# Patient Record
Sex: MALE | Race: WHITE | NOT HISPANIC OR LATINO | URBAN - METROPOLITAN AREA
[De-identification: names, ages, dates, MRNs, and addresses within clinical notes are randomized per-mention and may not be internally consistent; named-entity substitution may affect disease eponyms.]

---

## 2017-04-18 ENCOUNTER — OFFICE (OUTPATIENT)
Dept: URBAN - METROPOLITAN AREA CLINIC 75 | Facility: CLINIC | Age: 67
End: 2017-04-18

## 2017-04-18 VITALS
SYSTOLIC BLOOD PRESSURE: 140 MMHG | WEIGHT: 196 LBS | HEART RATE: 72 BPM | DIASTOLIC BLOOD PRESSURE: 80 MMHG | HEIGHT: 68 IN

## 2017-04-18 DIAGNOSIS — R13.10 DYSPHAGIA, UNSPECIFIED: ICD-10-CM

## 2017-04-18 DIAGNOSIS — Z86.010 PERSONAL HISTORY OF COLONIC POLYPS: ICD-10-CM

## 2017-04-18 DIAGNOSIS — K21.9 GASTRO-ESOPHAGEAL REFLUX DISEASE WITHOUT ESOPHAGITIS: ICD-10-CM

## 2017-04-18 PROCEDURE — 99204 OFFICE O/P NEW MOD 45 MIN: CPT | Performed by: INTERNAL MEDICINE

## 2017-04-18 RX ORDER — ESOMEPRAZOLE MAGNESIUM 40 MG/1
80 CAPSULE, DELAYED RELEASE ORAL
Qty: 60 | Refills: 2 | Status: COMPLETED
Start: 2015-11-01 | End: 2018-02-08

## 2018-02-08 ENCOUNTER — OFFICE (OUTPATIENT)
Dept: URBAN - METROPOLITAN AREA CLINIC 75 | Facility: CLINIC | Age: 68
End: 2018-02-08
Payer: MEDICAID

## 2018-02-08 VITALS
SYSTOLIC BLOOD PRESSURE: 124 MMHG | HEIGHT: 68 IN | DIASTOLIC BLOOD PRESSURE: 80 MMHG | HEART RATE: 73 BPM | WEIGHT: 205 LBS

## 2018-02-08 DIAGNOSIS — K22.0 ACHALASIA OF CARDIA: ICD-10-CM

## 2018-02-08 DIAGNOSIS — K59.1 FUNCTIONAL DIARRHEA: ICD-10-CM

## 2018-02-08 DIAGNOSIS — K21.9 GASTRO-ESOPHAGEAL REFLUX DISEASE WITHOUT ESOPHAGITIS: ICD-10-CM

## 2018-02-08 DIAGNOSIS — R13.10 DYSPHAGIA, UNSPECIFIED: ICD-10-CM

## 2018-02-08 DIAGNOSIS — R10.13 EPIGASTRIC PAIN: ICD-10-CM

## 2018-02-08 DIAGNOSIS — R14.0 ABDOMINAL DISTENSION (GASEOUS): ICD-10-CM

## 2018-02-08 DIAGNOSIS — Z80.9 FAMILY HISTORY OF MALIGNANT NEOPLASM, UNSPECIFIED: ICD-10-CM

## 2018-02-08 DIAGNOSIS — R10.827 GENERALIZED REBOUND ABDOMINAL TENDERNESS: ICD-10-CM

## 2018-02-08 DIAGNOSIS — Z86.010 PERSONAL HISTORY OF COLONIC POLYPS: ICD-10-CM

## 2018-02-08 PROCEDURE — 99214 OFFICE O/P EST MOD 30 MIN: CPT

## 2018-02-08 RX ORDER — PANTOPRAZOLE SODIUM 40 MG/1
TABLET, DELAYED RELEASE ORAL
Qty: 180 | Refills: 1 | Status: ACTIVE

## 2018-02-08 RX ORDER — ESOMEPRAZOLE MAGNESIUM 40 MG/1
80 CAPSULE, DELAYED RELEASE ORAL
Qty: 60 | Refills: 2 | Status: COMPLETED
Start: 2015-11-01 | End: 2018-02-08

## 2021-06-09 ENCOUNTER — APPOINTMENT (OUTPATIENT)
Dept: GENERAL RADIOLOGY | Facility: HOSPITAL | Age: 71
End: 2021-06-09

## 2021-06-09 ENCOUNTER — HOSPITAL ENCOUNTER (INPATIENT)
Facility: HOSPITAL | Age: 71
LOS: 4 days | Discharge: HOME | End: 2021-06-13
Attending: EMERGENCY MEDICINE | Admitting: STUDENT IN AN ORGANIZED HEALTH CARE EDUCATION/TRAINING PROGRAM

## 2021-06-09 DIAGNOSIS — I50.9 ACUTE ON CHRONIC CONGESTIVE HEART FAILURE, UNSPECIFIED HEART FAILURE TYPE (HCC): Primary | ICD-10-CM

## 2021-06-09 DIAGNOSIS — I10 PRIMARY HYPERTENSION: ICD-10-CM

## 2021-06-09 DIAGNOSIS — I50.33 ACUTE ON CHRONIC DIASTOLIC HEART FAILURE (HCC): ICD-10-CM

## 2021-06-09 PROBLEM — E87.6 HYPOKALEMIA: Status: ACTIVE | Noted: 2021-06-09

## 2021-06-09 PROBLEM — Z95.1 HX OF CABG: Status: ACTIVE | Noted: 2021-06-09

## 2021-06-09 PROBLEM — K59.09 CHRONIC CONSTIPATION: Status: ACTIVE | Noted: 2018-07-06

## 2021-06-09 PROBLEM — Z99.81 OXYGEN DEPENDENT: Status: ACTIVE | Noted: 2018-07-06

## 2021-06-09 PROBLEM — E78.5 HYPERLIPIDEMIA: Status: ACTIVE | Noted: 2021-06-09

## 2021-06-09 LAB
ALBUMIN SERPL-MCNC: 4 G/DL (ref 3.5–5.2)
ALBUMIN/GLOB SERPL: 1.3 G/DL
ALP SERPL-CCNC: 114 U/L (ref 39–117)
ALT SERPL W P-5'-P-CCNC: 21 U/L (ref 1–41)
ANION GAP SERPL CALCULATED.3IONS-SCNC: 6.1 MMOL/L (ref 5–15)
AST SERPL-CCNC: 20 U/L (ref 1–40)
BASOPHILS # BLD AUTO: 0.05 10*3/MM3 (ref 0–0.2)
BASOPHILS NFR BLD AUTO: 0.5 % (ref 0–1.5)
BILIRUB SERPL-MCNC: 0.5 MG/DL (ref 0–1.2)
BUN SERPL-MCNC: 13 MG/DL (ref 8–23)
BUN/CREAT SERPL: 12.3 (ref 7–25)
CALCIUM SPEC-SCNC: 8.9 MG/DL (ref 8.6–10.5)
CHLORIDE SERPL-SCNC: 106 MMOL/L (ref 98–107)
CO2 SERPL-SCNC: 33.9 MMOL/L (ref 22–29)
CREAT SERPL-MCNC: 1.06 MG/DL (ref 0.76–1.27)
DEPRECATED RDW RBC AUTO: 47.2 FL (ref 37–54)
EOSINOPHIL # BLD AUTO: 0.57 10*3/MM3 (ref 0–0.4)
EOSINOPHIL NFR BLD AUTO: 6.1 % (ref 0.3–6.2)
ERYTHROCYTE [DISTWIDTH] IN BLOOD BY AUTOMATED COUNT: 14.7 % (ref 12.3–15.4)
GFR SERPL CREATININE-BSD FRML MDRD: 69 ML/MIN/1.73
GLOBULIN UR ELPH-MCNC: 3.1 GM/DL
GLUCOSE SERPL-MCNC: 94 MG/DL (ref 65–99)
HCT VFR BLD AUTO: 41 % (ref 37.5–51)
HGB BLD-MCNC: 14 G/DL (ref 13–17.7)
LYMPHOCYTES # BLD AUTO: 2.39 10*3/MM3 (ref 0.7–3.1)
LYMPHOCYTES NFR BLD AUTO: 25.4 % (ref 19.6–45.3)
MCH RBC QN AUTO: 30.6 PG (ref 26.6–33)
MCHC RBC AUTO-ENTMCNC: 34.1 G/DL (ref 31.5–35.7)
MCV RBC AUTO: 89.7 FL (ref 79–97)
MONOCYTES # BLD AUTO: 0.85 10*3/MM3 (ref 0.1–0.9)
MONOCYTES NFR BLD AUTO: 9 % (ref 5–12)
NEUTROPHILS NFR BLD AUTO: 5.55 10*3/MM3 (ref 1.7–7)
NEUTROPHILS NFR BLD AUTO: 58.9 % (ref 42.7–76)
NT-PROBNP SERPL-MCNC: 1524 PG/ML (ref 0–900)
PLATELET # BLD AUTO: 193 10*3/MM3 (ref 140–450)
PMV BLD AUTO: 12.3 FL (ref 6–12)
POTASSIUM SERPL-SCNC: 3 MMOL/L (ref 3.5–5.2)
PROT SERPL-MCNC: 7.1 G/DL (ref 6–8.5)
RBC # BLD AUTO: 4.57 10*6/MM3 (ref 4.14–5.8)
SCHISTOCYTES BLD QL SMEAR: NORMAL
SODIUM SERPL-SCNC: 146 MMOL/L (ref 136–145)
TROPONIN T SERPL-MCNC: <0.01 NG/ML (ref 0–0.03)
WBC # BLD AUTO: 9.42 10*3/MM3 (ref 3.4–10.8)

## 2021-06-09 PROCEDURE — U0005 INFEC AGEN DETEC AMPLI PROBE: HCPCS | Performed by: EMERGENCY MEDICINE

## 2021-06-09 PROCEDURE — 84484 ASSAY OF TROPONIN QUANT: CPT | Performed by: EMERGENCY MEDICINE

## 2021-06-09 PROCEDURE — 93005 ELECTROCARDIOGRAM TRACING: CPT | Performed by: EMERGENCY MEDICINE

## 2021-06-09 PROCEDURE — 85025 COMPLETE CBC W/AUTO DIFF WBC: CPT | Performed by: EMERGENCY MEDICINE

## 2021-06-09 PROCEDURE — 83880 ASSAY OF NATRIURETIC PEPTIDE: CPT | Performed by: EMERGENCY MEDICINE

## 2021-06-09 PROCEDURE — 93010 ELECTROCARDIOGRAM REPORT: CPT | Performed by: INTERNAL MEDICINE

## 2021-06-09 PROCEDURE — 80053 COMPREHEN METABOLIC PANEL: CPT | Performed by: EMERGENCY MEDICINE

## 2021-06-09 PROCEDURE — 71045 X-RAY EXAM CHEST 1 VIEW: CPT

## 2021-06-09 PROCEDURE — 99284 EMERGENCY DEPT VISIT MOD MDM: CPT

## 2021-06-09 PROCEDURE — 83735 ASSAY OF MAGNESIUM: CPT | Performed by: NURSE PRACTITIONER

## 2021-06-09 PROCEDURE — 25010000002 FUROSEMIDE PER 20 MG: Performed by: EMERGENCY MEDICINE

## 2021-06-09 PROCEDURE — U0004 COV-19 TEST NON-CDC HGH THRU: HCPCS | Performed by: EMERGENCY MEDICINE

## 2021-06-09 PROCEDURE — 85007 BL SMEAR W/DIFF WBC COUNT: CPT | Performed by: EMERGENCY MEDICINE

## 2021-06-09 RX ORDER — ACETAMINOPHEN 650 MG/1
650 SUPPOSITORY RECTAL EVERY 4 HOURS PRN
Status: DISCONTINUED | OUTPATIENT
Start: 2021-06-09 | End: 2021-06-13 | Stop reason: HOSPADM

## 2021-06-09 RX ORDER — ACETAMINOPHEN 325 MG/1
650 TABLET ORAL EVERY 4 HOURS PRN
Status: DISCONTINUED | OUTPATIENT
Start: 2021-06-09 | End: 2021-06-13 | Stop reason: HOSPADM

## 2021-06-09 RX ORDER — SODIUM CHLORIDE 0.9 % (FLUSH) 0.9 %
10 SYRINGE (ML) INJECTION AS NEEDED
Status: DISCONTINUED | OUTPATIENT
Start: 2021-06-09 | End: 2021-06-13 | Stop reason: HOSPADM

## 2021-06-09 RX ORDER — FUROSEMIDE 20 MG/1
20 TABLET ORAL DAILY
COMMUNITY
End: 2021-06-13 | Stop reason: HOSPADM

## 2021-06-09 RX ORDER — FUROSEMIDE 10 MG/ML
40 INJECTION INTRAMUSCULAR; INTRAVENOUS EVERY 12 HOURS
Status: DISCONTINUED | OUTPATIENT
Start: 2021-06-10 | End: 2021-06-11

## 2021-06-09 RX ORDER — FAMOTIDINE 20 MG/1
20 TABLET, FILM COATED ORAL 2 TIMES DAILY
COMMUNITY

## 2021-06-09 RX ORDER — DICYCLOMINE HYDROCHLORIDE 10 MG/1
10 CAPSULE ORAL 3 TIMES DAILY PRN
COMMUNITY

## 2021-06-09 RX ORDER — POTASSIUM CHLORIDE 20 MEQ/1
20 TABLET, EXTENDED RELEASE ORAL 2 TIMES DAILY
COMMUNITY
End: 2021-06-13 | Stop reason: HOSPADM

## 2021-06-09 RX ORDER — CLONIDINE HYDROCHLORIDE 0.1 MG/1
0.1 TABLET ORAL 2 TIMES DAILY
COMMUNITY

## 2021-06-09 RX ORDER — NITROGLYCERIN 0.4 MG/1
0.4 TABLET SUBLINGUAL
Status: DISCONTINUED | OUTPATIENT
Start: 2021-06-09 | End: 2021-06-13 | Stop reason: HOSPADM

## 2021-06-09 RX ORDER — LABETALOL HYDROCHLORIDE 5 MG/ML
20 INJECTION, SOLUTION INTRAVENOUS ONCE
Status: COMPLETED | OUTPATIENT
Start: 2021-06-09 | End: 2021-06-09

## 2021-06-09 RX ORDER — POTASSIUM CHLORIDE 7.45 MG/ML
10 INJECTION INTRAVENOUS
Status: DISCONTINUED | OUTPATIENT
Start: 2021-06-09 | End: 2021-06-13 | Stop reason: HOSPADM

## 2021-06-09 RX ORDER — POTASSIUM CHLORIDE 1.5 G/1.77G
40 POWDER, FOR SOLUTION ORAL AS NEEDED
Status: DISCONTINUED | OUTPATIENT
Start: 2021-06-09 | End: 2021-06-13 | Stop reason: HOSPADM

## 2021-06-09 RX ORDER — HYDROCODONE BITARTRATE AND ACETAMINOPHEN 7.5; 325 MG/1; MG/1
1 TABLET ORAL EVERY 12 HOURS PRN
COMMUNITY

## 2021-06-09 RX ORDER — LISINOPRIL 40 MG/1
40 TABLET ORAL DAILY
COMMUNITY
End: 2021-06-13 | Stop reason: HOSPADM

## 2021-06-09 RX ORDER — LABETALOL HYDROCHLORIDE 5 MG/ML
40 INJECTION, SOLUTION INTRAVENOUS ONCE
Status: COMPLETED | OUTPATIENT
Start: 2021-06-09 | End: 2021-06-09

## 2021-06-09 RX ORDER — KETOCONAZOLE 20 MG/G
CREAM TOPICAL DAILY
COMMUNITY

## 2021-06-09 RX ORDER — ATORVASTATIN CALCIUM 40 MG/1
40 TABLET, FILM COATED ORAL DAILY
COMMUNITY

## 2021-06-09 RX ORDER — CARVEDILOL 6.25 MG/1
6.25 TABLET ORAL 2 TIMES DAILY WITH MEALS
COMMUNITY
End: 2021-06-13 | Stop reason: HOSPADM

## 2021-06-09 RX ORDER — ONDANSETRON 2 MG/ML
4 INJECTION INTRAMUSCULAR; INTRAVENOUS EVERY 6 HOURS PRN
Status: DISCONTINUED | OUTPATIENT
Start: 2021-06-09 | End: 2021-06-13 | Stop reason: HOSPADM

## 2021-06-09 RX ORDER — POTASSIUM CHLORIDE 750 MG/1
40 TABLET, FILM COATED, EXTENDED RELEASE ORAL AS NEEDED
Status: DISCONTINUED | OUTPATIENT
Start: 2021-06-09 | End: 2021-06-13 | Stop reason: HOSPADM

## 2021-06-09 RX ORDER — FUROSEMIDE 10 MG/ML
60 INJECTION INTRAMUSCULAR; INTRAVENOUS ONCE
Status: COMPLETED | OUTPATIENT
Start: 2021-06-09 | End: 2021-06-09

## 2021-06-09 RX ORDER — SODIUM CHLORIDE 0.9 % (FLUSH) 0.9 %
10 SYRINGE (ML) INJECTION EVERY 12 HOURS SCHEDULED
Status: DISCONTINUED | OUTPATIENT
Start: 2021-06-09 | End: 2021-06-13 | Stop reason: HOSPADM

## 2021-06-09 RX ORDER — ACETAMINOPHEN 160 MG/5ML
650 SOLUTION ORAL EVERY 4 HOURS PRN
Status: DISCONTINUED | OUTPATIENT
Start: 2021-06-09 | End: 2021-06-13 | Stop reason: HOSPADM

## 2021-06-09 RX ADMIN — FUROSEMIDE 60 MG: 10 INJECTION, SOLUTION INTRAMUSCULAR; INTRAVENOUS at 21:14

## 2021-06-09 RX ADMIN — LABETALOL HYDROCHLORIDE 20 MG: 5 INJECTION, SOLUTION INTRAVENOUS at 22:02

## 2021-06-09 RX ADMIN — LABETALOL HYDROCHLORIDE 40 MG: 5 INJECTION, SOLUTION INTRAVENOUS at 23:24

## 2021-06-09 NOTE — ED NOTES
Pt arrived via PV with c/o SOB abnormal level. Pt states his PCP called and stated lab for CHF is elevated. Pt confirms leg swelling and weight gain.     This RN in mask, pt placed in mask.      Ivette Carr, RN  06/09/21 8961

## 2021-06-10 ENCOUNTER — APPOINTMENT (OUTPATIENT)
Dept: CARDIOLOGY | Facility: HOSPITAL | Age: 71
End: 2021-06-10

## 2021-06-10 PROBLEM — J44.9 COPD (CHRONIC OBSTRUCTIVE PULMONARY DISEASE): Status: ACTIVE | Noted: 2021-06-10

## 2021-06-10 PROBLEM — I16.0 HYPERTENSIVE URGENCY: Status: ACTIVE | Noted: 2021-06-10

## 2021-06-10 PROBLEM — I25.810 CORONARY ARTERY DISEASE INVOLVING CORONARY BYPASS GRAFT OF NATIVE HEART WITHOUT ANGINA PECTORIS: Status: ACTIVE | Noted: 2021-06-10

## 2021-06-10 LAB
ANION GAP SERPL CALCULATED.3IONS-SCNC: 10.6 MMOL/L (ref 5–15)
AORTIC DIMENSIONLESS INDEX: 0.8 (DI)
BASOPHILS # BLD AUTO: 0.07 10*3/MM3 (ref 0–0.2)
BASOPHILS NFR BLD AUTO: 0.6 % (ref 0–1.5)
BH CV ECHO MEAS - AO MAX PG (FULL): 8.9 MMHG
BH CV ECHO MEAS - AO MAX PG: 15 MMHG
BH CV ECHO MEAS - AO MEAN PG (FULL): 6 MMHG
BH CV ECHO MEAS - AO MEAN PG: 9 MMHG
BH CV ECHO MEAS - AO ROOT AREA (BSA CORRECTED): 1.7
BH CV ECHO MEAS - AO ROOT AREA: 9.6 CM^2
BH CV ECHO MEAS - AO ROOT DIAM: 3.5 CM
BH CV ECHO MEAS - AO V2 MAX: 193 CM/SEC
BH CV ECHO MEAS - AO V2 MEAN: 142 CM/SEC
BH CV ECHO MEAS - AO V2 VTI: 37.5 CM
BH CV ECHO MEAS - AVA(I,A): 2.9 CM^2
BH CV ECHO MEAS - AVA(I,D): 2.9 CM^2
BH CV ECHO MEAS - AVA(V,A): 2.6 CM^2
BH CV ECHO MEAS - AVA(V,D): 2.6 CM^2
BH CV ECHO MEAS - BSA(HAYCOCK): 2.1 M^2
BH CV ECHO MEAS - BSA: 2.1 M^2
BH CV ECHO MEAS - BZI_BMI: 30.9 KILOGRAMS/M^2
BH CV ECHO MEAS - BZI_METRIC_HEIGHT: 172.7 CM
BH CV ECHO MEAS - BZI_METRIC_WEIGHT: 92.1 KG
BH CV ECHO MEAS - EDV(CUBED): 132.7 ML
BH CV ECHO MEAS - EDV(MOD-SP2): 133 ML
BH CV ECHO MEAS - EDV(MOD-SP4): 133 ML
BH CV ECHO MEAS - EDV(TEICH): 123.8 ML
BH CV ECHO MEAS - EF(CUBED): 70.4 %
BH CV ECHO MEAS - EF(MOD-BP): 56 %
BH CV ECHO MEAS - EF(MOD-SP2): 54.9 %
BH CV ECHO MEAS - EF(MOD-SP4): 60.9 %
BH CV ECHO MEAS - EF(TEICH): 61.7 %
BH CV ECHO MEAS - ESV(CUBED): 39.3 ML
BH CV ECHO MEAS - ESV(MOD-SP2): 60 ML
BH CV ECHO MEAS - ESV(MOD-SP4): 52 ML
BH CV ECHO MEAS - ESV(TEICH): 47.4 ML
BH CV ECHO MEAS - FS: 33.3 %
BH CV ECHO MEAS - IVS/LVPW: 1.1
BH CV ECHO MEAS - IVSD: 1 CM
BH CV ECHO MEAS - LAT PEAK E' VEL: 4.8 CM/SEC
BH CV ECHO MEAS - LV DIASTOLIC VOL/BSA (35-75): 64.7 ML/M^2
BH CV ECHO MEAS - LV MASS(C)D: 175.6 GRAMS
BH CV ECHO MEAS - LV MASS(C)DI: 85.4 GRAMS/M^2
BH CV ECHO MEAS - LV MAX PG: 6 MMHG
BH CV ECHO MEAS - LV MEAN PG: 3 MMHG
BH CV ECHO MEAS - LV SYSTOLIC VOL/BSA (12-30): 25.3 ML/M^2
BH CV ECHO MEAS - LV V1 MAX: 122 CM/SEC
BH CV ECHO MEAS - LV V1 MEAN: 80.1 CM/SEC
BH CV ECHO MEAS - LV V1 VTI: 25.8 CM
BH CV ECHO MEAS - LVIDD: 5.1 CM
BH CV ECHO MEAS - LVIDS: 3.4 CM
BH CV ECHO MEAS - LVLD AP2: 8 CM
BH CV ECHO MEAS - LVLD AP4: 8.3 CM
BH CV ECHO MEAS - LVLS AP2: 6.4 CM
BH CV ECHO MEAS - LVLS AP4: 7.2 CM
BH CV ECHO MEAS - LVOT AREA (M): 4.2 CM^2
BH CV ECHO MEAS - LVOT AREA: 4.2 CM^2
BH CV ECHO MEAS - LVOT DIAM: 2.3 CM
BH CV ECHO MEAS - LVPWD: 0.9 CM
BH CV ECHO MEAS - MED PEAK E' VEL: 5.7 CM/SEC
BH CV ECHO MEAS - MV A DUR: 0.09 SEC
BH CV ECHO MEAS - MV A MAX VEL: 100 CM/SEC
BH CV ECHO MEAS - MV DEC SLOPE: 380.5 CM/SEC^2
BH CV ECHO MEAS - MV DEC TIME: 263 SEC
BH CV ECHO MEAS - MV E MAX VEL: 95.5 CM/SEC
BH CV ECHO MEAS - MV E/A: 0.96
BH CV ECHO MEAS - MV MAX PG: 4.7 MMHG
BH CV ECHO MEAS - MV MEAN PG: 3 MMHG
BH CV ECHO MEAS - MV P1/2T MAX VEL: 106 CM/SEC
BH CV ECHO MEAS - MV P1/2T: 81.6 MSEC
BH CV ECHO MEAS - MV V2 MAX: 108 CM/SEC
BH CV ECHO MEAS - MV V2 MEAN: 85.7 CM/SEC
BH CV ECHO MEAS - MV V2 VTI: 33 CM
BH CV ECHO MEAS - MVA P1/2T LCG: 2.1 CM^2
BH CV ECHO MEAS - MVA(P1/2T): 2.7 CM^2
BH CV ECHO MEAS - MVA(VTI): 3.2 CM^2
BH CV ECHO MEAS - PA ACC TIME: 0.1 SEC
BH CV ECHO MEAS - PA MAX PG (FULL): 1.2 MMHG
BH CV ECHO MEAS - PA MAX PG: 3.8 MMHG
BH CV ECHO MEAS - PA PR(ACCEL): 36.3 MMHG
BH CV ECHO MEAS - PA V2 MAX: 97 CM/SEC
BH CV ECHO MEAS - RAP SYSTOLE: 8 MMHG
BH CV ECHO MEAS - RV MAX PG: 2.6 MMHG
BH CV ECHO MEAS - RV MEAN PG: 1 MMHG
BH CV ECHO MEAS - RV V1 MAX: 80.3 CM/SEC
BH CV ECHO MEAS - RV V1 MEAN: 50.7 CM/SEC
BH CV ECHO MEAS - RV V1 VTI: 13.9 CM
BH CV ECHO MEAS - SI(AO): 175.4 ML/M^2
BH CV ECHO MEAS - SI(CUBED): 45.4 ML/M^2
BH CV ECHO MEAS - SI(LVOT): 52.1 ML/M^2
BH CV ECHO MEAS - SI(MOD-SP2): 35.5 ML/M^2
BH CV ECHO MEAS - SI(MOD-SP4): 39.4 ML/M^2
BH CV ECHO MEAS - SI(TEICH): 37.1 ML/M^2
BH CV ECHO MEAS - SV(AO): 360.8 ML
BH CV ECHO MEAS - SV(CUBED): 93.3 ML
BH CV ECHO MEAS - SV(LVOT): 107.2 ML
BH CV ECHO MEAS - SV(MOD-SP2): 73 ML
BH CV ECHO MEAS - SV(MOD-SP4): 81 ML
BH CV ECHO MEAS - SV(TEICH): 76.4 ML
BH CV ECHO MEAS - TAPSE (>1.6): 2.7 CM
BH CV ECHO MEASUREMENTS AVERAGE E/E' RATIO: 18.19
BH CV XLRA - RV BASE: 4.4 CM
BH CV XLRA - RV LENGTH: 8.1 CM
BH CV XLRA - RV MID: 2.4 CM
BH CV XLRA - TDI S': 12.3 CM/SEC
BUN SERPL-MCNC: 11 MG/DL (ref 8–23)
BUN/CREAT SERPL: 11.1 (ref 7–25)
CALCIUM SPEC-SCNC: 8.5 MG/DL (ref 8.6–10.5)
CHLORIDE SERPL-SCNC: 102 MMOL/L (ref 98–107)
CO2 SERPL-SCNC: 31.4 MMOL/L (ref 22–29)
CREAT SERPL-MCNC: 0.99 MG/DL (ref 0.76–1.27)
DEPRECATED RDW RBC AUTO: 45.8 FL (ref 37–54)
EOSINOPHIL # BLD AUTO: 0.39 10*3/MM3 (ref 0–0.4)
EOSINOPHIL NFR BLD AUTO: 3.2 % (ref 0.3–6.2)
ERYTHROCYTE [DISTWIDTH] IN BLOOD BY AUTOMATED COUNT: 14.1 % (ref 12.3–15.4)
GFR SERPL CREATININE-BSD FRML MDRD: 74 ML/MIN/1.73
GLUCOSE SERPL-MCNC: 129 MG/DL (ref 65–99)
HCT VFR BLD AUTO: 40.6 % (ref 37.5–51)
HGB BLD-MCNC: 14.1 G/DL (ref 13–17.7)
IMM GRANULOCYTES # BLD AUTO: 0.05 10*3/MM3 (ref 0–0.05)
IMM GRANULOCYTES NFR BLD AUTO: 0.4 % (ref 0–0.5)
LEFT ATRIUM VOLUME INDEX: 33.4 ML/M2
LYMPHOCYTES # BLD AUTO: 2.26 10*3/MM3 (ref 0.7–3.1)
LYMPHOCYTES NFR BLD AUTO: 18.8 % (ref 19.6–45.3)
MAGNESIUM SERPL-MCNC: 1.9 MG/DL (ref 1.6–2.4)
MAXIMAL PREDICTED HEART RATE: 148 BPM
MCH RBC QN AUTO: 30.9 PG (ref 26.6–33)
MCHC RBC AUTO-ENTMCNC: 34.7 G/DL (ref 31.5–35.7)
MCV RBC AUTO: 88.8 FL (ref 79–97)
MONOCYTES # BLD AUTO: 1.53 10*3/MM3 (ref 0.1–0.9)
MONOCYTES NFR BLD AUTO: 12.7 % (ref 5–12)
NEUTROPHILS NFR BLD AUTO: 64.3 % (ref 42.7–76)
NEUTROPHILS NFR BLD AUTO: 7.73 10*3/MM3 (ref 1.7–7)
NRBC BLD AUTO-RTO: 0 /100 WBC (ref 0–0.2)
PLATELET # BLD AUTO: 272 10*3/MM3 (ref 140–450)
PMV BLD AUTO: 11.6 FL (ref 6–12)
POTASSIUM SERPL-SCNC: 2.8 MMOL/L (ref 3.5–5.2)
QT INTERVAL: 430 MS
RBC # BLD AUTO: 4.57 10*6/MM3 (ref 4.14–5.8)
SARS-COV-2 ORF1AB RESP QL NAA+PROBE: NOT DETECTED
SODIUM SERPL-SCNC: 144 MMOL/L (ref 136–145)
STRESS TARGET HR: 126 BPM
WBC # BLD AUTO: 12.03 10*3/MM3 (ref 3.4–10.8)

## 2021-06-10 PROCEDURE — 93306 TTE W/DOPPLER COMPLETE: CPT

## 2021-06-10 PROCEDURE — 85025 COMPLETE CBC W/AUTO DIFF WBC: CPT | Performed by: NURSE PRACTITIONER

## 2021-06-10 PROCEDURE — 25010000002 PERFLUTREN (DEFINITY) 8.476 MG IN SODIUM CHLORIDE (PF) 0.9 % 10 ML INJECTION: Performed by: NURSE PRACTITIONER

## 2021-06-10 PROCEDURE — 36415 COLL VENOUS BLD VENIPUNCTURE: CPT | Performed by: NURSE PRACTITIONER

## 2021-06-10 PROCEDURE — 80048 BASIC METABOLIC PNL TOTAL CA: CPT | Performed by: NURSE PRACTITIONER

## 2021-06-10 PROCEDURE — 25010000002 FUROSEMIDE PER 20 MG: Performed by: NURSE PRACTITIONER

## 2021-06-10 PROCEDURE — 93306 TTE W/DOPPLER COMPLETE: CPT | Performed by: INTERNAL MEDICINE

## 2021-06-10 RX ORDER — CARVEDILOL 12.5 MG/1
12.5 TABLET ORAL 2 TIMES DAILY WITH MEALS
Status: DISCONTINUED | OUTPATIENT
Start: 2021-06-10 | End: 2021-06-13 | Stop reason: HOSPADM

## 2021-06-10 RX ORDER — FAMOTIDINE 20 MG/1
20 TABLET, FILM COATED ORAL
Status: DISCONTINUED | OUTPATIENT
Start: 2021-06-10 | End: 2021-06-13 | Stop reason: HOSPADM

## 2021-06-10 RX ORDER — LISINOPRIL 40 MG/1
40 TABLET ORAL DAILY
Status: DISCONTINUED | OUTPATIENT
Start: 2021-06-10 | End: 2021-06-12

## 2021-06-10 RX ORDER — ATORVASTATIN CALCIUM 20 MG/1
40 TABLET, FILM COATED ORAL DAILY
Status: DISCONTINUED | OUTPATIENT
Start: 2021-06-10 | End: 2021-06-13 | Stop reason: HOSPADM

## 2021-06-10 RX ORDER — SPIRONOLACTONE 50 MG/1
50 TABLET, FILM COATED ORAL DAILY
Status: DISCONTINUED | OUTPATIENT
Start: 2021-06-10 | End: 2021-06-13 | Stop reason: HOSPADM

## 2021-06-10 RX ORDER — CARVEDILOL 6.25 MG/1
6.25 TABLET ORAL 2 TIMES DAILY WITH MEALS
Status: DISCONTINUED | OUTPATIENT
Start: 2021-06-10 | End: 2021-06-10

## 2021-06-10 RX ORDER — DICYCLOMINE HYDROCHLORIDE 10 MG/1
10 CAPSULE ORAL 3 TIMES DAILY PRN
Status: DISCONTINUED | OUTPATIENT
Start: 2021-06-10 | End: 2021-06-13 | Stop reason: HOSPADM

## 2021-06-10 RX ORDER — CLONIDINE HYDROCHLORIDE 0.1 MG/1
0.1 TABLET ORAL EVERY 12 HOURS SCHEDULED
Status: DISCONTINUED | OUTPATIENT
Start: 2021-06-10 | End: 2021-06-13 | Stop reason: HOSPADM

## 2021-06-10 RX ORDER — HYDROCODONE BITARTRATE AND ACETAMINOPHEN 7.5; 325 MG/1; MG/1
1 TABLET ORAL EVERY 12 HOURS PRN
Status: DISCONTINUED | OUTPATIENT
Start: 2021-06-10 | End: 2021-06-13 | Stop reason: HOSPADM

## 2021-06-10 RX ORDER — ASPIRIN 81 MG/1
81 TABLET, CHEWABLE ORAL DAILY
Status: DISCONTINUED | OUTPATIENT
Start: 2021-06-10 | End: 2021-06-13 | Stop reason: HOSPADM

## 2021-06-10 RX ADMIN — ATORVASTATIN CALCIUM 40 MG: 20 TABLET, FILM COATED ORAL at 09:19

## 2021-06-10 RX ADMIN — POTASSIUM CHLORIDE 40 MEQ: 750 TABLET, EXTENDED RELEASE ORAL at 20:42

## 2021-06-10 RX ADMIN — SODIUM CHLORIDE 2.5 MG/HR: 9 INJECTION, SOLUTION INTRAVENOUS at 18:45

## 2021-06-10 RX ADMIN — SODIUM CHLORIDE 2.5 MG/HR: 9 INJECTION, SOLUTION INTRAVENOUS at 07:13

## 2021-06-10 RX ADMIN — HYDROCODONE BITARTRATE AND ACETAMINOPHEN 1 TABLET: 7.5; 325 TABLET ORAL at 20:42

## 2021-06-10 RX ADMIN — SODIUM CHLORIDE 5 MG/HR: 9 INJECTION, SOLUTION INTRAVENOUS at 00:49

## 2021-06-10 RX ADMIN — FUROSEMIDE 40 MG: 10 INJECTION, SOLUTION INTRAMUSCULAR; INTRAVENOUS at 20:42

## 2021-06-10 RX ADMIN — FAMOTIDINE 20 MG: 20 TABLET, FILM COATED ORAL at 07:21

## 2021-06-10 RX ADMIN — SODIUM CHLORIDE, PRESERVATIVE FREE 10 ML: 5 INJECTION INTRAVENOUS at 09:19

## 2021-06-10 RX ADMIN — FUROSEMIDE 40 MG: 10 INJECTION, SOLUTION INTRAMUSCULAR; INTRAVENOUS at 09:18

## 2021-06-10 RX ADMIN — CARVEDILOL 12.5 MG: 12.5 TABLET, FILM COATED ORAL at 18:46

## 2021-06-10 RX ADMIN — POTASSIUM CHLORIDE 40 MEQ: 750 TABLET, EXTENDED RELEASE ORAL at 06:26

## 2021-06-10 RX ADMIN — LISINOPRIL 40 MG: 40 TABLET ORAL at 09:18

## 2021-06-10 RX ADMIN — POTASSIUM CHLORIDE 40 MEQ: 750 TABLET, EXTENDED RELEASE ORAL at 00:59

## 2021-06-10 RX ADMIN — CLONIDINE HYDROCHLORIDE 0.1 MG: 0.1 TABLET ORAL at 20:42

## 2021-06-10 RX ADMIN — PERFLUTREN 2 ML: 6.52 INJECTION, SUSPENSION INTRAVENOUS at 18:38

## 2021-06-10 RX ADMIN — SODIUM CHLORIDE 5 MG/HR: 9 INJECTION, SOLUTION INTRAVENOUS at 06:28

## 2021-06-10 RX ADMIN — SPIRONOLACTONE 50 MG: 50 TABLET, FILM COATED ORAL at 18:46

## 2021-06-10 RX ADMIN — SODIUM CHLORIDE, PRESERVATIVE FREE 10 ML: 5 INJECTION INTRAVENOUS at 21:36

## 2021-06-10 RX ADMIN — FAMOTIDINE 20 MG: 20 TABLET, FILM COATED ORAL at 18:46

## 2021-06-10 RX ADMIN — CARVEDILOL 6.25 MG: 6.25 TABLET, FILM COATED ORAL at 09:19

## 2021-06-10 RX ADMIN — POTASSIUM CHLORIDE 40 MEQ: 750 TABLET, EXTENDED RELEASE ORAL at 12:11

## 2021-06-10 RX ADMIN — HYDROCODONE BITARTRATE AND ACETAMINOPHEN 1 TABLET: 7.5; 325 TABLET ORAL at 06:26

## 2021-06-10 RX ADMIN — POTASSIUM CHLORIDE 40 MEQ: 750 TABLET, EXTENDED RELEASE ORAL at 15:40

## 2021-06-10 RX ADMIN — CLONIDINE HYDROCHLORIDE 0.1 MG: 0.1 TABLET ORAL at 09:19

## 2021-06-10 RX ADMIN — ASPIRIN 81 MG: 81 TABLET, CHEWABLE ORAL at 18:46

## 2021-06-10 NOTE — PROGRESS NOTES
Clinical Pharmacy Services: Medication History    Negrito Navarro is a 70 y.o. male presenting to [facility] for   Chief Complaint   Patient presents with   • Shortness of Breath       He  has a past medical history of CHF (congestive heart failure) (CMS/[facility]) and Hyperlipidemia.    Allergies as of 06/09/2021 - Reviewed 06/09/2021   Allergen Reaction Noted   • Shellfish-derived products Unknown - Low Severity 05/20/2013       Medication information was obtained from:patient and pharmacy    Pharmacy and Phone Number:   [id number] - [place], [place] - [address] AT [address] [facility] - [phone]  - [phone] FX  [address] [address]  Phone: [phone] Fax: [phone]          Prior to Admission Medications     Prescriptions Last Dose Informant Patient Reported? Taking?    atorvastatin (LIPITOR) 40 MG tablet  Pharmacy Yes Yes    Take 40 mg by mouth Daily.    carvedilol (COREG) 6.25 MG tablet  Pharmacy Yes Yes    Take 6.25 mg by mouth 2 (Two) Times a Day With Meals.    cloNIDine (CATAPRES) 0.1 MG tablet  Pharmacy Yes Yes    Take 0.1 mg by mouth 2 (Two) Times a Day.    dicyclomine (BENTYL) 10 MG capsule  Pharmacy Yes Yes    Take 10 mg by mouth 3 (Three) Times a Day As Needed.    famotidine (PEPCID) 20 MG tablet  Pharmacy Yes Yes    Take 20 mg by mouth 2 (Two) Times a Day.    furosemide (LASIX) 20 MG tablet  Pharmacy Yes Yes    Take 20 mg by mouth Daily.    HYDROcodone-acetaminophen (NORCO) 7.5-325 MG per tablet  Pharmacy Yes Yes    Take 1 tablet by mouth Every 12 (Twelve) Hours As Needed for Moderate Pain .    ketoconazole (NIZORAL) 2 % cream  Pharmacy Yes Yes    Apply  topically to the appropriate area as directed Daily.    lisinopril (PRINIVIL,ZESTRIL) 40 MG tablet  Pharmacy Yes Yes    Take 40 mg by mouth Daily.    potassium chloride (K-DUR,KLOR-CON) 20 MEQ CR tablet  Pharmacy Yes Yes    Take 20 mEq by mouth 2 (Two) Times a Day.             Medication notes:     This medication list is complete to the best of my knowledge as of 6/9/2021    Please call if questions.    Emerald [facility]  Medication History Technician  [phone]    6/9/2021 20:45 EDT

## 2021-06-10 NOTE — CONSULTS
Date of Hospital Visit: 06/10/21  Encounter Provider: Antelmo Ricks MD  Place of Service: [facility] CARDIOLOGY  Patient Name: Negrito Navarro  :1950  Referral Provider: Bobby Carlisle MD    Chief complaint: shortness of breath, abnormal lab    Reason for consultation: CHF    History of Present Illness    Mr. Navarro is a 70 year old man with oxygen dependent COPD, CAD s/p CABG (no details), HTN, obesity, and SERENITY.  He follows with Dr Terrell Robles for his cardiac care.     He presented to the [facility] ED yesterday at the request of his PCP. He had seen her a few days prior and reported increasing dyspnea, leg swelling, abdominal swelling, and orthopnea. He was placed on oral furosemide 20mg daily. His BNP was noted to be elevated and she referred him to the ED.     Here, his proBNP is 1500, K 3, Na 146. He was profoundly hypertensive, /130.  He was given IV labetalol, IV furosemide, and placed on IV nicardipine.      He is lying flat on oxygen and feels better but not at baseline. He denies chest pain. He denies palpitations, neuro changes, or syncope.     CXR 2021:  The cardiac silhouette is enlarged. There are postsurgical changes from CABG. There is calcific aortic atherosclerosis. There is mild airspace opacity in the lung bases bilaterally, left greater than right, which could be due to atelectasis. There is a small left pleural effusion versus scarring. There is asymmetric elevation of the left hemidiaphragm. Median sternal wires are intact. There is multilevel degenerative disc disease.    Previous cardiac testing:     Echocardiogram 2013:  Global left ventricular wall motion and contractility are within normal   limits.   The estimated ejection fraction is 55-60%.   No significant valvular abnormalities noted.   The study quality is fair.     Stress Test 2013:  1. Normal Lexiscan nuclear cardiac stress test.   2. No significant perfusion  abnormalities.   3. Normal left ventricular systolic function with ejection fraction   calculated at 64%.     Past Medical History:   Diagnosis Date   • Benign prostatic hyperplasia 11/10/2016   • COPD (chronic obstructive pulmonary disease) (CMS/[facility]) 6/10/2021   • Coronary artery disease involving coronary bypass graft of native heart without angina pectoris 6/10/2021   • Essential hypertension 11/10/2016   • Hyperlipidemia    • SERENITY (obstructive sleep apnea) 11/10/2016       History reviewed. No pertinent surgical history.    Prior to Admission medications    Medication Sig Start Date End Date Taking? Authorizing Provider   atorvastatin (LIPITOR) 40 MG tablet Take 40 mg by mouth Daily.   Yes Janelle Spaulding MD   carvedilol (COREG) 6.25 MG tablet Take 6.25 mg by mouth 2 (Two) Times a Day With Meals.   Yes Janelle Spaulding MD   cloNIDine (CATAPRES) 0.1 MG tablet Take 0.1 mg by mouth 2 (Two) Times a Day.   Yes Janelle Spaulding MD   dicyclomine (BENTYL) 10 MG capsule Take 10 mg by mouth 3 (Three) Times a Day As Needed.   Yes Janelle Spaulding MD   famotidine (PEPCID) 20 MG tablet Take 20 mg by mouth 2 (Two) Times a Day.   Yes Janelle Spaulding MD   furosemide (LASIX) 20 MG tablet Take 20 mg by mouth Daily.   Yes Janelle Spaulding MD   HYDROcodone-acetaminophen (NORCO) 7.5-325 MG per tablet Take 1 tablet by mouth Every 12 (Twelve) Hours As Needed for Moderate Pain .   Yes Janelle Spaulding MD   ketoconazole (NIZORAL) 2 % cream Apply  topically to the appropriate area as directed Daily.   Yes Janelle Spaulding MD   lisinopril (PRINIVIL,ZESTRIL) 40 MG tablet Take 40 mg by mouth Daily.   Yes Janelle Spaulding MD   potassium chloride (K-DUR,KLOR-CON) 20 MEQ CR tablet Take 20 mEq by mouth 2 (Two) Times a Day.   Yes Janelle Spaulding MD       Social History     Socioeconomic History   • Marital status: Legally      Spouse name: Not on file   • Number of children: Not on  "file   • Years of education: Not on file   • Highest education level: Not on file   Tobacco Use   • Smoking status: Never Smoker   • Smokeless tobacco: Never Used       History reviewed. No pertinent family history.    Review of Systems   Constitutional: Positive for unexpected weight change.   Respiratory: Positive for shortness of breath.    Cardiovascular: Positive for leg swelling.   Gastrointestinal: Positive for abdominal distention.   All other systems reviewed and are negative.       Objective:     Vitals:    06/10/21 1200 06/10/21 1230 06/10/21 1300 06/10/21 1323   BP: (!) 182/107 (!) 167/108 (!) 133/101 134/98   BP Location:   Left arm Left arm   Patient Position:   Lying Sitting   Pulse: 77 79 77 78   Resp:    18   Temp:    98.5 °F (36.9 °C)   TempSrc:    Oral   SpO2:    91%   Weight:       Height:         Body mass index is 30.96 kg/m².    Last Weight and Admission Weight        06/10/21  0554   Weight: 92.4 kg (203 lb 9.6 oz)     Flowsheet Rows      First Filed Value   Admission Height  172.7 cm (68\") Documented at 06/09/2021 1925   Admission Weight  96.4 kg (212 lb 8.4 oz) Documented at 06/09/2021 1925            Intake/Output Summary (Last 24 hours) at 6/10/2021 1501  Last data filed at 6/10/2021 1323  Gross per 24 hour   Intake --   Output 3400 ml   Net -3400 ml         Physical Exam  Constitutional:       General: He is not in acute distress.     Appearance: He is overweight.   HENT:      Head: Normocephalic.      Nose: Nose normal.   Eyes:      Conjunctiva/sclera: Conjunctivae normal.   Cardiovascular:      Rate and Rhythm: Normal rate and regular rhythm.      Pulses: Normal pulses.      Heart sounds: Normal heart sounds.   Pulmonary:      Effort: Pulmonary effort is normal.      Breath sounds: Normal breath sounds.   Abdominal:      Palpations: Abdomen is soft.      Tenderness: There is no abdominal tenderness.   Musculoskeletal:         General: Swelling (trace-1+) present.      Cervical back: " Normal range of motion.   Skin:     General: Skin is warm and dry.   Neurological:      General: No focal deficit present.      Mental Status: He is alert and oriented to person, place, and time.   Psychiatric:         Mood and Affect: Mood normal.                 Lab Review:                Results from last 7 days   Lab Units 06/10/21  0711   SODIUM mmol/L 144   POTASSIUM mmol/L 2.8*   CHLORIDE mmol/L 102   CO2 mmol/L 31.4*   BUN mg/dL 11   CREATININE mg/dL 0.99   GLUCOSE mg/dL 129*   CALCIUM mg/dL 8.5*     Results from last 7 days   Lab Units 06/09/21  2045   TROPONIN T ng/mL <0.010     Results from last 7 days   Lab Units 06/10/21  0711   WBC 10*3/mm3 12.03*   HEMOGLOBIN g/dL 14.1   HEMATOCRIT % 40.6   PLATELETS 10*3/mm3 272             Results from last 7 days   Lab Units 06/09/21  2045   MAGNESIUM mg/dL 1.9          Admission EKG 06/09/2021:        I personally viewed and interpreted the patient's EKG/Telemetry data -- SR, LVH with strain, no prior for comparison    Assessment/Plan:     1. Acute on chronic congestive heart failure (CMS/HCC)  2. Hypertensive urgency  3. HypoK/hyperNa  4. CAD s/p CABG  5. Oxygen dependent COPD  6. SERENITY    He is diuresing nicely. The combination of severe HTN, low K, high Na is extremely suggestive of hyperaldosteronism.     Continue IV diuretics, start oral spironolactone, increase carvedilol, continue lisinopril, wean nicardipine, try to wean off clonidine.     Await echo to re-evaluate LVEF/wall motion/valves.    Continue statin, start aspirin.      Add: He is very upset about his restricted diet.  As he doesn't follow a restricted diet at home, and is certainly not going to start doing so, I will order him a regular diet with no added salt. If his blood sugar is high, we will need to address his regimen based on what he actually is eating.

## 2021-06-10 NOTE — H&P
Patient Name:  Negrito Navarro  YOB: 1950  MRN:  [id number]  Admit Date:  6/9/2021  Patient Care Team:  Arthur Hancock MD as PCP - General (Internal Medicine)      Subjective   History Present Illness     Chief Complaint   Patient presents with   • Shortness of Breath     History of Present Illness   Mr. Javed is a 70-year-old male with history of congestive heart failure, open heart surgery, CAD, BPH, constipation, hypertension, obstructive sleep apnea, oxygen dependent who presents to the emergency room with increased shortness of breath.  Patient states that his ex been short of breath for about a month that is gradually been worsening, he denies any chest pain.  He was actually seen by his primary care physician on Monday and started back on diuretics, which he had stopped taking, he states that his primary care physician called him today and explained that his lab work was irregular and he would need to be seen by emergency room for further evaluation.  He states he has noticed some increased swelling in his legs over the last few weeks, he states he cannot sleep laying down because he gets too short of breath.  He is chronically on 4 L of oxygen at home, but states that his oxygen concentrator has not been working, he is attempted to get the company out to work on it, but he is not sure if it has been working correctly.  In the emergency room patient troponin negative, sodium 146, potassium 3.0, creatinine 1.06, BUN 13, will check magnesium.  EKG showed sinus rhythm, probable left atrial enlargement.  Chest x-ray shows cardiac silhouette enlarged, postsurgical CABG, airspace opacity in the lung bases bilaterally left greater than right could be atelectasis, small pleural effusion there is asymmetrical elevation of the left hemidiaphragm.  She also had significantly elevated blood pressure in the emergency room, patient was given labetalol times twice with little improvement in blood  pressure.  Patient appears to be in no acute distress.    Review of Systems   Constitutional: Positive for fatigue. Negative for appetite change and fever.   HENT: Negative for nosebleeds and trouble swallowing.    Eyes: Negative for photophobia, redness and visual disturbance.   Respiratory: Positive for shortness of breath. Negative for cough, chest tightness and wheezing.         Orthopnea, chronic O2 use at home 4L     Cardiovascular: Negative for chest pain, palpitations and leg swelling.   Gastrointestinal: Negative for abdominal distention, abdominal pain, nausea and vomiting.   Endocrine: Negative.    Genitourinary: Negative.    Musculoskeletal: Negative for gait problem and joint swelling.   Skin: Negative.    Neurological: Negative for dizziness, seizures, speech difficulty, light-headedness and headaches.   Hematological: Negative.    Psychiatric/Behavioral: Negative for behavioral problems and confusion.        Personal History     Past Medical History:   Diagnosis Date   • CHF (congestive heart failure) (CMS/[facility])    • Hyperlipidemia      History reviewed. No pertinent surgical history.  History reviewed. No pertinent family history.  Social History     Tobacco Use   • Smoking status: Not on file   Substance Use Topics   • Alcohol use: Not on file   • Drug use: Not on file     No current facility-administered medications on file prior to encounter.     Current Outpatient Medications on File Prior to Encounter   Medication Sig Dispense Refill   • atorvastatin (LIPITOR) 40 MG tablet Take 40 mg by mouth Daily.     • carvedilol (COREG) 6.25 MG tablet Take 6.25 mg by mouth 2 (Two) Times a Day With Meals.     • cloNIDine (CATAPRES) 0.1 MG tablet Take 0.1 mg by mouth 2 (Two) Times a Day.     • dicyclomine (BENTYL) 10 MG capsule Take 10 mg by mouth 3 (Three) Times a Day As Needed.     • famotidine (PEPCID) 20 MG tablet Take 20 mg by mouth 2 (Two) Times a Day.     • furosemide (LASIX) 20 MG tablet Take 20 mg by  mouth Daily.     • HYDROcodone-acetaminophen (NORCO) 7.5-325 MG per tablet Take 1 tablet by mouth Every 12 (Twelve) Hours As Needed for Moderate Pain .     • ketoconazole (NIZORAL) 2 % cream Apply  topically to the appropriate area as directed Daily.     • lisinopril (PRINIVIL,ZESTRIL) 40 MG tablet Take 40 mg by mouth Daily.     • potassium chloride (K-DUR,KLOR-CON) 20 MEQ CR tablet Take 20 mEq by mouth 2 (Two) Times a Day.       Allergies   Allergen Reactions   • Shellfish-Derived Products Unknown - Low Severity     gout  gout         Objective    Objective     Vital Signs  Temp:  [99.3 °F (37.4 °C)] 99.3 °F (37.4 °C)  Heart Rate:  [69-76] 72  Resp:  [22] 22  BP: (198-246)/(119-152) 199/135  SpO2:  [91 %-94 %] 91 %  on   ;   Device (Oxygen Therapy): room air  Body mass index is 32.31 kg/m².    Physical Exam  Vitals and nursing note reviewed.   Constitutional:       General: He is not in acute distress.     Appearance: He is well-developed.   HENT:      Head: Normocephalic.   Neck:      Vascular: No JVD.   Cardiovascular:      Rate and Rhythm: Normal rate and regular rhythm.      Heart sounds: Normal heart sounds.      Comments: Patient normal sinus rhythm on the monitor with heart rate 86 during my exam, bilateral lower extremity edema.  Patient appears to be in no acute distress, denies any chest pain  Pulmonary:      Effort: Pulmonary effort is normal.      Breath sounds: Examination of the right-lower field reveals decreased breath sounds. Examination of the left-lower field reveals decreased breath sounds. Decreased breath sounds present.      Comments: Patient on 3 L of oxygen during my exam with sats 93%.  Diminished breath sounds bilaterally, otherwise clear.  Abdominal:      General: Bowel sounds are normal. There is no distension.      Palpations: Abdomen is soft.      Tenderness: There is no abdominal tenderness.   Musculoskeletal:         General: Normal range of motion.      Cervical back: Normal range  of motion.      Right lower le+ Pitting Edema present.      Left lower le+ Pitting Edema present.   Skin:     General: Skin is warm and dry.      Capillary Refill: Capillary refill takes less than 2 seconds.   Neurological:      General: No focal deficit present.      Mental Status: He is alert and oriented to person, place, and time.      Cranial Nerves: Cranial nerves are intact.   Psychiatric:         Attention and Perception: Attention normal.         Mood and Affect: Mood normal.         Speech: Speech normal.         Behavior: Behavior normal.         Cognition and Memory: Cognition normal.         Judgment: Judgment normal.         Results Review:  I reviewed the patient's new clinical results.  I reviewed the patient's new imaging results and agree with the interpretation.  I reviewed the patient's other test results and agree with the interpretation  I personally viewed and interpreted the patient's EKG/Telemetry data  Discussed with ED provider.    Lab Results (last 24 hours)     Procedure Component Value Units Date/Time    CBC & Differential [[id number]]  (Abnormal) Collected: 21    Specimen: Blood Updated: 21    Narrative:      The following orders were created for panel order CBC & Differential.  Procedure                               Abnormality         Status                     ---------                               -----------         ------                     Scan Slide[[id number]]                                       Final result               CBC Auto Differential[[id number]]        Abnormal            Final result                 Please view results for these tests on the individual orders.    BNP [[id number]]  (Abnormal) Collected: 21    Specimen: Blood Updated: 21     proBNP 1,524.0 pg/mL     Narrative:      Among patients with dyspnea, NT-proBNP is highly sensitive for the detection of acute congestive heart failure. In addition NT-proBNP of  <300 pg/ml effectively rules out acute congestive heart failure with 99% negative predictive value.    Results may be falsely decreased if patient taking Biotin.      CBC Auto Differential [[id number]]  (Abnormal) Collected: 06/09/21 1935    Specimen: Blood Updated: 06/09/21 2003     WBC 9.42 10*3/mm3      RBC 4.57 10*6/mm3      Hemoglobin 14.0 g/dL      Hematocrit 41.0 %      MCV 89.7 fL      MCH 30.6 pg      MCHC 34.1 g/dL      RDW 14.7 %      RDW-SD 47.2 fl      MPV 12.3 fL      Platelets 193 10*3/mm3      Neutrophil % 58.9 %      Lymphocyte % 25.4 %      Monocyte % 9.0 %      Eosinophil % 6.1 %      Basophil % 0.5 %      Neutrophils, Absolute 5.55 10*3/mm3      Lymphocytes, Absolute 2.39 10*3/mm3      Monocytes, Absolute 0.85 10*3/mm3      Eosinophils, Absolute 0.57 10*3/mm3      Basophils, Absolute 0.05 10*3/mm3     Scan Slide [[id number]] Collected: 06/09/21 1935    Specimen: Blood Updated: 06/09/21 2003     Schistocytes Mod/2+    Comprehensive Metabolic Panel [[id number]]  (Abnormal) Collected: 06/09/21 2045    Specimen: Blood Updated: 06/09/21 2123     Glucose 94 mg/dL      BUN 13 mg/dL      Creatinine 1.06 mg/dL      Sodium 146 mmol/L      Potassium 3.0 mmol/L      Chloride 106 mmol/L      CO2 33.9 mmol/L      Calcium 8.9 mg/dL      Total Protein 7.1 g/dL      Albumin 4.00 g/dL      ALT (SGPT) 21 U/L      AST (SGOT) 20 U/L      Alkaline Phosphatase 114 U/L      Total Bilirubin 0.5 mg/dL      eGFR Non African Amer 69 mL/min/1.73      Globulin 3.1 gm/dL      A/G Ratio 1.3 g/dL      BUN/Creatinine Ratio 12.3     Anion Gap 6.1 mmol/L     Narrative:      GFR Normal >60  Chronic Kidney Disease <60  Kidney Failure <15      Troponin [[id number]]  (Normal) Collected: 06/09/21 2045    Specimen: Blood Updated: 06/09/21 2123     Troponin T <0.010 ng/mL     Narrative:      Troponin T Reference Range:  <= 0.03 ng/mL-   Negative for AMI  >0.03 ng/mL-     Abnormal for myocardial necrosis.  Clinicians would have to utilize  clinical acumen, EKG, Troponin and serial changes to determine if it is an Acute Myocardial Infarction or myocardial injury due to an underlying chronic condition.       Results may be falsely decreased if patient taking Biotin.      COVID PRE-OP / PRE-PROCEDURE SCREENING ORDER (NO ISOLATION) - Swab, Nasopharynx [[id number]] Collected: 06/09/21 2300    Specimen: Swab from Nasopharynx Updated: 06/09/21 2308    Narrative:      The following orders were created for panel order COVID PRE-OP / PRE-PROCEDURE SCREENING ORDER (NO ISOLATION) - Swab, Nasopharynx.  Procedure                               Abnormality         Status                     ---------                               -----------         ------                     COVID-19,APTIMA PANTHER,...[[id number]]                      In process                   Please view results for these tests on the individual orders.    COVID-19,APTIMA PANTHER,[facility] IN-HOUSE, NP/OP SWAB IN UTM/VTM/SALINE TRANSPORT MEDIA,24 HR TAT - Swab, Nasopharynx [[id number]] Collected: 06/09/21 2300    Specimen: Swab from Nasopharynx Updated: 06/09/21 2308          Imaging Results (Last 24 Hours)     Procedure Component Value Units Date/Time    XR Chest 1 View [[id number]] Collected: 06/09/21 2001     Updated: 06/09/21 2006    Narrative:      XR CHEST 1 VW-     HISTORY:  Shortness of air, CHF, bilateral leg swelling, face swelling.     COMPARISON:  None     FINDINGS:    A single portable view of the chest was obtained. The cardiac silhouette  is enlarged. There are postsurgical changes from CABG. There is calcific  aortic atherosclerosis. There is mild airspace opacity in the lung bases  bilaterally, left greater than right, which could be due to atelectasis.  There is a small left pleural effusion versus scarring. There is  asymmetric elevation of the left hemidiaphragm. Median sternal wires are  intact. There is multilevel degenerative disc disease.     This report was finalized on 6/9/2021  8:03 PM by Dr. Leeann Gibbons M.D.                 ECG 12 Lead   Preliminary Result   HEART RATE= 72  bpm   RR Interval= 836  ms   MT Interval= 189  ms   P Horizontal Axis= -32  deg   P Front Axis= 54  deg   QRSD Interval= 109  ms   QT Interval= 430  ms   QRS Axis= -29  deg   T Wave Axis= 112  deg   - ABNORMAL ECG -   Sinus rhythm   Probable left atrial enlargement   LVH with secondary repolarization abnormality   Electronically Signed By:    Date and Time of Study: 2021-06-09 19:58:18           Assessment/Plan     Active Hospital Problems    Diagnosis  POA   • **Acute on chronic congestive heart failure (CMS/HCC) [I50.9]  Yes   • COPD (chronic obstructive pulmonary disease) (CMS/HCC) [J44.9]  Unknown   • Hyperlipidemia [E78.5]  Yes   • Hx of CABG [Z95.1]  Not Applicable   • Hypokalemia [E87.6]  Unknown   • Chronic constipation [K59.09]  Yes   • Oxygen dependent [Z99.81]  Not Applicable   • Benign prostatic hyperplasia [N40.0]  Yes   • Essential hypertension [I10]  Yes   • SERENITY (obstructive sleep apnea) [G47.33]  Yes     Mr. Javed is a 70-year-old male with history of congestive heart failure, open heart surgery, CAD, BPH, constipation, hypertension, obstructive sleep apnea, oxygen dependent who presents to the emergency room with increased shortness of breath.    Acute on chronic CHF  -Lasix IV given in the emergency room, will continue 40 mg of Lasix IV twice daily  -Consult cardiology, patient is followed by Dr. Zabala in [place]  -2D echo in a.m.  -Daily weight  -Telemetry unit for monitoring    Hypokalemia  -Potassium replacement per protocol  -Recheck BMP in a.m.  -Magnesium level    Hypertension  -Patient given labetalol in the emergency room with little improvement in his blood pressure, will start Cardene drip  -Cardiology consulted  -Telemetry unit for monitoring    · I discussed the patient's findings and my recommendations with patient and ED provider.    VTE Prophylaxis - SCDs.  Code Status - Full  code.       HANNAH Watson  [facility]  06/10/21  00:07 EDT

## 2021-06-10 NOTE — PLAN OF CARE
Goal Outcome Evaluation:  Plan of Care Reviewed With: patient        Progress: improving     Cardene drip currently going at 5mg/hr. Nsr. A/ox4. Bilateral edema. IV lasix. Patient currently resting comfortably.

## 2021-06-10 NOTE — ED NOTES
Nursing report ED to floor  Negrito Navarro  70 y.o.  male    HPI (triage note):   Chief Complaint   Patient presents with   • Shortness of Breath       Admitting doctor:   Bobby Carlisle MD    Admitting diagnosis:   The primary encounter diagnosis was Acute on chronic congestive heart failure, unspecified heart failure type (CMS/HCC). A diagnosis of Primary hypertension was also pertinent to this visit.    Code status:   Current Code Status     Date Active Code Status Order ID Comments User Context       6/9/2021 2317 CPR [id number]  Danitza Pérez, [facility]     Advance Care Planning Activity      Questions for Current Code Status     Question Answer Comment    Code Status CPR     Medical Interventions (Level of Support Prior to Arrest) Full           Allergies:   Shellfish-derived products    Weight:       06/09/21  1925   Weight: 96.4 kg (212 lb 8.4 oz)       Most recent vitals:   Vitals:    06/09/21 2139 06/09/21 2230 06/09/21 2300 06/09/21 2319   BP: (!) 246/130 (!) 198/135 (!) 244/152 (!) 244/145   Pulse:    72   Resp:       Temp:       TempSrc:       SpO2:  94% 91% 94%   Weight:       Height:           Active LDAs/IV Access:   Lines, Drains & Airways    Active LDAs     Name:   Placement date:   Placement time:   Site:   Days:    Peripheral IV 06/09/21 2114 Left Forearm   06/09/21 2114    Forearm   less than 1                Labs (abnormal labs have a star):   Labs Reviewed   COMPREHENSIVE METABOLIC PANEL - Abnormal; Notable for the following components:       Result Value    Sodium 146 (*)     Potassium 3.0 (*)     CO2 33.9 (*)     All other components within normal limits    Narrative:     GFR Normal >60  Chronic Kidney Disease <60  Kidney Failure <15     BNP (IN-HOUSE) - Abnormal; Notable for the following components:    proBNP 1,524.0 (*)     All other components within normal limits    Narrative:     Among patients with dyspnea, NT-proBNP is highly sensitive for the detection of acute congestive  heart failure. In addition NT-proBNP of <300 pg/ml effectively rules out acute congestive heart failure with 99% negative predictive value.    Results may be falsely decreased if patient taking Biotin.     CBC WITH AUTO DIFFERENTIAL - Abnormal; Notable for the following components:    MPV 12.3 (*)     Eosinophils, Absolute 0.57 (*)     All other components within normal limits   TROPONIN (IN-HOUSE) - Normal    Narrative:     Troponin T Reference Range:  <= 0.03 ng/mL-   Negative for AMI  >0.03 ng/mL-     Abnormal for myocardial necrosis.  Clinicians would have to utilize clinical acumen, EKG, Troponin and serial changes to determine if it is an Acute Myocardial Infarction or myocardial injury due to an underlying chronic condition.       Results may be falsely decreased if patient taking Biotin.     COVID PRE-OP / PRE-PROCEDURE SCREENING ORDER (NO ISOLATION)    Narrative:     The following orders were created for panel order COVID PRE-OP / PRE-PROCEDURE SCREENING ORDER (NO ISOLATION) - Swab, Nasopharynx.  Procedure                               Abnormality         Status                     ---------                               -----------         ------                     COVID-19,APTIMA PANTHER,...[[id number]]                      In process                   Please view results for these tests on the individual orders.   COVID-19,APTIMA PANTHERCATA IN-HOUSE,NP/OP SWAB IN UTM/VTM/SALINE TRANSPORT MEDIA,24 HR TAT   SCAN SLIDE   MAGNESIUM   CBC AND DIFFERENTIAL    Narrative:     The following orders were created for panel order CBC & Differential.  Procedure                               Abnormality         Status                     ---------                               -----------         ------                     Scan Slide[[id number]]                                       Final result               CBC Auto Differential[[id number]]        Abnormal            Final result                 Please view results  for these tests on the individual orders.       EKG:   ECG 12 Lead   Preliminary Result   HEART RATE= 72  bpm   RR Interval= 836  ms   OH Interval= 189  ms   P Horizontal Axis= -32  deg   P Front Axis= 54  deg   QRSD Interval= 109  ms   QT Interval= 430  ms   QRS Axis= -29  deg   T Wave Axis= 112  deg   - ABNORMAL ECG -   Sinus rhythm   Probable left atrial enlargement   LVH with secondary repolarization abnormality   Electronically Signed By:    Date and Time of Study: 2021-06-09 19:58:18          Meds given in ED:   Medications   sodium chloride 0.9 % flush 10 mL (has no administration in time range)   sodium chloride 0.9 % flush 10 mL (has no administration in time range)   sodium chloride 0.9 % flush 10 mL (has no administration in time range)   nitroglycerin (NITROSTAT) SL tablet 0.4 mg (has no administration in time range)   acetaminophen (TYLENOL) tablet 650 mg (has no administration in time range)     Or   acetaminophen (TYLENOL) 160 MG/5ML solution 650 mg (has no administration in time range)     Or   acetaminophen (TYLENOL) suppository 650 mg (has no administration in time range)   ondansetron (ZOFRAN) injection 4 mg (has no administration in time range)   furosemide (LASIX) injection 40 mg (has no administration in time range)   niCARdipine (CARDENE) 25 mg in 250 mL NS infusion kit (has no administration in time range)   potassium chloride (K-DUR,KLOR-CON) ER tablet 40 mEq (has no administration in time range)     Or   potassium chloride (KLOR-CON) packet 40 mEq (has no administration in time range)     Or   potassium chloride 10 mEq in 100 mL IVPB (has no administration in time range)   furosemide (LASIX) injection 60 mg (60 mg Intravenous Given 6/9/21 2114)   labetalol (NORMODYNE,TRANDATE) injection 20 mg (20 mg Intravenous Given 6/9/21 2202)   labetalol (NORMODYNE,TRANDATE) injection 40 mg (40 mg Intravenous Given 6/9/21 2324)       Imaging results:  No radiology results for the last day    Ambulatory  status:   -  Up with assistance     Social issues:   Social History     Socioeconomic History   • Marital status: Legally      Spouse name: Not on file   • Number of children: Not on file   • Years of education: Not on file   • Highest education level: Not on file          Debra Goodman RN  06/09/21 6833

## 2021-06-10 NOTE — PROGRESS NOTES
Name: Negrito Navarro ADMIT: 2021   : 1950  PCP: Arthur Hancock MD    MRN: [id number] LOS: 1 days   AGE/SEX: 70 y.o. male  ROOM: [facility]     Subjective   Subjective   No events overnight. Pt now requiring some oxygen. Says that he feels a little better than when he presented but still complaining of SOA.       Objective   Objective   Vital Signs  Temp:  [98.3 °F (36.8 °C)-99.3 °F (37.4 °C)] 98.5 °F (36.9 °C)  Heart Rate:  [69-79] 78  Resp:  [18-22] 18  BP: (133-246)/() 134/98  SpO2:  [85 %-94 %] 91 %  on  Flow (L/min):  [2] 2;   Device (Oxygen Therapy): room air  Body mass index is 30.96 kg/m².  Physical Exam  Constitutional:       General: He is not in acute distress.  HENT:      Head: Normocephalic and atraumatic.   Cardiovascular:      Rate and Rhythm: Normal rate and regular rhythm.      Heart sounds: Normal heart sounds.   Pulmonary:      Effort: Pulmonary effort is normal.      Breath sounds: Normal breath sounds.      Comments: diminished  Abdominal:      General: Bowel sounds are normal.      Palpations: Abdomen is soft.   Musculoskeletal:         General: No tenderness.      Right lower leg: No edema.      Left lower leg: No edema.   Neurological:      Mental Status: He is alert.         Results Review     I reviewed the patient's new clinical results.  Results from last 7 days   Lab Units 06/10/21  0711 21  1935   WBC 10*3/mm3 12.03* 9.42   HEMOGLOBIN g/dL 14.1 14.0   PLATELETS 10*3/mm3 272 193     Results from last 7 days   Lab Units 06/10/21  0711 21  2045   SODIUM mmol/L 144 146*   POTASSIUM mmol/L 2.8* 3.0*   CHLORIDE mmol/L 102 106   CO2 mmol/L 31.4* 33.9*   BUN mg/dL 11 13   CREATININE mg/dL 0.99 1.06   GLUCOSE mg/dL 129* 94   Estimated Creatinine Clearance: 74.4 mL/min (by C-G formula based on SCr of 0.99 mg/dL).  Results from last 7 days   Lab Units 21  2045   ALBUMIN g/dL 4.00   BILIRUBIN mg/dL 0.5   ALK PHOS U/L 114   AST (SGOT) U/L 20   ALT (SGPT) U/L 21      Results from last 7 days   Lab Units 06/10/21  0711 06/09/21  2045   CALCIUM mg/dL 8.5* 8.9   ALBUMIN g/dL  --  4.00   MAGNESIUM mg/dL  --  1.9       COVID19   Date Value Ref Range Status   06/09/2021 Not Detected Not Detected - Ref. Range Final     No results found for: HGBA1C, POCGLU    XR Chest 1 View  XR CHEST 1 VW-     HISTORY:  Shortness of air, CHF, bilateral leg swelling, face swelling.     COMPARISON:  None     FINDINGS:    A single portable view of the chest was obtained. The cardiac silhouette  is enlarged. There are postsurgical changes from CABG. There is calcific  aortic atherosclerosis. There is mild airspace opacity in the lung bases  bilaterally, left greater than right, which could be due to atelectasis.  There is a small left pleural effusion versus scarring. There is  asymmetric elevation of the left hemidiaphragm. Median sternal wires are  intact. There is multilevel degenerative disc disease.     This report was finalized on 6/9/2021 8:03 PM by Dr. Leeann Gibbons M.D.       Scheduled Medications  atorvastatin, 40 mg, Oral, Daily  carvedilol, 6.25 mg, Oral, BID With Meals  cloNIDine, 0.1 mg, Oral, Q12H  famotidine, 20 mg, Oral, BID AC  furosemide, 40 mg, Intravenous, Q12H  lisinopril, 40 mg, Oral, Daily  sodium chloride, 10 mL, Intravenous, Q12H    Infusions  niCARdipine, 5-15 mg/hr, Last Rate: 2.5 mg/hr (06/10/21 0713)    Diet  Diet Regular; Low Sodium; No Added Salt       Assessment/Plan     Active Hospital Problems    Diagnosis  POA   • **Acute on chronic congestive heart failure (CMS/HCC) [I50.9]  Yes   • COPD (chronic obstructive pulmonary disease) (CMS/HCC) [J44.9]  Unknown   • Hyperlipidemia [E78.5]  Yes   • Hx of CABG [Z95.1]  Not Applicable   • Hypokalemia [E87.6]  Unknown   • Chronic constipation [K59.09]  Yes   • Oxygen dependent [Z99.81]  Not Applicable   • Benign prostatic hyperplasia [N40.0]  Yes   • Essential hypertension [I10]  Yes   • SERENITY (obstructive sleep apnea) [G47.33]   Yes      Resolved Hospital Problems   No resolved problems to display.       70 y.o. male admitted with Acute on chronic congestive heart failure (CMS/[facility]).    Acute on chronic unspecified heart failure-Cardiology is consulted. Receiving iv lasix. 2L urinary output yesterday. Another 1400cc today.     Hypertensive urgency-on Cardene drip. Oral home medications have been restarted. bp is improving.    (history of?) Chronic hypoxic respiratory failure on 4L home O2-pt reports to me that he hasn't really had access to oxygen in about a year, so it's not really clear to me that he has chronic hypoxic respiratory failure anymore.    Hypokalemia-mag is ok. Replacing K. Cardiology starting spironolactone given concern for hyperaldosteronism    Hypernatremia-mild    · SCDs for DVT prophylaxis.  · Full code.  · Discussed with patient and care team on multidisciplinary rounds.  · Anticipate discharge TBD      Ye Addison MD  [facility]  06/10/21  14:21 EDT    I wore protective equipment throughout this patient encounter including a face mask, gloves and protective eyewear.  Hand hygiene was performed before donning protective equipment and after removal when leaving the room.

## 2021-06-10 NOTE — ED PROVIDER NOTES
EMERGENCY DEPARTMENT ENCOUNTER    Room Number:  38/38  Date of encounter:  6/9/2021  PCP: Arthur Hancock MD  Historian: Patient      HPI:  Chief Complaint: Shortness of breath  A complete HPI/ROS/PMH/PSH/SH/FH are unobtainable due to: None    Context: Negrito Navarro is a 70 y.o. male who presents to the ED c/o redness of breath.  Onset 1 year ago.  However, it is worsened over the past 3 days.  He saw his PCP on Monday and he was started on Lasix 20 mg daily.  No chest pain or fever.  Does endorse having associated cough.  His dyspnea worsens with exertion.      PAST MEDICAL HISTORY  Active Ambulatory Problems     Diagnosis Date Noted   • No Active Ambulatory Problems     Resolved Ambulatory Problems     Diagnosis Date Noted   • No Resolved Ambulatory Problems     Past Medical History:   Diagnosis Date   • CHF (congestive heart failure) (CMS/[facility])    • Hyperlipidemia          PAST SURGICAL HISTORY  History reviewed. No pertinent surgical history.      FAMILY HISTORY  History reviewed. No pertinent family history.      SOCIAL HISTORY  Social History     Socioeconomic History   • Marital status: Legally      Spouse name: Not on file   • Number of children: Not on file   • Years of education: Not on file   • Highest education level: Not on file         ALLERGIES  Shellfish-derived products        REVIEW OF SYSTEMS  Review of Systems     All systems reviewed and negative except for those discussed in HPI.       PHYSICAL EXAM    I have reviewed the triage vital signs and nursing notes.    ED Triage Vitals   Temp Heart Rate Resp BP SpO2   06/09/21 1908 06/09/21 1908 06/09/21 1908 06/09/21 2013 06/09/21 1908   99.3 °F (37.4 °C) 76 22 (!) (P) 236/119 93 %      Temp src Heart Rate Source Patient Position BP Location FiO2 (%)   06/09/21 1908 -- -- -- --   Tympanic           Physical Exam  GENERAL: uncomfortable  HENT: nares patent  EYES: no scleral icterus  CV: regular rhythm, regular rate, no murmur, 3+ lower  extremity edema bilaterally  RESPIRATORY: normal effort, bibasilar crackles  ABDOMEN: soft, nontender  MUSCULOSKELETAL: no deformity  NEURO: alert, moves all extremities, follows commands  SKIN: warm, dry        LAB RESULTS  Recent Results (from the past 24 hour(s))   BNP    Collection Time: 06/09/21  7:35 PM    Specimen: Blood   Result Value Ref Range    proBNP 1,524.0 (H) 0.0 - 900.0 pg/mL   CBC Auto Differential    Collection Time: 06/09/21  7:35 PM    Specimen: Blood   Result Value Ref Range    WBC 9.42 3.40 - 10.80 10*3/mm3    RBC 4.57 4.14 - 5.80 10*6/mm3    Hemoglobin 14.0 13.0 - 17.7 g/dL    Hematocrit 41.0 37.5 - 51.0 %    MCV 89.7 79.0 - 97.0 fL    MCH 30.6 26.6 - 33.0 pg    MCHC 34.1 31.5 - 35.7 g/dL    RDW 14.7 12.3 - 15.4 %    RDW-SD 47.2 37.0 - 54.0 fl    MPV 12.3 (H) 6.0 - 12.0 fL    Platelets 193 140 - 450 10*3/mm3    Neutrophil % 58.9 42.7 - 76.0 %    Lymphocyte % 25.4 19.6 - 45.3 %    Monocyte % 9.0 5.0 - 12.0 %    Eosinophil % 6.1 0.3 - 6.2 %    Basophil % 0.5 0.0 - 1.5 %    Neutrophils, Absolute 5.55 1.70 - 7.00 10*3/mm3    Lymphocytes, Absolute 2.39 0.70 - 3.10 10*3/mm3    Monocytes, Absolute 0.85 0.10 - 0.90 10*3/mm3    Eosinophils, Absolute 0.57 (H) 0.00 - 0.40 10*3/mm3    Basophils, Absolute 0.05 0.00 - 0.20 10*3/mm3   Scan Slide    Collection Time: 06/09/21  7:35 PM    Specimen: Blood   Result Value Ref Range    Schistocytes Mod/2+ None Seen   ECG 12 Lead    Collection Time: 06/09/21  7:58 PM   Result Value Ref Range    QT Interval 430 ms   Comprehensive Metabolic Panel    Collection Time: 06/09/21  8:45 PM    Specimen: Blood   Result Value Ref Range    Glucose 94 65 - 99 mg/dL    BUN 13 8 - 23 mg/dL    Creatinine 1.06 0.76 - 1.27 mg/dL    Sodium 146 (H) 136 - 145 mmol/L    Potassium 3.0 (L) 3.5 - 5.2 mmol/L    Chloride 106 98 - 107 mmol/L    CO2 33.9 (H) 22.0 - 29.0 mmol/L    Calcium 8.9 8.6 - 10.5 mg/dL    Total Protein 7.1 6.0 - 8.5 g/dL    Albumin 4.00 3.50 - 5.20 g/dL    ALT (SGPT) 21 1  - 41 U/L    AST (SGOT) 20 1 - 40 U/L    Alkaline Phosphatase 114 39 - 117 U/L    Total Bilirubin 0.5 0.0 - 1.2 mg/dL    eGFR Non African Amer 69 >60 mL/min/1.73    Globulin 3.1 gm/dL    A/G Ratio 1.3 g/dL    BUN/Creatinine Ratio 12.3 7.0 - 25.0    Anion Gap 6.1 5.0 - 15.0 mmol/L   Troponin    Collection Time: 06/09/21  8:45 PM    Specimen: Blood   Result Value Ref Range    Troponin T <0.010 0.000 - 0.030 ng/mL       Ordered the above labs and independently reviewed the results.        RADIOLOGY  XR Chest 1 View    Result Date: 6/9/2021  XR CHEST 1 VW-  HISTORY:  Shortness of air, CHF, bilateral leg swelling, face swelling.  COMPARISON:  None  FINDINGS:  A single portable view of the chest was obtained. The cardiac silhouette is enlarged. There are postsurgical changes from CABG. There is calcific aortic atherosclerosis. There is mild airspace opacity in the lung bases bilaterally, left greater than right, which could be due to atelectasis. There is a small left pleural effusion versus scarring. There is asymmetric elevation of the left hemidiaphragm. Median sternal wires are intact. There is multilevel degenerative disc disease.  This report was finalized on 6/9/2021 8:03 PM by Dr. Leeann Gibbons M.D.        I ordered the above noted radiological studies. Reviewed by me and discussed with radiologist.  See dictation for official radiology interpretation.      PROCEDURES    Critical Care  Performed by: Anthony Sharma II, MD  Authorized by: Antohny Sharma II, MD     Critical care provider statement:     Critical care time (minutes):  33    Critical care was necessary to treat or prevent imminent or life-threatening deterioration of the following conditions:  Circulatory failure and cardiac failure    Critical care was time spent personally by me on the following activities:  Development of treatment plan with patient or surrogate, ordering and review of laboratory studies, ordering and review of  radiographic studies, pulse oximetry, re-evaluation of patient's condition, obtaining history from patient or surrogate, discussions with primary provider and examination of patient          MEDICATIONS GIVEN IN ER    Medications   sodium chloride 0.9 % flush 10 mL (has no administration in time range)   furosemide (LASIX) injection 60 mg (60 mg Intravenous Given 6/9/21 2114)   labetalol (NORMODYNE,TRANDATE) injection 20 mg (20 mg Intravenous Given 6/9/21 2202)         PROGRESS, DATA ANALYSIS, CONSULTS, AND MEDICAL DECISION MAKING    All labs have been independently reviewed by me.  All radiology studies have been reviewed by me and discussed with radiologist dictating the report.   EKG's independently viewed and interpreted by me.  Discussion below represents my analysis of pertinent findings related to patient's condition, differential diagnosis, treatment plan and final disposition.    Differential diagnosis includes but not limited to CHF, acute coronary syndrome, pulmonary embolism, pneumothorax, pneumonia, asthma/COPD, pulmonary hypertension, deconditioning, anemia, other hematologic etiologies such as CO poisoning, anxiety, COVID-19.         ED Course as of Jun 09 2307 Wed Jun 09, 2021 2039 proBNP(!): 1,524.0 [TD]   2039 WBC: 9.42 [TD]   2042 EKG interpreted by myself.  Time 1958.  Sinus rhythm.  Heart rate 72.  Left atrial normality.  Borderline left axis deviation.  LVH.    [TD]   2130 Creatinine: 1.06 [TD]      ED Course User Index  [TD] Anthony Sharma II, MD       Given IV labetalol for his severe HTN.     I discussed the case with HANNAH Denise for A.  I we discussed the patient's history, labs, and vitals.  Blood pressure is improving.  Admit for further diuresis to Dr. Carlisle.    PPE: Both the patient and I wore a surgical mask throughout the entire patient encounter. I wore protective goggles.     AS OF 23:07 EDT VITALS:    BP - (!) 198/135  HR - 69  TEMP - 99.3 °F (37.4 °C)  (Tympanic)  O2 SATS - 94%        DIAGNOSIS  Final diagnoses:   Acute on chronic congestive heart failure, unspecified heart failure type (CMS/[facility])   Primary hypertension         DISPOSITION  Admit           Anthony Sharma II, MD  06/09/21 9700

## 2021-06-11 PROBLEM — I50.33 ACUTE ON CHRONIC DIASTOLIC HEART FAILURE: Status: ACTIVE | Noted: 2021-06-11

## 2021-06-11 LAB
ANION GAP SERPL CALCULATED.3IONS-SCNC: 8 MMOL/L (ref 5–15)
BUN SERPL-MCNC: 13 MG/DL (ref 8–23)
BUN/CREAT SERPL: 10.9 (ref 7–25)
CALCIUM SPEC-SCNC: 8.6 MG/DL (ref 8.6–10.5)
CHLORIDE SERPL-SCNC: 103 MMOL/L (ref 98–107)
CO2 SERPL-SCNC: 29 MMOL/L (ref 22–29)
CREAT SERPL-MCNC: 1.19 MG/DL (ref 0.76–1.27)
DEPRECATED RDW RBC AUTO: 52 FL (ref 37–54)
ERYTHROCYTE [DISTWIDTH] IN BLOOD BY AUTOMATED COUNT: 14.9 % (ref 12.3–15.4)
GFR SERPL CREATININE-BSD FRML MDRD: 60 ML/MIN/1.73
GLUCOSE SERPL-MCNC: 190 MG/DL (ref 65–99)
HCT VFR BLD AUTO: 44.4 % (ref 37.5–51)
HGB BLD-MCNC: 14.2 G/DL (ref 13–17.7)
MCH RBC QN AUTO: 30.3 PG (ref 26.6–33)
MCHC RBC AUTO-ENTMCNC: 32 G/DL (ref 31.5–35.7)
MCV RBC AUTO: 94.7 FL (ref 79–97)
PLATELET # BLD AUTO: 259 10*3/MM3 (ref 140–450)
PMV BLD AUTO: 11.4 FL (ref 6–12)
POTASSIUM SERPL-SCNC: 3.7 MMOL/L (ref 3.5–5.2)
POTASSIUM SERPL-SCNC: 3.7 MMOL/L (ref 3.5–5.2)
RBC # BLD AUTO: 4.69 10*6/MM3 (ref 4.14–5.8)
SODIUM SERPL-SCNC: 140 MMOL/L (ref 136–145)
WBC # BLD AUTO: 10.01 10*3/MM3 (ref 3.4–10.8)

## 2021-06-11 PROCEDURE — 25010000002 FUROSEMIDE PER 20 MG: Performed by: NURSE PRACTITIONER

## 2021-06-11 PROCEDURE — 84132 ASSAY OF SERUM POTASSIUM: CPT | Performed by: STUDENT IN AN ORGANIZED HEALTH CARE EDUCATION/TRAINING PROGRAM

## 2021-06-11 PROCEDURE — 85027 COMPLETE CBC AUTOMATED: CPT | Performed by: STUDENT IN AN ORGANIZED HEALTH CARE EDUCATION/TRAINING PROGRAM

## 2021-06-11 PROCEDURE — 99232 SBSQ HOSP IP/OBS MODERATE 35: CPT | Performed by: NURSE PRACTITIONER

## 2021-06-11 PROCEDURE — 80048 BASIC METABOLIC PNL TOTAL CA: CPT | Performed by: INTERNAL MEDICINE

## 2021-06-11 RX ORDER — FUROSEMIDE 40 MG/1
40 TABLET ORAL
Status: DISCONTINUED | OUTPATIENT
Start: 2021-06-11 | End: 2021-06-12

## 2021-06-11 RX ADMIN — SPIRONOLACTONE 50 MG: 50 TABLET, FILM COATED ORAL at 08:35

## 2021-06-11 RX ADMIN — ATORVASTATIN CALCIUM 40 MG: 20 TABLET, FILM COATED ORAL at 08:35

## 2021-06-11 RX ADMIN — ASPIRIN 81 MG: 81 TABLET, CHEWABLE ORAL at 08:35

## 2021-06-11 RX ADMIN — FUROSEMIDE 40 MG: 10 INJECTION, SOLUTION INTRAMUSCULAR; INTRAVENOUS at 08:34

## 2021-06-11 RX ADMIN — CLONIDINE HYDROCHLORIDE 0.1 MG: 0.1 TABLET ORAL at 08:35

## 2021-06-11 RX ADMIN — HYDROCODONE BITARTRATE AND ACETAMINOPHEN 1 TABLET: 7.5; 325 TABLET ORAL at 11:51

## 2021-06-11 RX ADMIN — CLONIDINE HYDROCHLORIDE 0.1 MG: 0.1 TABLET ORAL at 21:07

## 2021-06-11 RX ADMIN — SODIUM CHLORIDE, PRESERVATIVE FREE 10 ML: 5 INJECTION INTRAVENOUS at 08:35

## 2021-06-11 RX ADMIN — SODIUM CHLORIDE, PRESERVATIVE FREE 10 ML: 5 INJECTION INTRAVENOUS at 21:07

## 2021-06-11 RX ADMIN — FAMOTIDINE 20 MG: 20 TABLET, FILM COATED ORAL at 18:13

## 2021-06-11 RX ADMIN — LISINOPRIL 40 MG: 40 TABLET ORAL at 08:35

## 2021-06-11 RX ADMIN — FUROSEMIDE 40 MG: 40 TABLET ORAL at 18:13

## 2021-06-11 RX ADMIN — CARVEDILOL 12.5 MG: 12.5 TABLET, FILM COATED ORAL at 18:13

## 2021-06-11 RX ADMIN — CARVEDILOL 12.5 MG: 12.5 TABLET, FILM COATED ORAL at 08:35

## 2021-06-11 RX ADMIN — FAMOTIDINE 20 MG: 20 TABLET, FILM COATED ORAL at 07:00

## 2021-06-11 RX ADMIN — SODIUM CHLORIDE 2.5 MG/HR: 9 INJECTION, SOLUTION INTRAVENOUS at 05:34

## 2021-06-11 NOTE — PLAN OF CARE
Goal Outcome Evaluation:  Plan of Care Reviewed With: patient        Progress: improving     Cardene drip going at 2.5 mg. IV lasix. Monitoring blood pressure q30 mins. Patient had low potassium. Potassium protocol in place. Up ad anand. NSR. Patient currently resting comfortably.

## 2021-06-11 NOTE — CASE MANAGEMENT/SOCIAL WORK
Discharge Planning Assessment  [facility]     Patient Name: Negrito Navarro  MRN: [id number]  Today's Date: 6/11/2021    Admit Date: 6/9/2021    Discharge Needs Assessment     Row Name 06/11/21 1525       Living Environment    Lives With  child(mike), adult    Name(s) of Who Lives With Patient  lives with daughter Drea Navarro    Current Living Arrangements  home/apartment/condo    Potentially Unsafe Housing Conditions  unable to assess    Primary Care Provided by  self    Provides Primary Care For  no one    Family Caregiver if Needed  child(mike), adult    Family Caregiver Names  mami Navarro [phone]    Quality of Family Relationships  helpful;involved;supportive    Able to Return to Prior Arrangements  yes       Resource/Environmental Concerns    Resource/Environmental Concerns  none    Transportation Concerns  car, none       Transition Planning    Patient/Family Anticipates Transition to  home with family    Transportation Anticipated  family or friend will provide       Discharge Needs Assessment    Readmission Within the Last 30 Days  no previous admission in last 30 days    Equipment Currently Used at Home  oxygen    Concerns to be Addressed  discharge planning    Anticipated Changes Related to Illness  none    Provided Post Acute Provider List?  N/A    Provided Post Acute Provider Quality & Resource List?  N/A        Discharge Plan     Row Name 06/11/21 1528       Plan    Plan  Home with daughter who he lives with    Provided Post Acute Provider List?  Refused    Provided Post Acute Provider Quality & Resource List?  Refused    Refused Quality and Resource List Comment  If he needs HH he does not care who it is with    Patient/Family in Agreement with Plan  unable to assess    Plan Comments  Patient lives with his daughter and is usually independent and drives himself.  She can help when discharged if needed.  He does wear O2 from [employer] but states the tanks need to be replaced at home or he  might want to go with someone else.  He does not use any DME at this time.  He is open to  if he needs it and does not care what company it is through.  CCP will remain to follow for any needs throughout his stay.        Continued Care and Services - Admitted Since 6/9/2021    Coordination has not been started for this encounter.         Demographic Summary     Row Name 06/11/21 1522       General Information    Admission Type  inpatient    Arrived From  home    Required Notices Provided  Important Message from Medicare    Reason for Consult  discharge planning    Preferred Language  English     Used During This Interaction  no       Contact Information    Permission Granted to Share Info With  family/designee    Contact Information Comments  daughter Drea Navarro [phone]        Functional Status     Row Name 06/11/21 1524       Functional Status    Usual Activity Tolerance  good    Current Activity Tolerance  moderate       Functional Status, IADL    Medications  independent    Meal Preparation  independent    Housekeeping  independent    Laundry  independent    Shopping  independent       Mental Status    General Appearance WDL  WDL       Mental Status Summary    Recent Changes in Mental Status/Cognitive Functioning  no changes        Psychosocial    No documentation.       Abuse/Neglect    No documentation.       Legal    No documentation.       Substance Abuse    No documentation.       Patient Forms    No documentation.           Shannon Epley, RN

## 2021-06-11 NOTE — PROGRESS NOTES
Name: Negrito Navarro ADMIT: 2021   : 1950  PCP: Arthur Hancock MD    MRN: [id number] LOS: 2 days   AGE/SEX: 71 y.o. male  ROOM: [facility]/1     Subjective   Subjective   Says he's feeling better, but says that shortness of breath is unchanged. Currently requiring 2L. [birth year].       Objective   Objective   Vital Signs  Temp:  [97.3 °F (36.3 °C)-98.3 °F (36.8 °C)] 97.3 °F (36.3 °C)  Heart Rate:  [59-78] 67  Resp:  [16-20] 20  BP: (122-166)/() 126/80  SpO2:  [90 %-95 %] 95 %  on  Flow (L/min):  [2] 2;   Device (Oxygen Therapy): nasal cannula  Body mass index is 30.76 kg/m².  Physical Exam  Constitutional:       General: He is not in acute distress.  HENT:      Head: Normocephalic and atraumatic.   Cardiovascular:      Rate and Rhythm: Normal rate and regular rhythm.      Heart sounds: Normal heart sounds.   Pulmonary:      Effort: Pulmonary effort is normal.      Breath sounds: Normal breath sounds.      Comments: diminished  Abdominal:      General: Bowel sounds are normal.      Palpations: Abdomen is soft.   Musculoskeletal:         General: No tenderness.      Right lower leg: No edema.      Left lower leg: No edema.   Neurological:      Mental Status: He is alert.         Results Review     I reviewed the patient's new clinical results.  Results from last 7 days   Lab Units 21  0059 06/10/21  0711 21  1935   WBC 10*3/mm3 10.01 12.03* 9.42   HEMOGLOBIN g/dL 14.2 14.1 14.0   PLATELETS 10*3/mm3 259 272 193     Results from last 7 days   Lab Units 21  0059 06/10/21  0711 21  2045   SODIUM mmol/L 140 144 146*   POTASSIUM mmol/L 3.7  3.7 2.8* 3.0*   CHLORIDE mmol/L 103 102 106   CO2 mmol/L 29.0 31.4* 33.9*   BUN mg/dL 13 11 13   CREATININE mg/dL 1.19 0.99 1.06   GLUCOSE mg/dL 190* 129* 94   Estimated Creatinine Clearance: 60.8 mL/min (by C-G formula based on SCr of 1.19 mg/dL).  Results from last 7 days   Lab Units 21  2045   ALBUMIN g/dL 4.00   BILIRUBIN mg/dL  0.5   ALK PHOS U/L 114   AST (SGOT) U/L 20   ALT (SGPT) U/L 21     Results from last 7 days   Lab Units 06/11/21  0059 06/10/21  0711 06/09/21  2045   CALCIUM mg/dL 8.6 8.5* 8.9   ALBUMIN g/dL  --   --  4.00   MAGNESIUM mg/dL  --   --  1.9       COVID19   Date Value Ref Range Status   06/09/2021 Not Detected Not Detected - Ref. Range Final     No results found for: HGBA1C, POCGLU    Adult Transthoracic Echo Complete W/ Cont if Necessary Per Protocol  · Left ventricular ejection fraction appears to be 61 - 65%.  · Left ventricular wall thickness is consistent with mild concentric   hypertrophy.  · Left ventricular diastolic function is consistent with (grade II w/high   LAP) pseudonormalization  · Normal right ventricular cavity size and systolic function noted.  · The left atrial cavity is mildly dilated.  · Saline test results are negative.  · Aortic valve maximum pressure gradient is 15 mmHg. Aortic valve mean   pressure gradient is 9.0 mmHg.  · There is no evidence of pericardial effusion.       Scheduled Medications  aspirin, 81 mg, Oral, Daily  atorvastatin, 40 mg, Oral, Daily  carvedilol, 12.5 mg, Oral, BID With Meals  cloNIDine, 0.1 mg, Oral, Q12H  famotidine, 20 mg, Oral, BID AC  furosemide, 40 mg, Oral, BID  lisinopril, 40 mg, Oral, Daily  sodium chloride, 10 mL, Intravenous, Q12H  spironolactone, 50 mg, Oral, Daily    Infusions   Diet  Diet Regular; Low Sodium; No Added Salt       Assessment/Plan     Active Hospital Problems    Diagnosis  POA   • **Acute on chronic diastolic heart failure (CMS/HCC) [I50.33]  Yes   • COPD (chronic obstructive pulmonary disease) (CMS/[facility]) [J44.9]  Yes   • Hypertensive urgency [I16.0]  Yes   • Coronary artery disease involving coronary bypass graft of native heart without angina pectoris [I25.810]  Yes   • Acute on chronic congestive heart failure (CMS/[facility]) [I50.9]  Yes   • Hyperlipidemia [E78.5]  Yes   • Hypokalemia [E87.6]  Yes   • Chronic constipation [K59.09]  Yes   •  Oxygen dependent [Z99.81]  Not Applicable   • Benign prostatic hyperplasia [N40.0]  Yes   • Essential hypertension [I10]  Yes   • SERENITY (obstructive sleep apnea) [G47.33]  Yes      Resolved Hospital Problems   No resolved problems to display.       71 y.o. male admitted with Acute on chronic diastolic heart failure (CMS/HCC).    Acute on chronic diastolic heart failure-Cardiology is consulted. Good UOP. Weight is down. On 2L. Switched to oral diuretics. Echo showed grade 2 LVDD.    Hypertensive urgency-cardene drip off. bp is improved with addition of spironolactone    (history of?) chronic hypoxic respiratory failure on 4L home O2-pt reports to me that he hasn't really had access to oxygen in about a year, so it's not really clear to me that he has chronic hypoxic respiratory failure anymore. Currently requiring 2L.  Hypokalemia-resolved  Hypernatremia-resolved    · SCDs for DVT prophylaxis.  · Full code.  · Discussed with patient and care team on multidisciplinary rounds.  · Anticipate discharge home in 1-2 days when cleared by cardiology. Will need walking oximetry on discharge      Ye Addison MD  [facility]  06/11/21  17:51 EDT    I wore protective equipment throughout this patient encounter including a face mask, gloves and protective eyewear.  Hand hygiene was performed before donning protective equipment and after removal when leaving the room.

## 2021-06-11 NOTE — PROGRESS NOTES
"    Patient Name: Negrito Navarro  :1950  71 y.o.      Patient Care Team:  Arthur Hancock MD as PCP - General (Internal Medicine)    Chief Complaint: follow up shortness of breath, hypertensive urgency    Interval History: BP well controlled. Remains on 2.5 of cardene. Feels some better than when is presented. Not much different than yesterday.        Objective   Vital Signs  Temp:  [97.3 °F (36.3 °C)-98.3 °F (36.8 °C)] 97.3 °F (36.3 °C)  Heart Rate:  [59-78] 67  Resp:  [16-20] 20  BP: (122-175)/() 126/80    Intake/Output Summary (Last 24 hours) at 2021 1342  Last data filed at 2021 1305  Gross per 24 hour   Intake --   Output 1575 ml   Net -1575 ml     Flowsheet Rows      First Filed Value   Admission Height  172.7 cm (68\") Documented at 2021   Admission Weight  96.4 kg (212 lb 8.4 oz) Documented at 2021          Physical Exam:   General Appearance:    Alert, cooperative, in no acute distress   Lungs:     Diminished r>l to auscultation.  Normal respiratory effort and rate.      Heart:    Regular rhythm and normal rate, normal S1 and S2, no murmurs, gallops or rubs.     Chest Wall:    No abnormalities observed   Abdomen:     Soft, nontender, positive bowel sounds.     Extremities:   no cyanosis, clubbing or edema.  No marked joint deformities.  Adequate musculoskeletal strength.       Results Review:    Results from last 7 days   Lab Units 21  0059   SODIUM mmol/L 140   POTASSIUM mmol/L 3.7  3.7   CHLORIDE mmol/L 103   CO2 mmol/L 29.0   BUN mg/dL 13   CREATININE mg/dL 1.19   GLUCOSE mg/dL 190*   CALCIUM mg/dL 8.6     Results from last 7 days   Lab Units 21   TROPONIN T ng/mL <0.010     Results from last 7 days   Lab Units 21  005   WBC 10*3/mm3 10.01   HEMOGLOBIN g/dL 14.2   HEMATOCRIT % 44.4   PLATELETS 10*3/mm3 259         Results from last 7 days   Lab Units 21  204   MAGNESIUM mg/dL 1.9                   Medication Review:   aspirin, 81 " mg, Oral, Daily  atorvastatin, 40 mg, Oral, Daily  carvedilol, 12.5 mg, Oral, BID With Meals  cloNIDine, 0.1 mg, Oral, Q12H  famotidine, 20 mg, Oral, BID AC  furosemide, 40 mg, Intravenous, Q12H  lisinopril, 40 mg, Oral, Daily  sodium chloride, 10 mL, Intravenous, Q12H  spironolactone, 50 mg, Oral, Daily              Assessment/Plan   1. Acute on chronic diastolic congestive heart failure (CMS/HCC)  2. Hypertensive urgency - certainly a contributing factor to #1.   3. HypoK/hyperNa  4. CAD s/p CABG  5. Oxygen dependent COPD - oxygen need appears to be at baseline.  6. SERENITY    Weight down 1 lbs. UOP starting to decrease.   BP well controlled. I stopped cardene this morning. Continue to follow BP and try to wean clonidine. Continue spironolactone, lisinopril, carvedilol.   Change to oral diuretics.     HANNAH Khanna  [facility]  06/11/21  13:42 EDT

## 2021-06-12 LAB
ANION GAP SERPL CALCULATED.3IONS-SCNC: 6.7 MMOL/L (ref 5–15)
ANION GAP SERPL CALCULATED.3IONS-SCNC: 7.8 MMOL/L (ref 5–15)
BACTERIA UR QL AUTO: NORMAL /HPF
BILIRUB UR QL STRIP: NEGATIVE
BUN SERPL-MCNC: 21 MG/DL (ref 8–23)
BUN SERPL-MCNC: 23 MG/DL (ref 8–23)
BUN/CREAT SERPL: 13.6 (ref 7–25)
BUN/CREAT SERPL: 16.1 (ref 7–25)
CALCIUM SPEC-SCNC: 8.6 MG/DL (ref 8.6–10.5)
CALCIUM SPEC-SCNC: 8.8 MG/DL (ref 8.6–10.5)
CHLORIDE SERPL-SCNC: 100 MMOL/L (ref 98–107)
CHLORIDE SERPL-SCNC: 103 MMOL/L (ref 98–107)
CLARITY UR: CLEAR
CO2 SERPL-SCNC: 31.2 MMOL/L (ref 22–29)
CO2 SERPL-SCNC: 33.3 MMOL/L (ref 22–29)
COLOR UR: YELLOW
CREAT SERPL-MCNC: 1.43 MG/DL (ref 0.76–1.27)
CREAT SERPL-MCNC: 1.54 MG/DL (ref 0.76–1.27)
GFR SERPL CREATININE-BSD FRML MDRD: 45 ML/MIN/1.73
GFR SERPL CREATININE-BSD FRML MDRD: 48 ML/MIN/1.73
GLUCOSE SERPL-MCNC: 115 MG/DL (ref 65–99)
GLUCOSE SERPL-MCNC: 175 MG/DL (ref 65–99)
GLUCOSE UR STRIP-MCNC: NEGATIVE MG/DL
HGB UR QL STRIP.AUTO: ABNORMAL
HYALINE CASTS UR QL AUTO: NORMAL /LPF
KETONES UR QL STRIP: NEGATIVE
LEUKOCYTE ESTERASE UR QL STRIP.AUTO: NEGATIVE
NITRITE UR QL STRIP: NEGATIVE
PH UR STRIP.AUTO: 5.5 [PH] (ref 5–8)
POTASSIUM SERPL-SCNC: 3.7 MMOL/L (ref 3.5–5.2)
POTASSIUM SERPL-SCNC: 3.8 MMOL/L (ref 3.5–5.2)
PROT UR QL STRIP: NEGATIVE
RBC # UR: NORMAL /HPF
REF LAB TEST METHOD: NORMAL
SODIUM SERPL-SCNC: 140 MMOL/L (ref 136–145)
SODIUM SERPL-SCNC: 142 MMOL/L (ref 136–145)
SP GR UR STRIP: 1.01 (ref 1–1.03)
SQUAMOUS #/AREA URNS HPF: NORMAL /HPF
UROBILINOGEN UR QL STRIP: ABNORMAL
WBC UR QL AUTO: NORMAL /HPF

## 2021-06-12 PROCEDURE — 81001 URINALYSIS AUTO W/SCOPE: CPT | Performed by: STUDENT IN AN ORGANIZED HEALTH CARE EDUCATION/TRAINING PROGRAM

## 2021-06-12 PROCEDURE — 80048 BASIC METABOLIC PNL TOTAL CA: CPT | Performed by: STUDENT IN AN ORGANIZED HEALTH CARE EDUCATION/TRAINING PROGRAM

## 2021-06-12 PROCEDURE — 99232 SBSQ HOSP IP/OBS MODERATE 35: CPT | Performed by: NURSE PRACTITIONER

## 2021-06-12 PROCEDURE — 80048 BASIC METABOLIC PNL TOTAL CA: CPT | Performed by: NURSE PRACTITIONER

## 2021-06-12 RX ORDER — FUROSEMIDE 40 MG/1
40 TABLET ORAL DAILY
Status: DISCONTINUED | OUTPATIENT
Start: 2021-06-13 | End: 2021-06-13 | Stop reason: HOSPADM

## 2021-06-12 RX ORDER — NIFEDIPINE 30 MG/1
30 TABLET, EXTENDED RELEASE ORAL
Status: DISCONTINUED | OUTPATIENT
Start: 2021-06-12 | End: 2021-06-13 | Stop reason: HOSPADM

## 2021-06-12 RX ADMIN — ASPIRIN 81 MG: 81 TABLET, CHEWABLE ORAL at 08:08

## 2021-06-12 RX ADMIN — FAMOTIDINE 20 MG: 20 TABLET, FILM COATED ORAL at 08:09

## 2021-06-12 RX ADMIN — ATORVASTATIN CALCIUM 40 MG: 20 TABLET, FILM COATED ORAL at 08:08

## 2021-06-12 RX ADMIN — FAMOTIDINE 20 MG: 20 TABLET, FILM COATED ORAL at 17:52

## 2021-06-12 RX ADMIN — CLONIDINE HYDROCHLORIDE 0.1 MG: 0.1 TABLET ORAL at 20:10

## 2021-06-12 RX ADMIN — SODIUM CHLORIDE, PRESERVATIVE FREE 10 ML: 5 INJECTION INTRAVENOUS at 20:10

## 2021-06-12 RX ADMIN — CARVEDILOL 12.5 MG: 12.5 TABLET, FILM COATED ORAL at 08:09

## 2021-06-12 RX ADMIN — SPIRONOLACTONE 50 MG: 50 TABLET, FILM COATED ORAL at 08:09

## 2021-06-12 RX ADMIN — LISINOPRIL 40 MG: 40 TABLET ORAL at 08:09

## 2021-06-12 RX ADMIN — CLONIDINE HYDROCHLORIDE 0.1 MG: 0.1 TABLET ORAL at 08:09

## 2021-06-12 RX ADMIN — SODIUM CHLORIDE 500 ML: 9 INJECTION, SOLUTION INTRAVENOUS at 09:54

## 2021-06-12 RX ADMIN — SODIUM CHLORIDE, PRESERVATIVE FREE 10 ML: 5 INJECTION INTRAVENOUS at 08:09

## 2021-06-12 RX ADMIN — FUROSEMIDE 40 MG: 40 TABLET ORAL at 08:09

## 2021-06-12 RX ADMIN — HYDROCODONE BITARTRATE AND ACETAMINOPHEN 1 TABLET: 7.5; 325 TABLET ORAL at 17:52

## 2021-06-12 RX ADMIN — CARVEDILOL 12.5 MG: 12.5 TABLET, FILM COATED ORAL at 17:52

## 2021-06-12 RX ADMIN — NIFEDIPINE 30 MG: 30 TABLET, FILM COATED, EXTENDED RELEASE ORAL at 11:30

## 2021-06-12 NOTE — PROGRESS NOTES
Name: Negrito Navarro ADMIT: 2021   : 1950  PCP: Arthur Hancock MD    MRN: [id number] LOS: 3 days   AGE/SEX: 71 y.o. male  ROOM: [facility]/1     Subjective   Subjective     Says he's feeling pretty well. Still has some shortness of breath, but not requiring any oxygen. saO2 is 90% however he looks comfortable. Unfortunately he has new BAO. Still having negative fluid balance on oral diuretics. He has already received his meds this morning.       Objective   Objective   Vital Signs  Temp:  [97.3 °F (36.3 °C)-98.7 °F (37.1 °C)] 98.7 °F (37.1 °C)  Heart Rate:  [66-69] 66  Resp:  [17-20] 17  BP: (126-193)/() 165/82  SpO2:  [93 %-95 %] 95 %  on  Flow (L/min):  [2] 2;   Device (Oxygen Therapy): nasal cannula  Body mass index is 31.22 kg/m².  Physical Exam  Constitutional:       General: He is not in acute distress.  HENT:      Head: Normocephalic and atraumatic.   Cardiovascular:      Rate and Rhythm: Normal rate and regular rhythm.      Heart sounds: Normal heart sounds.   Pulmonary:      Effort: Pulmonary effort is normal.      Breath sounds: Normal breath sounds.      Comments: diminished  Abdominal:      General: Bowel sounds are normal.      Palpations: Abdomen is soft.   Musculoskeletal:         General: No tenderness.      Right lower leg: No edema.      Left lower leg: No edema.   Neurological:      Mental Status: He is alert.         Results Review     I reviewed the patient's new clinical results.  Results from last 7 days   Lab Units 21  0059 06/10/21  0711 21  1935   WBC 10*3/mm3 10.01 12.03* 9.42   HEMOGLOBIN g/dL 14.2 14.1 14.0   PLATELETS 10*3/mm3 259 272 193     Results from last 7 days   Lab Units 21  0516 21  0059 06/10/21  0711 21  2045   SODIUM mmol/L 140 140 144 146*   POTASSIUM mmol/L 3.8 3.7  3.7 2.8* 3.0*   CHLORIDE mmol/L 100 103 102 106   CO2 mmol/L 33.3* 29.0 31.4* 33.9*   BUN mg/dL 21 13 11 13   CREATININE mg/dL 1.54* 1.19 0.99 1.06   GLUCOSE mg/dL  115* 190* 129* 94   Estimated Creatinine Clearance: 47.3 mL/min (A) (by C-G formula based on SCr of 1.54 mg/dL (H)).  Results from last 7 days   Lab Units 06/09/21  2045   ALBUMIN g/dL 4.00   BILIRUBIN mg/dL 0.5   ALK PHOS U/L 114   AST (SGOT) U/L 20   ALT (SGPT) U/L 21     Results from last 7 days   Lab Units 06/12/21  0516 06/11/21  0059 06/10/21  0711 06/09/21  2045   CALCIUM mg/dL 8.8 8.6 8.5* 8.9   ALBUMIN g/dL  --   --   --  4.00   MAGNESIUM mg/dL  --   --   --  1.9       COVID19   Date Value Ref Range Status   06/09/2021 Not Detected Not Detected - Ref. Range Final     No results found for: HGBA1C, POCGLU    Adult Transthoracic Echo Complete W/ Cont if Necessary Per Protocol  · Left ventricular ejection fraction appears to be 61 - 65%.  · Left ventricular wall thickness is consistent with mild concentric   hypertrophy.  · Left ventricular diastolic function is consistent with (grade II w/high   LAP) pseudonormalization  · Normal right ventricular cavity size and systolic function noted.  · The left atrial cavity is mildly dilated.  · Saline test results are negative.  · Aortic valve maximum pressure gradient is 15 mmHg. Aortic valve mean   pressure gradient is 9.0 mmHg.  · There is no evidence of pericardial effusion.       Scheduled Medications  aspirin, 81 mg, Oral, Daily  atorvastatin, 40 mg, Oral, Daily  carvedilol, 12.5 mg, Oral, BID With Meals  cloNIDine, 0.1 mg, Oral, Q12H  famotidine, 20 mg, Oral, BID AC  [START ON 6/13/2021] furosemide, 40 mg, Oral, Daily  NIFEdipine XL, 30 mg, Oral, Q24H  sodium chloride, 500 mL, Intravenous, Once  sodium chloride, 10 mL, Intravenous, Q12H  spironolactone, 50 mg, Oral, Daily    Infusions   Diet  Diet Regular; Low Sodium; No Added Salt       Assessment/Plan     Active Hospital Problems    Diagnosis  POA   • **Acute on chronic diastolic heart failure (CMS/HCC) [I50.33]  Yes   • COPD (chronic obstructive pulmonary disease) (CMS/HCC) [J44.9]  Yes   • Hypertensive  urgency [I16.0]  Yes   • Coronary artery disease involving coronary bypass graft of native heart without angina pectoris [I25.810]  Yes   • Acute on chronic congestive heart failure (CMS/HCC) [I50.9]  Yes   • Hyperlipidemia [E78.5]  Yes   • Hypokalemia [E87.6]  Yes   • Chronic constipation [K59.09]  Yes   • Oxygen dependent [Z99.81]  Not Applicable   • Benign prostatic hyperplasia [N40.0]  Yes   • Essential hypertension [I10]  Yes   • SERENITY (obstructive sleep apnea) [G47.33]  Yes      Resolved Hospital Problems   No resolved problems to display.       71 y.o. male admitted with Acute on chronic diastolic heart failure (CMS/HCC).    Acute on chronic diastolic heart failure-Cardiology is consulted. Negative fluid balance on oral diuretics. Reducing lasix to once daily.     BAO-likely from over diuresis. He's already received his meds this morning. Unfortunately, he continues to have intermittent high blood pressures which make controlling his diastolic heart failure difficult. Will stop losartan, and reduce lasix to once daily as above. Will administer 500cc NS over 4 hours. Check u/a and pvr. Check bmp this afternoon after fluids. If creatinine not coming down, then d/c other diuretics.    Hypertensive urgency-stopped lisinopril and reduced lasix dose as above. Start nifedipine.    (history of?) chronic hypoxic respiratory failure on 4L home O2-pt reports to me that he hasn't really had access to oxygen in about a year, so it's not really clear to me that he has chronic hypoxic respiratory failure anymore. Currently off oxygen  Hypokalemia-resolved  Hypernatremia-resolved    · SCDs for DVT prophylaxis.  · Full code.  · Discussed with patient and care team on multidisciplinary rounds.  · Anticipate discharge home in 1-2 days when cleared by cardiology. Will need walking oximetry on discharge      Ye Addison MD  [facility]  06/12/21  09:38 EDT    I wore protective equipment throughout this  patient encounter including a face mask, gloves and protective eyewear.  Hand hygiene was performed before donning protective equipment and after removal when leaving the room.

## 2021-06-12 NOTE — PROGRESS NOTES
Cardiology Follow-Up Note      Patient Name: Negrito Navarro  Age/Sex: 71 y.o. male  : 1950  MRN: [id number]      Day of Service: 21       Chief Complaint/Follow-up: CHF, dyspnea    S: Dyspnea and edema improved. No CP      Temp:  [97.3 °F (36.3 °C)-98.7 °F (37.1 °C)] 98.7 °F (37.1 °C)  Heart Rate:  [65-69] 65  Resp:  [17-20] 18  BP: (126-193)/() 165/82     PHYSICAL EXAM:    General Appearance: No acute distress, well developed and well nourished.   Eyes: Conjunctiva and lids: No erythema, swelling, or discharge. Sclera non-icteric.   HENT: Atraumatic, normocephalic. External eyes, ears, and nose normal.   Respiratory: No signs of respiratory distress. Respiration rhythm and depth normal. Decreased breath sounds but no rales.    Cardiovascular:  Heart Rate and Rhythm: Normal, Heart Sounds: Normal S1 and S2. No S3 or S4 noted.  Murmurs: No murmurs noted. No rubs, thrills, or gallops.   Arterial Pulses: Posterior tibialis and dorsalis pedis pulses normal.   Lower Extremities: No edema noted.  Gastrointestinal:  Abdomen soft, non-distended, non-tender.  Musculoskeletal: Normal movement of extremities  Skin: Warm and dry.   Psychiatric: Patient alert and oriented to person, place, and time. Speech and behavior appropriate. Normal mood and affect.       ECG/TELE:           Results from last 7 days   Lab Units 21  0516 21  0059 06/10/21  0711   SODIUM mmol/L 140 140 144   POTASSIUM mmol/L 3.8 3.7  3.7 2.8*   CHLORIDE mmol/L 100 103 102   CO2 mmol/L 33.3* 29.0 31.4*   BUN mg/dL 21 13 11   CREATININE mg/dL 1.54* 1.19 0.99   GLUCOSE mg/dL 115* 190* 129*   CALCIUM mg/dL 8.8 8.6 8.5*     Results from last 7 days   Lab Units 21  0059 06/10/21  0711 21  1935   WBC 10*3/mm3 10.01 12.03* 9.42   HEMOGLOBIN g/dL 14.2 14.1 14.0   HEMATOCRIT % 44.4 40.6 41.0   PLATELETS 10*3/mm3 259 272 193         Results from last 7 days   Lab Units 215   TROPONIN T ng/mL <0.010      6/10/2021 Echo:  Interpretation Summary    · Left ventricular ejection fraction appears to be 61 - 65%.  · Left ventricular wall thickness is consistent with mild concentric hypertrophy.  · Left ventricular diastolic function is consistent with (grade II w/high LAP) pseudonormalization  · Normal right ventricular cavity size and systolic function noted.  · The left atrial cavity is mildly dilated.  · Saline test results are negative.  · Aortic valve maximum pressure gradient is 15 mmHg. Aortic valve mean pressure gradient is 9.0 mmHg.  · There is no evidence of pericardial effusion.              Current Medications:   Scheduled Meds:aspirin, 81 mg, Oral, Daily  atorvastatin, 40 mg, Oral, Daily  carvedilol, 12.5 mg, Oral, BID With Meals  cloNIDine, 0.1 mg, Oral, Q12H  famotidine, 20 mg, Oral, BID AC  [START ON 6/13/2021] furosemide, 40 mg, Oral, Daily  NIFEdipine XL, 30 mg, Oral, Q24H  sodium chloride, 500 mL, Intravenous, Once  sodium chloride, 10 mL, Intravenous, Q12H  spironolactone, 50 mg, Oral, Daily            Acute on chronic diastolic heart failure (CMS/HCC)    Acute on chronic congestive heart failure (CMS/HCC)    Hyperlipidemia    Benign prostatic hyperplasia    Chronic constipation    Essential hypertension    SERENITY (obstructive sleep apnea)    Oxygen dependent    Hypokalemia    COPD (chronic obstructive pulmonary disease) (CMS/[facility])    Hypertensive urgency    Coronary artery disease involving coronary bypass graft of native heart without angina pectoris       Plan:     --Acute on chronic diastolic HF, EF 61-65%. Appears euvolemic by exam.     ---Hypertensive urgency--b/p improved but still times that is elevated. Noted med changes by primary team.     --Hypokalemia/hypernatremia. Resolved.    --CAD, s/p CABG. No angina.     --COPD, oxygen dependent---had not seen pul for follow up---may be able to come off oxygen per primary team. Room air when I saw him, Sat 90%--comfortable.     --SERENITY    --BAO, creat  1.5---furosemide decreased to daily, switched from lisinopril to nifedipine per primary team. Remains on spironolactone.     Dyspnea and edema better. Creat up, agree with med changes by primary team. B/p still significantly elevated at times. Continue to follow.     Jeannine Baltazar, HANNAH  06/12/21  11:47 EDT

## 2021-06-12 NOTE — PLAN OF CARE
Goal Outcome Evaluation:  Plan of Care Reviewed With: patient          Outcome Summary: Pt is alert and oriented. VSS on room air. Denies of SOB. complians of shoulder pain. Pain med given one time during day shift. ambulated to bathroom with one people assistant.given 500 ml N.S bolus per MD order.Urine sample collected. will continue to monitor.

## 2021-06-13 VITALS
TEMPERATURE: 98.9 F | HEART RATE: 65 BPM | DIASTOLIC BLOOD PRESSURE: 78 MMHG | WEIGHT: 205.4 LBS | RESPIRATION RATE: 18 BRPM | BODY MASS INDEX: 31.13 KG/M2 | HEIGHT: 68 IN | OXYGEN SATURATION: 98 % | SYSTOLIC BLOOD PRESSURE: 139 MMHG

## 2021-06-13 PROBLEM — I16.0 HYPERTENSIVE URGENCY: Status: RESOLVED | Noted: 2021-06-10 | Resolved: 2021-06-13

## 2021-06-13 PROBLEM — E87.6 HYPOKALEMIA: Status: RESOLVED | Noted: 2021-06-09 | Resolved: 2021-06-13

## 2021-06-13 PROBLEM — I50.9 ACUTE ON CHRONIC CONGESTIVE HEART FAILURE: Status: RESOLVED | Noted: 2021-06-09 | Resolved: 2021-06-13

## 2021-06-13 PROBLEM — I50.33 ACUTE ON CHRONIC DIASTOLIC HEART FAILURE: Status: RESOLVED | Noted: 2021-06-11 | Resolved: 2021-06-13

## 2021-06-13 LAB
ANION GAP SERPL CALCULATED.3IONS-SCNC: 7.2 MMOL/L (ref 5–15)
BUN SERPL-MCNC: 21 MG/DL (ref 8–23)
BUN/CREAT SERPL: 15.3 (ref 7–25)
CALCIUM SPEC-SCNC: 8.6 MG/DL (ref 8.6–10.5)
CHLORIDE SERPL-SCNC: 101 MMOL/L (ref 98–107)
CO2 SERPL-SCNC: 30.8 MMOL/L (ref 22–29)
CREAT SERPL-MCNC: 1.37 MG/DL (ref 0.76–1.27)
DEPRECATED RDW RBC AUTO: 44.8 FL (ref 37–54)
ERYTHROCYTE [DISTWIDTH] IN BLOOD BY AUTOMATED COUNT: 13.8 % (ref 12.3–15.4)
GFR SERPL CREATININE-BSD FRML MDRD: 51 ML/MIN/1.73
GLUCOSE SERPL-MCNC: 115 MG/DL (ref 65–99)
HCT VFR BLD AUTO: 37.7 % (ref 37.5–51)
HGB BLD-MCNC: 13 G/DL (ref 13–17.7)
MCH RBC QN AUTO: 30.7 PG (ref 26.6–33)
MCHC RBC AUTO-ENTMCNC: 34.5 G/DL (ref 31.5–35.7)
MCV RBC AUTO: 89.1 FL (ref 79–97)
PLATELET # BLD AUTO: 243 10*3/MM3 (ref 140–450)
PMV BLD AUTO: 10.6 FL (ref 6–12)
POTASSIUM SERPL-SCNC: 3.6 MMOL/L (ref 3.5–5.2)
RBC # BLD AUTO: 4.23 10*6/MM3 (ref 4.14–5.8)
SODIUM SERPL-SCNC: 139 MMOL/L (ref 136–145)
WBC # BLD AUTO: 13.69 10*3/MM3 (ref 3.4–10.8)

## 2021-06-13 PROCEDURE — 99232 SBSQ HOSP IP/OBS MODERATE 35: CPT | Performed by: NURSE PRACTITIONER

## 2021-06-13 PROCEDURE — 80048 BASIC METABOLIC PNL TOTAL CA: CPT | Performed by: STUDENT IN AN ORGANIZED HEALTH CARE EDUCATION/TRAINING PROGRAM

## 2021-06-13 PROCEDURE — 85027 COMPLETE CBC AUTOMATED: CPT | Performed by: STUDENT IN AN ORGANIZED HEALTH CARE EDUCATION/TRAINING PROGRAM

## 2021-06-13 PROCEDURE — 94618 PULMONARY STRESS TESTING: CPT

## 2021-06-13 RX ORDER — FUROSEMIDE 40 MG/1
40 TABLET ORAL DAILY
Qty: 30 TABLET | Refills: 0 | Status: SHIPPED | OUTPATIENT
Start: 2021-06-14 | End: 2021-07-14

## 2021-06-13 RX ORDER — SPIRONOLACTONE 50 MG/1
50 TABLET, FILM COATED ORAL DAILY
Qty: 30 TABLET | Refills: 0 | Status: SHIPPED | OUTPATIENT
Start: 2021-06-14 | End: 2021-07-14

## 2021-06-13 RX ORDER — CARVEDILOL 12.5 MG/1
12.5 TABLET ORAL 2 TIMES DAILY WITH MEALS
Qty: 60 TABLET | Refills: 0 | Status: SHIPPED | OUTPATIENT
Start: 2021-06-13 | End: 2021-07-13

## 2021-06-13 RX ORDER — ASPIRIN 81 MG/1
81 TABLET, CHEWABLE ORAL DAILY
Qty: 30 TABLET | Refills: 0 | Status: SHIPPED | OUTPATIENT
Start: 2021-06-14 | End: 2021-07-14

## 2021-06-13 RX ORDER — NIFEDIPINE 30 MG/1
30 TABLET, FILM COATED, EXTENDED RELEASE ORAL
Qty: 30 TABLET | Refills: 0 | Status: SHIPPED | OUTPATIENT
Start: 2021-06-14 | End: 2021-07-14

## 2021-06-13 RX ADMIN — NIFEDIPINE 30 MG: 30 TABLET, FILM COATED, EXTENDED RELEASE ORAL at 08:07

## 2021-06-13 RX ADMIN — FAMOTIDINE 20 MG: 20 TABLET, FILM COATED ORAL at 06:43

## 2021-06-13 RX ADMIN — CARVEDILOL 12.5 MG: 12.5 TABLET, FILM COATED ORAL at 08:07

## 2021-06-13 RX ADMIN — SODIUM CHLORIDE, PRESERVATIVE FREE 10 ML: 5 INJECTION INTRAVENOUS at 08:08

## 2021-06-13 RX ADMIN — ASPIRIN 81 MG: 81 TABLET, CHEWABLE ORAL at 08:07

## 2021-06-13 RX ADMIN — SPIRONOLACTONE 50 MG: 50 TABLET, FILM COATED ORAL at 08:07

## 2021-06-13 RX ADMIN — CLONIDINE HYDROCHLORIDE 0.1 MG: 0.1 TABLET ORAL at 08:07

## 2021-06-13 RX ADMIN — ATORVASTATIN CALCIUM 40 MG: 20 TABLET, FILM COATED ORAL at 08:07

## 2021-06-13 RX ADMIN — FUROSEMIDE 40 MG: 40 TABLET ORAL at 08:07

## 2021-06-13 NOTE — PROGRESS NOTES
Cardiology Follow-Up Note      Patient Name: Negrito Navarro  Age/Sex: 71 y.o. male  : 1950  MRN: [id number]      Day of Service: 21       Chief Complaint/Follow-up: Congestive heart failure, dyspnea.      S: Feels okay, no CP or dyspnea, edema improved.       Temp:  [98.3 °F (36.8 °C)-99 °F (37.2 °C)] 98.4 °F (36.9 °C)  Heart Rate:  [60-73] 60  Resp:  [16-22] 18  BP: (126-171)/(64-90) 126/64     PHYSICAL EXAM:    General Appearance: No acute distress, well developed and well nourished.   Eyes: Conjunctiva and lids: No erythema, swelling, or discharge. Sclera non-icteric.   HENT: Atraumatic, normocephalic. External eyes, ears, and nose normal.   Respiratory: No signs of respiratory distress. Respiration rhythm and depth normal.   Clear to auscultation. No rales, crackles, rhonchi, or wheezing auscultated.   Cardiovascular:  Jugular Venous Pressure: Normal  Heart Rate and Rhythm: Normal, Heart Sounds: Normal S1 and S2. No S3 or S4 noted.  Murmurs: No murmurs noted. No rubs, thrills, or gallops.   Arterial Pulses: Posterior tibialis and dorsalis pedis pulses normal.   Lower Extremities: No edema noted.  Gastrointestinal:  Abdomen soft, non-distended, non-tender.  Musculoskeletal: Normal movement of extremities  Skin: Warm and dry.   Psychiatric: Patient alert and oriented to person, place, and time. Speech and behavior appropriate. Normal mood and affect.       ECG/TELE:       Results from last 7 days   Lab Units 21  0420 21  1537 21  0516   SODIUM mmol/L 139 142 140   POTASSIUM mmol/L 3.6 3.7 3.8   CHLORIDE mmol/L 101 103 100   CO2 mmol/L 30.8* 31.2* 33.3*   BUN mg/dL    CREATININE mg/dL 1.37* 1.43* 1.54*   GLUCOSE mg/dL 115* 175* 115*   CALCIUM mg/dL 8.6 8.6 8.8     Results from last 7 days   Lab Units 21  0420 21  0059 06/10/21  0711   WBC 10*3/mm3 13.69* 10.01 12.03*   HEMOGLOBIN g/dL 13.0 14.2 14.1   HEMATOCRIT % 37.7 44.4 40.6   PLATELETS 10*3/mm3 243  259 272         Results from last 7 days   Lab Units 06/09/21 2045   TROPONIN T ng/mL <0.010               Current Medications:   Scheduled Meds:aspirin, 81 mg, Oral, Daily  atorvastatin, 40 mg, Oral, Daily  carvedilol, 12.5 mg, Oral, BID With Meals  cloNIDine, 0.1 mg, Oral, Q12H  famotidine, 20 mg, Oral, BID AC  furosemide, 40 mg, Oral, Daily  NIFEdipine XL, 30 mg, Oral, Q24H  sodium chloride, 10 mL, Intravenous, Q12H  spironolactone, 50 mg, Oral, Daily            Acute on chronic diastolic heart failure (CMS/[facility])    Acute on chronic congestive heart failure (CMS/[facility])    Hyperlipidemia    Benign prostatic hyperplasia    Chronic constipation    Essential hypertension    SERENITY (obstructive sleep apnea)    Oxygen dependent    Hypokalemia    COPD (chronic obstructive pulmonary disease) (CMS/[facility])    Hypertensive urgency    Coronary artery disease involving coronary bypass graft of native heart without angina pectoris       Plan:        --Acute on chronic diastolic HF, EF 61-65%. Appears euvolemic by exam.      ---Hypertensive urgency--elevated yesterday but b/p improved today---would keep on current regimen for now and monitor.     --Hypokalemia/hypernatremia. Resolved.     --CAD, s/p CABG. No angina.      --COPD, oxygen dependent---had not seen pul for follow up---may be able to come off oxygen per primary team.     --SERENITY     --BAO, creat improving-1.37 today--furosemide decreased to daily, switched from lisinopril to nifedipine per primary team yesterday. Remains on spironolactone.  Will keep current regimen.      B/p improved today and cardiac status stable. He could probably go home today and be follow up closely as outpatient. He thinks he is going to continue to follow with Dr. Robles---told him we would be happy to follow him if he choose. Will continue to follow along if he stays.      HANNAH Monroy APRN  06/13/21  10:11 EDT

## 2021-06-13 NOTE — DISCHARGE SUMMARY
Patient Name: Negrito Navarro  : 1950  MRN: [id number]    Date of Admission: 2021  Date of Discharge:  2021  Primary Care Physician: Arthur Hancock MD      Chief Complaint:   Shortness of Breath      Discharge Diagnoses     Active Hospital Problems    Diagnosis  POA   • COPD (chronic obstructive pulmonary disease) (CMS/HCC) [J44.9]  Yes   • Coronary artery disease involving coronary bypass graft of native heart without angina pectoris [I25.810]  Yes   • Hyperlipidemia [E78.5]  Yes   • Chronic constipation [K59.09]  Yes   • Oxygen dependent [Z99.81]  Not Applicable   • Benign prostatic hyperplasia [N40.0]  Yes   • Essential hypertension [I10]  Yes   • SERENITY (obstructive sleep apnea) [G47.33]  Yes      Resolved Hospital Problems    Diagnosis Date Resolved POA   • **Acute on chronic diastolic heart failure (CMS/HCC) [I50.33] 2021 Yes   • Hypertensive urgency [I16.0] 2021 Yes   • Acute on chronic congestive heart failure (CMS/HCC) [I50.9] 2021 Yes   • Hypokalemia [E87.6] 2021 Yes        Hospital Course     Mr. Navarro is a 71 y.o. male with a history of chronic diastolic heart failure, chronic hypoxic respiratory failure on 4 L home O2 (though patient had not been using his oxygen for about a year), essential hypertension, coronary artery disease, COPD, BPH, SERENITY who presented to [facility] initially complaining of shortness of breath.  Please see the admitting history and physical for further details.  He was found to have hypertensive urgency/emergency and acute on chronic diastolic heart failure and was admitted to the hospital for further evaluation and treatment.      Patient was seen in consultation by cardiology.  He was treated with IV diuretics and a Cardene drip.  It was suspected that he might have hyperaldosteronism due to hypokalemia, hypernatremia, alkalosis, and severe hypertension, and so he was started on spironolactone.  His shortness of breath  slowly improved, and he came off of oxygen.  The day prior to discharge, his creatinine bumped a little bit and so blood pressure medications and diuretics had to be readjusted.  He underwent walking oximetry, did not qualify for oxygen.  Referral to the ambulatory heart failure clinic has been written at discharge.  He should also follow-up with cardiology in a couple of weeks for labs and medication adjustments.    Day of Discharge     Subjective:  He is doing really well today.  His creatinine has improved.  His blood pressure is better to.  He did not require oxygen with walking oximetry.    Physical Exam:  Temp:  [98.3 °F (36.8 °C)-99 °F (37.2 °C)] 98.4 °F (36.9 °C)  Heart Rate:  [60-73] 60  Resp:  [16-22] 18  BP: (126-171)/(64-90) 126/64  Body mass index is 31.23 kg/m².  Physical Exam  Constitutional:       General: He is not in acute distress.  Cardiovascular:      Rate and Rhythm: Normal rate and regular rhythm.      Heart sounds: Normal heart sounds.   Pulmonary:      Effort: Pulmonary effort is normal.      Breath sounds: Normal breath sounds.   Abdominal:      General: Bowel sounds are normal.      Palpations: Abdomen is soft.   Musculoskeletal:         General: No tenderness.      Right lower leg: No edema.      Left lower leg: No edema.   Neurological:      Mental Status: He is alert.   Psychiatric:         Mood and Affect: Mood normal.         Behavior: Behavior normal.         Consultants     Consult Orders (all) (From admission, onward)     Start     Ordered    06/09/21 2315  Inpatient Cardiology Consult  Once     Specialty:  Cardiology  Provider:  Kerry Schulte MD    06/09/21 2317 06/09/21 2313  Cardiac Rehab Evaluation and Enrollment  Once,   Status:  Canceled     Provider:  (Not yet assigned)    06/09/21 2317 06/09/21 2235  [facility] (on-call MD unless specified) Details  Once     Specialty:  Hospitalist  Provider:  (Not yet assigned)    06/09/21 2234              Procedures     Imaging Results  (All)     Procedure Component Value Units Date/Time    XR Chest 1 View [[id number]] Collected: 06/09/21 2001     Updated: 06/09/21 2006    Narrative:      XR CHEST 1 VW-     HISTORY:  Shortness of air, CHF, bilateral leg swelling, face swelling.     COMPARISON:  None     FINDINGS:    A single portable view of the chest was obtained. The cardiac silhouette  is enlarged. There are postsurgical changes from CABG. There is calcific  aortic atherosclerosis. There is mild airspace opacity in the lung bases  bilaterally, left greater than right, which could be due to atelectasis.  There is a small left pleural effusion versus scarring. There is  asymmetric elevation of the left hemidiaphragm. Median sternal wires are  intact. There is multilevel degenerative disc disease.     This report was finalized on 6/9/2021 8:03 PM by Dr. Leeann Gibbons M.D.             Pertinent Labs     Results from last 7 days   Lab Units 06/13/21  0420 06/11/21  0059 06/10/21  0711 06/09/21  1935   WBC 10*3/mm3 13.69* 10.01 12.03* 9.42   HEMOGLOBIN g/dL 13.0 14.2 14.1 14.0   PLATELETS 10*3/mm3 243 259 272 193     Results from last 7 days   Lab Units 06/13/21  0420 06/12/21  1537 06/12/21  0516 06/11/21  0059   SODIUM mmol/L 139 142 140 140   POTASSIUM mmol/L 3.6 3.7 3.8 3.7  3.7   CHLORIDE mmol/L 101 103 100 103   CO2 mmol/L 30.8* 31.2* 33.3* 29.0   BUN mg/dL 21 23 21 13   CREATININE mg/dL 1.37* 1.43* 1.54* 1.19   GLUCOSE mg/dL 115* 175* 115* 190*   Estimated Creatinine Clearance: 53.2 mL/min (A) (by C-G formula based on SCr of 1.37 mg/dL (H)).  Results from last 7 days   Lab Units 06/09/21  2045   ALBUMIN g/dL 4.00   BILIRUBIN mg/dL 0.5   ALK PHOS U/L 114   AST (SGOT) U/L 20   ALT (SGPT) U/L 21     Results from last 7 days   Lab Units 06/13/21  0420 06/12/21  1537 06/12/21  0516 06/11/21  0059 06/09/21 2045   CALCIUM mg/dL 8.6 8.6 8.8 8.6 8.9   ALBUMIN g/dL  --   --   --   --  4.00   MAGNESIUM mg/dL  --   --   --   --  1.9       Results from  last 7 days   Lab Units 06/09/21 2045 06/09/21 1935   TROPONIN T ng/mL <0.010  --    PROBNP pg/mL  --  1,524.0*           Invalid input(s): LDLCALC        Test Results Pending at Discharge       Discharge Details        Discharge Medications      New Medications      Instructions Start Date   aspirin 81 MG chewable tablet   81 mg, Oral, Daily   Start Date: June 14, 2021     NIFEdipine CC 30 MG 24 hr tablet  Commonly known as: ADALAT CC   30 mg, Oral, Every 24 Hours Scheduled   Start Date: June 14, 2021     spironolactone 50 MG tablet  Commonly known as: ALDACTONE   50 mg, Oral, Daily   Start Date: June 14, 2021        Changes to Medications      Instructions Start Date   carvedilol 12.5 MG tablet  Commonly known as: COREG  What changed:   · medication strength  · how much to take   12.5 mg, Oral, 2 Times Daily With Meals      furosemide 40 MG tablet  Commonly known as: LASIX  What changed:   · medication strength  · how much to take   40 mg, Oral, Daily   Start Date: June 14, 2021        Continue These Medications      Instructions Start Date   atorvastatin 40 MG tablet  Commonly known as: LIPITOR   40 mg, Oral, Daily      cloNIDine 0.1 MG tablet  Commonly known as: CATAPRES   0.1 mg, Oral, 2 Times Daily      dicyclomine 10 MG capsule  Commonly known as: BENTYL   10 mg, Oral, 3 Times Daily PRN      famotidine 20 MG tablet  Commonly known as: PEPCID   20 mg, Oral, 2 Times Daily      HYDROcodone-acetaminophen 7.5-325 MG per tablet  Commonly known as: NORCO   1 tablet, Oral, Every 12 Hours PRN      ketoconazole 2 % cream  Commonly known as: NIZORAL   Topical, Daily         Stop These Medications    lisinopril 40 MG tablet  Commonly known as: PRINIVIL,ZESTRIL     potassium chloride 20 MEQ CR tablet  Commonly known as: K-DUR,KLOR-CON            Allergies   Allergen Reactions   • Shellfish-Derived Products Unknown - Low Severity     gout  gout           Discharge Disposition:  Home or Self Care    Discharge Diet:  Diet  Order   Procedures   • Diet Regular; Low Sodium; No Added Salt       Discharge Activity:   Activity Instructions     Activity as Tolerated            CODE STATUS:    Code Status and Medical Interventions:   Ordered at: 06/09/21 2492     Code Status:    CPR     Medical Interventions (Level of Support Prior to Arrest):    Full       No future appointments.  Additional Instructions for the Follow-ups that You Need to Schedule     Call MD With Problems / Concerns   As directed      Instructions: call your primary care physician if you experience chest pain, shortness of breath, abdominal pain, nausea, vomiting, fevers, sweats, chills, or worsening of your symptoms    Order Comments: Instructions: call your primary care physician if you experience chest pain, shortness of breath, abdominal pain, nausea, vomiting, fevers, sweats, chills, or worsening of your symptoms          Discharge Follow-up with PCP   As directed       Currently Documented PCP:    Arthur Hancock MD    PCP Phone Number:    [phone]     Follow Up Details: 1-2 weeks         Discharge Follow-up with Specialty: cardiology 2 weeks   As directed      Specialty: cardiology 2 weeks           Follow-up Information     Arthur Hancock MD .    Specialty: Internal Medicine  Why: 1-2 weeks  Contact information:  [address]  [place] [id number] [phone]             [facility] .    Specialty: Cardiology  Contact information:  [address]  [place] [address] [phone]                 Additional Instructions for the Follow-ups that You Need to Schedule     Call MD With Problems / Concerns   As directed      Instructions: call your primary care physician if you experience chest pain, shortness of breath, abdominal pain, nausea, vomiting, fevers, sweats, chills, or worsening of your symptoms    Order Comments: Instructions: call your primary care physician if you experience chest  pain, shortness of breath, abdominal pain, nausea, vomiting, fevers, sweats, chills, or worsening of your symptoms          Discharge Follow-up with PCP   As directed       Currently Documented PCP:    Arthur Hancock MD    PCP Phone Number:    [phone]     Follow Up Details: 1-2 weeks         Discharge Follow-up with Specialty: cardiology 2 weeks   As directed      Specialty: cardiology 2 weeks           Time Spent on Discharge:  Greater than 30 minutes      Ye Addison MD  [facility]  06/13/21  11:02 EDT

## 2021-06-13 NOTE — PLAN OF CARE
Exercise Oximetry    Patient Name:Negrito Navarro   MRN: [id number]   Date: 06/13/21             ROOM AIR BASELINE   SpO2%    92   Heart Rate  71   Blood Pressure      EXERCISE ON ROOM AIR SpO2% EXERCISE ON O2 @  LPM SpO2%   1 MINUTE    1 MINUTE    2 MINUTES  2 MINUTES    3 MINUTES  92  3 MINUTES    4 MINUTES   92 4 MINUTES    5 MINUTES   92 5 MINUTES    6 MINUTES   91 6 MINUTES               Distance Walked   Distance Walked   Dyspnea (Dee Scale)  none Dyspnea (Dee Scale)   Fatigue (Dee Scale)    none Fatigue (Dee Scale)   SpO2% Post Exercise  92 SpO2% Post Exercise   HR Post Exercise          72 HR Post Exercise   Time to Recovery          none Time to Recovery     CommentGoal Outcome Evaluation: Came upon RN walking pt. on room air. Continued walk at comfortble pace on room air. Returned to   Bedside at 92% on room air. Thank you NRRT

## 2021-06-13 NOTE — PLAN OF CARE
Goal Outcome Evaluation:  Plan of Care Reviewed With: patient        Progress: improving  Outcome Summary: Patient is being diacharged.

## 2021-06-13 NOTE — PLAN OF CARE
Goal Outcome Evaluation:  Plan of Care Reviewed With: patient        Progress: improving  Outcome Summary: PT has no complaints during evening, resting comfortably throught night.  Denies pain or SOB.  VSS.

## 2021-06-14 ENCOUNTER — TELEPHONE (OUTPATIENT)
Dept: CARDIOLOGY | Facility: HOSPITAL | Age: 71
End: 2021-06-14

## 2021-06-14 ENCOUNTER — READMISSION MANAGEMENT (OUTPATIENT)
Dept: CALL CENTER | Facility: HOSPITAL | Age: 71
End: 2021-06-14

## 2021-06-14 NOTE — CASE MANAGEMENT/SOCIAL WORK
Case Management Discharge Note      Final Note: Discharge to home    Provided Post Acute Provider List?: Refused  Provided Post Acute Provider Quality & Resource List?: Refused  Refused Quality and Resource List Comment: If he needs HH he does not care who it is with    Selected Continued Care - Discharged on 6/13/2021 Admission date: 6/9/2021 - Discharge disposition: Home or Self Care    Destination    No services have been selected for the patient.              Durable Medical Equipment    No services have been selected for the patient.              Dialysis/Infusion    No services have been selected for the patient.              Home Medical Care    No services have been selected for the patient.              Therapy    No services have been selected for the patient.              Community Resources    No services have been selected for the patient.              Community & DME    No services have been selected for the patient.                  Transportation Services  Private: Car    Final Discharge Disposition Code: 01 - home or self-care

## 2021-06-14 NOTE — TELEPHONE ENCOUNTER
Called Pt to schedule appt.  No answer. Left VM message with clinic phone number and reason for call.  Will await Pt call back/try again later.      Carlita Henry RN  [facility]

## 2021-06-14 NOTE — OUTREACH NOTE
Prep Survey      Responses   [facility] patient discharged from?  [place]   Is LACE score < 7 ?  No   Emergency Room discharge w/ pulse ox?  No   Eligibility  Readm Mgmt   Discharge diagnosis  COPD    Does the patient have one of the following disease processes/diagnoses(primary or secondary)?  COPD/Pneumonia   Does the patient have Home health ordered?  No   Is there a DME ordered?  No   Prep survey completed?  Yes          Lilly Kohli RN

## 2021-06-22 ENCOUNTER — TELEPHONE (OUTPATIENT)
Dept: CARDIOLOGY | Facility: HOSPITAL | Age: 71
End: 2021-06-22

## 2021-06-22 NOTE — TELEPHONE ENCOUNTER
Called Pt to schedule appt.  I explained that we received a referral to see him when he was discharged from the hospital.  I explained whet we do here at the clinic and provided him with out phone number.  He said he was not sure he wants to come in, but will think about it and call me back.  All questions addressed at this time. Understanding and agreement verbalized. Will await Pt call back.      Carlita Henry RN  [facility]

## 2021-06-23 ENCOUNTER — READMISSION MANAGEMENT (OUTPATIENT)
Dept: CALL CENTER | Facility: HOSPITAL | Age: 71
End: 2021-06-23

## 2021-06-23 NOTE — OUTREACH NOTE
COPD/PN Week 2 Survey      Responses   [facility] [place] patient discharged from?  [place]   Does the patient have one of the following disease processes/diagnoses(primary or secondary)?  COPD/Pneumonia   Was the primary reason for admission:  COPD exacerbation   Week 2 attempt successful?  Yes   Call start time  1007   Call end time  1010   Discharge diagnosis  COPD    Meds reviewed with patient/caregiver?  Yes   Is the patient having any side effects they believe may be caused by any medication additions or changes?  No   Does the patient have all medications ordered at discharge?  Yes   Is the patient taking all medications as directed (includes completed medication regime)?  Yes   Does the patient have a primary care provider?   Yes   Does the patient have an appointment with their PCP or specialist within 7 days of discharge?  Yes   Comments regarding PCP  PCP APPOINTMENT IS 7/7/21   Has the patient kept scheduled appointments due by today?  N/A   Has home health visited the patient within 72 hours of discharge?  N/A   Has all DME been delivered?  Yes   DME interventions  Called DME agency   DME comments  PATIENT STATES HE IS NO LONGER USING O2 AND WOULD LIKE IT PICKED UP. CALL TO [place] TO REVEAL THEY STILL NEED AN ORDER FAXED FROM PATIENT'S DOCTOR TO DISCONTINUE HIS O2. CALL TO PATIENT TO RELAY SAME INFORMATION.   Pulse Ox monitoring  None   Did the patient receive a copy of their discharge instructions?  Yes   Nursing interventions  Reviewed instructions with patient   What is the patient's perception of their health status since discharge?  Improving   Nursing Interventions  Nurse provided patient education   If the patient is a current smoker, are they able to teach back resources for cessation?  Not a smoker   Is the patient/caregiver able to teach back the hierarchy of who to call/visit for symptoms/problems? PCP, Specialist, Home health nurse, Urgent Care, ED, 911  Yes   Is the patient able to teach  back COPD zones?  Yes   Nursing interventions  Education provided on various zones   Patient reports what zone on this call?  Green Zone   Green Zone  Reports doing well, Breathing without shortness of breath, Usual activity and exercise level, Usual amount of phlegm/mucus without difficulty coughing up, Sleeping well, Appetite is good   Green Zone interventions:  Avoid indoor/outdoor triggers, Take daily medications, Continue regular exercise/diet plan   Week 2 call completed?  Yes          Charley Albrecht LPN

## 2021-07-01 ENCOUNTER — READMISSION MANAGEMENT (OUTPATIENT)
Dept: CALL CENTER | Facility: HOSPITAL | Age: 71
End: 2021-07-01

## 2021-07-01 NOTE — OUTREACH NOTE
COPD/PN Week 3 Survey      Responses   [facility] patient discharged from?  [place]   Does the patient have one of the following disease processes/diagnoses(primary or secondary)?  COPD/Pneumonia   Was the primary reason for admission:  COPD exacerbation   Week 3 attempt successful?  No   Unsuccessful attempts  Attempt 1          Charley Albrecht LPN

## 2021-07-02 ENCOUNTER — READMISSION MANAGEMENT (OUTPATIENT)
Dept: CALL CENTER | Facility: HOSPITAL | Age: 71
End: 2021-07-02

## 2021-07-02 NOTE — OUTREACH NOTE
COPD/PN Week 3 Survey      Responses   [facility] patient discharged from?  [place]   Does the patient have one of the following disease processes/diagnoses(primary or secondary)?  COPD/Pneumonia   Was the primary reason for admission:  COPD exacerbation   Week 3 attempt successful?  No   Unsuccessful attempts  Attempt 2          Oumou Burrows RN

## 2021-08-13 ENCOUNTER — TELEPHONE (OUTPATIENT)
Dept: CARDIOLOGY | Facility: HOSPITAL | Age: 71
End: 2021-08-13

## 2021-08-13 NOTE — TELEPHONE ENCOUNTER
Called Pt to schedule initial visit.  Appt. Scheduled on 8/17/21 at 11:00 AM.  Provided Pt with directions to clinic and phone number.  All questions and concerns addressed at this time. Understanding and agreement verbalized.    Carlita Henry RN  [facility]

## 2021-08-17 ENCOUNTER — TELEPHONE (OUTPATIENT)
Dept: CARDIOLOGY | Facility: HOSPITAL | Age: 71
End: 2021-08-17

## 2021-08-17 NOTE — TELEPHONE ENCOUNTER
08/17/2021 @ 11:23 AM - Patient has not showed for appointment this morning. Called and reached Voicemail. Left message.

## 2023-01-04 ENCOUNTER — HOSPITAL ENCOUNTER (EMERGENCY)
Facility: HOSPITAL | Age: 73
Discharge: HOME | End: 2023-01-05
Attending: EMERGENCY MEDICINE | Admitting: EMERGENCY MEDICINE
Payer: MEDICARE

## 2023-01-04 DIAGNOSIS — S46.912A STRAIN OF LEFT SHOULDER, INITIAL ENCOUNTER: ICD-10-CM

## 2023-01-04 DIAGNOSIS — S00.03XA CONTUSION OF OCCIPITAL REGION OF SCALP, INITIAL ENCOUNTER: ICD-10-CM
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
## 2023-01-04 DIAGNOSIS — I10 HYPERTENSION NOT AT GOAL: ICD-10-CM

## 2023-01-04 DIAGNOSIS — S09.90XA TRAUMATIC INJURY OF HEAD, INITIAL ENCOUNTER: Primary | ICD-10-CM

## 2023-01-04 DIAGNOSIS — N18.9 CHRONIC KIDNEY DISEASE, UNSPECIFIED CKD STAGE: ICD-10-CM

## 2023-01-04 DIAGNOSIS — S00.01XA ABRASION OF SCALP, INITIAL ENCOUNTER: ICD-10-CM
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
## 2023-01-04 PROCEDURE — 99285 EMERGENCY DEPT VISIT HI MDM: CPT

## 2023-01-05 ENCOUNTER — APPOINTMENT (OUTPATIENT)
Dept: GENERAL RADIOLOGY | Facility: HOSPITAL | Age: 73
End: 2023-01-05
Payer: MEDICARE

## 2023-01-05 ENCOUNTER — APPOINTMENT (OUTPATIENT)
Dept: CT IMAGING | Facility: HOSPITAL | Age: 73
End: 2023-01-05
Payer: MEDICARE

## 2023-01-05 VITALS
HEIGHT: 69 IN | BODY MASS INDEX: 29.62 KG/M2 | DIASTOLIC BLOOD PRESSURE: 114 MMHG | TEMPERATURE: 98.2 F | OXYGEN SATURATION: 95 % | HEART RATE: 65 BPM | SYSTOLIC BLOOD PRESSURE: 196 MMHG | WEIGHT: 200 LBS | RESPIRATION RATE: 16 BRPM

## 2023-01-05 LAB
ANION GAP SERPL CALCULATED.3IONS-SCNC: 6.3 MMOL/L (ref 5–15)
BASOPHILS # BLD AUTO: 0.05 10*3/MM3 (ref 0–0.2)
BASOPHILS NFR BLD AUTO: 0.5 % (ref 0–1.5)
BUN SERPL-MCNC: 21 MG/DL (ref 8–23)
BUN/CREAT SERPL: 15.2 (ref 7–25)
CALCIUM SPEC-SCNC: 9.2 MG/DL (ref 8.6–10.5)
CHLORIDE SERPL-SCNC: 106 MMOL/L (ref 98–107)
CO2 SERPL-SCNC: 30.7 MMOL/L (ref 22–29)
CREAT SERPL-MCNC: 1.38 MG/DL (ref 0.76–1.27)
DEPRECATED RDW RBC AUTO: 48 FL (ref 37–54)
EGFRCR SERPLBLD CKD-EPI 2021: 53.7 ML/MIN/1.73
EOSINOPHIL # BLD AUTO: 0.46 10*3/MM3 (ref 0–0.4)
EOSINOPHIL NFR BLD AUTO: 4.3 % (ref 0.3–6.2)
ERYTHROCYTE [DISTWIDTH] IN BLOOD BY AUTOMATED COUNT: 13.7 % (ref 12.3–15.4)
GLUCOSE SERPL-MCNC: 130 MG/DL (ref 65–99)
HCT VFR BLD AUTO: 43 % (ref 37.5–51)
HGB BLD-MCNC: 14 G/DL (ref 13–17.7)
IMM GRANULOCYTES # BLD AUTO: 0.02 10*3/MM3 (ref 0–0.05)
IMM GRANULOCYTES NFR BLD AUTO: 0.2 % (ref 0–0.5)
LYMPHOCYTES # BLD AUTO: 2.17 10*3/MM3 (ref 0.7–3.1)
LYMPHOCYTES NFR BLD AUTO: 20.5 % (ref 19.6–45.3)
MCH RBC QN AUTO: 31.3 PG (ref 26.6–33)
MCHC RBC AUTO-ENTMCNC: 32.6 G/DL (ref 31.5–35.7)
MCV RBC AUTO: 96 FL (ref 79–97)
MONOCYTES # BLD AUTO: 1.65 10*3/MM3 (ref 0.1–0.9)
MONOCYTES NFR BLD AUTO: 15.6 % (ref 5–12)
NEUTROPHILS NFR BLD AUTO: 58.9 % (ref 42.7–76)
NEUTROPHILS NFR BLD AUTO: 6.25 10*3/MM3 (ref 1.7–7)
NRBC BLD AUTO-RTO: 0 /100 WBC (ref 0–0.2)
PLATELET # BLD AUTO: 290 10*3/MM3 (ref 140–450)
PMV BLD AUTO: 9.9 FL (ref 6–12)
POTASSIUM SERPL-SCNC: 4 MMOL/L (ref 3.5–5.2)
QT INTERVAL: 402 MS
RBC # BLD AUTO: 4.48 10*6/MM3 (ref 4.14–5.8)
SODIUM SERPL-SCNC: 143 MMOL/L (ref 136–145)
TROPONIN T SERPL-MCNC: <0.01 NG/ML (ref 0–0.03)
WBC NRBC COR # BLD: 10.6 10*3/MM3 (ref 3.4–10.8)

## 2023-01-05 PROCEDURE — 84484 ASSAY OF TROPONIN QUANT: CPT | Performed by: EMERGENCY MEDICINE

## 2023-01-05 PROCEDURE — 93010 ELECTROCARDIOGRAM REPORT: CPT | Performed by: INTERNAL MEDICINE

## 2023-01-05 PROCEDURE — 73030 X-RAY EXAM OF SHOULDER: CPT

## 2023-01-05 PROCEDURE — 85025 COMPLETE CBC W/AUTO DIFF WBC: CPT | Performed by: EMERGENCY MEDICINE

## 2023-01-05 PROCEDURE — 72125 CT NECK SPINE W/O DYE: CPT

## 2023-01-05 PROCEDURE — 36415 COLL VENOUS BLD VENIPUNCTURE: CPT

## 2023-01-05 PROCEDURE — 90715 TDAP VACCINE 7 YRS/> IM: CPT | Performed by: EMERGENCY MEDICINE

## 2023-01-05 PROCEDURE — 90471 IMMUNIZATION ADMIN: CPT | Performed by: EMERGENCY MEDICINE

## 2023-01-05 PROCEDURE — 80048 BASIC METABOLIC PNL TOTAL CA: CPT | Performed by: EMERGENCY MEDICINE

## 2023-01-05 PROCEDURE — 70450 CT HEAD/BRAIN W/O DYE: CPT

## 2023-01-05 PROCEDURE — 93005 ELECTROCARDIOGRAM TRACING: CPT | Performed by: EMERGENCY MEDICINE

## 2023-01-05 PROCEDURE — 25010000002 TETANUS-DIPHTH-ACELL PERTUSSIS 5-2.5-18.5 LF-MCG/0.5 SUSPENSION PREFILLED SYRINGE: Performed by: EMERGENCY MEDICINE

## 2023-01-05 RX ORDER — CARVEDILOL 6.25 MG/1
12.5 TABLET ORAL ONCE
Status: COMPLETED | OUTPATIENT
Start: 2023-01-05 | End: 2023-01-05

## 2023-01-05 RX ORDER — SODIUM CHLORIDE 0.9 % (FLUSH) 0.9 %
10 SYRINGE (ML) INJECTION AS NEEDED
Status: DISCONTINUED | OUTPATIENT
Start: 2023-01-05 | End: 2023-01-05 | Stop reason: HOSPADM

## 2023-01-05 RX ORDER — CLONIDINE HYDROCHLORIDE 0.1 MG/1
0.1 TABLET ORAL ONCE
Status: COMPLETED | OUTPATIENT
Start: 2023-01-05 | End: 2023-01-05

## 2023-01-05 RX ORDER — ACETAMINOPHEN 500 MG
1000 TABLET ORAL ONCE
Status: COMPLETED | OUTPATIENT
Start: 2023-01-05 | End: 2023-01-05

## 2023-01-05 RX ADMIN — ACETAMINOPHEN 1000 MG: 500 TABLET ORAL at 00:31

## 2023-01-05 RX ADMIN — TETANUS TOXOID, REDUCED DIPHTHERIA TOXOID AND ACELLULAR PERTUSSIS VACCINE, ADSORBED 0.5 ML: 5; 2.5; 8; 8; 2.5 SUSPENSION INTRAMUSCULAR at 00:32

## 2023-01-05 RX ADMIN — CARVEDILOL 12.5 MG: 6.25 TABLET, FILM COATED ORAL at 00:30

## 2023-01-05 RX ADMIN — CLONIDINE HYDROCHLORIDE 0.1 MG: 0.1 TABLET ORAL at 00:31

## 2023-01-05 NOTE — ED PROVIDER NOTES
EMERGENCY DEPARTMENT ENCOUNTER    Room Number:  24/24  Date of encounter:  1/5/2023  PCP: Provider, No Known  Historian: Patient    Patient was placed in face mask during triage process. Patient was wearing facemask when I entered the room and throughout our encounter. I wore full protective equipment throughout this patient encounter including a face mask, eye protection, and gloves. Hand hygiene was performed before donning protective equipment and again following doffing of PPE after leaving the room.    HPI:  Chief Complaint: Fall with head injury  A complete HPI/ROS/PMH/PSH/SH/FH are unobtainable due to: N/A   Context: Negrito Navarro is a 72 y.o. male who presents to the ED via EMS after fall with head injury.  Patient reports that he misstepped and fell backwards while walking upstairs to his son's house tonight.  He has mild headache but did not lose consciousness.  No other focal numbness or weakness.  He reports little bit of neck discomfort and left shoulder discomfort.  Otherwise the patient has felt well recently with no chest pain, shortness of breath, nausea or vomiting.  He specifically denies any dizziness, lightheadedness or near syncope.  Unsure when his last tetanus immunization was.  Of note, patient reports that he has been staying with his son this week and left his regular medications at home until his other son brought him to them this morning.  He was not surprised to hear that his blood pressure was elevated.      MEDICAL HISTORY REVIEW  EMR reviewed:    Date of Admission: 6/9/2021  Date of Discharge:  6/13/2021        Active Hospital Problems     Diagnosis   POA   • COPD (chronic obstructive pulmonary disease) (CMS/[facility]) [J44.9]   Yes   • Coronary artery disease involving coronary bypass graft of native heart without angina pectoris [I25.810]   Yes   • Hyperlipidemia [E78.5]   Yes   • Chronic constipation [K59.09]   Yes   • Oxygen dependent [Z99.81]   Not Applicable   • Benign prostatic  hyperplasia [N40.0]   Yes   • Essential hypertension [I10]   Yes   • SERENITY (obstructive sleep apnea) [G47.33]   Yes       Resolved Hospital Problems     Diagnosis Date Resolved POA   • **Acute on chronic diastolic heart failure (CMS/HCC) [I50.33] 06/13/2021 Yes   • Hypertensive urgency [I16.0] 06/13/2021 Yes   • Acute on chronic congestive heart failure (CMS/HCC) [I50.9] 06/13/2021 Yes   • Hypokalemia [E87.6] 06/13/2021 Yes          PAST MEDICAL HISTORY  Active Ambulatory Problems     Diagnosis Date Noted   • Hyperlipidemia 06/09/2021   • Benign prostatic hyperplasia 11/10/2016   • Chronic constipation 07/06/2018   • Essential hypertension 11/10/2016   • SERENITY (obstructive sleep apnea) 11/10/2016   • Oxygen dependent 07/06/2018   • COPD (chronic obstructive pulmonary disease) ([facility]) 06/10/2021   • Coronary artery disease involving coronary bypass graft of native heart without angina pectoris 06/10/2021     Resolved Ambulatory Problems     Diagnosis Date Noted   • Acute on chronic congestive heart failure (HCC) 06/09/2021   • Hypokalemia 06/09/2021   • Hypertensive urgency 06/10/2021   • Acute on chronic diastolic heart failure (HCC) 06/11/2021     No Additional Past Medical History         PAST SURGICAL HISTORY  No past surgical history on file.      FAMILY HISTORY  No family history on file.      SOCIAL HISTORY  Social History     Socioeconomic History   • Marital status: Legally    Tobacco Use   • Smoking status: Never   • Smokeless tobacco: Never         ALLERGIES  Shellfish-derived products        REVIEW OF SYSTEMS  Review of Systems     All systems reviewed and negative except for those discussed in HPI.       PHYSICAL EXAM    I have reviewed the triage vital signs and nursing notes.    ED Triage Vitals [01/04/23 2356]   Temp Heart Rate Resp BP SpO2   98.2 °F (36.8 °C) 88 16 (!) 233/127 100 %      Temp src Heart Rate Source Patient Position BP Location FiO2 (%)   Tympanic Monitor -- -- --       Physical  Exam    Physical Exam   Constitutional: No distress.   HENT:  Head: Normocephalic.  Small abrasion occiput with no large laceration appreciated.  No depressed skull fracture appreciated.  Oropharynx: Mucous membranes are moist.   Eyes: No scleral icterus. No conjunctival pallor.  Neck: Painless range of motion noted. Neck supple.  Mild diffuse musculoskeletal discomfort with posterior neck without focal midline tenderness to palpation or step-off.  Cardiovascular: Normal rate, regular rhythm and intact distal pulses.  Pulmonary/Chest: No respiratory distress. There are no wheezes, no rhonchi, and no rales.   Abdominal: Soft. There is no tenderness. There is no rebound and no guarding.   Musculoskeletal: Moves all extremities equally. There is no pedal edema or calf tenderness.  Full range of motion left shoulder though he reports some mild discomfort.  No gross bony deformity of the left shoulder noted  Neurological: Alert.  Baseline strength and sensation noted.   Skin: Skin is pink, warm, and dry. No pallor.   Psychiatric: Mood and affect normal.   Nursing note and vitals reviewed.    LAB RESULTS  Recent Results (from the past 24 hour(s))   ECG 12 Lead Other; Uncontrolled hypertension    Collection Time: 01/05/23 12:35 AM   Result Value Ref Range    QT Interval 402 ms   Basic Metabolic Panel    Collection Time: 01/05/23 12:41 AM    Specimen: Arm, Left; Blood   Result Value Ref Range    Glucose 130 (H) 65 - 99 mg/dL    BUN 21 8 - 23 mg/dL    Creatinine 1.38 (H) 0.76 - 1.27 mg/dL    Sodium 143 136 - 145 mmol/L    Potassium 4.0 3.5 - 5.2 mmol/L    Chloride 106 98 - 107 mmol/L    CO2 30.7 (H) 22.0 - 29.0 mmol/L    Calcium 9.2 8.6 - 10.5 mg/dL    BUN/Creatinine Ratio 15.2 7.0 - 25.0    Anion Gap 6.3 5.0 - 15.0 mmol/L    eGFR 53.7 (L) >60.0 mL/min/1.73   Troponin    Collection Time: 01/05/23 12:41 AM    Specimen: Arm, Left; Blood   Result Value Ref Range    Troponin T <0.010 0.000 - 0.030 ng/mL   CBC Auto Differential     Collection Time: 01/05/23 12:41 AM    Specimen: Arm, Left; Blood   Result Value Ref Range    WBC 10.60 3.40 - 10.80 10*3/mm3    RBC 4.48 4.14 - 5.80 10*6/mm3    Hemoglobin 14.0 13.0 - 17.7 g/dL    Hematocrit 43.0 37.5 - 51.0 %    MCV 96.0 79.0 - 97.0 fL    MCH 31.3 26.6 - 33.0 pg    MCHC 32.6 31.5 - 35.7 g/dL    RDW 13.7 12.3 - 15.4 %    RDW-SD 48.0 37.0 - 54.0 fl    MPV 9.9 6.0 - 12.0 fL    Platelets 290 140 - 450 10*3/mm3    Neutrophil % 58.9 42.7 - 76.0 %    Lymphocyte % 20.5 19.6 - 45.3 %    Monocyte % 15.6 (H) 5.0 - 12.0 %    Eosinophil % 4.3 0.3 - 6.2 %    Basophil % 0.5 0.0 - 1.5 %    Immature Grans % 0.2 0.0 - 0.5 %    Neutrophils, Absolute 6.25 1.70 - 7.00 10*3/mm3    Lymphocytes, Absolute 2.17 0.70 - 3.10 10*3/mm3    Monocytes, Absolute 1.65 (H) 0.10 - 0.90 10*3/mm3    Eosinophils, Absolute 0.46 (H) 0.00 - 0.40 10*3/mm3    Basophils, Absolute 0.05 0.00 - 0.20 10*3/mm3    Immature Grans, Absolute 0.02 0.00 - 0.05 10*3/mm3    nRBC 0.0 0.0 - 0.2 /100 WBC       Ordered the above labs and independently reviewed the results.        RADIOLOGY  XR Shoulder 2+ View Left    Result Date: 1/5/2023  Patient: WILBUR ARGUELLO  Time Out: 01:36 Exam(s): FILM LEFT SHOULDER EXAM:   XR Left Shoulder Complete, 2 or More Views CLINICAL HISTORY:    Reason for exam: Traumatic injury. TECHNIQUE:   Two or more views of the left shoulder. COMPARISON:   No relevant prior studies available. FINDINGS:   Bones joints:  No acute fracture.  No dislocation.  Degenerative changes of the acromioclavicular joint.   Soft tissues:  Unremarkable. IMPRESSION:       No acute post-traumatic abnormality.     Electronically signed by Amol Barksdale M.D. on 01-05-23 at 0136    CT Head Without Contrast    Result Date: 1/5/2023  Patient: WILBUR ARGUELLO  Time Out: 03:08 Exam(s): CT HEAD Without Contrast EXAM:   CT Head Without Intravenous Contrast CLINICAL HISTORY:    Reason for exam: Traumatic injury. TECHNIQUE:   Axial computed tomography images of the  head brain without intravenous contrast.  CTDI is 55.97 mGy and DLP is 990.6 mGy-cm.  This CT exam was performed according to the principle of ALARA (As Low As Reasonably Achievable) by using one or more of the following dose reduction techniques: automated exposure control, adjustment of the mA and or kV according to patient size, and or use of iterative reconstruction technique. COMPARISON:   No relevant prior studies available. FINDINGS:   Brain:  No hemorrhage, herniation, or mass effect.  Chronic microvascular ischemic changes.   Ventricles:  No hydrocephalus.  Age related cerebral volume loss.   Bones joints:  Unremarkable.   Soft tissues:  Unremarkable.   Sinuses: Sphenoid sinus air-fluid level and severe ethmoid sinus mucosal thickening.   Mastoid air cells:  Clear. IMPRESSION:       No acute hemorrhage, hydrocephalus, or mass effect.  Correlate with sinus disease.     Electronically signed by Hany De La Vega MD on 01-05-23 at 0308    CT Cervical Spine Without Contrast    Result Date: 1/5/2023  Patient: WILBUR ARGUELLO  Time Out: 03:12 Exam(s): CT C SPINE EXAM:   CT Cervical Spine Without Intravenous Contrast CLINICAL HISTORY:    Reason for exam: Traumatic injury. TECHNIQUE:   Axial computed tomography images of the cervical spine without intravenous contrast.  CTDI is 18.79 mGy and DLP is 354 mGy-cm.  This CT exam was performed according to the principle of ALARA (As Low As Reasonably Achievable) by using one or more of the following dose reduction techniques: automated exposure control, adjustment of the mA and or kV according to patient size, and or use of iterative reconstruction technique. COMPARISON:   No relevant prior studies available. FINDINGS:   Vertebrae:  No acute fracture.   Discs spinal canal neural foramina:  degenerative changes.   Soft tissues:  No prevertebral swelling. IMPRESSION:       No acute fracture or subluxation.     Electronically signed by Hany De La Vega MD on 01-05-23 at  0312      I ordered the above noted radiological studies. Reviewed by me and discussed with radiologist.  See dictation for official radiology interpretation.      PROCEDURES    Procedures        MEDICATIONS GIVEN IN ER    Medications   sodium chloride 0.9 % flush 10 mL (has no administration in time range)   acetaminophen (TYLENOL) tablet 1,000 mg (1,000 mg Oral Given 1/5/23 0031)   Tetanus-Diphth-Acell Pertussis (BOOSTRIX) injection 0.5 mL (0.5 mL Intramuscular Given 1/5/23 0032)   carvedilol (COREG) tablet 12.5 mg (12.5 mg Oral Given 1/5/23 0030)   cloNIDine (CATAPRES) tablet 0.1 mg (0.1 mg Oral Given 1/5/23 0031)         PROGRESS, DATA ANALYSIS, CONSULTS, AND MEDICAL DECISION MAKING    My differential diagnosis includes but is not limited to cerebral contusion, cervical strain, concussion with LOC, concussion without LOC, contusion, fracture of the skull, orbits or mandible, hematoma, intracranial hemorrhage including subdural, epidural, subarachnoid and intracerebral, laceration and postconcussion syndrome      All labs have been independently reviewed by me.  All radiology studies have been reviewed by me and discussed with radiologist dictating the report.   EKG's independently viewed and interpreted by me.  Discussion below represents my analysis of pertinent findings related to patient's condition, differential diagnosis, treatment plan and final disposition.      ED Course as of 01/05/23 0318   Thu Jan 05, 2023   0023 Treat headache with Tylenol.  Cleansed evaluate wound further and assess for acute traumatic injuries with imaging.  Uncontrolled hypertension-EKG and labs. [RS]   0043 EKG           EKG time: 0035  Rhythm/Rate: Sinus, 85  P waves and DC: MARCELLA within normal limits  QRS, axis: Narrow complex  ST and T waves: No STEMI; QTC within normal    Interpreted Contemporaneously by me, independently viewed  Comparison: 6/9/2021-no acute change   [RS]   0128 Creatinine(!): 1.38  Stable chronic CKD [RS]    0128 Troponin T: <0.010 [RS]   0128 WBC: 10.60 [RS]   0128 Hemoglobin: 14.0 [RS]   0128 No evidence of endorgan damage on laboratory or EKG evaluation.  Patient given a dose of his evening home medications here.  Await imaging results. [RS]   0136 Blood pressure beginning to come down at this time.  Patient feels well.  After the wound is been cleansed, it confirms there is no large laceration requiring repair.  Topical antibiotic ointment requested. [RS]   0137 I personally reviewed and evaluated the radiologic imaging of the left shoulder which reveals chronic arthritic changes especially the AC joint with no clear evidence of acute bony deformity. [RS]   0138 I personally reviewed the noncontrast CT images of the head.  I do not appreciate a large intracranial hemorrhage.  Await final radiology interpretation. [RS]   0209 BP(!): 190/107  Improving [RS]   0247 I contacted stat rad.  They have not received any images for the head or cervical spine CTs.  I then contacted our radiology department.  Images appear to have not transmitted.  They are trying again at this time.  Disposition of this patient has been delayed by radiology issues. [RS]      ED Course User Index  [RS] Aayush Talbert MD       AS OF 03:18 EST VITALS:    BP - (!) 189/140  HR - 67  TEMP - 98.2 °F (36.8 °C) (Tympanic)  O2 SATS - 95%        DIAGNOSIS  Final diagnoses:   Traumatic injury of head, initial encounter   Fall in home, initial encounter   Hypertension not at goal   Contusion of occipital region of scalp, initial encounter   Abrasion of scalp, initial encounter   Strain of left shoulder, initial encounter   Chronic kidney disease, unspecified CKD stage         DISPOSITION  DISCHARGE    Patient discharged in stable condition.    Reviewed implications of results, diagnosis, meds, responsibility to follow up, warning signs and symptoms of possible worsening, potential complications and reasons to return to ER.    Patient/Family voiced  understanding of above instructions.    Discussed plan for discharge, as there is no emergent indication for admission. Patient referred to primary care provider for regular health maintenance. Pt/family is agreeable and understands need for follow up and possible repeat testing.  Pt is aware that discharge does not mean that nothing is wrong but it indicates no emergency is present that requires admission and they must continue care with follow-up as given below or physician of their choice.     FOLLOW-UP  Your primary care provider in [place], [place]    Schedule an appointment as soon as possible for a visit in 1 week  Follow-up on elevated blood pressure and reevaluation of head injury         Medication List      No changes were made to your prescriptions during this visit.                     Aayush Talbert MD  01/05/23 031

## 2023-01-05 NOTE — DISCHARGE INSTRUCTIONS
Return to the emergency department with worsening symptoms, uncontrolled pain, inability to tolerate oral liquids, fever greater than 105°F not controlled by Tylenol and ibuprofen or as needed with emergent concerns.

## 2023-01-05 NOTE — ED NOTES
Pt to triage from home via [employer] [phone] with initial c/o fall.  Pt has small laceration/abrasion to posterior head.  Mechanical fall, due to uneven steps.  Pt denies blood thinners, denies loc. Pt transported due to elevated bp - told ems he has been out of his bp meds for a few days.  Pt wearing mask in triage. Triage personnel wore appropriate PPE    Pt denies headache or visual changes with htn.

## 2023-04-20 ENCOUNTER — APPOINTMENT (OUTPATIENT)
Dept: GENERAL RADIOLOGY | Facility: HOSPITAL | Age: 73
DRG: 229 | End: 2023-04-20
Payer: MEDICARE

## 2023-04-20 ENCOUNTER — HOSPITAL ENCOUNTER (INPATIENT)
Facility: HOSPITAL | Age: 73
LOS: 3 days | Discharge: HOME | DRG: 229 | End: 2023-04-23
Attending: EMERGENCY MEDICINE | Admitting: INTERNAL MEDICINE
Payer: MEDICARE

## 2023-04-20 DIAGNOSIS — N18.9 CHRONIC RENAL IMPAIRMENT, UNSPECIFIED CKD STAGE: ICD-10-CM

## 2023-04-20 DIAGNOSIS — I24.9 ACUTE CORONARY SYNDROME WITH HIGH TROPONIN: Primary | ICD-10-CM

## 2023-04-20 DIAGNOSIS — R07.2 PRECORDIAL CHEST PAIN: ICD-10-CM

## 2023-04-20 DIAGNOSIS — I10 ESSENTIAL HYPERTENSION: ICD-10-CM

## 2023-04-20 DIAGNOSIS — Z95.1 HX OF CABG: ICD-10-CM

## 2023-04-20 DIAGNOSIS — Z95.5 HISTORY OF CORONARY ANGIOPLASTY WITH INSERTION OF STENT: ICD-10-CM

## 2023-04-20 DIAGNOSIS — E78.5 HYPERLIPIDEMIA, UNSPECIFIED HYPERLIPIDEMIA TYPE: ICD-10-CM

## 2023-04-20 LAB
ALBUMIN SERPL-MCNC: 4.1 G/DL (ref 3.5–5.2)
ALBUMIN/GLOB SERPL: 1.1 G/DL
ALP SERPL-CCNC: 93 U/L (ref 39–117)
ALT SERPL W P-5'-P-CCNC: 26 U/L (ref 1–41)
ANION GAP SERPL CALCULATED.3IONS-SCNC: 9 MMOL/L (ref 5–15)
APTT PPP: 28 SECONDS (ref 22.7–35.4)
AST SERPL-CCNC: 32 U/L (ref 1–40)
BASOPHILS # BLD AUTO: 0.03 10*3/MM3 (ref 0–0.2)
BASOPHILS NFR BLD AUTO: 0.3 % (ref 0–1.5)
BILIRUB SERPL-MCNC: 0.8 MG/DL (ref 0–1.2)
BUN SERPL-MCNC: 21 MG/DL (ref 8–23)
BUN/CREAT SERPL: 14.3 (ref 7–25)
CALCIUM SPEC-SCNC: 9 MG/DL (ref 8.6–10.5)
CHLORIDE SERPL-SCNC: 101 MMOL/L (ref 98–107)
CO2 SERPL-SCNC: 29 MMOL/L (ref 22–29)
CREAT SERPL-MCNC: 1.47 MG/DL (ref 0.76–1.27)
DEPRECATED RDW RBC AUTO: 43.8 FL (ref 37–54)
EGFRCR SERPLBLD CKD-EPI 2021: 49.7 ML/MIN/1.73
EOSINOPHIL # BLD AUTO: 0.04 10*3/MM3 (ref 0–0.4)
EOSINOPHIL NFR BLD AUTO: 0.3 % (ref 0.3–6.2)
ERYTHROCYTE [DISTWIDTH] IN BLOOD BY AUTOMATED COUNT: 13.8 % (ref 12.3–15.4)
GLOBULIN UR ELPH-MCNC: 3.8 GM/DL
GLUCOSE SERPL-MCNC: 113 MG/DL (ref 65–99)
HCT VFR BLD AUTO: 42.6 % (ref 37.5–51)
HGB BLD-MCNC: 14.8 G/DL (ref 13–17.7)
IMM GRANULOCYTES # BLD AUTO: 0.03 10*3/MM3 (ref 0–0.05)
IMM GRANULOCYTES NFR BLD AUTO: 0.3 % (ref 0–0.5)
INR PPP: 1.11 (ref 0.9–1.1)
LYMPHOCYTES # BLD AUTO: 1.84 10*3/MM3 (ref 0.7–3.1)
LYMPHOCYTES NFR BLD AUTO: 15.8 % (ref 19.6–45.3)
MCH RBC QN AUTO: 30.6 PG (ref 26.6–33)
MCHC RBC AUTO-ENTMCNC: 34.7 G/DL (ref 31.5–35.7)
MCV RBC AUTO: 88 FL (ref 79–97)
MONOCYTES # BLD AUTO: 2.11 10*3/MM3 (ref 0.1–0.9)
MONOCYTES NFR BLD AUTO: 18.1 % (ref 5–12)
NEUTROPHILS NFR BLD AUTO: 65.2 % (ref 42.7–76)
NEUTROPHILS NFR BLD AUTO: 7.62 10*3/MM3 (ref 1.7–7)
NRBC BLD AUTO-RTO: 0 /100 WBC (ref 0–0.2)
NT-PROBNP SERPL-MCNC: 2790 PG/ML (ref 0–900)
PLATELET # BLD AUTO: 255 10*3/MM3 (ref 140–450)
PMV BLD AUTO: 9.9 FL (ref 6–12)
POTASSIUM SERPL-SCNC: 4.2 MMOL/L (ref 3.5–5.2)
PROT SERPL-MCNC: 7.9 G/DL (ref 6–8.5)
PROTHROMBIN TIME: 14.4 SECONDS (ref 11.7–14.2)
QT INTERVAL: 425 MS
RBC # BLD AUTO: 4.84 10*6/MM3 (ref 4.14–5.8)
SODIUM SERPL-SCNC: 139 MMOL/L (ref 136–145)
TROPONIN T SERPL HS-MCNC: 1096 NG/L
WBC NRBC COR # BLD: 11.67 10*3/MM3 (ref 3.4–10.8)

## 2023-04-20 PROCEDURE — 25010000002 ENOXAPARIN PER 10 MG: Performed by: EMERGENCY MEDICINE

## 2023-04-20 PROCEDURE — 80053 COMPREHEN METABOLIC PANEL: CPT | Performed by: EMERGENCY MEDICINE

## 2023-04-20 PROCEDURE — 99285 EMERGENCY DEPT VISIT HI MDM: CPT

## 2023-04-20 PROCEDURE — 25010000002 MORPHINE PER 10 MG: Performed by: EMERGENCY MEDICINE

## 2023-04-20 PROCEDURE — 71045 X-RAY EXAM CHEST 1 VIEW: CPT

## 2023-04-20 PROCEDURE — 85025 COMPLETE CBC W/AUTO DIFF WBC: CPT | Performed by: EMERGENCY MEDICINE

## 2023-04-20 PROCEDURE — 84484 ASSAY OF TROPONIN QUANT: CPT | Performed by: EMERGENCY MEDICINE

## 2023-04-20 PROCEDURE — 93005 ELECTROCARDIOGRAM TRACING: CPT | Performed by: EMERGENCY MEDICINE

## 2023-04-20 PROCEDURE — 85610 PROTHROMBIN TIME: CPT | Performed by: EMERGENCY MEDICINE

## 2023-04-20 PROCEDURE — 83880 ASSAY OF NATRIURETIC PEPTIDE: CPT | Performed by: EMERGENCY MEDICINE

## 2023-04-20 PROCEDURE — 85730 THROMBOPLASTIN TIME PARTIAL: CPT | Performed by: EMERGENCY MEDICINE

## 2023-04-20 PROCEDURE — 93010 ELECTROCARDIOGRAM REPORT: CPT | Performed by: STUDENT IN AN ORGANIZED HEALTH CARE EDUCATION/TRAINING PROGRAM

## 2023-04-20 PROCEDURE — 25010000002 ONDANSETRON PER 1 MG: Performed by: EMERGENCY MEDICINE

## 2023-04-20 RX ORDER — CLOPIDOGREL 300 MG/1
300 TABLET, FILM COATED ORAL ONCE
Status: COMPLETED | OUTPATIENT
Start: 2023-04-20 | End: 2023-04-20

## 2023-04-20 RX ORDER — SODIUM CHLORIDE 0.9 % (FLUSH) 0.9 %
10 SYRINGE (ML) INJECTION AS NEEDED
Status: DISCONTINUED | OUTPATIENT
Start: 2023-04-20 | End: 2023-04-23 | Stop reason: HOSPADM

## 2023-04-20 RX ORDER — MORPHINE SULFATE 2 MG/ML
4 INJECTION, SOLUTION INTRAMUSCULAR; INTRAVENOUS ONCE
Status: COMPLETED | OUTPATIENT
Start: 2023-04-20 | End: 2023-04-20

## 2023-04-20 RX ORDER — ONDANSETRON 2 MG/ML
4 INJECTION INTRAMUSCULAR; INTRAVENOUS ONCE
Status: COMPLETED | OUTPATIENT
Start: 2023-04-20 | End: 2023-04-20

## 2023-04-20 RX ORDER — NITROGLYCERIN 0.4 MG/1
0.4 TABLET SUBLINGUAL
Status: DISCONTINUED | OUTPATIENT
Start: 2023-04-20 | End: 2023-04-23 | Stop reason: HOSPADM

## 2023-04-20 RX ORDER — ENOXAPARIN SODIUM 100 MG/ML
1 INJECTION SUBCUTANEOUS ONCE
Status: COMPLETED | OUTPATIENT
Start: 2023-04-20 | End: 2023-04-20

## 2023-04-20 RX ADMIN — NITROGLYCERIN 0.4 MG: 0.4 TABLET SUBLINGUAL at 20:54

## 2023-04-20 RX ADMIN — NITROGLYCERIN 0.4 MG: 0.4 TABLET SUBLINGUAL at 20:24

## 2023-04-20 RX ADMIN — ENOXAPARIN SODIUM 90 MG: 100 INJECTION SUBCUTANEOUS at 20:54

## 2023-04-20 RX ADMIN — MORPHINE SULFATE 4 MG: 2 INJECTION, SOLUTION INTRAMUSCULAR; INTRAVENOUS at 22:14

## 2023-04-20 RX ADMIN — ONDANSETRON 4 MG: 2 INJECTION INTRAMUSCULAR; INTRAVENOUS at 22:14

## 2023-04-20 RX ADMIN — CLOPIDOGREL BISULFATE 300 MG: 300 TABLET, FILM COATED ORAL at 20:23

## 2023-04-20 NOTE — Clinical Note
Hemostasis started on the right femoral artery. Angio-Seal was used in achieving hemostasis. Closure device deployed in the vessel. Hemostasis achieved successfully. Closure device additional comment: 4x4 and tegaderm applied s/p deployment

## 2023-04-20 NOTE — Clinical Note
The DP pulses are +2 bilaterally. The PT pulses are +1 bilaterally. The femoral pulses are +2 bilaterally.

## 2023-04-20 NOTE — Clinical Note
First balloon inflation max pressure = 6 tony. First balloon inflation duration = 5 seconds. Second inflation of balloon - Max pressure = 10 tony. 2nd Inflation of balloon - Duration = 8 seconds. 2nd inflation was done at 16:28 EDT.

## 2023-04-21 ENCOUNTER — APPOINTMENT (OUTPATIENT)
Dept: CARDIOLOGY | Facility: HOSPITAL | Age: 73
DRG: 229 | End: 2023-04-21
Payer: MEDICARE

## 2023-04-21 PROBLEM — Z95.5 HISTORY OF CORONARY ANGIOPLASTY WITH INSERTION OF STENT: Status: ACTIVE | Noted: 2023-04-20

## 2023-04-21 PROBLEM — Z95.1 HX OF CABG: Status: ACTIVE | Noted: 2023-04-20

## 2023-04-21 PROBLEM — R07.2 PRECORDIAL CHEST PAIN: Status: ACTIVE | Noted: 2023-04-20

## 2023-04-21 LAB
AORTIC DIMENSIONLESS INDEX: 0.4 (DI)
ASCENDING AORTA: 3.4 CM
BH CV ECHO MEAS - ACS: 1.75 CM
BH CV ECHO MEAS - AO MAX PG: 6.9 MMHG
BH CV ECHO MEAS - AO MEAN PG: 3.6 MMHG
BH CV ECHO MEAS - AO ROOT DIAM: 3.9 CM
BH CV ECHO MEAS - AO V2 MAX: 131.2 CM/SEC
BH CV ECHO MEAS - AO V2 VTI: 27.1 CM
BH CV ECHO MEAS - AVA(I,D): 1.84 CM2
BH CV ECHO MEAS - EDV(CUBED): 67.9 ML
BH CV ECHO MEAS - EDV(MOD-SP2): 48 ML
BH CV ECHO MEAS - EDV(MOD-SP4): 101 ML
BH CV ECHO MEAS - EF(MOD-SP2): 58.3 %
BH CV ECHO MEAS - EF(MOD-SP4): 58.4 %
BH CV ECHO MEAS - ESV(CUBED): 33.7 ML
BH CV ECHO MEAS - ESV(MOD-SP2): 20 ML
BH CV ECHO MEAS - ESV(MOD-SP4): 42 ML
BH CV ECHO MEAS - FS: 20.8 %
BH CV ECHO MEAS - IVS/LVPW: 1.21 CM
BH CV ECHO MEAS - IVSD: 1.1 CM
BH CV ECHO MEAS - LAT PEAK E' VEL: 8.3 CM/SEC
BH CV ECHO MEAS - LV MASS(C)D: 131.1 GRAMS
BH CV ECHO MEAS - LV MAX PG: 1.28 MMHG
BH CV ECHO MEAS - LV MEAN PG: 0.73 MMHG
BH CV ECHO MEAS - LV V1 MAX: 56.6 CM/SEC
BH CV ECHO MEAS - LV V1 VTI: 12.2 CM
BH CV ECHO MEAS - LVIDD: 4.1 CM
BH CV ECHO MEAS - LVIDS: 3.2 CM
BH CV ECHO MEAS - LVOT AREA: 4.1 CM2
BH CV ECHO MEAS - LVOT DIAM: 2.28 CM
BH CV ECHO MEAS - LVPWD: 0.9 CM
BH CV ECHO MEAS - MED PEAK E' VEL: 7.7 CM/SEC
BH CV ECHO MEAS - MV A MAX VEL: 73.7 CM/SEC
BH CV ECHO MEAS - MV DEC SLOPE: 284.9 CM/SEC2
BH CV ECHO MEAS - MV DEC TIME: 143 MSEC
BH CV ECHO MEAS - MV E MAX VEL: 75.8 CM/SEC
BH CV ECHO MEAS - MV E/A: 1.03
BH CV ECHO MEAS - MV MAX PG: 4.7 MMHG
BH CV ECHO MEAS - MV MEAN PG: 1.86 MMHG
BH CV ECHO MEAS - MV P1/2T: 112.5 MSEC
BH CV ECHO MEAS - MV V2 VTI: 27.6 CM
BH CV ECHO MEAS - MVA(P1/2T): 1.96 CM2
BH CV ECHO MEAS - MVA(VTI): 1.81 CM2
BH CV ECHO MEAS - PA ACC TIME: 0.04 SEC
BH CV ECHO MEAS - PA PR(ACCEL): 62.4 MMHG
BH CV ECHO MEAS - PA V2 MAX: 73.8 CM/SEC
BH CV ECHO MEAS - PULM A REVS DUR: 0.12 SEC
BH CV ECHO MEAS - PULM A REVS VEL: 20.5 CM/SEC
BH CV ECHO MEAS - PULM DIAS VEL: 26 CM/SEC
BH CV ECHO MEAS - PULM S/D: 0.69
BH CV ECHO MEAS - PULM SYS VEL: 18.1 CM/SEC
BH CV ECHO MEAS - RV MAX PG: 0.79 MMHG
BH CV ECHO MEAS - RV V1 MAX: 44.6 CM/SEC
BH CV ECHO MEAS - RV V1 VTI: 9.3 CM
BH CV ECHO MEAS - SV(LVOT): 49.8 ML
BH CV ECHO MEAS - SV(MOD-SP2): 28 ML
BH CV ECHO MEAS - SV(MOD-SP4): 59 ML
BH CV ECHO MEASUREMENTS AVERAGE E/E' RATIO: 9.48
BH CV XLRA - RV BASE: 3.3 CM
BH CV XLRA - RV LENGTH: 5.3 CM
BH CV XLRA - RV MID: 3.1 CM
BH CV XLRA - TDI S': 7.9 CM/SEC
GEN 5 2HR TROPONIN T REFLEX: 1035 NG/L
LEFT ATRIUM VOLUME INDEX: 34.9 ML/M2
MAXIMAL PREDICTED HEART RATE: 146 BPM
QT INTERVAL: 410 MS
SINUS: 2.9 CM
STJ: 3 CM
STRESS TARGET HR: 124 BPM
TROPONIN T DELTA: -61 NG/L

## 2023-04-21 PROCEDURE — 027034Z DILATION OF CORONARY ARTERY, ONE ARTERY WITH DRUG-ELUTING INTRALUMINAL DEVICE, PERCUTANEOUS APPROACH: ICD-10-PCS | Performed by: INTERNAL MEDICINE

## 2023-04-21 PROCEDURE — 99153 MOD SED SAME PHYS/QHP EA: CPT | Performed by: INTERNAL MEDICINE

## 2023-04-21 PROCEDURE — 36415 COLL VENOUS BLD VENIPUNCTURE: CPT | Performed by: EMERGENCY MEDICINE

## 2023-04-21 PROCEDURE — 02C03ZZ EXTIRPATION OF MATTER FROM CORONARY ARTERY, ONE ARTERY, PERCUTANEOUS APPROACH: ICD-10-PCS | Performed by: INTERNAL MEDICINE

## 2023-04-21 PROCEDURE — C9600 PERC DRUG-EL COR STENT SING: HCPCS | Performed by: INTERNAL MEDICINE

## 2023-04-21 PROCEDURE — C1769 GUIDE WIRE: HCPCS | Performed by: INTERNAL MEDICINE

## 2023-04-21 PROCEDURE — B2181ZZ FLUOROSCOPY OF LEFT INTERNAL MAMMARY BYPASS GRAFT USING LOW OSMOLAR CONTRAST: ICD-10-PCS | Performed by: INTERNAL MEDICINE

## 2023-04-21 PROCEDURE — B2131ZZ FLUOROSCOPY OF MULTIPLE CORONARY ARTERY BYPASS GRAFTS USING LOW OSMOLAR CONTRAST: ICD-10-PCS | Performed by: INTERNAL MEDICINE

## 2023-04-21 PROCEDURE — 93459 L HRT ART/GRFT ANGIO: CPT | Performed by: INTERNAL MEDICINE

## 2023-04-21 PROCEDURE — 99152 MOD SED SAME PHYS/QHP 5/>YRS: CPT | Performed by: INTERNAL MEDICINE

## 2023-04-21 PROCEDURE — 92928 PRQ TCAT PLMT NTRAC ST 1 LES: CPT | Performed by: INTERNAL MEDICINE

## 2023-04-21 PROCEDURE — 25010000002 DIPHENHYDRAMINE PER 50 MG: Performed by: NURSE PRACTITIONER

## 2023-04-21 PROCEDURE — 93306 TTE W/DOPPLER COMPLETE: CPT

## 2023-04-21 PROCEDURE — B2111ZZ FLUOROSCOPY OF MULTIPLE CORONARY ARTERIES USING LOW OSMOLAR CONTRAST: ICD-10-PCS | Performed by: INTERNAL MEDICINE

## 2023-04-21 PROCEDURE — 02CK3ZZ EXTIRPATION OF MATTER FROM RIGHT VENTRICLE, PERCUTANEOUS APPROACH: ICD-10-PCS | Performed by: INTERNAL MEDICINE

## 2023-04-21 PROCEDURE — B2151ZZ FLUOROSCOPY OF LEFT HEART USING LOW OSMOLAR CONTRAST: ICD-10-PCS | Performed by: INTERNAL MEDICINE

## 2023-04-21 PROCEDURE — 25010000002 MIDAZOLAM PER 1 MG: Performed by: INTERNAL MEDICINE

## 2023-04-21 PROCEDURE — 25510000001 PERFLUTREN (DEFINITY) 8.476 MG IN SODIUM CHLORIDE (PF) 0.9 % 10 ML INJECTION: Performed by: INTERNAL MEDICINE

## 2023-04-21 PROCEDURE — 25010000002 HEPARIN (PORCINE) PER 1000 UNITS: Performed by: INTERNAL MEDICINE

## 2023-04-21 PROCEDURE — 25010000002 FENTANYL CITRATE (PF) 50 MCG/ML SOLUTION: Performed by: INTERNAL MEDICINE

## 2023-04-21 PROCEDURE — C1894 INTRO/SHEATH, NON-LASER: HCPCS | Performed by: INTERNAL MEDICINE

## 2023-04-21 PROCEDURE — 93306 TTE W/DOPPLER COMPLETE: CPT | Performed by: INTERNAL MEDICINE

## 2023-04-21 PROCEDURE — 25010000002 METHYLPREDNISOLONE PER 40 MG: Performed by: NURSE PRACTITIONER

## 2023-04-21 PROCEDURE — C1725 CATH, TRANSLUMIN NON-LASER: HCPCS | Performed by: INTERNAL MEDICINE

## 2023-04-21 PROCEDURE — 25010000002 ONDANSETRON PER 1 MG: Performed by: INTERNAL MEDICINE

## 2023-04-21 PROCEDURE — 25510000001 IOPAMIDOL PER 1 ML: Performed by: INTERNAL MEDICINE

## 2023-04-21 PROCEDURE — C1757 CATH, THROMBECTOMY/EMBOLECT: HCPCS | Performed by: INTERNAL MEDICINE

## 2023-04-21 PROCEDURE — 99222 1ST HOSP IP/OBS MODERATE 55: CPT | Performed by: INTERNAL MEDICINE

## 2023-04-21 PROCEDURE — 93005 ELECTROCARDIOGRAM TRACING: CPT | Performed by: INTERNAL MEDICINE

## 2023-04-21 PROCEDURE — C1887 CATHETER, GUIDING: HCPCS | Performed by: INTERNAL MEDICINE

## 2023-04-21 PROCEDURE — 4A023N7 MEASUREMENT OF CARDIAC SAMPLING AND PRESSURE, LEFT HEART, PERCUTANEOUS APPROACH: ICD-10-PCS | Performed by: INTERNAL MEDICINE

## 2023-04-21 PROCEDURE — C1874 STENT, COATED/COV W/DEL SYS: HCPCS | Performed by: INTERNAL MEDICINE

## 2023-04-21 PROCEDURE — C1760 CLOSURE DEV, VASC: HCPCS | Performed by: INTERNAL MEDICINE

## 2023-04-21 PROCEDURE — 85347 COAGULATION TIME ACTIVATED: CPT

## 2023-04-21 PROCEDURE — 84484 ASSAY OF TROPONIN QUANT: CPT | Performed by: EMERGENCY MEDICINE

## 2023-04-21 DEVICE — XIENCE SKYPOINT™ EVEROLIMUS ELUTING CORONARY STENT SYSTEM 3.50 MM X 33 MM / RAPID-EXCHANGE
Type: IMPLANTABLE DEVICE | Site: CORONARY | Status: FUNCTIONAL
Brand: XIENCE SKYPOINT™

## 2023-04-21 RX ORDER — NIFEDIPINE 30 MG/1
30 TABLET, EXTENDED RELEASE ORAL DAILY
Status: DISCONTINUED | OUTPATIENT
Start: 2023-04-21 | End: 2023-04-23 | Stop reason: HOSPADM

## 2023-04-21 RX ORDER — ALBUTEROL SULFATE 2.5 MG/3ML
2.5 SOLUTION RESPIRATORY (INHALATION) EVERY 6 HOURS PRN
Status: DISCONTINUED | OUTPATIENT
Start: 2023-04-21 | End: 2023-04-23 | Stop reason: HOSPADM

## 2023-04-21 RX ORDER — ATORVASTATIN CALCIUM 20 MG/1
40 TABLET, FILM COATED ORAL DAILY
Status: DISCONTINUED | OUTPATIENT
Start: 2023-04-22 | End: 2023-04-23 | Stop reason: HOSPADM

## 2023-04-21 RX ORDER — METHYLPREDNISOLONE SODIUM SUCCINATE 40 MG/ML
40 INJECTION, POWDER, LYOPHILIZED, FOR SOLUTION INTRAMUSCULAR; INTRAVENOUS EVERY 4 HOURS
Status: DISCONTINUED | OUTPATIENT
Start: 2023-04-21 | End: 2023-04-21

## 2023-04-21 RX ORDER — CLOPIDOGREL BISULFATE 75 MG/1
75 TABLET ORAL DAILY
Status: DISCONTINUED | OUTPATIENT
Start: 2023-04-21 | End: 2023-04-23 | Stop reason: HOSPADM

## 2023-04-21 RX ORDER — SPIRONOLACTONE 50 MG/1
50 TABLET, FILM COATED ORAL DAILY
COMMUNITY

## 2023-04-21 RX ORDER — FENTANYL CITRATE 50 UG/ML
INJECTION, SOLUTION INTRAMUSCULAR; INTRAVENOUS
Status: DISCONTINUED | OUTPATIENT
Start: 2023-04-21 | End: 2023-04-21 | Stop reason: HOSPADM

## 2023-04-21 RX ORDER — ASPIRIN 81 MG/1
81 TABLET ORAL DAILY
COMMUNITY

## 2023-04-21 RX ORDER — METHYLPREDNISOLONE SODIUM SUCCINATE 40 MG/ML
40 INJECTION, POWDER, LYOPHILIZED, FOR SOLUTION INTRAMUSCULAR; INTRAVENOUS ONCE
Status: COMPLETED | OUTPATIENT
Start: 2023-04-21 | End: 2023-04-21

## 2023-04-21 RX ORDER — LABETALOL HYDROCHLORIDE 5 MG/ML
20 INJECTION, SOLUTION INTRAVENOUS
Status: DISCONTINUED | OUTPATIENT
Start: 2023-04-21 | End: 2023-04-22

## 2023-04-21 RX ORDER — TAMSULOSIN HYDROCHLORIDE 0.4 MG/1
0.4 CAPSULE ORAL DAILY
Status: DISCONTINUED | OUTPATIENT
Start: 2023-04-21 | End: 2023-04-23 | Stop reason: HOSPADM

## 2023-04-21 RX ORDER — ASPIRIN 325 MG
TABLET ORAL
Status: DISCONTINUED | OUTPATIENT
Start: 2023-04-21 | End: 2023-04-21 | Stop reason: HOSPADM

## 2023-04-21 RX ORDER — ONDANSETRON 2 MG/ML
4 INJECTION INTRAMUSCULAR; INTRAVENOUS ONCE
Status: COMPLETED | OUTPATIENT
Start: 2023-04-21 | End: 2023-04-21

## 2023-04-21 RX ORDER — HYDROCODONE BITARTRATE AND ACETAMINOPHEN 7.5; 325 MG/1; MG/1
1 TABLET ORAL ONCE AS NEEDED
Status: COMPLETED | OUTPATIENT
Start: 2023-04-21 | End: 2023-04-21

## 2023-04-21 RX ORDER — ASPIRIN 81 MG/1
81 TABLET ORAL DAILY
Status: DISCONTINUED | OUTPATIENT
Start: 2023-04-21 | End: 2023-04-23 | Stop reason: HOSPADM

## 2023-04-21 RX ORDER — LIDOCAINE HYDROCHLORIDE 20 MG/ML
INJECTION, SOLUTION INFILTRATION; PERINEURAL
Status: DISCONTINUED | OUTPATIENT
Start: 2023-04-21 | End: 2023-04-21 | Stop reason: HOSPADM

## 2023-04-21 RX ORDER — MIDAZOLAM HYDROCHLORIDE 1 MG/ML
INJECTION INTRAMUSCULAR; INTRAVENOUS
Status: DISCONTINUED | OUTPATIENT
Start: 2023-04-21 | End: 2023-04-21 | Stop reason: HOSPADM

## 2023-04-21 RX ORDER — ACETAMINOPHEN 325 MG/1
650 TABLET ORAL EVERY 4 HOURS PRN
Status: DISCONTINUED | OUTPATIENT
Start: 2023-04-21 | End: 2023-04-23 | Stop reason: HOSPADM

## 2023-04-21 RX ORDER — FLUTICASONE PROPIONATE 50 MCG
2 SPRAY, SUSPENSION (ML) NASAL DAILY
COMMUNITY

## 2023-04-21 RX ORDER — DIPHENHYDRAMINE HYDROCHLORIDE 50 MG/ML
50 INJECTION INTRAMUSCULAR; INTRAVENOUS
Status: COMPLETED | OUTPATIENT
Start: 2023-04-21 | End: 2023-04-21

## 2023-04-21 RX ORDER — CARVEDILOL 6.25 MG/1
6.25 TABLET ORAL 2 TIMES DAILY WITH MEALS
COMMUNITY

## 2023-04-21 RX ORDER — FUROSEMIDE 40 MG/1
40 TABLET ORAL DAILY
COMMUNITY
End: 2023-05-17 | Stop reason: ALTCHOICE

## 2023-04-21 RX ORDER — SODIUM CHLORIDE 9 MG/ML
250 INJECTION, SOLUTION INTRAVENOUS ONCE AS NEEDED
Status: DISCONTINUED | OUTPATIENT
Start: 2023-04-21 | End: 2023-04-23 | Stop reason: HOSPADM

## 2023-04-21 RX ORDER — ASPIRIN 81 MG/1
81 TABLET, CHEWABLE ORAL DAILY
Status: ON HOLD | COMMUNITY
End: 2023-04-23

## 2023-04-21 RX ORDER — SODIUM CHLORIDE 9 MG/ML
50 INJECTION, SOLUTION INTRAVENOUS CONTINUOUS
Status: DISCONTINUED | OUTPATIENT
Start: 2023-04-21 | End: 2023-04-23 | Stop reason: HOSPADM

## 2023-04-21 RX ORDER — CARVEDILOL 6.25 MG/1
6.25 TABLET ORAL 2 TIMES DAILY WITH MEALS
Status: DISCONTINUED | OUTPATIENT
Start: 2023-04-21 | End: 2023-04-23 | Stop reason: HOSPADM

## 2023-04-21 RX ORDER — HEPARIN SODIUM 1000 [USP'U]/ML
INJECTION, SOLUTION INTRAVENOUS; SUBCUTANEOUS
Status: DISCONTINUED | OUTPATIENT
Start: 2023-04-21 | End: 2023-04-21 | Stop reason: HOSPADM

## 2023-04-21 RX ORDER — NIFEDIPINE 30 MG/1
30 TABLET, EXTENDED RELEASE ORAL DAILY
COMMUNITY
End: 2023-05-17 | Stop reason: ALTCHOICE

## 2023-04-21 RX ORDER — ALBUTEROL SULFATE 90 UG/1
2 AEROSOL, METERED RESPIRATORY (INHALATION) EVERY 4 HOURS PRN
COMMUNITY

## 2023-04-21 RX ORDER — CLONIDINE HYDROCHLORIDE 0.1 MG/1
0.1 TABLET ORAL EVERY 12 HOURS SCHEDULED
Status: DISCONTINUED | OUTPATIENT
Start: 2023-04-21 | End: 2023-04-23 | Stop reason: HOSPADM

## 2023-04-21 RX ORDER — TAMSULOSIN HYDROCHLORIDE 0.4 MG/1
1 CAPSULE ORAL DAILY
COMMUNITY

## 2023-04-21 RX ORDER — FLUTICASONE PROPIONATE 50 MCG
2 SPRAY, SUSPENSION (ML) NASAL DAILY
Status: DISCONTINUED | OUTPATIENT
Start: 2023-04-21 | End: 2023-04-23 | Stop reason: HOSPADM

## 2023-04-21 RX ADMIN — METHYLPREDNISOLONE SODIUM SUCCINATE 40 MG: 40 INJECTION, POWDER, FOR SOLUTION INTRAMUSCULAR; INTRAVENOUS at 14:03

## 2023-04-21 RX ADMIN — SODIUM CHLORIDE 50 ML/HR: 9 INJECTION, SOLUTION INTRAVENOUS at 10:46

## 2023-04-21 RX ADMIN — CARVEDILOL 6.25 MG: 6.25 TABLET, FILM COATED ORAL at 08:36

## 2023-04-21 RX ADMIN — LABETALOL HYDROCHLORIDE 20 MG: 5 INJECTION, SOLUTION INTRAVENOUS at 18:11

## 2023-04-21 RX ADMIN — NITROGLYCERIN 1 INCH: 20 OINTMENT TOPICAL at 17:48

## 2023-04-21 RX ADMIN — METHYLPREDNISOLONE SODIUM SUCCINATE 40 MG: 40 INJECTION, POWDER, FOR SOLUTION INTRAMUSCULAR; INTRAVENOUS at 10:46

## 2023-04-21 RX ADMIN — HYDROCODONE BITARTRATE AND ACETAMINOPHEN 1 TABLET: 7.5; 325 TABLET ORAL at 09:17

## 2023-04-21 RX ADMIN — NITROGLYCERIN 1 INCH: 20 OINTMENT TOPICAL at 00:53

## 2023-04-21 RX ADMIN — PERFLUTREN 2 ML: 6.52 INJECTION, SUSPENSION INTRAVENOUS at 16:11

## 2023-04-21 RX ADMIN — NITROGLYCERIN 1 INCH: 20 OINTMENT TOPICAL at 12:11

## 2023-04-21 RX ADMIN — DIPHENHYDRAMINE HYDROCHLORIDE 50 MG: 50 INJECTION, SOLUTION INTRAMUSCULAR; INTRAVENOUS at 14:03

## 2023-04-21 RX ADMIN — NIFEDIPINE 30 MG: 30 TABLET, FILM COATED, EXTENDED RELEASE ORAL at 17:48

## 2023-04-21 RX ADMIN — CLOPIDOGREL BISULFATE 75 MG: 75 TABLET, FILM COATED ORAL at 14:03

## 2023-04-21 RX ADMIN — DIPHENHYDRAMINE HYDROCHLORIDE 50 MG: 50 INJECTION, SOLUTION INTRAMUSCULAR; INTRAVENOUS at 10:46

## 2023-04-21 RX ADMIN — ACETAMINOPHEN 650 MG: 325 TABLET, FILM COATED ORAL at 22:53

## 2023-04-21 RX ADMIN — LABETALOL HYDROCHLORIDE 20 MG: 5 INJECTION, SOLUTION INTRAVENOUS at 20:23

## 2023-04-21 RX ADMIN — NITROGLYCERIN 1 INCH: 20 OINTMENT TOPICAL at 05:44

## 2023-04-21 RX ADMIN — ONDANSETRON 4 MG: 2 INJECTION INTRAMUSCULAR; INTRAVENOUS at 09:17

## 2023-04-21 RX ADMIN — CLONIDINE HYDROCHLORIDE 0.1 MG: 0.1 TABLET ORAL at 20:23

## 2023-04-21 RX ADMIN — ASPIRIN 81 MG: 81 TABLET, COATED ORAL at 08:36

## 2023-04-21 RX ADMIN — CARVEDILOL 6.25 MG: 6.25 TABLET, FILM COATED ORAL at 17:47

## 2023-04-21 NOTE — ED NOTES
Nursing report ED to floor  Negrito Navarro  72 y.o.  male    HPI :   Chief Complaint   Patient presents with    Chest Pain       Admitting doctor:   Jayne Villalobos MD    Admitting diagnosis:   The primary encounter diagnosis was Acute coronary syndrome with high troponin. Diagnoses of Precordial chest pain, Hyperlipidemia, unspecified hyperlipidemia type, Essential hypertension, History of coronary angioplasty with insertion of stent, Hx of CABG, and Chronic renal impairment, unspecified CKD stage were also pertinent to this visit.    Code status:   Current Code Status       Date Active Code Status Order ID Comments User Context       Prior            Allergies:   Shellfish-derived products    Isolation:   No active isolations    Intake and Output  No intake or output data in the 24 hours ending 04/20/23 2221    Weight:       04/20/23 2005   Weight: 93 kg (205 lb 0.4 oz)       Most recent vitals:   Vitals:    04/20/23 2038 04/20/23 2106 04/20/23 2136 04/20/23 2143   BP:  120/89 151/85    BP Location:       Patient Position:       Pulse: 66 70 87 86   Resp:       Temp:       TempSrc:       SpO2: 95% 95% 95% 94%   Weight:       Height:           Active LDAs/IV Access:   Lines, Drains & Airways       Active LDAs       Name Placement date Placement time Site Days    Peripheral IV 04/20/23 2002 Left Antecubital 04/20/23 2002  Antecubital  less than 1    Peripheral IV 04/20/23 2017 Right Antecubital 04/20/23 2017  Antecubital  less than 1                    Labs (abnormal labs have a star):   Labs Reviewed   COMPREHENSIVE METABOLIC PANEL - Abnormal; Notable for the following components:       Result Value    Glucose 113 (*)     Creatinine 1.47 (*)     eGFR 49.7 (*)     All other components within normal limits    Narrative:     GFR Normal >60  Chronic Kidney Disease <60  Kidney Failure <15    The GFR formula is only valid for adults with stable renal function between ages 18 and 70.   PROTIME-INR - Abnormal;  Notable for the following components:    Protime 14.4 (*)     INR 1.11 (*)     All other components within normal limits   BNP (IN-HOUSE) - Abnormal; Notable for the following components:    proBNP 2,790.0 (*)     All other components within normal limits    Narrative:     Among patients with dyspnea, NT-proBNP is highly sensitive for the detection of acute congestive heart failure. In addition NT-proBNP of <300 pg/ml effectively rules out acute congestive heart failure with 99% negative predictive value.    Results may be falsely decreased if patient taking Biotin.     TROPONIN - Abnormal; Notable for the following components:    HS Troponin T 1,096 (*)     All other components within normal limits    Narrative:     High Sensitive Troponin T Reference Range:  <10.0 ng/L- Negative Female for AMI  <15.0 ng/L- Negative Male for AMI  >=10 - Abnormal Female indicating possible myocardial injury.  >=15 - Abnormal Male indicating possible myocardial injury.   Clinicians would have to utilize clinical acumen, EKG, Troponin, and serial changes to determine if it is an Acute Myocardial Infarction or myocardial injury due to an underlying chronic condition.        CBC WITH AUTO DIFFERENTIAL - Abnormal; Notable for the following components:    WBC 11.67 (*)     Lymphocyte % 15.8 (*)     Monocyte % 18.1 (*)     Neutrophils, Absolute 7.62 (*)     Monocytes, Absolute 2.11 (*)     All other components within normal limits   APTT - Normal   HIGH SENSITIVITIY TROPONIN T 2HR   CBC AND DIFFERENTIAL    Narrative:     The following orders were created for panel order CBC & Differential.  Procedure                               Abnormality         Status                     ---------                               -----------         ------                     CBC Auto Differential[[id number]]        Abnormal            Final result                 Please view results for these tests on the individual orders.       EKG:   ECG 12 Lead Chest  Pain   Final Result   HEART RATE= 91  bpm   RR Interval= 659  ms   MD Interval=   ms   P Horizontal Axis=   deg   P Front Axis=   deg   QRSD Interval= 108  ms   QT Interval= 425  ms   QRS Axis= -8  deg   T Wave Axis= -48  deg   - ABNORMAL ECG -   Atrial fibrillation   Inferior infarct, recent   Consider posterior wall involvement   Prolonged QT interval   When compared with ECG of 05-Jan-2023 0:35:15,   Significant change in rhythm, previously sinus    Electronically Signed By: Mark Ervin ([facility]) 20-Apr-2023 22:16:30   Date and Time of Study: 2023-04-20 20:03:42          Meds given in ED:   Medications   sodium chloride 0.9 % flush 10 mL (has no administration in time range)   nitroglycerin (NITROSTAT) SL tablet 0.4 mg (0.4 mg Sublingual Given 4/20/23 2054)   metoprolol tartrate (LOPRESSOR) injection 5 mg (0 mg Intravenous Hold 4/20/23 2038)   clopidogrel (PLAVIX) tablet 300 mg (300 mg Oral Given 4/20/23 2023)   Enoxaparin Sodium (LOVENOX) syringe 90 mg (90 mg Subcutaneous Given 4/20/23 2054)   ondansetron (ZOFRAN) injection 4 mg (4 mg Intravenous Given 4/20/23 2214)   morphine injection 4 mg (4 mg Intravenous Given 4/20/23 2214)       Imaging results:  XR Chest 1 View    Result Date: 4/20/2023  Minimal likely atelectasis at the bases. Cardiomegaly. Tortuous aorta. Follow-up as clinically indicated.  This report was finalized on 4/20/2023 8:57 PM by Dr. Rigoberto Herrera M.D.       Ambulatory status:   - up with assist     Social issues:   Social History     Socioeconomic History    Marital status: Legally    Tobacco Use    Smoking status: Never    Smokeless tobacco: Never       NIH Stroke Scale:         Wagner Burrows RN  04/20/23 22:21 EDT

## 2023-04-21 NOTE — CASE MANAGEMENT/SOCIAL WORK
Discharge Planning Assessment  [facility]     Patient Name: Negrito Navarro  MRN: [id number]  Today's Date: 4/21/2023    Admit Date: 4/20/2023    Plan: Home with family support, family will transport   Discharge Needs Assessment     Row Name 04/21/23 1306       Living Environment    People in Home spouse    Current Living Arrangements home    Potentially Unsafe Housing Conditions none    Primary Care Provided by self    Provides Primary Care For no one    Family Caregiver if Needed child(mike), adult    Family Caregiver Names Drea Navarro, daughter, [phone]    Quality of Family Relationships unable to assess    Able to Return to Prior Arrangements yes       Resource/Environmental Concerns    Resource/Environmental Concerns none    Transportation Concerns none       Food Insecurity    Within the past 12 months, you worried that your food would run out before you got the money to buy more. Never true    Within the past 12 months, the food you bought just didn't last and you didn't have money to get more. Never true       Transition Planning    Patient/Family Anticipates Transition to home    Patient/Family Anticipated Services at Transition none    Transportation Anticipated family or friend will provide       Discharge Needs Assessment    Readmission Within the Last 30 Days no previous admission in last 30 days    Equipment Currently Used at Home none    Concerns to be Addressed no discharge needs identified;denies needs/concerns at this time    Equipment Needed After Discharge none    Provided Post Acute Provider List? N/A    Provided Post Acute Provider Quality & Resource List? N/A               Discharge Plan     Row Name 04/21/23 1307       Plan    Plan Home with family support, family will transport    Patient/Family in Agreement with Plan yes    Provided Post Acute Provider List? N/A    Provided Post Acute Provider Quality & Resource List? N/A    Plan Comments Met with pt at bedside. Explained role of case  manager. Face sheet verified.Pts PCP is Guillermina Yu. Pt denies any difficulty paying for medications and obtains his medications from [facility] on [address]. Pt lives with his spouse, who can assist with any care needs. Pt states that he is independent with ADL's and does not need any assistive devices at this time. Pt denies the need for any home health or rehab. Pt states that he will have transportation home. Explained that CCP would follow to assess for any discharge needs              Continued Care and Services - Admitted Since 4/20/2023    Coordination has not been started for this encounter.       Expected Discharge Date and Time     Expected Discharge Date Expected Discharge Time    Apr 23, 2023          Demographic Summary    No documentation.                Functional Status    No documentation.                Psychosocial    No documentation.                Abuse/Neglect    No documentation.                Legal    No documentation.                Substance Abuse    No documentation.                Patient Forms    No documentation.                   Queenie Javed RN

## 2023-04-21 NOTE — ED PROVIDER NOTES
EMERGENCY DEPARTMENT ENCOUNTER    Room Number:  15/15  Date seen:  4/20/2023  PCP: Provider, No Known  Historian: Patient, EMS      HPI:  Chief Complaint: Chest pain  A complete HPI/ROS/PMH/PSH/SH/FH are unobtainable due to: Nothing  Context: Negrito Navarro is a 72 y.o. male, with a history of CAD, prior CABG, and multiple coronary artery stents, who presents to the ED from home by EMS c/o substernal chest pain for the past 3 to 4 days.  Pain is described as tightness and heaviness.  Pain initially was radiating down the left arm.  Pain then began radiating to the right arm today.  He reports associated shortness of breath.  Symptoms are worse with exertion.  Denies nausea, vomiting, or sweating.  Pain was 8/10 at worst and is currently 3/10.  Patient has not seen a cardiologist in several years.  Denies recent illness, cough, fever, abdominal pain, palpitations, or dizziness.  Patient was given aspirin by EMS.            PAST MEDICAL HISTORY  Active Ambulatory Problems     Diagnosis Date Noted   • Hyperlipidemia 06/09/2021   • Benign prostatic hyperplasia 11/10/2016   • Chronic constipation 07/06/2018   • Essential hypertension 11/10/2016   • SERENITY (obstructive sleep apnea) 11/10/2016   • Oxygen dependent 07/06/2018   • COPD (chronic obstructive pulmonary disease) 06/10/2021   • Coronary artery disease involving coronary bypass graft of native heart without angina pectoris 06/10/2021     Resolved Ambulatory Problems     Diagnosis Date Noted   • Acute on chronic congestive heart failure 06/09/2021   • Hypokalemia 06/09/2021   • Hypertensive urgency 06/10/2021   • Acute on chronic diastolic heart failure 06/11/2021     No Additional Past Medical History         PAST SURGICAL HISTORY  No past surgical history on file.      FAMILY HISTORY  No family history on file.      SOCIAL HISTORY  Social History     Socioeconomic History   • Marital status: Legally    Tobacco Use   • Smoking status: Never   • Smokeless  Patient returned call regarding the lab results, would like to you to contact her about what is going on with the kidneys.     Phone is [phone] tobacco: Never         ALLERGIES  Shellfish-derived products        REVIEW OF SYSTEMS  Review of Systems     All systems have been reviewed and are negative except as as discussed in the HPI    PHYSICAL EXAM  ED Triage Vitals   Temp Pulse Resp BP SpO2   -- -- -- -- --      Temp src Heart Rate Source Patient Position BP Location FiO2 (%)   -- -- -- -- --       Physical Exam      GENERAL: Awake, alert, oriented x3.  Well-developed, well-nourished elderly male.  He does not appear to be in any distress.  HENT: NCAT, nares patent  EYES: no scleral icterus  CV: Irregularly irregular rhythm, normal rate, equal radial pulses bilaterally  RESPIRATORY: normal effort, clear to auscultation bilaterally  ABDOMEN: soft, nontender  MUSCULOSKELETAL: Extremities are nontender with full range of motion.  Trace edema in both ankles.  No calf tenderness  NEURO: Speech is normal.  No facial droop.  Follows commands  PSYCH:  calm, cooperative  SKIN: warm, dry    Vital signs and nursing notes reviewed.          LAB RESULTS  Recent Results (from the past 24 hour(s))   ECG 12 Lead Chest Pain    Collection Time: 04/20/23  8:03 PM   Result Value Ref Range    QT Interval 425 ms   Comprehensive Metabolic Panel    Collection Time: 04/20/23  8:17 PM    Specimen: Blood   Result Value Ref Range    Glucose 113 (H) 65 - 99 mg/dL    BUN 21 8 - 23 mg/dL    Creatinine 1.47 (H) 0.76 - 1.27 mg/dL    Sodium 139 136 - 145 mmol/L    Potassium 4.2 3.5 - 5.2 mmol/L    Chloride 101 98 - 107 mmol/L    CO2 29.0 22.0 - 29.0 mmol/L    Calcium 9.0 8.6 - 10.5 mg/dL    Total Protein 7.9 6.0 - 8.5 g/dL    Albumin 4.1 3.5 - 5.2 g/dL    ALT (SGPT) 26 1 - 41 U/L    AST (SGOT) 32 1 - 40 U/L    Alkaline Phosphatase 93 39 - 117 U/L    Total Bilirubin 0.8 0.0 - 1.2 mg/dL    Globulin 3.8 gm/dL    A/G Ratio 1.1 g/dL    BUN/Creatinine Ratio 14.3 7.0 - 25.0    Anion Gap 9.0 5.0 - 15.0 mmol/L    eGFR 49.7 (L) >60.0 mL/min/1.73   Protime-INR    Collection Time: 04/20/23  8:17 PM     Specimen: Blood   Result Value Ref Range    Protime 14.4 (H) 11.7 - 14.2 Seconds    INR 1.11 (H) 0.90 - 1.10   aPTT    Collection Time: 04/20/23  8:17 PM    Specimen: Blood   Result Value Ref Range    PTT 28.0 22.7 - 35.4 seconds   BNP    Collection Time: 04/20/23  8:17 PM    Specimen: Blood   Result Value Ref Range    proBNP 2,790.0 (H) 0.0 - 900.0 pg/mL   High Sensitivity Troponin T    Collection Time: 04/20/23  8:17 PM    Specimen: Blood   Result Value Ref Range    HS Troponin T 1,096 (C) <15 ng/L   CBC Auto Differential    Collection Time: 04/20/23  8:17 PM    Specimen: Blood   Result Value Ref Range    WBC 11.67 (H) 3.40 - 10.80 10*3/mm3    RBC 4.84 4.14 - 5.80 10*6/mm3    Hemoglobin 14.8 13.0 - 17.7 g/dL    Hematocrit 42.6 37.5 - 51.0 %    MCV 88.0 79.0 - 97.0 fL    MCH 30.6 26.6 - 33.0 pg    MCHC 34.7 31.5 - 35.7 g/dL    RDW 13.8 12.3 - 15.4 %    RDW-SD 43.8 37.0 - 54.0 fl    MPV 9.9 6.0 - 12.0 fL    Platelets 255 140 - 450 10*3/mm3    Neutrophil % 65.2 42.7 - 76.0 %    Lymphocyte % 15.8 (L) 19.6 - 45.3 %    Monocyte % 18.1 (H) 5.0 - 12.0 %    Eosinophil % 0.3 0.3 - 6.2 %    Basophil % 0.3 0.0 - 1.5 %    Immature Grans % 0.3 0.0 - 0.5 %    Neutrophils, Absolute 7.62 (H) 1.70 - 7.00 10*3/mm3    Lymphocytes, Absolute 1.84 0.70 - 3.10 10*3/mm3    Monocytes, Absolute 2.11 (H) 0.10 - 0.90 10*3/mm3    Eosinophils, Absolute 0.04 0.00 - 0.40 10*3/mm3    Basophils, Absolute 0.03 0.00 - 0.20 10*3/mm3    Immature Grans, Absolute 0.03 0.00 - 0.05 10*3/mm3    nRBC 0.0 0.0 - 0.2 /100 WBC       Ordered the above labs and reviewed the results.        RADIOLOGY  XR Chest 1 View    Result Date: 4/20/2023  XR CHEST 1 VW-  HISTORY: Male who is 72 years-old,  chest pain  TECHNIQUE: Frontal view the chest  COMPARISON: 06/09/2021  FINDINGS: The heart is enlarged. Aorta is tortuous. Sternotomy wires are present. Pulmonary vasculature is unremarkable. Minimal likely atelectasis at the bases. No pleural effusion, or pneumothorax.  The left hemidiaphragm remains elevated. No acute osseous process.      Minimal likely atelectasis at the bases. Cardiomegaly. Tortuous aorta. Follow-up as clinically indicated.  This report was finalized on 4/20/2023 8:57 PM by Dr. Rigoberto Herrera M.D.        Ordered the above noted radiological studies. Reviewed by me in PACS.            PROCEDURES  Critical Care  Performed by: Duc Plaza MD  Authorized by: Duc Plaza MD     Critical care provider statement:     Critical care time (minutes):  38    Critical care time was exclusive of:  Separately billable procedures and treating other patients    Critical care was necessary to treat or prevent imminent or life-threatening deterioration of the following conditions:  Cardiac failure and circulatory failure    Critical care was time spent personally by me on the following activities:  Development of treatment plan with patient or surrogate, discussions with consultants, evaluation of patient's response to treatment, examination of patient, obtaining history from patient or surrogate, ordering and performing treatments and interventions, ordering and review of laboratory studies, ordering and review of radiographic studies, re-evaluation of patient's condition, pulse oximetry and review of old charts    I assumed direction of critical care for this patient from another provider in my specialty: no      Care discussed with: admitting provider                    MEDICATIONS GIVEN IN ER  Medications   sodium chloride 0.9 % flush 10 mL (has no administration in time range)   nitroglycerin (NITROSTAT) SL tablet 0.4 mg (0.4 mg Sublingual Given 4/20/23 2054)   metoprolol tartrate (LOPRESSOR) injection 5 mg (0 mg Intravenous Hold 4/20/23 2038)   clopidogrel (PLAVIX) tablet 300 mg (300 mg Oral Given 4/20/23 2023)   Enoxaparin Sodium (LOVENOX) syringe 90 mg (90 mg Subcutaneous Given 4/20/23 2054)   ondansetron (ZOFRAN) injection 4 mg (4 mg Intravenous Given  4/20/23 2214)   morphine injection 4 mg (4 mg Intravenous Given 4/20/23 2214)                   MEDICAL DECISION MAKING, PROGRESS, and CONSULTS    All labs have been independently reviewed by me.  All radiology studies have been reviewed by me and I have also reviewed the radiology report.   EKG's independently viewed and interpreted by me.  Discussion below represents my analysis of pertinent findings related to patient's condition, differential diagnosis, treatment plan and final disposition.      Additional sources:  - Discussed/ obtained information from independent historians: EMS    - External (non-ED) record review: Echocardiogram was done in June 2021.  EF was 61 to 65%.  There was mild concentric LVH.    - Chronic or social conditions impacting care: N/A          Orders placed during this visit:  Orders Placed This Encounter   Procedures   • Critical Care   • XR Chest 1 View   • Comprehensive Metabolic Panel   • Protime-INR   • aPTT   • BNP   • High Sensitivity Troponin T   • CBC Auto Differential   • High Sensitivity Troponin T 2Hr   • Cardiac Monitoring   • Pulse Oximetry, Continuous   • Monitor Blood Pressure   • ECG 12 Lead Chest Pain   • Insert Peripheral IV   • Inpatient Admission   • CBC & Differential         Additional orders considered but not ordered:  N/A        Differential diagnosis:    DDx includes but is not limited to acute coronary syndrome, pulmonary embolism, thoracic aortic dissection, pneumonia, pneumothorax, musculoskeletal pain, GERD or esophageal spasm, anxiety, myocarditis/pericarditis, esophageal rupture, pancreatitis.               Independent interpretation of labs, radiology studies, and discussions with consultants:  ED Course as of 04/20/23 2251   u Apr 20, 2023 2011 EKG personally interpreted by me.  My personal interpretation is:          EKG time: 2003  Rhythm/Rate: Atrial fibrillation, rate 91  P waves and WA: Irregular, very  QRS, axis: Normal axis, inferior Q  waves  ST and T waves: There is minimal ST elevation in the inferior leads, there is ST depression in the lateral leads    Interpreted Contemporaneously by me at 2004, independently viewed  EKG is changed compared to prior EKG done on 1/5/2023.  Sinus rhythm was present at that time.  ST elevation and ST depression was not present.   [WH]   2012 Prehospital EKG was reviewed by me at 1944.  I then spoke with Dr. Villalobos and he reviewed the EKG at 1947.  He suspects the patient had a recent MI as there are Q waves present in the inferior leads. [WH]   2016 Case was again discussed with Dr. Villalobos.  I updated him on the patient's history and physical exam.  He recommends giving the patient Plavix 300 mg, Lovenox, and IV Lopressor.  He states that the patient does not need an emergent cardiac catheterization.  He will admit the patient to a monitored bed. [WH]   2046 proBNP(!): 2,790.0 [WH]   2046 WBC(!): 11.67 [WH]   2046 Hemoglobin: 14.8 [WH]   2046 Chest x-ray personally interpreted by me.  My personal interpretation is: Heart is enlarged.  Left hemidiaphragm is elevated.  No pneumothorax. [WH]   2049 HS Troponin T(!!): 1,096 [WH]   2049 Creatinine(!): 1.47  Stable [WH]   2056 HEART SCORE:    History #2  (Highly suspicious 2, Moderately suspicious 1, Slightly or non-suspicious 0)    ECG #2  (Significant ST depression 2,  Nonspecific repol disturbance 1, Normal 0)    Age #2  (> or = 65 2, 46-65 1,  < or = 45 0)    Risk factors #2  (hypercholesterolemia, HTN, DM, smoking, pos fam hx, obesity)  (> or = to 3 RF 2, 1 or 2 1, No risk factors 0)    Troponin #2  (> or = 3x normal limit 2, 1-3x normal limit 1, < or = Normal limit 0)    HEART Score is 10   [WH]   2108 Test results and plan for admission were discussed with the patient.  Chest pain is unchanged after nitroglycerin.  Patient appears to be resting comfortably.  He will be given a dose of IV morphine. [WH]   2222 Patient presented to the ED complaining  of constant chest pain for the past 3 days.  Pain was better today.  EKG showed ST elevation in the inferior leads but there are also Q waves in the inferior leads.  Patient's troponin was approximately 1100.  Creatinine was elevated but stable.  Patient's heart score was 10.  Case was discussed with Dr. Villalobos and he reviewed the patient's EKG.  Patient's symptoms are consistent with a recent MI.  Patient was given aspirin by EMS.  He was given Lovenox, Plavix, morphine, and nitroglycerin in the ED.  Patient will be admitted.  Critical care was performed on this patient. [WH]      ED Course User Index  [WH] Duc Plaza MD               DIAGNOSIS  Final diagnoses:   Acute coronary syndrome with high troponin   Precordial chest pain   Hyperlipidemia, unspecified hyperlipidemia type   Essential hypertension   History of coronary angioplasty with insertion of stent   Hx of CABG   Chronic renal impairment, unspecified CKD stage         DISPOSITION  ADMISSION    Discussed treatment plan and reason for admission with pt/family and admitting physician.  Pt/family voiced understanding of the plan for admission for further testing/treatment as needed.                 Latest Documented Vital Signs:  As of 22:51 EDT  BP- 151/85 HR- 86 Temp- 99.2 °F (37.3 °C) (Oral) O2 sat- 94%              --    Please note that portions of this were completed with a voice recognition program.       Note Disclaimer: At [facility], we believe that sharing information builds trust and better relationships. You are receiving this note because you are receiving care at [facility] or recently visited. It is possible you will see health information before a provider has talked with you about it. This kind of information can be easy to misunderstand. To help you fully understand what it means for your health, we urge you to discuss this note with your provider.           Duc Plaza MD  04/20/23 1208

## 2023-04-21 NOTE — ED TRIAGE NOTES
Pt presents to ED via [facility] from home with complaints of midsternal chest pain x3-4 days. Pt rates pain 3/10, worse with exertion. Pt endorses multiple cardiac stents in the past.     Pt given 324 ASA in route.

## 2023-04-21 NOTE — H&P
[facility]  History & Physical Note                                                                                    Patient Identification:  Negrito Navarro:   72 y.o.  male  1950  [id number]            Chief Complaint   Patient presents with    Chest Pain       Date of Admission: 4/21/23    Admitting Physician: Dr. Jayne Villalobos    Reason for Admission:Precordial chest pain [R07.2]  Essential hypertension [I10]  Hx of CABG [Z95.1]  Hyperlipidemia, unspecified hyperlipidemia type [E78.5]  Chronic renal impairment, unspecified CKD stage [N18.9]  Acute coronary syndrome with high troponin [I24.9]  History of coronary angioplasty with insertion of stent [Z95.5],   Chief Complaint   Patient presents with    Chest Pain       History of Present Illness:   This is a 72-year-old male who is new to our service.  He presented to [facility] with complaints of chest pain.  He has a history of CAD with stents and CABG x4 in 2016, hypertension and dyslipidemia.  He states he has not followed with cardiology for few years.  He presented to [facility] via EMS with substernal chest pain which had been ongoing for the last 3 to 4 days.  He states it radiated with right and left left arm pain.    Echo in 2021 revealed EF 61 to 65%, LV wall thickness is consistent with mild LVH.  LV diastolic function is consistent with grade 2 with high LAP.  Normal RV C size and systolic function.  LAC is mildly dilated.  7 test results are negative.  Stress test in 2013 was a normal Lexiscan.      Cardiac Risk Factors: hypertension, CAD, hyperlipidemia  Past Medical History:  Past Medical History:   Diagnosis Date    Benign prostatic hyperplasia 11/10/2016    COPD (chronic obstructive pulmonary disease) 6/10/2021    Coronary artery disease involving coronary bypass graft of native heart without angina pectoris 6/10/2021    Essential hypertension 11/10/2016    Hyperlipidemia     SERENITY  (obstructive sleep apnea) 11/10/2016    at least 3 stable    Past Surgical History:  No past surgical history on file.     Social History:   Social History     Tobacco Use    Smoking status: Never    Smokeless tobacco: Never   Substance Use Topics    Alcohol use: Not on file        Family History:  No family history on file.       Allergies:  Allergies   Allergen Reactions    Shellfish-Derived Products Unknown - Low Severity     gout  gout         Home Meds:  No current facility-administered medications on file prior to encounter.     Current Outpatient Medications on File Prior to Encounter   Medication Sig Dispense Refill    atorvastatin (LIPITOR) 40 MG tablet Take 40 mg by mouth Daily.      carvedilol (COREG) 12.5 MG tablet Take 1 tablet by mouth 2 (Two) Times a Day With Meals for 30 days. 60 tablet 0    cloNIDine (CATAPRES) 0.1 MG tablet Take 0.1 mg by mouth 2 (Two) Times a Day.      dicyclomine (BENTYL) 10 MG capsule Take 10 mg by mouth 3 (Three) Times a Day As Needed.      famotidine (PEPCID) 20 MG tablet Take 20 mg by mouth 2 (Two) Times a Day.      furosemide (LASIX) 40 MG tablet Take 1 tablet by mouth Daily for 30 days. 30 tablet 0    HYDROcodone-acetaminophen (NORCO) 7.5-325 MG per tablet Take 1 tablet by mouth Every 12 (Twelve) Hours As Needed for Moderate Pain .      ketoconazole (NIZORAL) 2 % cream Apply  topically to the appropriate area as directed Daily.      NIFEdipine XL (ADALAT CC) 30 MG 24 hr tablet Take 1 tablet by mouth Daily for 30 days. 30 tablet 0    spironolactone (ALDACTONE) 50 MG tablet Take 1 tablet by mouth Daily for 30 days. 30 tablet 0         Scheduled Meds:  aspirin, 81 mg, Daily  carvedilol, 6.25 mg, BID With Meals  metoprolol tartrate, 5 mg, Once  nitroglycerin, 1 inch, Q6H            INTAKE AND OUTPUT:  No intake or output data in the 24 hours ending 04/21/23 0937        Review of Systems  Constitutional: No wt loss, fever   Gastrointestinal: No nausea , abdominal  "pain  Behavioral/Psych: No insomnia or anxiety   Cardiovascular ----positive for CP. All other systems reviewed and are negative               Physical Exam  BP (!) 163/105   Pulse 72   Temp 98.5 °F (36.9 °C) (Oral)   Resp 20   Ht 174 cm (68.5\")   Wt 90.3 kg (199 lb)   SpO2 98%   BMI 29.82 kg/m²     General appearance: No acute changes   Eyes: Sclerae conjunctivae normal, pupils reactive   HENT: Atraumatic; oropharynx clear with moist mucous membranes and no mucosal ulcerations;  Neck: Trachea midline; NECK, supple, no thyromegaly or lymphadenopathy   Lungs: Normal size and shape, normal breath sounds, equal distribution of air, no rales and rhonchi   CV: S1-S2 regular, no murmurs, no rub, no gallop   Abdomen: Soft, nontender; no masses , no abnormal abdominal sounds   Extremities: No deformity , normal color , no peripheral edema   Skin: Normal temperature, turgor and texture; no rash, ulcers  Psych: Appropriate affect, alert and oriented to person, place and time                                Cardiographics    ECG:          ECHO: Interpretation Summary    Left ventricular ejection fraction appears to be 61 - 65%.  Left ventricular wall thickness is consistent with mild concentric hypertrophy.  Left ventricular diastolic function is consistent with (grade II w/high LAP) pseudonormalization  Normal right ventricular cavity size and systolic function noted.  The left atrial cavity is mildly dilated.  Saline test results are negative.  Aortic valve maximum pressure gradient is 15 mmHg. Aortic valve mean pressure gradient is 9.0 mmHg.  There is no evidence of pericardial effusion.      2013      IMPRESSION:   1. Normal Lexiscan nuclear cardiac stress test.   2. No significant perfusion abnormalities.   3. Normal left ventricular systolic function with ejection fraction   calculated at 64%.     COPY OF REPORT TO BE SENT TO Dr. Dangelo Gonzalez M.D.        Lab Review:  Results from last 7 days   Lab Units " 04/21/23  0003 04/20/23 2017   HSTROP T ng/L 1,035* 1,096*         Results from last 7 days   Lab Units 04/20/23 2017   SODIUM mmol/L 139   POTASSIUM mmol/L 4.2   BUN mg/dL 21   CREATININE mg/dL 1.47*   CALCIUM mg/dL 9.0     @LABRCNTbnp@  Results from last 7 days   Lab Units 04/20/23 2017   WBC 10*3/mm3 11.67*   HEMOGLOBIN g/dL 14.8   HEMATOCRIT % 42.6   PLATELETS 10*3/mm3 255     Results from last 7 days   Lab Units 04/20/23 2017   INR  1.11*   APTT seconds 28.0         CXR:  IMPRESSION:  Minimal likely atelectasis at the bases. Cardiomegaly.  Tortuous aorta. Follow-up as clinically indicated.     This report was finalized on 4/20/2023 8:57 PM by Dr. Rigoberto Herrera M.D.          Assessment / Plan:    Chest pain with known CAD with history of CABG in 2016  Hypertension  hyperlipidemia  CKD        Recommendations:  This is a 72-year-old male who is new to our service.  He presented to [facility] with substernal chest pain which radiated to right and left arms.  He has a history to include CAD with history of CABG in 2016, hypertension and hyperlipidemia.  Troponin's significantly elevated.  In the emergency room he was given Lovenox and aspirin.  He will undergo a cardiac cath.  Risk and benefits have been discussed.  He was also given bolus of Plavix.  We will start Plavix 75 mg until cardiac cath results.  Hold diuretics and spironolactone until a.m. labs and reevaluate.  Discussed with nurse, and Dr. Villalobos.         Procedure, risks and options of cardiac cath explained to pt INCLUDING BUT NOT LIMITED TO MI, STROKE, DEATH, INFECTION HAEMORRHAGE, . Pt understands well and agrees with no further questions.      Labs/tests ordered in am: Plavix 75 mg, fluids due to increased creatinine, labs tomorrow, n.p.o., cardiac cath procedure.    Rosa Isela Walker, HANNAH  4/21/2023  09:37 EDT    Patient personally interviewed and above subjective findings personally confirmed during a face to  "face contact with patient today  All findings of physical examination confirmed  All pertinent and performed labs, cardiac procedures ,  radiographs of the last 24 hours personally reviewed  Impression and plans discussed/elaborated and implemented jointly as described above     Jayne Villalobos MD        EMR Dragon/Transcription:   \"Dictated utilizing Dragon dictation\".     "

## 2023-04-21 NOTE — PLAN OF CARE
Goal Outcome Evaluation:  Plan of Care Reviewed With: patient            No acute distress noted this shift, NTG paste ordered and given for elevated BP, safety maintained, continue plan of care

## 2023-04-21 NOTE — PLAN OF CARE
Goal Outcome Evaluation:  Plan of Care Reviewed With: patient        Progress: no change  Outcome Evaluation: Received from cath lab with increased BP and bleeding at right groin. Pressure held multiple times and sandbag inuse. Dr. Villalobos notified when at bedside. IV Labetalol ordered. PO medications given. Patient supine and bed flat. Monitor BP and right groin site.

## 2023-04-22 LAB
ANION GAP SERPL CALCULATED.3IONS-SCNC: 11.4 MMOL/L (ref 5–15)
BASOPHILS # BLD AUTO: 0.01 10*3/MM3 (ref 0–0.2)
BASOPHILS NFR BLD AUTO: 0.1 % (ref 0–1.5)
BUN SERPL-MCNC: 27 MG/DL (ref 8–23)
BUN/CREAT SERPL: 20.8 (ref 7–25)
CALCIUM SPEC-SCNC: 9.3 MG/DL (ref 8.6–10.5)
CHLORIDE SERPL-SCNC: 102 MMOL/L (ref 98–107)
CHOLEST SERPL-MCNC: 122 MG/DL (ref 0–200)
CO2 SERPL-SCNC: 23.6 MMOL/L (ref 22–29)
CREAT SERPL-MCNC: 1.3 MG/DL (ref 0.76–1.27)
DEPRECATED RDW RBC AUTO: 44.3 FL (ref 37–54)
EGFRCR SERPLBLD CKD-EPI 2021: 57.6 ML/MIN/1.73
EOSINOPHIL # BLD AUTO: 0 10*3/MM3 (ref 0–0.4)
EOSINOPHIL NFR BLD AUTO: 0 % (ref 0.3–6.2)
ERYTHROCYTE [DISTWIDTH] IN BLOOD BY AUTOMATED COUNT: 13.5 % (ref 12.3–15.4)
GLUCOSE BLDC GLUCOMTR-MCNC: 106 MG/DL (ref 70–130)
GLUCOSE BLDC GLUCOMTR-MCNC: 131 MG/DL (ref 70–130)
GLUCOSE SERPL-MCNC: 229 MG/DL (ref 65–99)
HBA1C MFR BLD: 6.3 % (ref 4.8–5.6)
HCT VFR BLD AUTO: 37.3 % (ref 37.5–51)
HDLC SERPL-MCNC: 35 MG/DL (ref 40–60)
HGB BLD-MCNC: 12.9 G/DL (ref 13–17.7)
IMM GRANULOCYTES # BLD AUTO: 0.05 10*3/MM3 (ref 0–0.05)
IMM GRANULOCYTES NFR BLD AUTO: 0.4 % (ref 0–0.5)
LDLC SERPL CALC-MCNC: 74 MG/DL (ref 0–100)
LDLC/HDLC SERPL: 2.14 {RATIO}
LYMPHOCYTES # BLD AUTO: 0.72 10*3/MM3 (ref 0.7–3.1)
LYMPHOCYTES NFR BLD AUTO: 6 % (ref 19.6–45.3)
MCH RBC QN AUTO: 30.7 PG (ref 26.6–33)
MCHC RBC AUTO-ENTMCNC: 34.6 G/DL (ref 31.5–35.7)
MCV RBC AUTO: 88.8 FL (ref 79–97)
MONOCYTES # BLD AUTO: 1.07 10*3/MM3 (ref 0.1–0.9)
MONOCYTES NFR BLD AUTO: 9 % (ref 5–12)
NEUTROPHILS NFR BLD AUTO: 10.1 10*3/MM3 (ref 1.7–7)
NEUTROPHILS NFR BLD AUTO: 84.5 % (ref 42.7–76)
NRBC BLD AUTO-RTO: 0 /100 WBC (ref 0–0.2)
PLATELET # BLD AUTO: 263 10*3/MM3 (ref 140–450)
PMV BLD AUTO: 10.4 FL (ref 6–12)
POTASSIUM SERPL-SCNC: 4.1 MMOL/L (ref 3.5–5.2)
QT INTERVAL: 488 MS
RBC # BLD AUTO: 4.2 10*6/MM3 (ref 4.14–5.8)
SODIUM SERPL-SCNC: 137 MMOL/L (ref 136–145)
TRIGL SERPL-MCNC: 61 MG/DL (ref 0–150)
VLDLC SERPL-MCNC: 13 MG/DL (ref 5–40)
WBC NRBC COR # BLD: 11.95 10*3/MM3 (ref 3.4–10.8)

## 2023-04-22 PROCEDURE — 85025 COMPLETE CBC W/AUTO DIFF WBC: CPT | Performed by: INTERNAL MEDICINE

## 2023-04-22 PROCEDURE — 82962 GLUCOSE BLOOD TEST: CPT

## 2023-04-22 PROCEDURE — 80061 LIPID PANEL: CPT | Performed by: INTERNAL MEDICINE

## 2023-04-22 PROCEDURE — 80048 BASIC METABOLIC PNL TOTAL CA: CPT | Performed by: INTERNAL MEDICINE

## 2023-04-22 PROCEDURE — 83036 HEMOGLOBIN GLYCOSYLATED A1C: CPT | Performed by: INTERNAL MEDICINE

## 2023-04-22 PROCEDURE — 99232 SBSQ HOSP IP/OBS MODERATE 35: CPT | Performed by: INTERNAL MEDICINE

## 2023-04-22 PROCEDURE — 93005 ELECTROCARDIOGRAM TRACING: CPT | Performed by: INTERNAL MEDICINE

## 2023-04-22 RX ORDER — NICOTINE POLACRILEX 4 MG
15 LOZENGE BUCCAL
Status: DISCONTINUED | OUTPATIENT
Start: 2023-04-22 | End: 2023-04-23 | Stop reason: HOSPADM

## 2023-04-22 RX ORDER — LISINOPRIL 5 MG/1
5 TABLET ORAL
Status: DISCONTINUED | OUTPATIENT
Start: 2023-04-22 | End: 2023-04-23 | Stop reason: HOSPADM

## 2023-04-22 RX ORDER — DEXTROSE MONOHYDRATE 25 G/50ML
25 INJECTION, SOLUTION INTRAVENOUS
Status: DISCONTINUED | OUTPATIENT
Start: 2023-04-22 | End: 2023-04-23 | Stop reason: HOSPADM

## 2023-04-22 RX ORDER — INSULIN LISPRO 100 [IU]/ML
2-7 INJECTION, SOLUTION INTRAVENOUS; SUBCUTANEOUS
Status: DISCONTINUED | OUTPATIENT
Start: 2023-04-22 | End: 2023-04-23 | Stop reason: HOSPADM

## 2023-04-22 RX ORDER — IBUPROFEN 600 MG/1
1 TABLET ORAL
Status: DISCONTINUED | OUTPATIENT
Start: 2023-04-22 | End: 2023-04-23 | Stop reason: HOSPADM

## 2023-04-22 RX ORDER — HYDRALAZINE HYDROCHLORIDE 20 MG/ML
10 INJECTION INTRAMUSCULAR; INTRAVENOUS EVERY 6 HOURS PRN
Status: DISCONTINUED | OUTPATIENT
Start: 2023-04-22 | End: 2023-04-23 | Stop reason: HOSPADM

## 2023-04-22 RX ADMIN — ASPIRIN 81 MG: 81 TABLET, COATED ORAL at 08:49

## 2023-04-22 RX ADMIN — ATORVASTATIN CALCIUM 40 MG: 20 TABLET, FILM COATED ORAL at 08:50

## 2023-04-22 RX ADMIN — CLONIDINE HYDROCHLORIDE 0.1 MG: 0.1 TABLET ORAL at 08:50

## 2023-04-22 RX ADMIN — ACETAMINOPHEN 650 MG: 325 TABLET, FILM COATED ORAL at 22:38

## 2023-04-22 RX ADMIN — CARVEDILOL 6.25 MG: 6.25 TABLET, FILM COATED ORAL at 17:49

## 2023-04-22 RX ADMIN — NIFEDIPINE 30 MG: 30 TABLET, FILM COATED, EXTENDED RELEASE ORAL at 08:50

## 2023-04-22 RX ADMIN — CLOPIDOGREL BISULFATE 75 MG: 75 TABLET, FILM COATED ORAL at 08:50

## 2023-04-22 RX ADMIN — LISINOPRIL 5 MG: 5 TABLET ORAL at 11:06

## 2023-04-22 RX ADMIN — TAMSULOSIN HYDROCHLORIDE 0.4 MG: 0.4 CAPSULE ORAL at 08:50

## 2023-04-22 RX ADMIN — CLONIDINE HYDROCHLORIDE 0.1 MG: 0.1 TABLET ORAL at 20:19

## 2023-04-22 RX ADMIN — CARVEDILOL 6.25 MG: 6.25 TABLET, FILM COATED ORAL at 08:50

## 2023-04-22 NOTE — CONSULTS
CONSULT NOTE    INTERNAL MEDICINE   [facility]       Patient Identification:  Name: Negrito Navarro  Age: 72 y.o.  Sex: male  :  1950  MRN: [id number]             Date of Consultation:  23          Primary Care Physician: Karen Yu MD                               Requesting Physician: dr barboza  Reason for Consultation:medical management    Chief Complaint:  72 year old gentleman presented with chest pain; he had elevated troponins consistent with acs; he was admitted by cardiology; he is feeling better today; we are asked to see him regarding medical management; he had a cardiac cath and stent placement yesterday    History of Present Illness:   As above      Past Medical History:  Past Medical History:   Diagnosis Date   • Benign prostatic hyperplasia 11/10/2016   • COPD (chronic obstructive pulmonary disease) 6/10/2021   • Coronary artery disease involving coronary bypass graft of native heart without angina pectoris 6/10/2021   • Essential hypertension 11/10/2016   • Hyperlipidemia    • SERENITY (obstructive sleep apnea) 11/10/2016     Past Surgical History:  No past surgical history on file.   Home Meds:  Medications Prior to Admission   Medication Sig Dispense Refill Last Dose   • albuterol sulfate  (90 Base) MCG/ACT inhaler Inhale 2 puffs Every 4 (Four) Hours As Needed for Wheezing.   Past Month   • aspirin 81 MG EC tablet Take 1 tablet by mouth Daily.   Past Week   • atorvastatin (LIPITOR) 40 MG tablet Take 1 tablet by mouth Daily.   2023   • carvedilol (COREG) 6.25 MG tablet Take 1 tablet by mouth 2 (Two) Times a Day With Meals.   2023   • HYDROcodone-acetaminophen (NORCO) 7.5-325 MG per tablet Take 1 tablet by mouth Every 12 (Twelve) Hours As Needed for Moderate Pain.   2023   • tamsulosin (FLOMAX) 0.4 MG capsule 24 hr capsule Take 1 capsule by mouth Daily.   Past Week   • aspirin 81 MG chewable tablet Chew 1 tablet Daily.      • cloNIDine  (CATAPRES) 0.1 MG tablet Take 1 tablet by mouth 2 (Two) Times a Day.   More than a month   • fluticasone (FLONASE) 50 MCG/ACT nasal spray 2 sprays into the nostril(s) as directed by provider Daily.      • furosemide (LASIX) 40 MG tablet Take 1 tablet by mouth Daily.   More than a month   • ketoconazole (NIZORAL) 2 % cream Apply  topically to the appropriate area as directed Daily.   More than a month   • NIFEdipine XL (ADALAT CC) 30 MG 24 hr tablet Take 1 tablet by mouth Daily for 30 days. 30 tablet 0    • NIFEdipine XL (PROCARDIA XL) 30 MG 24 hr tablet Take 1 tablet by mouth Daily.   More than a month   • spironolactone (ALDACTONE) 50 MG tablet Take 1 tablet by mouth Daily for 30 days. 30 tablet 0    • spironolactone (ALDACTONE) 50 MG tablet Take 1 tablet by mouth Daily.   More than a month     Current Meds:     Current Facility-Administered Medications:   •  acetaminophen (TYLENOL) tablet 650 mg, 650 mg, Oral, Q4H PRN, Jayne Villalobos MD, 650 mg at 04/21/23 2253  •  albuterol (PROVENTIL) nebulizer solution 0.083% 2.5 mg/3mL, 2.5 mg, Nebulization, Q6H PRN, Jayne Villalobos MD  •  aspirin EC tablet 81 mg, 81 mg, Oral, Daily, Jayne Villalobos MD, 81 mg at 04/22/23 0849  •  atorvastatin (LIPITOR) tablet 40 mg, 40 mg, Oral, Daily, Jayne Villalobos MD, 40 mg at 04/22/23 0850  •  atropine sulfate injection 0.5 mg, 0.5 mg, Intravenous, Q5 Min PRN, Jayne Villalobos MD  •  carvedilol (COREG) tablet 6.25 mg, 6.25 mg, Oral, BID With Meals, Jayne Villalobos MD, 6.25 mg at 04/22/23 0850  •  cloNIDine (CATAPRES) tablet 0.1 mg, 0.1 mg, Oral, Q12H, Jayne Villalobos MD, 0.1 mg at 04/22/23 0850  •  clopidogrel (PLAVIX) tablet 75 mg, 75 mg, Oral, Daily, Jayne Villalobos MD, 75 mg at 04/22/23 0850  •  fluticasone (FLONASE) 50 MCG/ACT nasal spray 2 spray, 2 spray, Nasal, Daily, Jayne Villalobos MD  •  hydrALAZINE (APRESOLINE) injection 10 mg, 10 mg, Intravenous, Q6H PRN, Aditya  HANNAH Whitaker  •  iopamidol (ISOVUE-370) 76 % injection, , , Code / Trauma / Sedation Medication, Jayne Villalobos MD, 157 mg at 04/21/23 1651  •  lisinopril (PRINIVIL,ZESTRIL) tablet 5 mg, 5 mg, Oral, Q24H, Jayne Villalobos MD, 5 mg at 04/22/23 1106  •  metoprolol tartrate (LOPRESSOR) injection 5 mg, 5 mg, Intravenous, Once, Jayne Villalobos MD  •  NIFEdipine XL (PROCARDIA XL) 24 hr tablet 30 mg, 30 mg, Oral, Daily, Jayne Villalobos MD, 30 mg at 04/22/23 0850  •  nitroglycerin (NITROSTAT) ointment 1 inch, 1 inch, Topical, Q6H, Jayne Villalobos MD, 1 inch at 04/21/23 1748  •  nitroglycerin (NITROSTAT) SL tablet 0.4 mg, 0.4 mg, Sublingual, Q5 Min PRN, Jayne Villalobos MD, 0.4 mg at 04/20/23 2054  •  [COMPLETED] Insert Peripheral IV, , , Once **AND** sodium chloride 0.9 % flush 10 mL, 10 mL, Intravenous, PRN, Jayne Villalobos MD  •  sodium chloride 0.9 % infusion 250 mL, 250 mL, Intravenous, Once PRN, Jayne Villalobos MD  •  sodium chloride 0.9 % infusion, 50 mL/hr, Intravenous, Continuous, Jayne Villalobos MD, Last Rate: 50 mL/hr at 04/21/23 1046, 50 mL/hr at 04/21/23 1046  •  tamsulosin (FLOMAX) 24 hr capsule 0.4 mg, 0.4 mg, Oral, Daily, Jayne Villalobos MD, 0.4 mg at 04/22/23 0850  Allergies:  Allergies   Allergen Reactions   • Shellfish-Derived Products Unknown - Low Severity     gout  gout       Social History:   Social History     Socioeconomic History   • Marital status: Legally    Tobacco Use   • Smoking status: Never   • Smokeless tobacco: Never     Family History:  No family history on file.       Review of Systems  See history of present illness and past medical history.  Patient denies headache, dizziness, syncope, falls, trauma, change in vision, change in hearing, change in taste, changes in weight, changes in appetite, focal weakness, numbness, or paresthesia.  Patient denies  orthopnea, PND, cough, sinus pressure, rhinorrhea,  "epistaxis, hemoptysis, nausea, vomiting,hematemesis, diarrhea, constipation or hematchezia.  Denies cold or heat intolerance, polydipsia, polyuria, polyphagia. Denies hematuria, pyuria, dysuria, hesitancy, frequency or urgency. Denies consumption of raw and under cooked meats foods or change in water source.  Denies fever, chills, sweats, night sweats.  Denies missing any routine medications. Remainder of ROS is negative.      Vitals:   /80 (BP Location: Left arm, Patient Position: Lying)   Pulse 72   Temp 98.3 °F (36.8 °C) (Oral)   Resp 18   Ht 174 cm (68.5\")   Wt 90.3 kg (199 lb)   SpO2 98%   BMI 29.82 kg/m²   I/O:     Intake/Output Summary (Last 24 hours) at 4/22/2023 1703  Last data filed at 4/22/2023 1230  Gross per 24 hour   Intake 920 ml   Output 1050 ml   Net -130 ml     Exam:  General Appearance:    Alert, cooperative, no distress, appears stated age   Head:    Normocephalic, without obvious abnormality, atraumatic   Eyes:    PERRL, conjunctivae/corneas clear, EOM's intact, both eyes   Ears:    Normal external ear canals, both ears   Nose:   Nares normal, septum midline, mucosa normal, no drainage    or sinus tenderness   Throat:   Lips, tongue, gums normal; oral mucosa pink and moist   Neck:   Supple, symmetrical, trachea midline, no adenopathy;     thyroid:  no enlargement/tenderness/nodules; no carotid    bruit or JVD   Back:     Symmetric, no curvature, ROM normal, no CVA tenderness   Lungs:     Decreased breath sounds bilaterally, respirations unlabored   Chest Wall:    No tenderness or deformity    Heart:    Regular rate and rhythm, S1 and S2 normal, no murmur, rub   or gallop   Abdomen:     Soft, nontender, bowel sounds active all four quadrants,     no masses, no hepatomegaly, no splenomegaly   Extremities:   Extremities normal, atraumatic, no cyanosis or edema   Pulses:   Pulses palpable in all extremities; symmetric all extremities   Skin:   Skin color normal, Skin is warm and dry,  " no rashes or palpable lesions        Data Review:  Labs in chart were reviewed.  WBC   Date Value Ref Range Status   04/22/2023 11.95 (H) 3.40 - 10.80 10*3/mm3 Final     Hemoglobin   Date Value Ref Range Status   04/22/2023 12.9 (L) 13.0 - 17.7 g/dL Final     Hematocrit   Date Value Ref Range Status   04/22/2023 37.3 (L) 37.5 - 51.0 % Final     Platelets   Date Value Ref Range Status   04/22/2023 263 140 - 450 10*3/mm3 Final     Sodium   Date Value Ref Range Status   04/22/2023 137 136 - 145 mmol/L Final     Potassium   Date Value Ref Range Status   04/22/2023 4.1 3.5 - 5.2 mmol/L Final     Chloride   Date Value Ref Range Status   04/22/2023 102 98 - 107 mmol/L Final     CO2   Date Value Ref Range Status   04/22/2023 23.6 22.0 - 29.0 mmol/L Final     BUN   Date Value Ref Range Status   04/22/2023 27 (H) 8 - 23 mg/dL Final     Creatinine   Date Value Ref Range Status   04/22/2023 1.30 (H) 0.76 - 1.27 mg/dL Final     Glucose   Date Value Ref Range Status   04/22/2023 229 (H) 65 - 99 mg/dL Final     Calcium   Date Value Ref Range Status   04/22/2023 9.3 8.6 - 10.5 mg/dL Final     AST (SGOT)   Date Value Ref Range Status   04/20/2023 32 1 - 40 U/L Final     ALT (SGPT)   Date Value Ref Range Status   04/20/2023 26 1 - 41 U/L Final     Alkaline Phosphatase   Date Value Ref Range Status   04/20/2023 93 39 - 117 U/L Final         Results from last 7 days   Lab Units 04/22/23  0337   HEMOGLOBIN A1C % 6.30*       Imaging Results (Last 7 Days)     Procedure Component Value Units Date/Time    XR Chest 1 View [[id number]] Collected: 04/20/23 2049     Updated: 04/20/23 2100    Narrative:      XR CHEST 1 VW-     HISTORY: Male who is 72 years-old,  chest pain     TECHNIQUE: Frontal view the chest     COMPARISON: 06/09/2021     FINDINGS: The heart is enlarged. Aorta is tortuous. Sternotomy wires are  present. Pulmonary vasculature is unremarkable. Minimal likely  atelectasis at the bases. No pleural effusion, or pneumothorax. The  left  hemidiaphragm remains elevated. No acute osseous process.       Impression:      Minimal likely atelectasis at the bases. Cardiomegaly.  Tortuous aorta. Follow-up as clinically indicated.     This report was finalized on 4/20/2023 8:57 PM by Dr. Rigoberto Herrera M.D.           Past Medical History:   Diagnosis Date   • Benign prostatic hyperplasia 11/10/2016   • COPD (chronic obstructive pulmonary disease) 6/10/2021   • Coronary artery disease involving coronary bypass graft of native heart without angina pectoris 6/10/2021   • Essential hypertension 11/10/2016   • Hyperlipidemia    • SERENITY (obstructive sleep apnea) 11/10/2016       Assessment:  Active Hospital Problems    Diagnosis  POA   • **Acute coronary syndrome with high troponin [I24.9]  Yes   • Precordial chest pain [R07.2]  Unknown   • History of coronary angioplasty with insertion of stent [Z95.5]  Unknown   • Hx of CABG [Z95.1]  Unknown   • Hyperlipidemia [E78.5]  Unknown   • Essential hypertension [I10]  Unknown      Resolved Hospital Problems   No resolved problems to display.   diabetes  Anemia  ckd3    Plan:  Add accu checks, hgba1c, insulin  Ask the diabetes educator to see him  Monitor bp  Angioplasty done yesterday  Thanks for involving us in his care  Will follow with you    Alejandra Warren MD   4/22/2023  17:03 EDT

## 2023-04-22 NOTE — PROGRESS NOTES
[facility]  Cardiology Progress Note    Patient Identification:  Name: Negrito Navarro  Age: 72 y.o.  Sex: male  :  1950  MRN: [id number]                 Follow Up / Chief Complaint: Chest pain    Interval History:  Patient underwent angioplasty stent without any problems and complication     Subjective:  No chest pains or tightness in the chest    Objective:    Past Medical History:  Past Medical History:   Diagnosis Date   • Benign prostatic hyperplasia 11/10/2016   • COPD (chronic obstructive pulmonary disease) 6/10/2021   • Coronary artery disease involving coronary bypass graft of native heart without angina pectoris 6/10/2021   • Essential hypertension 11/10/2016   • Hyperlipidemia    • SERENITY (obstructive sleep apnea) 11/10/2016     Past Surgical History:  No past surgical history on file.     Social History:   Social History     Tobacco Use   • Smoking status: Never   • Smokeless tobacco: Never   Substance Use Topics   • Alcohol use: Not on file      Family History:  No family history on file.       Allergies:  Allergies   Allergen Reactions   • Shellfish-Derived Products Unknown - Low Severity     gout  gout       Scheduled Meds:  aspirin, 81 mg, Daily  atorvastatin, 40 mg, Daily  carvedilol, 6.25 mg, BID With Meals  cloNIDine, 0.1 mg, Q12H  clopidogrel, 75 mg, Daily  fluticasone, 2 spray, Daily  metoprolol tartrate, 5 mg, Once  NIFEdipine XL, 30 mg, Daily  nitroglycerin, 1 inch, Q6H  tamsulosin, 0.4 mg, Daily            INTAKE AND OUTPUT:    Intake/Output Summary (Last 24 hours) at 2023 0829  Last data filed at 2023 0500  Gross per 24 hour   Intake 560 ml   Output 825 ml   Net -265 ml       Review of Systems:   GI:  Cardiac: No chest pain  Pulmonary:    Constitutional:  Temp:  [97.5 °F (36.4 °C)-98.5 °F (36.9 °C)] 97.5 °F (36.4 °C)  Heart Rate:  [38-86] 68  Resp:  [12-22] 18  BP: (135-226)/() 153/91    Physical Exam:  General:  Appears in no acute distress  Eyes:  "PERTL,  HEENT:  No JVD. Thyroid not visibly enlarged. No mucosal pallor or cyanosis  Respiratory: Respirations regular and unlabored at rest. BBS with good air entry in all fields. No crackles, rubs or wheezes auscultated  Cardiovascular: S1S2 Regular rate and rhythm. No murmur, rub or gallop. No carotid bruits. DP/PT pulses     . No pretibial pitting edema  Gastrointestinal: Abdomen soft, flat, non tender. Bowel sounds present. No hepatosplenomegaly. No ascites  Musculoskeletal: VILLARREAL x4. No abnormal movements  Extremities: No digital clubbing or cyanosis  Skin: Color pink. Skin warm and dry to touch. No rashes    Neuro: AAO x3 CN II-XII grossly intact  Psych: Mood and affect normal, pleasant and cooperative          Cardiographics  Telemetry:     ECG:     Echocardiogram:     Lab Review   Results from last 7 days   Lab Units 04/21/23  0003 04/20/23 2017   HSTROP T ng/L 1,035* 1,096*         Results from last 7 days   Lab Units 04/22/23 0337   SODIUM mmol/L 137   POTASSIUM mmol/L 4.1   BUN mg/dL 27*   CREATININE mg/dL 1.30*   CALCIUM mg/dL 9.3     @LABRCNTIPbnp@  Results from last 7 days   Lab Units 04/22/23  0337 04/20/23 2017   WBC 10*3/mm3 11.95* 11.67*   HEMOGLOBIN g/dL 12.9* 14.8   HEMATOCRIT % 37.3* 42.6   PLATELETS 10*3/mm3 263 255     Results from last 7 days   Lab Units 04/20/23 2017   INR  1.11*   APTT seconds 28.0         Assessment:  Recent inferior wall myocardial infarction  S/p angioplasty stent  Hypertension      Plan:  Add lisinopril to the current medication  Possible discharge in the morning        )4/22/2023  Jayne Villalobos MD      EMR Dragon/Transcription:   \"Dictated utilizing Dragon dictation\".     "

## 2023-04-23 ENCOUNTER — READMISSION MANAGEMENT (OUTPATIENT)
Dept: CALL CENTER | Facility: HOSPITAL | Age: 73
End: 2023-04-23
Payer: MEDICARE

## 2023-04-23 VITALS
DIASTOLIC BLOOD PRESSURE: 78 MMHG | RESPIRATION RATE: 18 BRPM | SYSTOLIC BLOOD PRESSURE: 128 MMHG | WEIGHT: 199 LBS | TEMPERATURE: 97.9 F | BODY MASS INDEX: 29.47 KG/M2 | HEIGHT: 69 IN | OXYGEN SATURATION: 93 % | HEART RATE: 73 BPM

## 2023-04-23 LAB
GLUCOSE BLDC GLUCOMTR-MCNC: 105 MG/DL (ref 70–130)
GLUCOSE BLDC GLUCOMTR-MCNC: 112 MG/DL (ref 70–130)

## 2023-04-23 PROCEDURE — 82962 GLUCOSE BLOOD TEST: CPT

## 2023-04-23 PROCEDURE — 99238 HOSP IP/OBS DSCHRG MGMT 30/<: CPT | Performed by: INTERNAL MEDICINE

## 2023-04-23 RX ORDER — LISINOPRIL 5 MG/1
5 TABLET ORAL
Qty: 30 TABLET | Refills: 6 | Status: SHIPPED | OUTPATIENT
Start: 2023-04-24

## 2023-04-23 RX ORDER — ASPIRIN 81 MG/1
81 TABLET ORAL DAILY
Qty: 30 TABLET | Refills: 6 | Status: SHIPPED | OUTPATIENT
Start: 2023-04-24

## 2023-04-23 RX ORDER — CLOPIDOGREL BISULFATE 75 MG/1
75 TABLET ORAL DAILY
Qty: 30 TABLET | Refills: 11 | Status: SHIPPED | OUTPATIENT
Start: 2023-04-24

## 2023-04-23 RX ORDER — NITROGLYCERIN 0.4 MG/1
0.4 TABLET SUBLINGUAL
Qty: 25 TABLET | Refills: 12 | Status: SHIPPED | OUTPATIENT
Start: 2023-04-23

## 2023-04-23 RX ADMIN — CLOPIDOGREL BISULFATE 75 MG: 75 TABLET, FILM COATED ORAL at 08:51

## 2023-04-23 RX ADMIN — CLONIDINE HYDROCHLORIDE 0.1 MG: 0.1 TABLET ORAL at 08:51

## 2023-04-23 RX ADMIN — TAMSULOSIN HYDROCHLORIDE 0.4 MG: 0.4 CAPSULE ORAL at 08:51

## 2023-04-23 RX ADMIN — ATORVASTATIN CALCIUM 40 MG: 20 TABLET, FILM COATED ORAL at 08:51

## 2023-04-23 RX ADMIN — NIFEDIPINE 30 MG: 30 TABLET, FILM COATED, EXTENDED RELEASE ORAL at 08:51

## 2023-04-23 RX ADMIN — CARVEDILOL 6.25 MG: 6.25 TABLET, FILM COATED ORAL at 08:51

## 2023-04-23 RX ADMIN — LISINOPRIL 5 MG: 5 TABLET ORAL at 08:51

## 2023-04-23 RX ADMIN — ASPIRIN 81 MG: 81 TABLET, COATED ORAL at 08:51

## 2023-04-23 NOTE — DISCHARGE SUMMARY
Date of Discharge:  4/23/2023    Discharge Diagnosis:   Active Hospital Problems    Diagnosis  POA   • **Acute coronary syndrome with high troponin [I24.9]  Yes   • Precordial chest pain [R07.2]  Unknown   • History of coronary angioplasty with insertion of stent [Z95.5]  Unknown   • Hx of CABG [Z95.1]  Unknown   • Hyperlipidemia [E78.5]  Unknown   • Essential hypertension [I10]  Unknown      Resolved Hospital Problems   No resolved problems to display.       Presenting Problem/History of Present Illness  Precordial chest pain [R07.2]  Essential hypertension [I10]  Hx of CABG [Z95.1]  Hyperlipidemia, unspecified hyperlipidemia type [E78.5]  Chronic renal impairment, unspecified CKD stage [N18.9]  Acute coronary syndrome with high troponin [I24.9]  History of coronary angioplasty with insertion of stent [Z95.5]      Hospital Course  Patient is a 72 y.o. male presented with retrosternal chest pain for approximately 3 days, after the hospitalization patient had enzymes which were elevated, patient underwent diagnostic heart catheterization was found to have the 100% occluded right coronary artery with patient underwent angioplasty and stent without any problems complications rest of the bypasses were functioning normally postoperative course was uneventful patient is being discharged to be followed up as an outpatient.      Procedures Performed  Procedure(s):  Left Heart Cath  Coronary angiography  Left ventriculography  Saphenous Vein Graft  Native mammary injection  Percutaneous Coronary Intervention  Stent BETO coronary  Percutaneous Manual Thrombectomy       Consults:   Consults     Date and Time Order Name Status Description    4/21/2023 10:30 AM Inpatient Internal Medicine Consult Completed           Pertinent Test Results:     Ejection Fraction  No results found for: EF    Echo EF Estimated  No results found for: ECHOEFEST    Nuclear Stress Ejection Fraction  No components found for: NUCEF    Cath Ejection  Fraction Quantitative  No results found for: CATHEF    Condition on Discharge: Stable    Physical Exam at Discharge    Vital Signs  Temp:  [97.1 °F (36.2 °C)-98.3 °F (36.8 °C)] 97.9 °F (36.6 °C)  Heart Rate:  [68-73] 73  Resp:  [18] 18  BP: (128-140)/(78-93) 128/78    Physical Exam:     General Appearance:    Alert, cooperative, in no acute distress   Head:    Normocephalic, without obvious abnormality, atraumatic   Eyes:            Lids and lashes normal, conjunctivae and sclerae normal, no   icterus, no pallor, corneas clear, PERRLA   Ears:    Ears appear intact with no abnormalities noted   Throat:   No oral lesions, no thrush, oral mucosa moist   Neck:   No adenopathy, supple, trachea midline, no thyromegaly, no   carotid bruit, no JVD   Back:     No kyphosis present, no scoliosis present, no skin lesions,      erythema or scars, no tenderness to percussion or                   palpation,   range of motion normal   Lungs:     Clear to auscultation,respirations regular, even and                  unlabored    Heart:    Regular rhythm and normal rate, normal S1 and S2, no            murmur, no gallop, no rub, no click   Chest Wall:    No abnormalities observed   Abdomen:     Normal bowel sounds, no masses, no organomegaly, soft        non-tender, non-distended, no guarding, no rebound                tenderness   Rectal:     Deferred   Extremities:   Moves all extremities well, no edema, no cyanosis, no             redness   Pulses:   Pulses palpable and equal bilaterally   Skin:   No bleeding, bruising or rash   Lymph nodes:   No palpable adenopathy   Neurologic:   Cranial nerves 2 - 12 grossly intact, sensation intact, DTR       present and equal bilaterally       Discharge Disposition  Home or Self Care    Discharge Medications     Discharge Medications      New Medications      Instructions Start Date   clopidogrel 75 MG tablet  Commonly known as: PLAVIX   75 mg, Oral, Daily   Start Date: April 24, 2023      lisinopril 5 MG tablet  Commonly known as: PRINIVIL,ZESTRIL   5 mg, Oral, Every 24 Hours Scheduled   Start Date: April 24, 2023     nitroglycerin 0.4 MG SL tablet  Commonly known as: NITROSTAT   0.4 mg, Sublingual, Every 5 Minutes PRN, Take no more than 3 doses in 15 minutes.         Changes to Medications      Instructions Start Date   aspirin 81 MG chewable tablet  What changed: Another medication with the same name was added. Make sure you understand how and when to take each.   81 mg, Oral, Daily      aspirin 81 MG EC tablet  What changed: Another medication with the same name was added. Make sure you understand how and when to take each.   81 mg, Oral, Daily      aspirin 81 MG EC tablet  What changed: You were already taking a medication with the same name, and this prescription was added. Make sure you understand how and when to take each.   81 mg, Oral, Daily   Start Date: April 24, 2023        Continue These Medications      Instructions Start Date   albuterol sulfate  (90 Base) MCG/ACT inhaler  Commonly known as: PROVENTIL HFA;VENTOLIN HFA;PROAIR HFA   2 puffs, Inhalation, Every 4 Hours PRN      atorvastatin 40 MG tablet  Commonly known as: LIPITOR   40 mg, Oral, Daily      carvedilol 6.25 MG tablet  Commonly known as: COREG   6.25 mg, Oral, 2 Times Daily With Meals      cloNIDine 0.1 MG tablet  Commonly known as: CATAPRES   0.1 mg, Oral, 2 Times Daily      fluticasone 50 MCG/ACT nasal spray  Commonly known as: FLONASE   2 sprays, Nasal, Daily      furosemide 40 MG tablet  Commonly known as: LASIX   40 mg, Oral, Daily      HYDROcodone-acetaminophen 7.5-325 MG per tablet  Commonly known as: NORCO   1 tablet, Oral, Every 12 Hours PRN      ketoconazole 2 % cream  Commonly known as: NIZORAL   Topical, Daily      NIFEdipine XL 30 MG 24 hr tablet  Commonly known as: PROCARDIA XL   30 mg, Oral, Daily      NIFEdipine CC 30 MG 24 hr tablet  Commonly known as: ADALAT CC   30 mg, Oral, Every 24 Hours  Scheduled      spironolactone 50 MG tablet  Commonly known as: ALDACTONE   50 mg, Oral, Daily      spironolactone 50 MG tablet  Commonly known as: ALDACTONE   50 mg, Oral, Daily      tamsulosin 0.4 MG capsule 24 hr capsule  Commonly known as: FLOMAX   1 capsule, Oral, Daily             Discharge Diet:     Activity at Discharge:     Follow-up Appointments  No future appointments.      Test Results Pending at Discharge       Jayne Villalobos MD  04/23/23  12:30 EDT    Time:

## 2023-04-23 NOTE — NURSING NOTE
Patient given discharge education and instructions, patient read back and verified instructions. PIV x2 removed. Patient grandson at bedside to drive patient home. Wheelchair was offered to patient for discharge, patient refused. No distress noted, patient walked off unit with grandson.

## 2023-04-23 NOTE — OUTREACH NOTE
Prep Survey    Flowsheet Row Responses   [facility]? [place]   Is LACE score < 7 ? No   Eligibility Readm Mgmt   Discharge diagnosis Acute coronary syndrome with high troponin- Left Heart Cath   Does the patient have one of the following disease processes/diagnoses(primary or secondary)? Other   Does the patient have Home health ordered? No   Is there a DME ordered? No   Prep survey completed? Yes          Cande HENRIQUEZ - Registered Nurse

## 2023-04-25 LAB — ACT BLD: 275 SECONDS (ref 82–152)

## 2023-04-26 ENCOUNTER — READMISSION MANAGEMENT (OUTPATIENT)
Dept: CALL CENTER | Facility: HOSPITAL | Age: 73
End: 2023-04-26
Payer: MEDICARE

## 2023-04-26 NOTE — OUTREACH NOTE
Medical Week 1 Survey    Flowsheet Row Responses   [facility] patient discharged from? [place]   Does the patient have one of the following disease processes/diagnoses(primary or secondary)? Other   Week 1 attempt successful? No   Unsuccessful attempts Attempt 1          Jazmín BONILLA - Licensed Nurse

## 2023-05-03 ENCOUNTER — READMISSION MANAGEMENT (OUTPATIENT)
Dept: CALL CENTER | Facility: HOSPITAL | Age: 73
End: 2023-05-03
Payer: MEDICARE

## 2023-05-03 NOTE — OUTREACH NOTE
Medical Week 2 Survey    Flowsheet Row Responses   [facility] [place] operated by [facility] patient discharged from? [place]   Does the patient have one of the following disease processes/diagnoses(primary or secondary)? Other   Week 2 attempt successful? Yes   Call start time 1446   Discharge diagnosis Acute coronary syndrome with high troponin- Left Heart Cath   Call end time 1449   Person spoke with today (if not patient) and relationship pt   Meds reviewed with patient/caregiver? Yes   Is the patient having any side effects they believe may be caused by any medication additions or changes? No   Does the patient have all medications ordered at discharge? Yes   Is the patient taking all medications as directed (includes completed medication regime)? Yes   Does the patient have a primary care provider?  Yes   Does the patient have an appointment with their PCP within 7 days of discharge? Yes   Has the patient kept scheduled appointments due by today? N/A   Psychosocial issues? No   What is the patient's perception of their health status since discharge? Improving   Is the patient/caregiver able to teach back signs and symptoms related to disease process for when to call PCP? Yes   Is the patient/caregiver able to teach back signs and symptoms related to disease process for when to call 911? Yes   Is the patient/caregiver able to teach back the hierarchy of who to call/visit for symptoms/problems? PCP, Specialist, Home health nurse, Urgent Care, ED, 911 Yes   If the patient is a current smoker, are they able to teach back resources for cessation? Not a smoker   Week 2 Call Completed? Yes   Is the patient interested in additional calls from an ambulatory ?  NOTE:  applies to high risk patients requiring additional follow-up. No   Wrap up additional comments Pt denies chest pain/SOB. Pt verified PCP fu appt next week, and cardiologist appt on 5/17/23.          Karli GUAMAN - Registered Nurse

## 2023-05-08 LAB
QT INTERVAL: 410 MS
QT INTERVAL: 488 MS

## 2023-05-10 ENCOUNTER — READMISSION MANAGEMENT (OUTPATIENT)
Dept: CALL CENTER | Facility: HOSPITAL | Age: 73
End: 2023-05-10
Payer: MEDICARE

## 2023-05-10 NOTE — OUTREACH NOTE
Medical Week 3 Survey    Flowsheet Row Responses   [facility] patient discharged from? [place]   Does the patient have one of the following disease processes/diagnoses(primary or secondary)? Other   Week 3 attempt successful? No   Unsuccessful attempts Attempt 1          Jazmín BONILLA - Licensed Nurse

## 2023-05-17 ENCOUNTER — OFFICE VISIT (OUTPATIENT)
Dept: CARDIOLOGY | Age: 73
End: 2023-05-17
Payer: MEDICARE

## 2023-05-17 VITALS
BODY MASS INDEX: 29.33 KG/M2 | WEIGHT: 198 LBS | SYSTOLIC BLOOD PRESSURE: 95 MMHG | DIASTOLIC BLOOD PRESSURE: 61 MMHG | HEART RATE: 83 BPM | HEIGHT: 69 IN

## 2023-05-17 DIAGNOSIS — Z98.61 CAD S/P PERCUTANEOUS CORONARY ANGIOPLASTY: ICD-10-CM

## 2023-05-17 DIAGNOSIS — I25.10 CAD S/P PERCUTANEOUS CORONARY ANGIOPLASTY: ICD-10-CM

## 2023-05-17 DIAGNOSIS — Z79.02 LONG TERM (CURRENT) USE OF ANTITHROMBOTICS/ANTIPLATELETS: ICD-10-CM

## 2023-05-17 DIAGNOSIS — I10 PRIMARY HYPERTENSION: ICD-10-CM

## 2023-05-17 DIAGNOSIS — I25.118 CORONARY ARTERY DISEASE OF NATIVE ARTERY OF NATIVE HEART WITH STABLE ANGINA PECTORIS: Primary | ICD-10-CM

## 2023-05-17 NOTE — PROGRESS NOTES
CATH FOLLOW UP   Subjective:        Negrito Navarro is a 72 y.o. male who here for follow up    CC  S/P CATH , PCI  HPI  72-year-old male with coronary artery disease with a status post angioplasty on chronic antiplatelet agent here for the follow-up with no complaints of chest pains tightness heaviness or the pressure sensation     Problems Addressed this Visit        Cardiac and Vasculature    Primary hypertension    Coronary artery disease of native artery of native heart with stable angina pectoris - Primary    CAD S/P percutaneous coronary angioplasty       Coag and Thromboembolic    Long term (current) use of antithrombotics/antiplatelets   Diagnoses       Codes Comments    Coronary artery disease of native artery of native heart with stable angina pectoris    -  Primary ICD-10-CM: I25.118  ICD-9-CM: 414.01, 413.9     CAD S/P percutaneous coronary angioplasty     ICD-10-CM: I25.10, Z98.61  ICD-9-CM: 414.01, V45.82     Long term (current) use of antithrombotics/antiplatelets     ICD-10-CM: Z79.02  ICD-9-CM: V58.63     Primary hypertension     ICD-10-CM: I10  ICD-9-CM: 401.9         .Conclusion       •  Successful right and left coronary angiogram, LV gram, saphenous vein angiogram, internal mammary artery angiogram  •  Normal left  •  Left anterior descending 100% occluded with LIMA to the LAD patent with normal distal flow, saphenous vein with graft sequential to the diagonal 1 and 2 patent with normal distal flow  •  Circumflex artery 100% occluded with the saphenous vein graft to the OM is patent with normal distal  •  Right coronary artery midportion was 100% occluded reduced to 0% with 3.5/33 Xience stent dilated to 3.6  •  LV gram showed inferior wall akinetic with a EF of 45%      The following portions of the patient's history were reviewed and updated as appropriate: allergies, current medications, past family history, past medical history, past social history, past surgical history and problem  "list.    Past Medical History:   Diagnosis Date   • Benign prostatic hyperplasia 11/10/2016   • COPD (chronic obstructive pulmonary disease) 6/10/2021   • Coronary artery disease involving coronary bypass graft of native heart without angina pectoris 6/10/2021   • Essential hypertension 11/10/2016   • Hyperlipidemia    • SERENITY (obstructive sleep apnea) 11/10/2016     reports that he has never smoked. He has never used smokeless tobacco. He reports that he does not currently use alcohol. He reports that he does not use drugs. History reviewed. No pertinent family history.    Review of Systems  Constitutional: No wt loss, fever, fatigue  Gastrointestinal: No nausea, abdominal pain  Behavioral/Psych: No insomnia or anxiety   Cardiovascular no chest pains or tightness in the chest  Objective:       Physical Exam           Physical Exam  BP 95/61   Pulse 83   Ht 174 cm (68.5\")   Wt 89.8 kg (198 lb)   BMI 29.67 kg/m²     General appearance: No acute changes   Eyes: Sclerae conjunctivae normal, pupils reactive   HENT: Atraumatic; oropharynx clear with moist mucous membranes and no mucosal ulcerations;  Neck: Trachea midline; NECK, supple, no thyromegaly or lymphadenopathy   Lungs: Normal size and shape, normal breath sounds, equal distribution of air, no rales and rhonchi   CV: S1-S2 regular, no murmurs, no rub, no gallop   Abdomen: Soft, nontender; no masses , no abnormal abdominal sounds   Extremities: No deformity , normal color , no peripheral edema   Skin: Normal temperature, turgor and texture; no rash, ulcers  Psych: Appropriate affect, alert and oriented to person, place and time           Procedures      Echocardiogram:        Current Outpatient Medications:   •  albuterol sulfate  (90 Base) MCG/ACT inhaler, Inhale 2 puffs Every 4 (Four) Hours As Needed for Wheezing., Disp: , Rfl:   •  aspirin 81 MG EC tablet, Take 1 tablet by mouth Daily., Disp: , Rfl:   •  aspirin 81 MG EC tablet, Take 1 tablet by mouth " Daily., Disp: 30 tablet, Rfl: 6  •  atorvastatin (LIPITOR) 40 MG tablet, Take 1 tablet by mouth Daily., Disp: , Rfl:   •  carvedilol (COREG) 6.25 MG tablet, Take 1 tablet by mouth 2 (Two) Times a Day With Meals., Disp: , Rfl:   •  cloNIDine (CATAPRES) 0.1 MG tablet, Take 1 tablet by mouth 2 (Two) Times a Day., Disp: , Rfl:   •  clopidogrel (PLAVIX) 75 MG tablet, Take 1 tablet by mouth Daily., Disp: 30 tablet, Rfl: 11  •  fluticasone (FLONASE) 50 MCG/ACT nasal spray, 2 sprays into the nostril(s) as directed by provider Daily., Disp: , Rfl:   •  HYDROcodone-acetaminophen (NORCO) 7.5-325 MG per tablet, Take 1 tablet by mouth Every 12 (Twelve) Hours As Needed for Moderate Pain., Disp: , Rfl:   •  ketoconazole (NIZORAL) 2 % cream, Apply  topically to the appropriate area as directed Daily., Disp: , Rfl:   •  lisinopril (PRINIVIL,ZESTRIL) 5 MG tablet, Take 1 tablet by mouth Daily., Disp: 30 tablet, Rfl: 6  •  nitroglycerin (NITROSTAT) 0.4 MG SL tablet, Place 1 tablet under the tongue Every 5 (Five) Minutes As Needed for Chest Pain for up to 1 dose. Take no more than 3 doses in 15 minutes., Disp: 25 tablet, Rfl: 12  •  spironolactone (ALDACTONE) 50 MG tablet, Take 1 tablet by mouth Daily., Disp: , Rfl:   •  tamsulosin (FLOMAX) 0.4 MG capsule 24 hr capsule, Take 1 capsule by mouth Daily., Disp: , Rfl:   •  spironolactone (ALDACTONE) 50 MG tablet, Take 1 tablet by mouth Daily for 30 days., Disp: 30 tablet, Rfl: 0   Assessment:        Patient Active Problem List   Diagnosis   • Hyperlipidemia   • Benign prostatic hyperplasia   • Chronic constipation   • Primary hypertension   • SERENITY (obstructive sleep apnea)   • Oxygen dependent   • COPD (chronic obstructive pulmonary disease)   • Coronary artery disease of native artery of native heart with stable angina pectoris   • Acute coronary syndrome with high troponin   • Precordial chest pain   • History of coronary angioplasty with insertion of stent   • Hx of CABG   • CAD S/P  percutaneous coronary angioplasty   • Long term (current) use of antithrombotics/antiplatelets               Plan:            ICD-10-CM ICD-9-CM   1. Coronary artery disease of native artery of native heart with stable angina pectoris  I25.118 414.01     413.9   2. CAD S/P percutaneous coronary angioplasty  I25.10 414.01    Z98.61 V45.82   3. Long term (current) use of antithrombotics/antiplatelets  Z79.02 V58.63   4. Primary hypertension  I10 401.9     1. Coronary artery disease of native artery of native heart with stable angina pectoris  No angina pectoris    2. CAD S/P percutaneous coronary angioplasty  No angina pectoris    3. Long term (current) use of antithrombotics/antiplatelets  Continue current treatment     4.  Hypertension    DC PROCARDIAC AND LASIX    ETT, IF OK SEE IN 1 MONTHS  COUNSELING:    Negrito Purcell was given to patient for the following topics: diagnostic results, risk factor reductions, impressions, risks and benefits of treatment options and importance of treatment compliance .       SMOKING COUNSELING:        Dictated using Dragon dictation

## 2023-05-18 PROBLEM — Z98.61 CAD S/P PERCUTANEOUS CORONARY ANGIOPLASTY: Status: ACTIVE | Noted: 2023-05-18

## 2023-05-18 PROBLEM — I25.10 CAD S/P PERCUTANEOUS CORONARY ANGIOPLASTY: Status: ACTIVE | Noted: 2023-05-18

## 2023-05-18 PROBLEM — Z79.02 LONG TERM (CURRENT) USE OF ANTITHROMBOTICS/ANTIPLATELETS: Status: ACTIVE | Noted: 2023-05-18

## 2023-05-18 PROBLEM — I25.118 CORONARY ARTERY DISEASE OF NATIVE ARTERY OF NATIVE HEART WITH STABLE ANGINA PECTORIS: Status: ACTIVE | Noted: 2021-06-10

## 2023-09-19 RX ORDER — CLOPIDOGREL BISULFATE 75 MG/1
75 TABLET ORAL DAILY
Qty: 90 TABLET | Refills: 3 | Status: SHIPPED | OUTPATIENT
Start: 2023-09-19

## 2023-09-19 RX ORDER — CARVEDILOL 6.25 MG/1
6.25 TABLET ORAL 2 TIMES DAILY WITH MEALS
Qty: 180 TABLET | Refills: 3 | Status: SHIPPED | OUTPATIENT
Start: 2023-09-19

## 2023-09-19 RX ORDER — LISINOPRIL 5 MG/1
5 TABLET ORAL
Qty: 90 TABLET | Refills: 3 | Status: SHIPPED | OUTPATIENT
Start: 2023-09-19

## 2024-04-17 ENCOUNTER — HOSPITAL ENCOUNTER (INPATIENT)
Facility: HOSPITAL | Age: 74
LOS: 7 days | Discharge: HOME | End: 2024-04-24
Attending: EMERGENCY MEDICINE | Admitting: INTERNAL MEDICINE
Payer: MEDICARE

## 2024-04-17 ENCOUNTER — APPOINTMENT (OUTPATIENT)
Dept: GENERAL RADIOLOGY | Facility: HOSPITAL | Age: 74
End: 2024-04-17
Payer: MEDICARE

## 2024-04-17 ENCOUNTER — APPOINTMENT (OUTPATIENT)
Dept: CT IMAGING | Facility: HOSPITAL | Age: 74
End: 2024-04-17
Payer: MEDICARE

## 2024-04-17 DIAGNOSIS — Z95.1 HISTORY OF CORONARY ARTERY BYPASS GRAFT: ICD-10-CM

## 2024-04-17 DIAGNOSIS — R07.9 CHEST PAIN, UNSPECIFIED TYPE: ICD-10-CM

## 2024-04-17 DIAGNOSIS — Z86.79 HISTORY OF CHF (CONGESTIVE HEART FAILURE): ICD-10-CM

## 2024-04-17 DIAGNOSIS — B34.8 INFECTION DUE TO HUMAN METAPNEUMOVIRUS (HMPV): ICD-10-CM

## 2024-04-17 DIAGNOSIS — J96.01 ACUTE HYPOXIC RESPIRATORY FAILURE: Primary | ICD-10-CM

## 2024-04-17 LAB
ALBUMIN SERPL-MCNC: 3.7 G/DL (ref 3.5–5.2)
ALBUMIN/GLOB SERPL: 1.2 G/DL
ALP SERPL-CCNC: 88 U/L (ref 39–117)
ALT SERPL W P-5'-P-CCNC: 14 U/L (ref 1–41)
ANION GAP SERPL CALCULATED.3IONS-SCNC: 12.7 MMOL/L (ref 5–15)
AST SERPL-CCNC: 30 U/L (ref 1–40)
B PARAPERT DNA SPEC QL NAA+PROBE: NOT DETECTED
B PERT DNA SPEC QL NAA+PROBE: NOT DETECTED
BASOPHILS # BLD AUTO: 0.05 10*3/MM3 (ref 0–0.2)
BASOPHILS NFR BLD AUTO: 0.4 % (ref 0–1.5)
BILIRUB SERPL-MCNC: 1.4 MG/DL (ref 0–1.2)
BUN SERPL-MCNC: 23 MG/DL (ref 8–23)
BUN/CREAT SERPL: 13.6 (ref 7–25)
C PNEUM DNA NPH QL NAA+NON-PROBE: NOT DETECTED
CALCIUM SPEC-SCNC: 8.7 MG/DL (ref 8.6–10.5)
CHLORIDE SERPL-SCNC: 102 MMOL/L (ref 98–107)
CO2 SERPL-SCNC: 24.3 MMOL/L (ref 22–29)
CREAT SERPL-MCNC: 1.69 MG/DL (ref 0.76–1.27)
D DIMER PPP FEU-MCNC: 0.86 MCGFEU/ML (ref 0–0.75)
D-LACTATE SERPL-SCNC: 1.5 MMOL/L (ref 0.5–2)
DEPRECATED RDW RBC AUTO: 44.1 FL (ref 37–54)
EGFRCR SERPLBLD CKD-EPI 2021: 41.8 ML/MIN/1.73
EOSINOPHIL # BLD AUTO: 0.01 10*3/MM3 (ref 0–0.4)
EOSINOPHIL NFR BLD AUTO: 0.1 % (ref 0.3–6.2)
ERYTHROCYTE [DISTWIDTH] IN BLOOD BY AUTOMATED COUNT: 13.7 % (ref 12.3–15.4)
FLUAV SUBTYP SPEC NAA+PROBE: NOT DETECTED
FLUBV RNA ISLT QL NAA+PROBE: NOT DETECTED
GLOBULIN UR ELPH-MCNC: 3.1 GM/DL
GLUCOSE SERPL-MCNC: 113 MG/DL (ref 65–99)
HADV DNA SPEC NAA+PROBE: NOT DETECTED
HCOV 229E RNA SPEC QL NAA+PROBE: NOT DETECTED
HCOV HKU1 RNA SPEC QL NAA+PROBE: NOT DETECTED
HCOV NL63 RNA SPEC QL NAA+PROBE: NOT DETECTED
HCOV OC43 RNA SPEC QL NAA+PROBE: NOT DETECTED
HCT VFR BLD AUTO: 41 % (ref 37.5–51)
HGB BLD-MCNC: 14 G/DL (ref 13–17.7)
HMPV RNA NPH QL NAA+NON-PROBE: DETECTED
HOLD SPECIMEN: NORMAL
HPIV1 RNA ISLT QL NAA+PROBE: NOT DETECTED
HPIV2 RNA SPEC QL NAA+PROBE: NOT DETECTED
HPIV3 RNA NPH QL NAA+PROBE: NOT DETECTED
HPIV4 P GENE NPH QL NAA+PROBE: NOT DETECTED
IMM GRANULOCYTES # BLD AUTO: 0.04 10*3/MM3 (ref 0–0.05)
IMM GRANULOCYTES NFR BLD AUTO: 0.3 % (ref 0–0.5)
LYMPHOCYTES # BLD AUTO: 1.16 10*3/MM3 (ref 0.7–3.1)
LYMPHOCYTES NFR BLD AUTO: 9 % (ref 19.6–45.3)
M PNEUMO IGG SER IA-ACNC: NOT DETECTED
MCH RBC QN AUTO: 30.7 PG (ref 26.6–33)
MCHC RBC AUTO-ENTMCNC: 34.1 G/DL (ref 31.5–35.7)
MCV RBC AUTO: 89.9 FL (ref 79–97)
MONOCYTES # BLD AUTO: 2.4 10*3/MM3 (ref 0.1–0.9)
MONOCYTES NFR BLD AUTO: 18.7 % (ref 5–12)
NEUTROPHILS NFR BLD AUTO: 71.5 % (ref 42.7–76)
NEUTROPHILS NFR BLD AUTO: 9.2 10*3/MM3 (ref 1.7–7)
NRBC BLD AUTO-RTO: 0 /100 WBC (ref 0–0.2)
NT-PROBNP SERPL-MCNC: 2363 PG/ML (ref 0–1800)
PLATELET # BLD AUTO: 230 10*3/MM3 (ref 140–450)
PMV BLD AUTO: 10.8 FL (ref 6–12)
POTASSIUM SERPL-SCNC: 4.3 MMOL/L (ref 3.5–5.2)
PROCALCITONIN SERPL-MCNC: 0.35 NG/ML (ref 0–0.25)
PROT SERPL-MCNC: 6.8 G/DL (ref 6–8.5)
QT INTERVAL: 411 MS
QTC INTERVAL: 492 MS
RBC # BLD AUTO: 4.56 10*6/MM3 (ref 4.14–5.8)
RHINOVIRUS RNA SPEC NAA+PROBE: NOT DETECTED
RSV RNA NPH QL NAA+NON-PROBE: NOT DETECTED
SARS-COV-2 RNA NPH QL NAA+NON-PROBE: NOT DETECTED
SODIUM SERPL-SCNC: 139 MMOL/L (ref 136–145)
TROPONIN T SERPL HS-MCNC: 38 NG/L
WBC NRBC COR # BLD AUTO: 12.86 10*3/MM3 (ref 3.4–10.8)
WHOLE BLOOD HOLD COAG: NORMAL
WHOLE BLOOD HOLD SPECIMEN: NORMAL

## 2024-04-17 PROCEDURE — 84145 PROCALCITONIN (PCT): CPT | Performed by: EMERGENCY MEDICINE

## 2024-04-17 PROCEDURE — 85379 FIBRIN DEGRADATION QUANT: CPT | Performed by: EMERGENCY MEDICINE

## 2024-04-17 PROCEDURE — 83880 ASSAY OF NATRIURETIC PEPTIDE: CPT | Performed by: EMERGENCY MEDICINE

## 2024-04-17 PROCEDURE — 80053 COMPREHEN METABOLIC PANEL: CPT | Performed by: EMERGENCY MEDICINE

## 2024-04-17 PROCEDURE — 36415 COLL VENOUS BLD VENIPUNCTURE: CPT

## 2024-04-17 PROCEDURE — 83605 ASSAY OF LACTIC ACID: CPT | Performed by: EMERGENCY MEDICINE

## 2024-04-17 PROCEDURE — 25510000001 IOPAMIDOL PER 1 ML: Performed by: INTERNAL MEDICINE

## 2024-04-17 PROCEDURE — 85025 COMPLETE CBC W/AUTO DIFF WBC: CPT | Performed by: EMERGENCY MEDICINE

## 2024-04-17 PROCEDURE — 84484 ASSAY OF TROPONIN QUANT: CPT | Performed by: EMERGENCY MEDICINE

## 2024-04-17 PROCEDURE — 93010 ELECTROCARDIOGRAM REPORT: CPT | Performed by: INTERNAL MEDICINE

## 2024-04-17 PROCEDURE — 0202U NFCT DS 22 TRGT SARS-COV-2: CPT | Performed by: EMERGENCY MEDICINE

## 2024-04-17 PROCEDURE — 93005 ELECTROCARDIOGRAM TRACING: CPT | Performed by: EMERGENCY MEDICINE

## 2024-04-17 PROCEDURE — 36415 COLL VENOUS BLD VENIPUNCTURE: CPT | Performed by: EMERGENCY MEDICINE

## 2024-04-17 PROCEDURE — 87040 BLOOD CULTURE FOR BACTERIA: CPT | Performed by: EMERGENCY MEDICINE

## 2024-04-17 PROCEDURE — 25010000002 FUROSEMIDE PER 20 MG: Performed by: EMERGENCY MEDICINE

## 2024-04-17 PROCEDURE — 71045 X-RAY EXAM CHEST 1 VIEW: CPT

## 2024-04-17 PROCEDURE — 99285 EMERGENCY DEPT VISIT HI MDM: CPT

## 2024-04-17 PROCEDURE — 71275 CT ANGIOGRAPHY CHEST: CPT

## 2024-04-17 RX ORDER — ASPIRIN 81 MG/1
81 TABLET ORAL DAILY
Status: DISCONTINUED | OUTPATIENT
Start: 2024-04-18 | End: 2024-04-24 | Stop reason: HOSPADM

## 2024-04-17 RX ORDER — METHYLPREDNISOLONE SODIUM SUCCINATE 40 MG/ML
40 INJECTION, POWDER, LYOPHILIZED, FOR SOLUTION INTRAMUSCULAR; INTRAVENOUS EVERY 8 HOURS
Status: DISCONTINUED | OUTPATIENT
Start: 2024-04-18 | End: 2024-04-19

## 2024-04-17 RX ORDER — FUROSEMIDE 10 MG/ML
80 INJECTION INTRAMUSCULAR; INTRAVENOUS ONCE
Status: COMPLETED | OUTPATIENT
Start: 2024-04-17 | End: 2024-04-17

## 2024-04-17 RX ORDER — UREA 10 %
3 LOTION (ML) TOPICAL NIGHTLY PRN
Status: DISCONTINUED | OUTPATIENT
Start: 2024-04-17 | End: 2024-04-24 | Stop reason: HOSPADM

## 2024-04-17 RX ORDER — LISINOPRIL 5 MG/1
5 TABLET ORAL
Status: DISCONTINUED | OUTPATIENT
Start: 2024-04-18 | End: 2024-04-18

## 2024-04-17 RX ORDER — BISACODYL 10 MG
10 SUPPOSITORY, RECTAL RECTAL DAILY PRN
Status: DISCONTINUED | OUTPATIENT
Start: 2024-04-17 | End: 2024-04-24 | Stop reason: HOSPADM

## 2024-04-17 RX ORDER — IPRATROPIUM BROMIDE AND ALBUTEROL SULFATE 2.5; .5 MG/3ML; MG/3ML
3 SOLUTION RESPIRATORY (INHALATION)
Status: DISCONTINUED | OUTPATIENT
Start: 2024-04-18 | End: 2024-04-24 | Stop reason: HOSPADM

## 2024-04-17 RX ORDER — ONDANSETRON 2 MG/ML
4 INJECTION INTRAMUSCULAR; INTRAVENOUS EVERY 6 HOURS PRN
Status: DISCONTINUED | OUTPATIENT
Start: 2024-04-17 | End: 2024-04-24 | Stop reason: HOSPADM

## 2024-04-17 RX ORDER — ONDANSETRON 4 MG/1
4 TABLET, ORALLY DISINTEGRATING ORAL EVERY 6 HOURS PRN
Status: DISCONTINUED | OUTPATIENT
Start: 2024-04-17 | End: 2024-04-24 | Stop reason: HOSPADM

## 2024-04-17 RX ORDER — CLOPIDOGREL BISULFATE 75 MG/1
75 TABLET ORAL DAILY
Status: DISCONTINUED | OUTPATIENT
Start: 2024-04-18 | End: 2024-04-24 | Stop reason: HOSPADM

## 2024-04-17 RX ORDER — ALBUTEROL SULFATE 2.5 MG/3ML
2.5 SOLUTION RESPIRATORY (INHALATION) EVERY 6 HOURS PRN
Status: DISCONTINUED | OUTPATIENT
Start: 2024-04-17 | End: 2024-04-24 | Stop reason: HOSPADM

## 2024-04-17 RX ORDER — TAMSULOSIN HYDROCHLORIDE 0.4 MG/1
0.4 CAPSULE ORAL DAILY
Status: DISCONTINUED | OUTPATIENT
Start: 2024-04-18 | End: 2024-04-24 | Stop reason: HOSPADM

## 2024-04-17 RX ORDER — CLONIDINE HYDROCHLORIDE 0.1 MG/1
0.1 TABLET ORAL EVERY 12 HOURS SCHEDULED
Status: DISCONTINUED | OUTPATIENT
Start: 2024-04-18 | End: 2024-04-22

## 2024-04-17 RX ORDER — SODIUM CHLORIDE 0.9 % (FLUSH) 0.9 %
10 SYRINGE (ML) INJECTION AS NEEDED
Status: DISCONTINUED | OUTPATIENT
Start: 2024-04-17 | End: 2024-04-24 | Stop reason: HOSPADM

## 2024-04-17 RX ORDER — AMOXICILLIN 250 MG
2 CAPSULE ORAL 2 TIMES DAILY PRN
Status: DISCONTINUED | OUTPATIENT
Start: 2024-04-17 | End: 2024-04-20

## 2024-04-17 RX ORDER — SPIRONOLACTONE 50 MG/1
50 TABLET, FILM COATED ORAL DAILY
Status: DISCONTINUED | OUTPATIENT
Start: 2024-04-18 | End: 2024-04-23

## 2024-04-17 RX ORDER — POLYETHYLENE GLYCOL 3350 17 G/17G
17 POWDER, FOR SOLUTION ORAL DAILY PRN
Status: DISCONTINUED | OUTPATIENT
Start: 2024-04-17 | End: 2024-04-20

## 2024-04-17 RX ORDER — FUROSEMIDE 10 MG/ML
40 INJECTION INTRAMUSCULAR; INTRAVENOUS EVERY 12 HOURS
Status: DISCONTINUED | OUTPATIENT
Start: 2024-04-18 | End: 2024-04-17

## 2024-04-17 RX ORDER — BISACODYL 5 MG/1
5 TABLET, DELAYED RELEASE ORAL DAILY PRN
Status: DISCONTINUED | OUTPATIENT
Start: 2024-04-17 | End: 2024-04-24 | Stop reason: HOSPADM

## 2024-04-17 RX ORDER — HYDROCODONE BITARTRATE AND ACETAMINOPHEN 7.5; 325 MG/1; MG/1
1 TABLET ORAL EVERY 12 HOURS PRN
Status: DISCONTINUED | OUTPATIENT
Start: 2024-04-17 | End: 2024-04-24 | Stop reason: HOSPADM

## 2024-04-17 RX ORDER — ACETAMINOPHEN 325 MG/1
650 TABLET ORAL EVERY 4 HOURS PRN
Status: DISCONTINUED | OUTPATIENT
Start: 2024-04-17 | End: 2024-04-24 | Stop reason: HOSPADM

## 2024-04-17 RX ORDER — FLUTICASONE PROPIONATE 50 MCG
2 SPRAY, SUSPENSION (ML) NASAL DAILY
Status: DISCONTINUED | OUTPATIENT
Start: 2024-04-18 | End: 2024-04-24 | Stop reason: HOSPADM

## 2024-04-17 RX ORDER — CARVEDILOL 6.25 MG/1
6.25 TABLET ORAL 2 TIMES DAILY WITH MEALS
Status: DISCONTINUED | OUTPATIENT
Start: 2024-04-18 | End: 2024-04-19

## 2024-04-17 RX ORDER — HYDRALAZINE HYDROCHLORIDE 20 MG/ML
10 INJECTION INTRAMUSCULAR; INTRAVENOUS EVERY 6 HOURS PRN
Status: DISCONTINUED | OUTPATIENT
Start: 2024-04-17 | End: 2024-04-18

## 2024-04-17 RX ORDER — ATORVASTATIN CALCIUM 20 MG/1
40 TABLET, FILM COATED ORAL DAILY
Status: DISCONTINUED | OUTPATIENT
Start: 2024-04-18 | End: 2024-04-24 | Stop reason: HOSPADM

## 2024-04-17 RX ADMIN — IOPAMIDOL 95 ML: 755 INJECTION, SOLUTION INTRAVENOUS at 18:30

## 2024-04-17 RX ADMIN — FUROSEMIDE 80 MG: 10 INJECTION, SOLUTION INTRAMUSCULAR; INTRAVENOUS at 17:43

## 2024-04-17 NOTE — ED NOTES
Patient to ER via ems from home for chest pain and SOA and flank pain on both sides x 1 week on and off    Room air was 83%   Ems reports patient was blue upon arrival   Patient reports productive cough  Patient had 324 asprin and 1 nitro     Patient has hx open heart

## 2024-04-17 NOTE — NURSING NOTE
Nursing report ED to floor  Negrito Navarro  73 y.o.  male    HPI :  HPI (Adult)  Stated Reason for Visit: soa cough    Chief Complaint  Chief Complaint   Patient presents with    Chest Pain    Shortness of Breath    Flank Pain    Cough    Fever       Admitting doctor:   Alejandra Warren MD    Admitting diagnosis:   The primary encounter diagnosis was Acute hypoxic respiratory failure. Diagnoses of Infection due to human metapneumovirus (hMPV), Chest pain, unspecified type, History of coronary artery bypass graft, and History of CHF (congestive heart failure) were also pertinent to this visit.    Code status:   Current Code Status       Date Active Code Status Order ID Comments User Context       4/17/2024 1738 CPR (Attempt to Resuscitate) [id number]  Alejandra Warren MD ED        Question Answer    Code Status (Patient has no pulse and is not breathing) CPR (Attempt to Resuscitate)    Medical Interventions (Patient has pulse or is breathing) Full                    Allergies:   Shellfish-derived products    Isolation:   Contact    Intake and Output  No intake or output data in the 24 hours ending 04/17/24 1821    Weight:   There were no vitals filed for this visit.    Most recent vitals:   Vitals:    04/17/24 1630 04/17/24 1631 04/17/24 1704 04/17/24 1708   BP:  (!) 185/102     Pulse: 79 81 86 85   Resp:       Temp:       SpO2: 91%  (!) 84% 95%       Active LDAs/IV Access:   Lines, Drains & Airways       Active LDAs       Name Placement date Placement time Site Days    Peripheral IV 04/17/24 1503 Left Antecubital 04/17/24  1503  Antecubital  less than 1    Peripheral IV 04/17/24 1617 Right Antecubital 04/17/24  1617  Antecubital  less than 1                    Labs (abnormal labs have a star):   Labs Reviewed   RESPIRATORY PANEL PCR W/ COVID-19 (SARS-COV-2), NP SWAB IN UTM/VTP, 2 HR TAT - Abnormal; Notable for the following components:       Result Value    Human Metapneumovirus Detected (*)     All other  components within normal limits    Narrative:     In the setting of a positive respiratory panel with a viral infection PLUS a negative procalcitonin without other underlying concern for bacterial infection, consider observing off antibiotics or discontinuation of antibiotics and continue supportive care. If the respiratory panel is positive for atypical bacterial infection (Bordetella pertussis, Chlamydophila pneumoniae, or Mycoplasma pneumoniae), consider antibiotic de-escalation to target atypical bacterial infection.   COMPREHENSIVE METABOLIC PANEL - Abnormal; Notable for the following components:    Glucose 113 (*)     Creatinine 1.69 (*)     Total Bilirubin 1.4 (*)     eGFR 41.8 (*)     All other components within normal limits    Narrative:     GFR Normal >60  Chronic Kidney Disease <60  Kidney Failure <15    The GFR formula is only valid for adults with stable renal function between ages 18 and 70.   BNP (IN-HOUSE) - Abnormal; Notable for the following components:    proBNP 2,363.0 (*)     All other components within normal limits    Narrative:     This assay is used as an aid in the diagnosis of individuals suspected of having heart failure. It can be used as an aid in the diagnosis of acute decompensated heart failure (ADHF) in patients presenting with signs and symptoms of ADHF to the emergency department (ED). In addition, NT-proBNP of <300 pg/mL indicates ADHF is not likely.    Age Range Result Interpretation  NT-proBNP Concentration (pg/mL:      <50             Positive            >450                   Gray                 300-450                    Negative             <300    50-75           Positive            >900                  Gray                300-900                  Negative            <300      >75             Positive            >1800                  Gray                300-1800                  Negative            <300   SINGLE HS TROPONIN T - Abnormal; Notable for the following  "components:    HS Troponin T 38 (*)     All other components within normal limits    Narrative:     High Sensitive Troponin T Reference Range:  <14.0 ng/L- Negative Female for AMI  <22.0 ng/L- Negative Male for AMI  >=14 - Abnormal Female indicating possible myocardial injury.  >=22 - Abnormal Male indicating possible myocardial injury.   Clinicians would have to utilize clinical acumen, EKG, Troponin, and serial changes to determine if it is an Acute Myocardial Infarction or myocardial injury due to an underlying chronic condition.        CBC WITH AUTO DIFFERENTIAL - Abnormal; Notable for the following components:    WBC 12.86 (*)     Lymphocyte % 9.0 (*)     Monocyte % 18.7 (*)     Eosinophil % 0.1 (*)     Neutrophils, Absolute 9.20 (*)     Monocytes, Absolute 2.40 (*)     All other components within normal limits   D-DIMER, QUANTITATIVE - Abnormal; Notable for the following components:    D-Dimer, Quantitative 0.86 (*)     All other components within normal limits    Narrative:     According to the assay 's published package insert, a normal (<0.50 MCGFEU/mL) D-dimer result in conjunction with a non-high clinical probability assessment, excludes deep vein thrombosis (DVT) and pulmonary embolism (PE) with high sensitivity.    D-dimer values increase with age and this can make VTE exclusion of an older population difficult. To address this, the [employer], based on best available evidence and recent guidelines, recommends that clinicians use age-adjusted D-dimer thresholds in patients greater than 50 years of age with: a) a low probability of PE who do not meet all Pulmonary Embolism Rule Out Criteria, or b) in those with intermediate probability of PE.   The formula for an age-adjusted D-dimer cut-off is \"age/100\".  For example, a 60 year old patient would have an age-adjusted cut-off of 0.60 MCGFEU/mL and an 80 year old 0.80 MCGFEU/mL.   RAINBOW DRAW    Narrative:     The following " orders were created for panel order [place] Draw.  Procedure                               Abnormality         Status                     ---------                               -----------         ------                     Green Top (Gel)[[id number]]                                  Final result               Lavender Top[[id number]]                                     Final result               Gold Top - SST[[id number]]                                                              Light Blue Top[[id number]]                                   Final result                 Please view results for these tests on the individual orders.   CBC AND DIFFERENTIAL    Narrative:     The following orders were created for panel order CBC & Differential.  Procedure                               Abnormality         Status                     ---------                               -----------         ------                     CBC Auto Differential[[id number]]        Abnormal            Final result                 Please view results for these tests on the individual orders.   GREEN TOP   LAVENDER TOP   LIGHT BLUE TOP   GOLD TOP - SST       EKG:   ECG 12 Lead ED Triage Standing Order; SOA   Preliminary Result   HEART RATE= 86  bpm   RR Interval= 698  ms   WV Interval= 190  ms   P Horizontal Axis= 5  deg   P Front Axis= 51  deg   QRSD Interval= 101  ms   QT Interval= 411  ms   QTcB= 492  ms   QRS Axis= -32  deg   T Wave Axis= 83  deg   - ABNORMAL ECG -   Sinus rhythm   Probable left atrial enlargement   RSR' in V1 or V2, right VCD or RVH   Inferior infarct, old   Electronically Signed By:    Date and Time of Study: 2024-04-17 15:28:05          Meds given in ED:   Medications   sodium chloride 0.9 % flush 10 mL (has no administration in time range)   acetaminophen (TYLENOL) tablet 650 mg (has no administration in time range)   ondansetron ODT (ZOFRAN-ODT) disintegrating tablet 4 mg (has no administration in time range)     Or    ondansetron (ZOFRAN) injection 4 mg (has no administration in time range)   melatonin tablet 3 mg (has no administration in time range)   sennosides-docusate (PERICOLACE) 8.6-50 MG per tablet 2 tablet (has no administration in time range)     And   polyethylene glycol (MIRALAX) packet 17 g (has no administration in time range)     And   bisacodyl (DULCOLAX) EC tablet 5 mg (has no administration in time range)     And   bisacodyl (DULCOLAX) suppository 10 mg (has no administration in time range)   furosemide (LASIX) injection 80 mg (80 mg Intravenous Given 4/17/24 9843)       Imaging results:  XR Chest 1 View    Result Date: 4/17/2024  Mild bilateral basilar atelectasis or infiltrate. Borderline heart size.  This report was finalized on 4/17/2024 4:03 PM by Dr. Rigoberto Herrera M.D on Workstation: [email or web address]       Ambulatory status:   - br    Social issues:   Social History     Socioeconomic History    Marital status: Legally    Tobacco Use    Smoking status: Never    Smokeless tobacco: Never   Vaping Use    Vaping status: Never Used   Substance and Sexual Activity    Alcohol use: Not Currently    Drug use: Never    Sexual activity: Defer       Peripheral Neurovascular  Peripheral Neurovascular (Adult)  Peripheral Neurovascular WDL: WDL    Neuro Cognitive  Neuro Cognitive (Adult)  Cognitive/Neuro/Behavioral WDL: WDL    Learning  Learning Assessment (Adult)  Learning Readiness and Ability: no barriers identified    Respiratory  Respiratory WDL  Respiratory WDL: WDL    Abdominal Pain       Pain Assessments  Pain (Adult)  (0-10) Pain Rating: Rest: 7    NIH Stroke Scale       Denver Zarate RN  04/17/24 18:21 EDT

## 2024-04-17 NOTE — ED PROVIDER NOTES
EMERGENCY DEPARTMENT ENCOUNTER  Room Number:  19/19  PCP: Karen Yu MD  Independent Historians: Patient      HPI:  Chief Complaint: had concerns including Chest Pain, Shortness of Breath, Flank Pain, Cough, and Fever.     A complete HPI/ROS/PMH/PSH/SH/FH are unobtainable due to: Nothing      Context: Negrito Navarro is a 73 y.o. male with a medical history of CAD status post CABG in 2016 who presents to the ED c/o acute chest pain and shortness of breath.  He reports chest pain onset last night.  His chest pain was alleviated by nitroglycerin prior to arrival EMS.  He states that he does have to take nitroglycerin on occasion.  His cardiologist is Dr. Villalobos.  He had bypass surgery in 2016.  He also reports shortness of breath.  His oxygen level was reportedly low prior to arrival he was placed on nasal cannula oxygen.  He does not normally wear oxygen at home.  He reports that the pain is substernal.  He denies radiation to his back.  He denies history of blood clots.  He is on Plavix, no other blood thinner medications.      Review of prior external notes (non-ED) -and- Review of prior external test results outside of this encounter: See ED course below        PAST MEDICAL HISTORY  Active Ambulatory Problems     Diagnosis Date Noted    Hyperlipidemia 06/09/2021    Benign prostatic hyperplasia 11/10/2016    Chronic constipation 07/06/2018    Primary hypertension 11/10/2016    SERENITY (obstructive sleep apnea) 11/10/2016    Oxygen dependent 07/06/2018    COPD (chronic obstructive pulmonary disease) 06/10/2021    Coronary artery disease of native artery of native heart with stable angina pectoris 06/10/2021    Acute coronary syndrome with high troponin 04/20/2023    Precordial chest pain 04/20/2023    History of coronary angioplasty with insertion of stent 04/20/2023    Hx of CABG 04/20/2023    CAD S/P percutaneous coronary angioplasty 05/18/2023    Long term (current) use of antithrombotics/antiplatelets  05/18/2023     Resolved Ambulatory Problems     Diagnosis Date Noted    Acute on chronic congestive heart failure 06/09/2021    Hypokalemia 06/09/2021    Hypertensive urgency 06/10/2021    Acute on chronic diastolic heart failure 06/11/2021     Past Medical History:   Diagnosis Date    Coronary artery disease involving coronary bypass graft of native heart without angina pectoris 6/10/2021    Essential hypertension 11/10/2016         PAST SURGICAL HISTORY  Past Surgical History:   Procedure Laterality Date    CARDIAC CATHETERIZATION N/A 4/21/2023    Procedure: Left Heart Cath;  Surgeon: Jayne Villalobos MD;  Location: [facility] CATH INVASIVE LOCATION;  Service: Cardiovascular;  Laterality: N/A;    CARDIAC CATHETERIZATION N/A 4/21/2023    Procedure: Coronary angiography;  Surgeon: Jayne Villalobos MD;  Location:  [facility] CATH INVASIVE LOCATION;  Service: Cardiovascular;  Laterality: N/A;    CARDIAC CATHETERIZATION N/A 4/21/2023    Procedure: Left ventriculography;  Surgeon: Jayne Villalobos MD;  Location: [facility] CATH INVASIVE LOCATION;  Service: Cardiovascular;  Laterality: N/A;    CARDIAC CATHETERIZATION  4/21/2023    Procedure: Saphenous Vein Graft;  Surgeon: Jayne Villalobos MD;  Location: [facility] CATH INVASIVE LOCATION;  Service: Cardiovascular;;    CARDIAC CATHETERIZATION N/A 4/21/2023    Procedure: Native mammary injection;  Surgeon: Jayne Villalobos MD;  Location: [facility] CATH INVASIVE LOCATION;  Service: Cardiovascular;  Laterality: N/A;    CARDIAC CATHETERIZATION N/A 4/21/2023    Procedure: Percutaneous Coronary Intervention;  Surgeon: Jayne Villalobos MD;  Location: [facility] CATH INVASIVE LOCATION;  Service: Cardiovascular;  Laterality: N/A;    CARDIAC CATHETERIZATION N/A 4/21/2023    Procedure: Stent BETO coronary;  Surgeon: Jayne Villalobos MD;  Location: [facility] CATH INVASIVE LOCATION;  Service: Cardiovascular;  Laterality: N/A;    CARDIAC CATHETERIZATION  4/21/2023     Procedure: Percutaneous Manual Thrombectomy;  Surgeon: Jayne Villalobos MD;  Location: [facility] INVASIVE LOCATION;  Service: Cardiovascular;;         FAMILY HISTORY  History reviewed. No pertinent family history.      SOCIAL HISTORY  Social History     Socioeconomic History    Marital status: Legally    Tobacco Use    Smoking status: Never    Smokeless tobacco: Never   Vaping Use    Vaping status: Never Used   Substance and Sexual Activity    Alcohol use: Not Currently    Drug use: Never    Sexual activity: Defer         ALLERGIES  Shellfish-derived products      REVIEW OF SYSTEMS  Review of all 14 systems is negative other than stated in the HPI above.      PHYSICAL EXAM    I have reviewed the triage vital signs and nursing notes.    ED Triage Vitals [04/17/24 1505]   Temp Heart Rate Resp BP SpO2   100.5 °F (38.1 °C) 81 24 163/90 96 %      Temp src Heart Rate Source Patient Position BP Location FiO2 (%)   -- -- -- -- --         GENERAL: awake and alert, chronically ill-appearing, no acute distress  HENT: Normocephalic, atraumatic  EYES: no scleral icterus, EOMI  CV: regular rhythm, regular rate, minimal peripheral edema bilateral lower extremities  RESPIRATORY: normal effort, coarse rhonchi and crackles present bilaterally, no wheezing  ABDOMEN: soft, nondistended, nontender throughout  MUSCULOSKELETAL: no deformity  NEURO: alert, moves all extremities, follows commands, cranial nerves II through XII grossly intact with speech fluent and clear  PSYCH: calm, cooperative  SKIN: Warm, dry          LAB RESULTS  Recent Results (from the past 24 hour(s))   ECG 12 Lead ED Triage Standing Order; SOA    Collection Time: 04/17/24  3:28 PM   Result Value Ref Range    QT Interval 411 ms    QTC Interval 492 ms   Comprehensive Metabolic Panel    Collection Time: 04/17/24  4:16 PM    Specimen: Blood   Result Value Ref Range    Glucose 113 (H) 65 - 99 mg/dL    BUN 23 8 - 23 mg/dL    Creatinine 1.69 (H) 0.76 -  1.27 mg/dL    Sodium 139 136 - 145 mmol/L    Potassium 4.3 3.5 - 5.2 mmol/L    Chloride 102 98 - 107 mmol/L    CO2 24.3 22.0 - 29.0 mmol/L    Calcium 8.7 8.6 - 10.5 mg/dL    Total Protein 6.8 6.0 - 8.5 g/dL    Albumin 3.7 3.5 - 5.2 g/dL    ALT (SGPT) 14 1 - 41 U/L    AST (SGOT) 30 1 - 40 U/L    Alkaline Phosphatase 88 39 - 117 U/L    Total Bilirubin 1.4 (H) 0.0 - 1.2 mg/dL    Globulin 3.1 gm/dL    A/G Ratio 1.2 g/dL    BUN/Creatinine Ratio 13.6 7.0 - 25.0    Anion Gap 12.7 5.0 - 15.0 mmol/L    eGFR 41.8 (L) >60.0 mL/min/1.73   BNP    Collection Time: 04/17/24  4:16 PM    Specimen: Blood   Result Value Ref Range    proBNP 2,363.0 (H) 0.0 - 1,800.0 pg/mL   Single High Sensitivity Troponin T    Collection Time: 04/17/24  4:16 PM    Specimen: Blood   Result Value Ref Range    HS Troponin T 38 (H) <22 ng/L   Green Top (Gel)    Collection Time: 04/17/24  4:16 PM   Result Value Ref Range    Extra Tube Hold for add-ons.    Lavender Top    Collection Time: 04/17/24  4:16 PM   Result Value Ref Range    Extra Tube hold for add-on    Light Blue Top    Collection Time: 04/17/24  4:16 PM   Result Value Ref Range    Extra Tube Hold for add-ons.    CBC Auto Differential    Collection Time: 04/17/24  4:16 PM    Specimen: Blood   Result Value Ref Range    WBC 12.86 (H) 3.40 - 10.80 10*3/mm3    RBC 4.56 4.14 - 5.80 10*6/mm3    Hemoglobin 14.0 13.0 - 17.7 g/dL    Hematocrit 41.0 37.5 - 51.0 %    MCV 89.9 79.0 - 97.0 fL    MCH 30.7 26.6 - 33.0 pg    MCHC 34.1 31.5 - 35.7 g/dL    RDW 13.7 12.3 - 15.4 %    RDW-SD 44.1 37.0 - 54.0 fl    MPV 10.8 6.0 - 12.0 fL    Platelets 230 140 - 450 10*3/mm3    Neutrophil % 71.5 42.7 - 76.0 %    Lymphocyte % 9.0 (L) 19.6 - 45.3 %    Monocyte % 18.7 (H) 5.0 - 12.0 %    Eosinophil % 0.1 (L) 0.3 - 6.2 %    Basophil % 0.4 0.0 - 1.5 %    Immature Grans % 0.3 0.0 - 0.5 %    Neutrophils, Absolute 9.20 (H) 1.70 - 7.00 10*3/mm3    Lymphocytes, Absolute 1.16 0.70 - 3.10 10*3/mm3    Monocytes, Absolute 2.40 (H)  0.10 - 0.90 10*3/mm3    Eosinophils, Absolute 0.01 0.00 - 0.40 10*3/mm3    Basophils, Absolute 0.05 0.00 - 0.20 10*3/mm3    Immature Grans, Absolute 0.04 0.00 - 0.05 10*3/mm3    nRBC 0.0 0.0 - 0.2 /100 WBC   Respiratory Panel PCR w/COVID-19(SARS-CoV-2) [facility] In-House, NP Swab in UTM/VTM, 2 HR TAT - Swab, Nasopharynx    Collection Time: 04/17/24  4:16 PM    Specimen: Nasopharynx; Swab   Result Value Ref Range    ADENOVIRUS, PCR Not Detected Not Detected    Coronavirus 229E Not Detected Not Detected    Coronavirus HKU1 Not Detected Not Detected    Coronavirus NL63 Not Detected Not Detected    Coronavirus OC43 Not Detected Not Detected    COVID19 Not Detected Not Detected - Ref. Range    Human Metapneumovirus Detected (A) Not Detected    Human Rhinovirus/Enterovirus Not Detected Not Detected    Influenza A PCR Not Detected Not Detected    Influenza B PCR Not Detected Not Detected    Parainfluenza Virus 1 Not Detected Not Detected    Parainfluenza Virus 2 Not Detected Not Detected    Parainfluenza Virus 3 Not Detected Not Detected    Parainfluenza Virus 4 Not Detected Not Detected    RSV, PCR Not Detected Not Detected    Bordetella pertussis pcr Not Detected Not Detected    Bordetella parapertussis PCR Not Detected Not Detected    Chlamydophila pneumoniae PCR Not Detected Not Detected    Mycoplasma pneumo by PCR Not Detected Not Detected   D-dimer, Quantitative    Collection Time: 04/17/24  4:16 PM    Specimen: Blood   Result Value Ref Range    D-Dimer, Quantitative 0.86 (H) 0.00 - 0.75 MCGFEU/mL       The above labs were ordered by me and independently reviewed by me.     RADIOLOGY  XR Chest 1 View    Result Date: 4/17/2024  XR CHEST 1 VW-  HISTORY: Male who is 73 years-old, short of breath  TECHNIQUE: Frontal view of the chest  COMPARISON: 4/20/2023  FINDINGS: The heart size is borderline. Sternotomy wires are present. Pulmonary vasculature is unremarkable. Mild bilateral basilar atelectasis or  infiltrate. No large pleural effusion or pneumothorax. No acute osseous process.      Mild bilateral basilar atelectasis or infiltrate. Borderline heart size.  This report was finalized on 4/17/2024 4:03 PM by Dr. Rigoberto Herrera M.D on Workstation: [id number]       The above radiology studies were ordered by me.  See ED course for independent interpretations.     MEDICATIONS GIVEN IN ER  Medications   sodium chloride 0.9 % flush 10 mL (has no administration in time range)   acetaminophen (TYLENOL) tablet 650 mg (has no administration in time range)   ondansetron ODT (ZOFRAN-ODT) disintegrating tablet 4 mg (has no administration in time range)     Or   ondansetron (ZOFRAN) injection 4 mg (has no administration in time range)   melatonin tablet 3 mg (has no administration in time range)   sennosides-docusate (PERICOLACE) 8.6-50 MG per tablet 2 tablet (has no administration in time range)     And   polyethylene glycol (MIRALAX) packet 17 g (has no administration in time range)     And   bisacodyl (DULCOLAX) EC tablet 5 mg (has no administration in time range)     And   bisacodyl (DULCOLAX) suppository 10 mg (has no administration in time range)   cefTRIAXone (ROCEPHIN) 2,000 mg in sodium chloride 0.9 % 100 mL MBP (has no administration in time range)   furosemide (LASIX) injection 80 mg (80 mg Intravenous Given 4/17/24 2043)   iopamidol (ISOVUE-370) 76 % injection 95 mL (95 mL Intravenous Given by Other 4/17/24 1830)         ORDERS PLACED DURING THIS VISIT:  Orders Placed This Encounter   Procedures    Respiratory Panel PCR w/COVID-19(SARS-CoV-2) [facility] In-House, NP Swab in UTM/VTM, 2 HR TAT - Swab, Nasopharynx    Blood Culture - Blood,    Blood Culture - Blood,    XR Chest 1 View    CT Angiogram Chest Pulmonary Embolism    [facility] Draw    Comprehensive Metabolic Panel    BNP    Single High Sensitivity Troponin T    CBC Auto Differential    D-dimer, Quantitative    Basic Metabolic Panel    CBC (No  Diff)    Procalcitonin    Lactic Acid, Plasma    Diet: Cardiac; Healthy Heart (2-3 Na+); Fluid Consistency: Thin (IDDSI 0)    Undress & Gown    Continuous Pulse Oximetry    Vital Signs    Vital Signs    Up with assistance    Daily Weights    Strict Intake & Output    Oral Care    Place Sequential Compression Device    Maintain Sequential Compression Device    Code Status and Medical Interventions:    L[facility] (on-call MD unless specified) Details    Oxygen Therapy- Nasal Cannula; Titrate 1-6 LPM Per SpO2; 90 - 95%    ECG 12 Lead ED Triage Standing Order; SOA    Insert Peripheral IV    Inpatient Admission    CBC & Differential    Green Top (Gel)    Lavender Top    Gold Top - SST    Light Blue Top         OUTPATIENT MEDICATION MANAGEMENT:  Current Facility-Administered Medications Ordered in Epic   Medication Dose Route Frequency Provider Last Rate Last Admin    acetaminophen (TYLENOL) tablet 650 mg  650 mg Oral Q4H PRN Stingjadiel, Alejandra Stack MD        sennosides-docusate (PERICOLACE) 8.6-50 MG per tablet 2 tablet  2 tablet Oral BID PRN Briana, Alejandra Stack MD        And    polyethylene glycol (MIRALAX) packet 17 g  17 g Oral Daily PRN Briana, Alejandra Stack MD        And    bisacodyl (DULCOLAX) EC tablet 5 mg  5 mg Oral Daily PRN Briana, Alejandra Stack MD        And    bisacodyl (DULCOLAX) suppository 10 mg  10 mg Rectal Daily PRN Stingjadiel, Alejandra Stack MD        cefTRIAXone (ROCEPHIN) 2,000 mg in sodium chloride 0.9 % 100 mL MBP  2,000 mg Intravenous Once Panfilo Boothe MD        melatonin tablet 3 mg  3 mg Oral Nightly PRN Alejandra Warren MD        ondansetron ODT (ZOFRAN-ODT) disintegrating tablet 4 mg  4 mg Oral Q6H PRN Alejandra Warren MD        Or    ondansetron (ZOFRAN) injection 4 mg  4 mg Intravenous Q6H PRN Alejandra Warren MD        sodium chloride 0.9 % flush 10 mL  10 mL Intravenous PRN Panfilo Boothe MD         Current Outpatient Medications Ordered in Epic   Medication  Sig Dispense Refill    albuterol sulfate  (90 Base) MCG/ACT inhaler Inhale 2 puffs Every 4 (Four) Hours As Needed for Wheezing.      aspirin 81 MG EC tablet Take 1 tablet by mouth Daily.      aspirin 81 MG EC tablet Take 1 tablet by mouth Daily. 30 tablet 6    atorvastatin (LIPITOR) 40 MG tablet Take 1 tablet by mouth Daily.      carvedilol (COREG) 6.25 MG tablet Take 1 tablet by mouth 2 (Two) Times a Day With Meals. 180 tablet 3    cloNIDine (CATAPRES) 0.1 MG tablet Take 1 tablet by mouth 2 (Two) Times a Day.      clopidogrel (PLAVIX) 75 MG tablet Take 1 tablet by mouth Daily. 90 tablet 3    fluticasone (FLONASE) 50 MCG/ACT nasal spray 2 sprays into the nostril(s) as directed by provider Daily.      HYDROcodone-acetaminophen (NORCO) 7.5-325 MG per tablet Take 1 tablet by mouth Every 12 (Twelve) Hours As Needed for Moderate Pain.      ketoconazole (NIZORAL) 2 % cream Apply  topically to the appropriate area as directed Daily.      lisinopril (PRINIVIL,ZESTRIL) 5 MG tablet Take 1 tablet by mouth Daily. 90 tablet 3    nitroglycerin (NITROSTAT) 0.4 MG SL tablet Place 1 tablet under the tongue Every 5 (Five) Minutes As Needed for Chest Pain for up to 1 dose. Take no more than 3 doses in 15 minutes. 25 tablet 12    spironolactone (ALDACTONE) 50 MG tablet Take 1 tablet by mouth Daily for 30 days. 30 tablet 0    spironolactone (ALDACTONE) 50 MG tablet Take 1 tablet by mouth Daily.      tamsulosin (FLOMAX) 0.4 MG capsule 24 hr capsule Take 1 capsule by mouth Daily.           PROCEDURES  Procedures            PROGRESS, DATA ANALYSIS, CONSULTS, AND MEDICAL DECISION MAKING  All labs have been independently interpreted by me.  All radiology studies have been reviewed by me. All EKG's have been independently viewed and interpreted by me.  Discussion below represents my analysis of pertinent findings related to patient's condition, differential diagnosis, treatment plan and final disposition.    Differential diagnosis  includes but is not limited to:  Pneumonia  CHF  Pulmonary embolism      Clinical Scores:                  ED Course as of 04/17/24 1842 Wed Apr 17, 2024   1513 Temp: 100.5 °F (38.1 °C) [JR]   1556 EKG          EKG time: 1528  Rhythm/Rate: Sinus rhythm, 86  P waves and MA: Normal  QRS, axis: Borderline left axis  ST and T waves: Borderline lateral ST depression    Interpreted Contemporaneously by me, independently viewed  Similar compared to prior 4/22/2023       [JR]   1557 Chest x-ray independently interpreted in PACS.  There is an opacity overlying the left lower lung zone which appears to reflect elevation of left hemidiaphragm and is similar to prior chest x-ray dated 6/9/2021.  There is mild pulmonary vascular congestion.  Evidence of previous sternotomy. [JR]   1650 HS Troponin T(!): 38 [JR]   1707 I reviewed cardiac cath from 4/21/2023.  Patient had patent LIMA to LAD and other vein grafts were patent as well.  Echo reviewed from 4/21/2023 showing EF 41 to 45%. [JR]   1708 Patient has some mild vascular congestion on x-ray however his hypoxia seems out of proportion to his x-ray findings.  I have ordered Lasix for diuresis but his D-dimer is elevated therefore I have ordered CTA chest to evaluate for possible pulmonary embolism [JR]   1709 . [JR]   1722 Human Metapneumovirus(!): Detected [JR]   1725 Discussed with Dr. Warren, [facility] hospitalist, who agrees to admit for further management.  I explained that CTA chest is still pending at this time. [JR]   1840 CTA chest independently interpreted in PACS.  There is no central pulmonary embolism.  There appears to be some infiltrate present particular right lower lung field [JR]   1842 Patient appears to have some infiltrate present in the right lower lung field on the CTA chest.  This is likely secondary to human metapneumovirus however I will add a procalcitonin, blood cultures, lactic acid, and begin empiric Rocephin. [JR]      ED Course User Index  [JR]  Panfilo Boothe MD             AS OF 18:42 EDT VITALS:    BP - (!) 181/90  HR - 83  TEMP - 100.5 °F (38.1 °C)  O2 SATS - 95%    COMPLEXITY OF CARE  The patient requires admission.      Chronic or social conditions impacting patient care (Social Determinants of Health):     DIAGNOSIS  Final diagnoses:   Acute hypoxic respiratory failure   Infection due to human metapneumovirus (hMPV)   Chest pain, unspecified type   History of coronary artery bypass graft   History of CHF (congestive heart failure)           DISPOSITION  Admit      Prescription drug monitoring program review:           Please note that portions of this document were completed with a voice recognition program.    Note Disclaimer: At [facility], we believe that sharing information builds trust and better relationships. You are receiving this note because you recently visited [facility]. It is possible you will see health information before a provider has talked with you about it. This kind of information can be easy to misunderstand. To help you fully understand what it means for your health, we urge you to discuss this note with your provider.         Panfilo Boothe MD  04/17/24 7084

## 2024-04-18 ENCOUNTER — APPOINTMENT (OUTPATIENT)
Dept: CARDIOLOGY | Facility: HOSPITAL | Age: 74
End: 2024-04-18
Payer: MEDICARE

## 2024-04-18 LAB
ANION GAP SERPL CALCULATED.3IONS-SCNC: 11 MMOL/L (ref 5–15)
BUN SERPL-MCNC: 28 MG/DL (ref 8–23)
BUN/CREAT SERPL: 14.6 (ref 7–25)
CALCIUM SPEC-SCNC: 8.4 MG/DL (ref 8.6–10.5)
CHLORIDE SERPL-SCNC: 101 MMOL/L (ref 98–107)
CO2 SERPL-SCNC: 27 MMOL/L (ref 22–29)
CREAT SERPL-MCNC: 1.92 MG/DL (ref 0.76–1.27)
DEPRECATED RDW RBC AUTO: 46 FL (ref 37–54)
EGFRCR SERPLBLD CKD-EPI 2021: 35.9 ML/MIN/1.73
ERYTHROCYTE [DISTWIDTH] IN BLOOD BY AUTOMATED COUNT: 13.8 % (ref 12.3–15.4)
GEN 5 2HR TROPONIN T REFLEX: 40 NG/L
GLUCOSE SERPL-MCNC: 132 MG/DL (ref 65–99)
HCT VFR BLD AUTO: 44 % (ref 37.5–51)
HGB BLD-MCNC: 14.8 G/DL (ref 13–17.7)
MCH RBC QN AUTO: 30.7 PG (ref 26.6–33)
MCHC RBC AUTO-ENTMCNC: 33.6 G/DL (ref 31.5–35.7)
MCV RBC AUTO: 91.3 FL (ref 79–97)
PLATELET # BLD AUTO: 229 10*3/MM3 (ref 140–450)
PMV BLD AUTO: 10.9 FL (ref 6–12)
POTASSIUM SERPL-SCNC: 4 MMOL/L (ref 3.5–5.2)
RBC # BLD AUTO: 4.82 10*6/MM3 (ref 4.14–5.8)
SODIUM SERPL-SCNC: 139 MMOL/L (ref 136–145)
TROPONIN T DELTA: 2 NG/L
TROPONIN T SERPL HS-MCNC: 38 NG/L
WBC NRBC COR # BLD AUTO: 10.98 10*3/MM3 (ref 3.4–10.8)

## 2024-04-18 PROCEDURE — 97162 PT EVAL MOD COMPLEX 30 MIN: CPT

## 2024-04-18 PROCEDURE — 94760 N-INVAS EAR/PLS OXIMETRY 1: CPT

## 2024-04-18 PROCEDURE — 94799 UNLISTED PULMONARY SVC/PX: CPT

## 2024-04-18 PROCEDURE — 25510000001 PERFLUTREN (DEFINITY) 8.476 MG IN SODIUM CHLORIDE (PF) 0.9 % 10 ML INJECTION

## 2024-04-18 PROCEDURE — 25010000002 CEFTRIAXONE PER 250 MG: Performed by: INTERNAL MEDICINE

## 2024-04-18 PROCEDURE — 94640 AIRWAY INHALATION TREATMENT: CPT

## 2024-04-18 PROCEDURE — 93306 TTE W/DOPPLER COMPLETE: CPT | Performed by: INTERNAL MEDICINE

## 2024-04-18 PROCEDURE — 99222 1ST HOSP IP/OBS MODERATE 55: CPT | Performed by: INTERNAL MEDICINE

## 2024-04-18 PROCEDURE — 85027 COMPLETE CBC AUTOMATED: CPT | Performed by: INTERNAL MEDICINE

## 2024-04-18 PROCEDURE — 84484 ASSAY OF TROPONIN QUANT: CPT | Performed by: INTERNAL MEDICINE

## 2024-04-18 PROCEDURE — 80048 BASIC METABOLIC PNL TOTAL CA: CPT | Performed by: INTERNAL MEDICINE

## 2024-04-18 PROCEDURE — 25010000002 METHYLPREDNISOLONE PER 40 MG: Performed by: INTERNAL MEDICINE

## 2024-04-18 PROCEDURE — 93306 TTE W/DOPPLER COMPLETE: CPT

## 2024-04-18 PROCEDURE — 94761 N-INVAS EAR/PLS OXIMETRY MLT: CPT

## 2024-04-18 PROCEDURE — 97530 THERAPEUTIC ACTIVITIES: CPT

## 2024-04-18 RX ORDER — LISINOPRIL 10 MG/1
10 TABLET ORAL
Status: DISCONTINUED | OUTPATIENT
Start: 2024-04-19 | End: 2024-04-18

## 2024-04-18 RX ORDER — LISINOPRIL 5 MG/1
5 TABLET ORAL
Status: DISCONTINUED | OUTPATIENT
Start: 2024-04-19 | End: 2024-04-22

## 2024-04-18 RX ORDER — HYDRALAZINE HYDROCHLORIDE 25 MG/1
25 TABLET, FILM COATED ORAL EVERY 8 HOURS PRN
Status: DISCONTINUED | OUTPATIENT
Start: 2024-04-18 | End: 2024-04-23

## 2024-04-18 RX ADMIN — CARVEDILOL 6.25 MG: 6.25 TABLET, FILM COATED ORAL at 00:55

## 2024-04-18 RX ADMIN — IPRATROPIUM BROMIDE AND ALBUTEROL SULFATE 3 ML: .5; 3 SOLUTION RESPIRATORY (INHALATION) at 11:11

## 2024-04-18 RX ADMIN — CARVEDILOL 6.25 MG: 6.25 TABLET, FILM COATED ORAL at 10:13

## 2024-04-18 RX ADMIN — METHYLPREDNISOLONE SODIUM SUCCINATE 40 MG: 40 INJECTION, POWDER, FOR SOLUTION INTRAMUSCULAR; INTRAVENOUS at 23:58

## 2024-04-18 RX ADMIN — CLONIDINE HYDROCHLORIDE 0.1 MG: 0.1 TABLET ORAL at 00:55

## 2024-04-18 RX ADMIN — PERFLUTREN 2 ML: 6.52 INJECTION, SUSPENSION INTRAVENOUS at 15:03

## 2024-04-18 RX ADMIN — FLUTICASONE PROPIONATE 2 SPRAY: 50 SPRAY, METERED NASAL at 10:10

## 2024-04-18 RX ADMIN — METHYLPREDNISOLONE SODIUM SUCCINATE 40 MG: 40 INJECTION, POWDER, FOR SOLUTION INTRAMUSCULAR; INTRAVENOUS at 00:55

## 2024-04-18 RX ADMIN — METHYLPREDNISOLONE SODIUM SUCCINATE 40 MG: 40 INJECTION, POWDER, FOR SOLUTION INTRAMUSCULAR; INTRAVENOUS at 10:11

## 2024-04-18 RX ADMIN — TAMSULOSIN HYDROCHLORIDE 0.4 MG: 0.4 CAPSULE ORAL at 10:10

## 2024-04-18 RX ADMIN — CLONIDINE HYDROCHLORIDE 0.1 MG: 0.1 TABLET ORAL at 21:28

## 2024-04-18 RX ADMIN — SPIRONOLACTONE 50 MG: 50 TABLET, FILM COATED ORAL at 10:10

## 2024-04-18 RX ADMIN — ATORVASTATIN CALCIUM 40 MG: 20 TABLET, FILM COATED ORAL at 10:10

## 2024-04-18 RX ADMIN — CEFTRIAXONE 2000 MG: 2 INJECTION, POWDER, FOR SOLUTION INTRAMUSCULAR; INTRAVENOUS at 23:58

## 2024-04-18 RX ADMIN — Medication 3 MG: at 23:58

## 2024-04-18 RX ADMIN — IPRATROPIUM BROMIDE AND ALBUTEROL SULFATE 3 ML: .5; 3 SOLUTION RESPIRATORY (INHALATION) at 06:57

## 2024-04-18 RX ADMIN — CLONIDINE HYDROCHLORIDE 0.1 MG: 0.1 TABLET ORAL at 10:10

## 2024-04-18 RX ADMIN — HYDROCODONE BITARTRATE AND ACETAMINOPHEN 1 TABLET: 7.5; 325 TABLET ORAL at 00:55

## 2024-04-18 RX ADMIN — HYDROCODONE BITARTRATE AND ACETAMINOPHEN 1 TABLET: 7.5; 325 TABLET ORAL at 21:31

## 2024-04-18 RX ADMIN — CARVEDILOL 6.25 MG: 6.25 TABLET, FILM COATED ORAL at 17:07

## 2024-04-18 RX ADMIN — CEFTRIAXONE 2000 MG: 2 INJECTION, POWDER, FOR SOLUTION INTRAMUSCULAR; INTRAVENOUS at 00:55

## 2024-04-18 RX ADMIN — LISINOPRIL 5 MG: 5 TABLET ORAL at 10:10

## 2024-04-18 RX ADMIN — METHYLPREDNISOLONE SODIUM SUCCINATE 40 MG: 40 INJECTION, POWDER, FOR SOLUTION INTRAMUSCULAR; INTRAVENOUS at 17:07

## 2024-04-18 RX ADMIN — ASPIRIN 81 MG: 81 TABLET, COATED ORAL at 10:10

## 2024-04-18 RX ADMIN — IPRATROPIUM BROMIDE AND ALBUTEROL SULFATE 3 ML: .5; 3 SOLUTION RESPIRATORY (INHALATION) at 19:19

## 2024-04-18 RX ADMIN — CLOPIDOGREL BISULFATE 75 MG: 75 TABLET, FILM COATED ORAL at 10:10

## 2024-04-18 NOTE — PROGRESS NOTES
Name: Negrito Navarro ADMIT: 2024   : 1950  PCP: Karen Yu MD    MRN: [id number] LOS: 1 days   AGE/SEX: 73 y.o. male  ROOM: [facility]     Subjective   Subjective   No acute events overnight.  Patient states that he is feeling okay this morning.  Still having some intermittent chest tightness.  Sitting up in a chair at bedside.  Feels like he has improved since admission.    Objective   Objective     Vital Signs  Temp:  [97.7 °F (36.5 °C)-98.4 °F (36.9 °C)] 98.4 °F (36.9 °C)  Heart Rate:  [76-86] 79  Resp:  [16-18] 18  BP: (134-194)/() 184/95  SpO2:  [84 %-97 %] 96 %  on  Flow (L/min):  [3] 3;   Device (Oxygen Therapy): nasal cannula  Body mass index is 29.95 kg/m².    Physical Exam  General: Alert, no acute distress.  Chronically ill-appearing.  Sitting up in a chair at bedside.  ENT: No conjunctival injection or scleral icterus. Moist mucous membranes.  Neuro: Eyes open and moving in all directions, strength normal in all extremities, no focal deficits.   Lungs: Coarse breath sounds throughout.  Expiratory wheeze noted.  No distress.  Nasal cannula in place.  Heart: RRR, no murmurs. No edema.  Abdomen: Soft, non-tender, non-distended. Normal bowel sounds.   Ext: Warm and well-perfused. No edema.   Skin: No rashes or lesions. IV site without swelling or erythema.     Results Review     I reviewed the patient's new clinical results:  Results from last 7 days   Lab Units 24  0234 24  1616   WBC 10*3/mm3 10.98* 12.86*   HEMOGLOBIN g/dL 14.8 14.0   PLATELETS 10*3/mm3 229 230     Results from last 7 days   Lab Units 24  0234 24  1616   SODIUM mmol/L 139 139   POTASSIUM mmol/L 4.0 4.3   CHLORIDE mmol/L 101 102   CO2 mmol/L 27.0 24.3   BUN mg/dL 28* 23   CREATININE mg/dL 1.92* 1.69*   GLUCOSE mg/dL 132* 113*   EGFR mL/min/1.73 35.9* 41.8*     Results from last 7 days   Lab Units 24  1616   ALBUMIN g/dL 3.7   BILIRUBIN mg/dL 1.4*   ALK PHOS U/L 88   AST (SGOT) U/L 30  "  ALT (SGPT) U/L 14     Results from last 7 days   Lab Units 04/18/24  0234 04/17/24  1616   CALCIUM mg/dL 8.4* 8.7   ALBUMIN g/dL  --  3.7     Results from last 7 days   Lab Units 04/17/24  2041 04/17/24  1616   PROCALCITONIN ng/mL  --  0.35*   LACTATE mmol/L 1.5  --      No results found for: \"HGBA1C\", \"POCGLU\"    CT Angiogram Chest Pulmonary Embolism    Result Date: 4/17/2024   No pulmonary embolism. Bilateral reticulonodular infiltrates suggesting pneumonia, follow-up CT advised to exclude any possibility of underlying pulmonary nodules. Nonspecific mediastinal and hilar adenopathy, further evaluation/follow-up advised as indicated.  This report was finalized on 4/17/2024 7:25 PM by Dr. Rigoberto Herrera M.D on Workstation: [email or web address]      XR Chest 1 View    Result Date: 4/17/2024  Mild bilateral basilar atelectasis or infiltrate. Borderline heart size.  This report was finalized on 4/17/2024 4:03 PM by Dr. Rigoberto Herrera M.D on Workstation: [email or web address]       I have personally reviewed all medications:  Scheduled Medications  aspirin, 81 mg, Oral, Daily  atorvastatin, 40 mg, Oral, Daily  carvedilol, 6.25 mg, Oral, BID With Meals  cefTRIAXone, 2,000 mg, Intravenous, Q24H  cloNIDine, 0.1 mg, Oral, Q12H  clopidogrel, 75 mg, Oral, Daily  fluticasone, 2 spray, Nasal, Daily  ipratropium-albuterol, 3 mL, Nebulization, Q6H While Awake - RT  lisinopril, 5 mg, Oral, Q24H  methylPREDNISolone sodium succinate, 40 mg, Intravenous, Q8H  spironolactone, 50 mg, Oral, Daily  tamsulosin, 0.4 mg, Oral, Daily    Infusions   Diet  Diet: Cardiac; Healthy Heart (2-3 Na+); Fluid Consistency: Thin (IDDSI 0)    Assessment/Plan     Active Hospital Problems    Diagnosis  POA    **Acute hypoxic respiratory failure [J96.01]  Yes      Resolved Hospital Problems   No resolved problems to display.       73 y.o. male with Acute hypoxic respiratory failure.    Acute hypoxic respiratory failure  Pneumonia  Human metapneumovirus  -CT chest with no " pulmonary embolism, bilateral reticulonodular infiltrates suggestive of pneumonia; will need CT follow-up after resolution of acute illness  -Viral respiratory panel positive for human metapneumovirus  -Currently on 3 L nasal cannula, wean as able  -WBC elevated at 13 K, improved to 11 K this morning  -Blood cultures pending, no growth to date  -Continue DuoNebs and steroids  -Ceftriaxone x 5 days    Chest pain  Coronary artery disease  Hypertension  HFrEF  -Patient underwent PCI to the RCA in April 2023  -Blood pressure elevated today  -S/p Lasix 80 mg IV in ER  -Continue home carvedilol and clonidine  -Plan to increase lisinopril initially, but not sure kidneys will tolerate so continue current dose  -Add oral as needed hydralazine for elevated blood pressure  -Discussed additional changes/titration with cardiology  -Continue ASA, statin, Plavix  -Cardiology consulted  -EKG with evidence of acute ischemic changes  -Troponin elevated at 38, recheck is 42.  This is in setting of CKD.  -Echocardiogram done today, read pending  -Plan for stress test tomorrow    BAO on CKD  -Most recent creatinine in system was last year at 1.3  -Creatinine further increased to 1.92 today  -Hold off on further diuresis for now  -Hold spironolactone  -Monitor very closely with daily BMP  -If creatinine increases tomorrow, consult nephrology for further evaluation    SCDs for DVT prophylaxis.  Full code.  Discussed with patient, nursing staff, and care team on multidisciplinary rounds.  Anticipate discharge home timing yet to be determined.      Chica Brumfield MD  [facility]  04/18/24  15:37 EDT

## 2024-04-18 NOTE — CONSULTS
[facility]  Cardiology Consult Note    Patient Identification:  Name: Negrito Navarro  Age: 73 y.o.  Sex: male  :  1950  MRN: [id number]             Requesting Physician: Dr Warren    Reason for Consultation / Chief Complaint: chest pain    History of Present Illness:   This is a 73-year-old male who is known to our service with coronary artery disease s/p CABG, BETO to the RCA 2023, hypertension, hyperlipidemia, COPD.  He presented to [facility] ER with complaints of shortness of breath and chest pain.  He reports for the past month or so he has been having intermittent left chest discomfort radiating up to his shoulder and down his left arm with exertion.  He also reports that the past couple days he has been feeling short of breath and used his wife oxygen at night.  Workup in ER with high-sensitivity troponin initially 38, repeat 40.  BNP 2363, creatinine 1.69, D-dimer 0.86, WBC 12.86.  ECG sinus rhythm not significantly changed from previous.  Respiratory panel positive for human metapneumovirus..  Chest x-ray with bilateral basilar atelectasis or infiltrate.  CTA chest bilateral infiltrates suggesting pneumonia, mediastinal and hilar lymphadenopathy, negative for pulmonary embolism.  He was hypoxic in the ER requiring O2.  He was admitted for further management.    Echo 2023 EF 41 to 45%, LV wall motion abnormality, normal LV diastolic function.  Cardiac catheterization 2023 normal left main, % occluded with LIMA to the LAD patent with normal distal flow, SVG to the diagonal 1 and 2 patent with normal distal flow.  Circumflex 100% occluded with SVG to the OM patent with normal distal flow.  % occluded reduced to 0% with BETO.  LV gram showed inferior wall akinetic with EF 45%.    Comorbid cardiac risk factors: Age, coronary artery disease, hypertension, hyperlipidemia    Past Medical History:  Past Medical History:   Diagnosis Date    Benign prostatic  hyperplasia 11/10/2016    COPD (chronic obstructive pulmonary disease) 6/10/2021    Coronary artery disease involving coronary bypass graft of native heart without angina pectoris 6/10/2021    Essential hypertension 11/10/2016    Hyperlipidemia     SREENITY (obstructive sleep apnea) 11/10/2016     Past Surgical History:  Past Surgical History:   Procedure Laterality Date    CARDIAC CATHETERIZATION N/A 4/21/2023    Procedure: Left Heart Cath;  Surgeon: Jayne Villalobos MD;  Location: [facility] CATH INVASIVE LOC[facility];  Service: Cardiovascular;  Laterality: N/A;    CARDIAC CATHETERIZATION N/A 4/21/2023    Procedure: Coronary angiography;  Surgeon: Jayne Villalobos MD;  Location:  [facility] CATH INVASIVE LOCATION;  Service: Cardiovascular;  Laterality: N/A;    CARDIAC CATHETERIZATION N/A 4/21/2023    Procedure: Left ventriculography;  Surgeon: Jayne Villalobos MD;  Location:  [facility] CATH INVASIVE LOCATION;  Service: Cardiovascular;  Laterality: N/A;    CARDIAC CATHETERIZATION  4/21/2023    Procedure: Saphenous Vein Graft;  Surgeon: Jayne Villalobos MD;  Location: [facility] CATH INVASIVE LOCATION;  Service: Cardiovascular;;    CARDIAC CATHETERIZATION N/A 4/21/2023    Procedure: Native mammary injection;  Surgeon: Jayne Villalobos MD;  Location: [facility] CATH INVASIVE LOCATION;  Service: Cardiovascular;  Laterality: N/A;    CARDIAC CATHETERIZATION N/A 4/21/2023    Procedure: Percutaneous Coronary Intervention;  Surgeon: Jayne Villalobos MD;  Location: [facility] CATH INVASIVE LOCATION;  Service: Cardiovascular;  Laterality: N/A;    CARDIAC CATHETERIZATION N/A 4/21/2023    Procedure: Stent BETO coronary;  Surgeon: Jayne Villalobos MD;  Location: [facility] CATH INVASIVE LOCATION;  Service: Cardiovascular;  Laterality: N/A;    CARDIAC CATHETERIZATION  4/21/2023    Procedure: Percutaneous Manual Thrombectomy;  Surgeon: Jayne Villalobos MD;  Location: [facility] CATH INVASIVE LOCATION;  Service:  Cardiovascular;;      Allergies:  Allergies   Allergen Reactions    Shellfish-Derived Products Unknown - Low Severity     gout  gout       Home Meds:  Medications Prior to Admission   Medication Sig Dispense Refill Last Dose    aspirin 81 MG EC tablet Take 1 tablet by mouth Daily.   4/17/2024    atorvastatin (LIPITOR) 40 MG tablet Take 1 tablet by mouth Daily.   4/16/2024    carvedilol (COREG) 6.25 MG tablet Take 1 tablet by mouth 2 (Two) Times a Day With Meals. 180 tablet 3 4/16/2024    cloNIDine (CATAPRES) 0.1 MG tablet Take 1 tablet by mouth 2 (Two) Times a Day.   4/16/2024    clopidogrel (PLAVIX) 75 MG tablet Take 1 tablet by mouth Daily. 90 tablet 3 4/17/2024    fluticasone (FLONASE) 50 MCG/ACT nasal spray 2 sprays into the nostril(s) as directed by provider Daily.   4/16/2024    HYDROcodone-acetaminophen (NORCO) 7.5-325 MG per tablet Take 1 tablet by mouth Every 12 (Twelve) Hours As Needed for Moderate Pain.   4/16/2024    lisinopril (PRINIVIL,ZESTRIL) 5 MG tablet Take 1 tablet by mouth Daily. 90 tablet 3 4/16/2024    nitroglycerin (NITROSTAT) 0.4 MG SL tablet Place 1 tablet under the tongue Every 5 (Five) Minutes As Needed for Chest Pain for up to 1 dose. Take no more than 3 doses in 15 minutes. 25 tablet 12 4/17/2024    tamsulosin (FLOMAX) 0.4 MG capsule 24 hr capsule Take 1 capsule by mouth Daily.   4/17/2024    albuterol sulfate  (90 Base) MCG/ACT inhaler Inhale 2 puffs Every 4 (Four) Hours As Needed for Wheezing.   Unknown    aspirin 81 MG EC tablet Take 1 tablet by mouth Daily. 30 tablet 6     ketoconazole (NIZORAL) 2 % cream Apply  topically to the appropriate area as directed Daily.   Unknown    spironolactone (ALDACTONE) 50 MG tablet Take 1 tablet by mouth Daily for 30 days. 30 tablet 0     spironolactone (ALDACTONE) 50 MG tablet Take 1 tablet by mouth Daily.   Unknown     Current Meds:   Current Facility-Administered Medications   Medication Dose Route Frequency Provider Last Rate Last Admin     acetaminophen (TYLENOL) tablet 650 mg  650 mg Oral Q4H PRN Alejandra Warren MD        albuterol (PROVENTIL) nebulizer solution 0.083% 2.5 mg/3mL  2.5 mg Nebulization Q6H PRN Alejandra Warren MD        aspirin EC tablet 81 mg  81 mg Oral Daily Alejandra Warren MD   81 mg at 04/18/24 1010    atorvastatin (LIPITOR) tablet 40 mg  40 mg Oral Daily Alejandra Warren MD   40 mg at 04/18/24 1010    sennosides-docusate (PERICOLACE) 8.6-50 MG per tablet 2 tablet  2 tablet Oral BID PRN Alejandra Warren MD        And    polyethylene glycol (MIRALAX) packet 17 g  17 g Oral Daily PRN Alejandra Warren MD        And    bisacodyl (DULCOLAX) EC tablet 5 mg  5 mg Oral Daily PRN Alejandra Warren MD        And    bisacodyl (DULCOLAX) suppository 10 mg  10 mg Rectal Daily PRN Alejandra Warren MD        carvedilol (COREG) tablet 6.25 mg  6.25 mg Oral BID With Meals Alejandra Warren MD   6.25 mg at 04/18/24 1013    cefTRIAXone (ROCEPHIN) 2,000 mg in sodium chloride 0.9 % 100 mL MBP  2,000 mg Intravenous Q24H Alejandra Warren  mL/hr at 04/18/24 0055 2,000 mg at 04/18/24 0055    cloNIDine (CATAPRES) tablet 0.1 mg  0.1 mg Oral Q12H Alejandra Warren MD   0.1 mg at 04/18/24 1010    clopidogrel (PLAVIX) tablet 75 mg  75 mg Oral Daily Alejandra Warren MD   75 mg at 04/18/24 1010    fluticasone (FLONASE) 50 MCG/ACT nasal spray 2 spray  2 spray Nasal Daily Alejandra Warren MD   2 spray at 04/18/24 1010    hydrALAZINE (APRESOLINE) injection 10 mg  10 mg Intravenous Q6H PRN Alejandra Warren MD        HYDROcodone-acetaminophen (NORCO) 7.5-325 MG per tablet 1 tablet  1 tablet Oral Q12H PRN Stingl, Alejandra Stack MD   1 tablet at 04/18/24 0055    ipratropium-albuterol (DUO-NEB) nebulizer solution 3 mL  3 mL Nebulization Q6H While Awake - RT StingAlejandra duvall MD   3 mL at 04/18/24 1111    lisinopril (PRINIVIL,ZESTRIL) tablet 5 mg  5 mg Oral Q24H Stingl,  "Alejandra Stack MD   5 mg at 04/18/24 1010    melatonin tablet 3 mg  3 mg Oral Nightly PRN Alejandra Warren MD        methylPREDNISolone sodium succinate (SOLU-Medrol) injection 40 mg  40 mg Intravenous Q8H Alejandra Warren MD   40 mg at 04/18/24 1011    ondansetron ODT (ZOFRAN-ODT) disintegrating tablet 4 mg  4 mg Oral Q6H PRN Alejandra Warren MD        Or    ondansetron (ZOFRAN) injection 4 mg  4 mg Intravenous Q6H PRN Alejandra Warren MD        sodium chloride 0.9 % flush 10 mL  10 mL Intravenous PRN Panfilo Boothe MD        spironolactone (ALDACTONE) tablet 50 mg  50 mg Oral Daily Alejandra Warren MD   50 mg at 04/18/24 1010    tamsulosin (FLOMAX) 24 hr capsule 0.4 mg  0.4 mg Oral Daily Alejandra Warren MD   0.4 mg at 04/18/24 1010       Social History:   Social History     Tobacco Use    Smoking status: Never    Smokeless tobacco: Never   Substance Use Topics    Alcohol use: Not Currently      Family History:  History reviewed. No pertinent family history.     Review of Systems  Constitutional: No wt loss, fever   Gastrointestinal: No nausea , abdominal pain  Behavioral/Psych: No insomnia or anxiety   Cardiovascular ----positive for chest pain. All other systems reviewed and are negative               Physical Exam  BP (!) 184/95   Pulse 79   Temp 98.4 °F (36.9 °C) (Oral)   Resp 18   Ht 172.7 cm (68\")   Wt 89.4 kg (197 lb)   SpO2 96%   BMI 29.95 kg/m²     General appearance: No acute changes   Eyes: Sclerae conjunctivae normal, pupils reactive   HENT: Atraumatic; oropharynx clear with moist mucous membranes and no mucosal ulcerations;  Neck: Trachea midline; NECK, supple, no thyromegaly or lymphadenopathy   Lungs: Normal size and shape, normal breath sounds, equal distribution of air, no rales and rhonchi   CV: S1-S2 regular, no murmurs, no rub, no gallop   Abdomen: Soft, nontender; no masses , no abnormal abdominal sounds   Extremities: No deformity , normal " color , no peripheral edema   Skin: Normal temperature, turgor and texture; no rash, ulcers  Psych: Appropriate affect, alert and oriented to person, place and time             Cardiographics  ECG:     Prior ECG 4/22/2023 for comparison      Echocardiogram:   Interpretation Summary         Left ventricular ejection fraction appears to be 41 - 45%.    The following left ventricular wall segments are hypokinetic: apical lateral, basal inferolateral and mid inferolateral.    Left ventricular diastolic function was normal.    Left atrial volume is mildly increased.    Cardiac catheterization 4/2023  HEMODYNAMIC / ANGIOGRAPHIC DATA:   Left ventricular end diastolic pressure was 15 mmHg.  Left ventriculography revealed an EF around 45% inferior wall akinetic.   The left main is normal left main.  The left anterior descending artery is 100% occluded with the LIMA to the LAD patent with normal distal flow, sequential saphenous vein graft to the diagonal 1 and diagonal 2 patent with normal distal flow.  The left circumflex is 100% occluded with a saphenous vein graft to the OM patent with normal distal flow.  The right coronary artery is midportion 100% occluded reduced to 0% with 3.5/33 Xience stent dilated to 3.6.  Successful stenting of the mid right coronary, with reduction of stenosis from 100% to 0% with 3.5/33 Xience stent.    JESUS ALBERTO FLOW PRE..0..... POST...3...    TYPE OF LESION......C.......    RECOMMENDATIONS: Post-procedure care will focus on prevention of any ischemic events and congestive complications. Aggressive risk factor modification will be carried out.  Importance of taking dual antiplatelets for one year has been explained, risk of stent thrombosis leading to the acute MI, which carries high morbidity and mortality has been explained    Discontinuation or interruptions of these medications should be under the strict guidance of appropriate health professional  Imaging  Chest X-ray:     Lab Review   Results  from last 7 days   Lab Units 04/18/24  0234 04/18/24  0026 04/17/24  1616   HSTROP T ng/L 40* 38* 38*         Results from last 7 days   Lab Units 04/18/24  0234   SODIUM mmol/L 139   POTASSIUM mmol/L 4.0   BUN mg/dL 28*   CREATININE mg/dL 1.92*   CALCIUM mg/dL 8.4*     @[id number]@  Results from last 7 days   Lab Units 04/18/24  0234 04/17/24  1616   WBC 10*3/mm3 10.98* 12.86*   HEMOGLOBIN g/dL 14.8 14.0   HEMATOCRIT % 44.0 41.0   PLATELETS 10*3/mm3 229 230             Assessment:  Acute hypoxic respiratory failure  Pneumonia  Human metapneumovirus infection  Chest pain  Coronary artery disease s/p CABG x4 2016 and BETO to RCA 4/2023  Hypertension  Hyperlipidemia  COPD  Apnea  CKD    Recommendations / Plan:     This is a 73-year-old male who is known to our service admitted for acute hypoxic respiratory failure, pneumonia, human metapneumovirus infection chest pain.  Antibiotics per primary.  He underwent PCI to the RCA April 2023.  Mild cardiomyopathy per previous echo, will repeat echocardiogram at this time.  He was treated in ER with Lasix 80 mg IV x 1, creatinine initially 1.69, this morning 1.92.  Will hold off on any further IV diuresis.  Reports he is feeling better today still on 3 L nasal cannula.  Will plan for stress test tomorrow.        Janelle Adler, APRN  4/18/2024, 09:33 EDT  73-year-old male with respiratory failure with pneumonia atypical chest pain with a history of PCI in the past has a atypical chest pain we will have a stress test and echo in the morning  Jayne Villalobos MD      EMR Dragon/Transcription:   Dictated utilizing Dragon dictation

## 2024-04-18 NOTE — H&P
HISTORY AND PHYSICAL   [facility]        Date of Admission: 2024  Patient Identification:  Name: Negrito Navarro  Age: 73 y.o.  Sex: male  :  1950  MRN: [id number]                     Primary Care Physician: Karen Yu MD    Chief Complaint:  73 year old gentleman presented to the emergency room with shortness of breath and chest pain and cough; he took nitroglycerin which helped to relieve the pain; he has a history of cad and cabg; he was hypoxic and placed on oxygen; he has a history of copd but is not on home oxygen; denies sick contacts    History of Present Illness:   As above    Past Medical History:  Past Medical History:   Diagnosis Date    Benign prostatic hyperplasia 11/10/2016    COPD (chronic obstructive pulmonary disease) 6/10/2021    Coronary artery disease involving coronary bypass graft of native heart without angina pectoris 6/10/2021    Essential hypertension 11/10/2016    Hyperlipidemia     SERENITY (obstructive sleep apnea) 11/10/2016     Past Surgical History:  Past Surgical History:   Procedure Laterality Date    CARDIAC CATHETERIZATION N/A 2023    Procedure: Left Heart Cath;  Surgeon: Jayne Villalobos MD;  Location: [facility] CATH INVASIVE LOCATION;  Service: Cardiovascular;  Laterality: N/A;    CARDIAC CATHETERIZATION N/A 2023    Procedure: Coronary angiography;  Surgeon: Jayne Villalobos MD;  Location: [facility] CATH INVASIVE LOCATION;  Service: Cardiovascular;  Laterality: N/A;    CARDIAC CATHETERIZATION N/A 2023    Procedure: Left ventriculography;  Surgeon: Jayne Villalobos MD;  Location: [facility] CATH INVASIVE LOCATION;  Service: Cardiovascular;  Laterality: N/A;    CARDIAC CATHETERIZATION  2023    Procedure: Saphenous Vein Graft;  Surgeon: Jayne Villalobos MD;  Location: [facility] CATH INVASIVE LOCATION;  Service: Cardiovascular;;    CARDIAC CATHETERIZATION N/A 2023    Procedure: Native mammary injection;  Surgeon:  Jayne Villalobos MD;  Location:  [facility] CATH INVASIVE LOCATION;  Service: Cardiovascular;  Laterality: N/A;    CARDIAC CATHETERIZATION N/A 4/21/2023    Procedure: Percutaneous Coronary Intervention;  Surgeon: Jayne Villalobos MD;  Location:  [facility] CATH INVASIVE LOCATION;  Service: Cardiovascular;  Laterality: N/A;    CARDIAC CATHETERIZATION N/A 4/21/2023    Procedure: Stent BETO coronary;  Surgeon: Jayne Villalobos MD;  Location:  [facility] CATH INVASIVE LOCATION;  Service: Cardiovascular;  Laterality: N/A;    CARDIAC CATHETERIZATION  4/21/2023    Procedure: Percutaneous Manual Thrombectomy;  Surgeon: Jayne Villalobos MD;  [facility];  Service: Cardiovascular;;      Home Meds:  Medications Prior to Admission   Medication Sig Dispense Refill Last Dose    aspirin 81 MG EC tablet Take 1 tablet by mouth Daily.   4/17/2024    atorvastatin (LIPITOR) 40 MG tablet Take 1 tablet by mouth Daily.   4/16/2024    carvedilol (COREG) 6.25 MG tablet Take 1 tablet by mouth 2 (Two) Times a Day With Meals. 180 tablet 3 4/16/2024    cloNIDine (CATAPRES) 0.1 MG tablet Take 1 tablet by mouth 2 (Two) Times a Day.   4/16/2024    clopidogrel (PLAVIX) 75 MG tablet Take 1 tablet by mouth Daily. 90 tablet 3 4/17/2024    fluticasone (FLONASE) 50 MCG/ACT nasal spray 2 sprays into the nostril(s) as directed by provider Daily.   4/16/2024    HYDROcodone-acetaminophen (NORCO) 7.5-325 MG per tablet Take 1 tablet by mouth Every 12 (Twelve) Hours As Needed for Moderate Pain.   4/16/2024    lisinopril (PRINIVIL,ZESTRIL) 5 MG tablet Take 1 tablet by mouth Daily. 90 tablet 3 4/16/2024    nitroglycerin (NITROSTAT) 0.4 MG SL tablet Place 1 tablet under the tongue Every 5 (Five) Minutes As Needed for Chest Pain for up to 1 dose. Take no more than 3 doses in 15 minutes. 25 tablet 12 4/17/2024    tamsulosin (FLOMAX) 0.4 MG capsule 24 hr capsule Take 1 capsule by mouth Daily.   4/17/2024    albuterol sulfate HFA  108 (90 Base) MCG/ACT inhaler Inhale 2 puffs Every 4 (Four) Hours As Needed for Wheezing.   Unknown    aspirin 81 MG EC tablet Take 1 tablet by mouth Daily. 30 tablet 6     ketoconazole (NIZORAL) 2 % cream Apply  topically to the appropriate area as directed Daily.   Unknown    spironolactone (ALDACTONE) 50 MG tablet Take 1 tablet by mouth Daily for 30 days. 30 tablet 0     spironolactone (ALDACTONE) 50 MG tablet Take 1 tablet by mouth Daily.   Unknown       Allergies:  Allergies   Allergen Reactions    Shellfish-Derived Products Unknown - Low Severity     gout  gout       Immunizations:  Immunization History   Administered Date(s) Administered    COVID-19 (PFIZER) Purple Cap Monovalent 2021, 2021, 2021    Tdap 2023     Social History:   Social History     Social History Narrative    Not on file     Social History     Socioeconomic History    Marital status: Legally    Tobacco Use    Smoking status: Never    Smokeless tobacco: Never   Vaping Use    Vaping status: Never Used   Substance and Sexual Activity    Alcohol use: Not Currently    Drug use: Never    Sexual activity: Defer       Family History:  History reviewed. No pertinent family history.     Review of Systems  See history of present illness and past medical history.  Patient denies headache, dizziness, syncope, falls, trauma, change in vision, change in hearing, change in taste, changes in weight, changes in appetite, focal weakness, numbness, or paresthesia.  Patient denies  palpitations, orthopnea, PND,  sinus pressure, rhinorrhea, epistaxis, hemoptysis, nausea, vomiting,hematemesis, diarrhea, constipation or hematochezia.  Denies cold or heat intolerance, polydipsia, polyuria, polyphagia. Denies hematuria, pyuria, dysuria, hesitancy, frequency or urgency. Denies consumption of raw and under cooked meats foods or change in water source.      Objective:  T Max 24 hrs: Temp (24hrs), Av.2 °F (37.3 °C), Min:97.9 °F (36.6  °C), Max:100.5 °F (38.1 °C)    Vitals Ranges:   Temp:  [97.9 °F (36.6 °C)-100.5 °F (38.1 °C)] 97.9 °F (36.6 °C)  Heart Rate:  [79-86] 81  Resp:  [16-24] 16  BP: (163-194)/() 194/107      Exam:  BP (!) 194/107 (BP Location: Right arm, Patient Position: Sitting)   Pulse 81   Temp 97.9 °F (36.6 °C) (Oral)   Resp 16   SpO2 96%     General Appearance:    Alert, cooperative, no distress, appears stated age   Head:    Normocephalic, without obvious abnormality, atraumatic   Eyes:    PERRL, conjunctivae/corneas clear, EOM's intact, both eyes   Ears:    Normal external ear canals, both ears   Nose:   Nares normal, septum midline, mucosa normal, no drainage    or sinus tenderness   Throat:   Lips, mucosa, and tongue normal   Neck:   Supple, symmetrical, trachea midline, no adenopathy;     thyroid:  no enlargement/tenderness/nodules; no carotid    bruit or JVD   Back:     Symmetric, no curvature, ROM normal, no CVA tenderness   Lungs:     Decreased breath sounds bilaterally, respirations unlabored   Chest Wall:    No tenderness or deformity    Heart:    Regular rate and rhythm, S1 and S2 normal, no murmur, rub   or gallop   Abdomen:     Soft, nontender, bowel sounds active all four quadrants,     no masses, no hepatomegaly, no splenomegaly   Extremities:   Extremities normal, atraumatic, no cyanosis or edema                       .    Data Review:  Labs in chart were reviewed.  WBC   Date Value Ref Range Status   04/17/2024 12.86 (H) 3.40 - 10.80 10*3/mm3 Final     Hemoglobin   Date Value Ref Range Status   04/17/2024 14.0 13.0 - 17.7 g/dL Final     Hematocrit   Date Value Ref Range Status   04/17/2024 41.0 37.5 - 51.0 % Final     Platelets   Date Value Ref Range Status   04/17/2024 230 140 - 450 10*3/mm3 Final     Sodium   Date Value Ref Range Status   04/17/2024 139 136 - 145 mmol/L Final     Potassium   Date Value Ref Range Status   04/17/2024 4.3 3.5 - 5.2 mmol/L Final     Comment:     Slight hemolysis detected  by analyzer. Result may be falsely elevated.     Chloride   Date Value Ref Range Status   04/17/2024 102 98 - 107 mmol/L Final     CO2   Date Value Ref Range Status   04/17/2024 24.3 22.0 - 29.0 mmol/L Final     BUN   Date Value Ref Range Status   04/17/2024 23 8 - 23 mg/dL Final     Creatinine   Date Value Ref Range Status   04/17/2024 1.69 (H) 0.76 - 1.27 mg/dL Final     Glucose   Date Value Ref Range Status   04/17/2024 113 (H) 65 - 99 mg/dL Final     Calcium   Date Value Ref Range Status   04/17/2024 8.7 8.6 - 10.5 mg/dL Final                Imaging Results (All)       Procedure Component Value Units Date/Time    CT Angiogram Chest Pulmonary Embolism [[id number]] Collected: 04/17/24 1919     Updated: 04/17/24 1928    Narrative:      CT ANGIOGRAM CHEST PULMONARY EMBOLISM-     INDICATIONS: Chest pain, cough, fever     TECHNIQUE: Radiation dose reduction techniques were utilized, including  automated exposure control and exposure modulation based on body size.  CT angiography of the chest. Three-dimensional reconstructions.     COMPARISON: None available      FINDINGS:     No pulmonary embolism. No aortic dissection.     The heart size is enlarged without pericardial effusion. Coronary chill  calcifications are present. A few subcentimeter short axis mediastinal  lymph nodes are seen. A subcarinal lymph node is present, 1.5 cm, and a  1.4 cm short axis right hilar lymph node on axial image 56, may be  reactive in nature, but potentially evidence of neoplasm, clinical  correlation and CT follow-up advised (if further imaging evaluation is  indicated, PET/CT could be considered. Likewise, left hilar lymph node  measures 1.3 cm in axial image 70.     The airways appear clear.     No pleural effusion or pneumothorax.     The lungs show multifocal bilateral reticulonodular infiltrates, most  prominently in the right lower lobe, but also in right upper lobe, right  middle lobe, lingula. CT follow-up advised to exclude the  possibility of  underlying nodule. Atelectasis/consolidation is also apparent in right  middle lobe, left lower lobe.     Upper abdominal structures show no acute findings. Suspected splenule in  the left upper quadrant, with no normal appearing spleen noted. Colonic  diverticula are seen that do not appear inflamed. Mid mesenteric  haziness and nodularity may reflect mesenteric panniculitis. Nonspecific  thickening of the adrenal glands is seen. Fatty infiltration of the  pancreas is present.     Degenerative changes are seen in the spine. Old bilateral rib fractures  are apparent. Sternotomy wires are noted. No acute fracture is  identified.       Impression:         No pulmonary embolism. Bilateral reticulonodular infiltrates suggesting  pneumonia, follow-up CT advised to exclude any possibility of underlying  pulmonary nodules. Nonspecific mediastinal and hilar adenopathy, further  evaluation/follow-up advised as indicated.     This report was finalized on 4/17/2024 7:25 PM by Dr. Rigoberto Herrera M.D on Workstation: [employer]       XR Chest 1 View [[id number]] Collected: 04/17/24 1555     Updated: 04/17/24 1606    Narrative:      XR CHEST 1 VW-     HISTORY: Male who is 73 years-old, short of breath     TECHNIQUE: Frontal view of the chest     COMPARISON: 4/20/2023     FINDINGS: The heart size is borderline. Sternotomy wires are present.  Pulmonary vasculature is unremarkable. Mild bilateral basilar  atelectasis or infiltrate. No large pleural effusion or pneumothorax. No  acute osseous process.       Impression:      Mild bilateral basilar atelectasis or infiltrate. Borderline  heart size.     This report was finalized on 4/17/2024 4:03 PM by Dr. Rigoberto Herrera M.D on Workstation: [employer]                 Assessment:  Active Hospital Problems    Diagnosis  POA    **Acute hypoxic respiratory failure [J96.01]  Yes      Resolved Hospital Problems   No resolved problems to display.   Hypertension  Copd  Human  metapneumovirus  Cad  Chest pain  Obesity  Sleep apnea  Hyperglycemia  Ckd3  pneumonia    Plan:  Will repeat troponin  Ask cardiology to see him  Antibiotics  Steroids, giuseppe Montanez patient and ed provider  Monitor on telemetry    Alejandra Warren MD  4/17/2024  23:07 EDT

## 2024-04-18 NOTE — PLAN OF CARE
Goal Outcome Evaluation:  Plan of Care Reviewed With: patient           Outcome Evaluation: Pt is a 74 yo male who presents from home with chest pain, SOA, human metapneumovirus, PMH of CABG and CAD. Prior to admission, pt was living at home with spouse and independent with mobility. Pt uses cane as needed. Upon exam, pt demonstrates generalized weakness and decreased endurance limiting mobility. Pt performed bed mobility independently, stood with SBA, and ambulated 40' with CGA. Pt slightly SOA after ambulation but sats stable throughout. Pt will continue to benefit from PT to address impairments and assist with return to PLOF. No problems anticipated with DC home.      Anticipated Discharge Disposition (PT): home

## 2024-04-18 NOTE — PLAN OF CARE
Problem: Adult Inpatient Plan of Care  Goal: Plan of Care Review  Flowsheets (Taken 4/18/2024 1811)  Outcome Evaluation: VSS, Assist x1, 3L NC, No c/o pain, Bed alarm on. Stress test orderd. Echo completed  Goal: Absence of Hospital-Acquired Illness or Injury  Intervention: Identify and Manage Fall Risk  Recent Flowsheet Documentation  Taken 4/18/2024 1800 by Kelli Borges RN  Safety Promotion/Fall Prevention:   safety round/check completed   nonskid shoes/slippers when out of bed   fall prevention program maintained  Taken 4/18/2024 1600 by Kelli Borges RN  Safety Promotion/Fall Prevention:   safety round/check completed   nonskid shoes/slippers when out of bed   fall prevention program maintained  Taken 4/18/2024 1400 by Kelli Borges RN  Safety Promotion/Fall Prevention: safety round/check completed  Taken 4/18/2024 1200 by Kelli Borges RN  Safety Promotion/Fall Prevention:   nonskid shoes/slippers when out of bed   safety round/check completed   fall prevention program maintained  Taken 4/18/2024 1000 by Kelli Borges RN  Safety Promotion/Fall Prevention:   nonskid shoes/slippers when out of bed   safety round/check completed  Taken 4/18/2024 0800 by Kleli Borges RN  Safety Promotion/Fall Prevention:   safety round/check completed   nonskid shoes/slippers when out of bed   fall prevention program maintained  Intervention: Prevent Skin Injury  Recent Flowsheet Documentation  Taken 4/18/2024 1800 by Kelli Borges RN  Body Position: position changed independently  Taken 4/18/2024 1600 by Kelli Borges RN  Body Position: position changed independently  Taken 4/18/2024 1400 by Kelli Borges RN  Body Position: position changed independently  Skin Protection:   adhesive use limited   tubing/devices free from skin contact  Taken 4/18/2024 1200 by Kelli Borges RN  Body Position: position changed independently  Taken 4/18/2024 1000 by Kelli Borges RN  Body Position:  position changed independently  Taken 4/18/2024 0800 by Kelli Borges RN  Body Position: position changed independently  Skin Protection:   adhesive use limited   tubing/devices free from skin contact  Intervention: Prevent and Manage VTE (Venous Thromboembolism) Risk  Recent Flowsheet Documentation  Taken 4/18/2024 1800 by Kelli Borges RN  Activity Management: activity encouraged  Taken 4/18/2024 1600 by Kelli Borges RN  Activity Management: activity encouraged  Taken 4/18/2024 1400 by Kelli Borges RN  Activity Management: activity encouraged  Taken 4/18/2024 1200 by Kelli Borges RN  Activity Management: activity encouraged  Taken 4/18/2024 1000 by Kelli Borges RN  Activity Management: activity encouraged  Taken 4/18/2024 0800 by Kelli Borges RN  Activity Management: activity encouraged  VTE Prevention/Management: (Plavix) other (see comments)  Intervention: Prevent Infection  Recent Flowsheet Documentation  Taken 4/18/2024 1400 by Kelli Borges RN  Infection Prevention:   hand hygiene promoted   single patient room provided  Goal: Optimal Comfort and Wellbeing  Intervention: Provide Person-Centered Care  Recent Flowsheet Documentation  Taken 4/18/2024 1400 by Kelli Borges RN  Trust Relationship/Rapport:   choices provided   care explained  Taken 4/18/2024 0800 by Kelli Borges RN  Trust Relationship/Rapport:   questions answered   questions encouraged   Goal Outcome Evaluation:              Outcome Evaluation: VSS, Assist x1, 3L NC, No c/o pain, Bed alarm on. Stress test orderd. Echo completed

## 2024-04-18 NOTE — CASE MANAGEMENT/SOCIAL WORK
Discharge Planning Assessment  [facility]     Patient Name: Negrito Navarro  MRN: [id number]  Today's Date: 4/18/2024    Admit Date: 4/17/2024    Plan: Home with family   Discharge Needs Assessment       Row Name 04/18/24 1606       Living Environment    People in Home spouse    Name(s) of People in Home wife Yani    Current Living Arrangements home    Potentially Unsafe Housing Conditions none    Primary Care Provided by self    Family Caregiver if Needed child(mike), adult    Quality of Family Relationships involved;helpful    Able to Return to Prior Arrangements yes       Resource/Environmental Concerns    Resource/Environmental Concerns none       Transition Planning    Patient/Family Anticipates Transition to home with family    Patient/Family Anticipated Services at Transition none    Transportation Anticipated family or friend will provide       Discharge Needs Assessment    Readmission Within the Last 30 Days no previous admission in last 30 days    Equipment Currently Used at Home none    Concerns to be Addressed no discharge needs identified    Anticipated Changes Related to Illness none    Equipment Needed After Discharge none                   Discharge Plan       Row Name 04/18/24 1607       Plan    Plan Home with family    Patient/Family in Agreement with Plan yes    Plan Comments Spoke to pt at bedside, introduced self and explained CCP role, face sheet and pharmacy information verified. Pt lives with wife Yani in 1 level home with one [address]ERNON, he is IADL', has cane if needed, no HH or SNF history. He plans home with friend dtr to transport no anticipated needs, CCP will follow -Chica BAXTER                  Continued Care and Services - Admitted Since 4/17/2024    No active coordination exists for this encounter.       Expected Discharge Date and Time       Expected Discharge Date Expected Discharge Time    Apr 20, 2024            Demographic Summary       Row Name 04/18/24 1606       General Information     Admission Type inpatient                   Functional Status       Row Name 04/18/24 1606       Functional Status    Usual Activity Tolerance excellent    Current Activity Tolerance good       Assessment of Health Literacy    Health Literacy Good       Functional Status, IADL    Medications independent    Meal Preparation independent    Housekeeping independent    Laundry independent    Shopping independent       Mental Status    General Appearance WDL WDL       Mental Status Summary    Recent Changes in Mental Status/Cognitive Functioning no changes                   Psychosocial    No documentation.                  Abuse/Neglect    No documentation.                  Legal       Row Name 04/18/24 1606       Financial/Legal    Who Manages Finances if Patient Unable dtr                   Substance Abuse    No documentation.                  Patient Forms    No documentation.                     Chica Myers RN

## 2024-04-18 NOTE — THERAPY EVALUATION
Patient Name: Negrito Navarro  : 1950    MRN: [id number]                              Today's Date: 2024       Admit Date: 2024    Visit Dx:     ICD-10-CM ICD-9-CM   1. Acute hypoxic respiratory failure  J96.01 518.81   2. Infection due to human metapneumovirus (hMPV)  B34.8 079.89   3. Chest pain, unspecified type  R07.9 786.50   4. History of coronary artery bypass graft  Z95.1 V45.81   5. History of CHF (congestive heart failure)  Z86.79 V12.59     Patient Active Problem List   Diagnosis    Hyperlipidemia    Benign prostatic hyperplasia    Chronic constipation    Primary hypertension    SERENITY (obstructive sleep apnea)    Oxygen dependent    COPD (chronic obstructive pulmonary disease)    Coronary artery disease of native artery of native heart with stable angina pectoris    Acute coronary syndrome with high troponin    Precordial chest pain    History of coronary angioplasty with insertion of stent    Hx of CABG    CAD S/P percutaneous coronary angioplasty    Long term (current) use of antithrombotics/antiplatelets    Acute hypoxic respiratory failure     Past Medical History:   Diagnosis Date    Benign prostatic hyperplasia 11/10/2016    COPD (chronic obstructive pulmonary disease) 6/10/2021    Coronary artery disease involving coronary bypass graft of native heart without angina pectoris 6/10/2021    Essential hypertension 11/10/2016    Hyperlipidemia     SERENITY (obstructive sleep apnea) 11/10/2016     Past Surgical History:   Procedure Laterality Date    CARDIAC CATHETERIZATION N/A 2023    Procedure: Left Heart Cath;  Surgeon: Jayne Villalobos MD;  Location:  [facility];  Service: Cardiovascular;  Laterality: N/A;    CARDIAC CATHETERIZATION N/A 2023    Procedure: Coronary angiography;  Surgeon: Jayne Villalobos MD;  Location:  [facility];  Service: Cardiovascular;  Laterality: N/A;    CARDIAC CATHETERIZATION N/A 2023    Procedure: Left  ventriculography;  Surgeon: Jayne Villalobos MD;  Location:  [facility];  Service: Cardiovascular;  Laterality: N/A;    CARDIAC CATHETERIZATION  4/21/2023    Procedure: Saphenous Vein Graft;  Surgeon: Jayne Villalobos MD;  Location:  [facility];  Service: Cardiovascular;;    CARDIAC CATHETERIZATION N/A 4/21/2023    Procedure: Native mammary injection;  Surgeon: Jayne Villalobos MD;  Location:  [facility];  Service: Cardiovascular;  Laterality: N/A;    CARDIAC CATHETERIZATION N/A 4/21/2023    Procedure: Percutaneous Coronary Intervention;  Surgeon: Jayne Villalobos MD;  Location:  [facility];  Service: Cardiovascular;  Laterality: N/A;    CARDIAC CATHETERIZATION N/A 4/21/2023    Procedure: Stent BETO coronary;  Surgeon: Jayne Villalobos MD;  Location:  [facility];  Service: Cardiovascular;  Laterality: N/A;    CARDIAC CATHETERIZATION  4/21/2023    Procedure: Percutaneous Manual Thrombectomy;  Surgeon: Jayne Villalobos MD;  Location:  [facility];  Service: Cardiovascular;;      General Information       Row Name 04/18/24 1045          Physical Therapy Time and Intention    Document Type evaluation  -EE     Mode of Treatment individual therapy;physical therapy  -EE       Row Name 04/18/24 1045          General Information    Patient Profile Reviewed yes  -EE     Prior Level of Function independent:;all household mobility;community mobility  has cane  -EE     Existing Precautions/Restrictions fall;oxygen therapy device and L/min  -EE     Barriers to Rehab none identified  -EE       Row Name 04/18/24 1045          Living Environment    People in Home spouse  -EE       Row Name 04/18/24 1045          Home Main Entrance    Number of Stairs, Main Entrance none  -EE       Row Name 04/18/24 1045          Cognition    Orientation Status (Cognition) oriented x 4  -EE       Row Name  04/18/24 1045          Safety Issues, Functional Mobility    Impairments Affecting Function (Mobility) balance;endurance/activity tolerance;shortness of breath;strength  -EE               User Key  (r) = Recorded By, (t) = Taken By, (c) = Cosigned By      Initials Name Provider Type    EE Leeann Cervantes, PT Physical Therapist                   Mobility       Row Name 04/18/24 1046          Bed Mobility    Bed Mobility supine-sit  -EE     Supine-Sit [place] (Bed Mobility) modified independence  -EE     Assistive Device (Bed Mobility) head of bed elevated  -EE       Row Name 04/18/24 1046          Sit-Stand Transfer    Sit-Stand [place] (Transfers) standby assist;contact guard  -EE     Comment, (Sit-Stand Transfer) STS x3 from EOB  -EE       Row Name 04/18/24 1046          Gait/Stairs (Locomotion)    [place] Level (Gait) contact guard;verbal cues  -EE     Distance in Feet (Gait) 40  -EE     Deviations/Abnormal Patterns (Gait) obed decreased  -EE     Bilateral Gait Deviations forward flexed posture  -EE     Comment, (Gait/Stairs) no overt LOB; increased SOA noted with activity  -EE               User Key  (r) = Recorded By, (t) = Taken By, (c) = Cosigned By      Initials Name Provider Type    EE Leeann Cervantes, PT Physical Therapist                   Obj/Interventions       Row Name 04/18/24 1047          Range of Motion Comprehensive    General Range of Motion bilateral lower extremity ROM WNL  -EE       Row Name 04/18/24 1047          Strength Comprehensive (MMT)    General Manual Muscle Testing (MMT) Assessment lower extremity strength deficits identified  -EE     Comment, General Manual Muscle Testing (MMT) Assessment B LEs grossly 4-/5  -EE       Row Name 04/18/24 1047          Balance    Balance Assessment sitting static balance;standing static balance;standing dynamic balance  -EE     Static Sitting Balance independent  -EE     Position, Sitting Balance unsupported;sitting edge of bed  -EE      Static Standing Balance standby assist  -EE     Dynamic Standing Balance contact guard  -EE     Position/Device Used, Standing Balance unsupported  -EE     Comment, Balance Static standing during ADLs x3 min with CGA/SBA  -EE       Row Name 04/18/24 1047          Sensory Assessment (Somatosensory)    Sensory Assessment (Somatosensory) LE sensation intact  -EE               User Key  (r) = Recorded By, (t) = Taken By, (c) = Cosigned By      Initials Name Provider Type    EE Leeann Cervantes, PT Physical Therapist                   Goals/Plan       Row Name 04/18/24 1050          Transfer Goal 1 (PT)    Activity/Assistive Device (Transfer Goal 1, PT) sit-to-stand/stand-to-sit  -EE     [place] Level/Cues Needed (Transfer Goal 1, PT) independent  -EE     Time Frame (Transfer Goal 1, PT) 1 week;long term goal (LTG)  -EE     Progress/Outcome (Transfer Goal 1, PT) goal ongoing  -EE       Row Name 04/18/24 1050          Gait Training Goal 1 (PT)    Activity/Assistive Device (Gait Training Goal 1, PT) gait (walking locomotion)  -EE     [place] Level (Gait Training Goal 1, PT) independent  -EE     Distance (Gait Training Goal 1, PT) 150'  -EE     Time Frame (Gait Training Goal 1, PT) 1 week;long term goal (LTG)  -EE     Progress/Outcome (Gait Training Goal 1, PT) goal ongoing  -EE       Row Name 04/18/24 1050          Therapy Assessment/Plan (PT)    Planned Therapy Interventions (PT) balance training;bed mobility training;gait training;home exercise program;patient/family education;strengthening;transfer training;ROM (range of motion);postural re-education  -EE               User Key  (r) = Recorded By, (t) = Taken By, (c) = Cosigned By      Initials Name Provider Type    EE Leeann Cervantes, PT Physical Therapist                   Clinical Impression       Row Name 04/18/24 1048          Pain    Pretreatment Pain Rating 0/10 - no pain  -EE     Posttreatment Pain Rating 0/10 - no pain  -EE       Row Name 04/18/24 1048           Plan of Care Review    Plan of Care Reviewed With patient  -EE     Outcome Evaluation Pt is a 74 yo male who presents from home with chest pain, SOA, human metapneumovirus, PMH of CABG and CAD. Prior to admission, pt was living at home with spouse and independent with mobility. Pt uses cane as needed. Upon exam, pt demonstrates generalized weakness and decreased endurance limiting mobility. Pt performed bed mobility independently, stood with SBA, and ambulated 40' with CGA. Pt slightly SOA after ambulation but sats stable throughout. Pt will continue to benefit from PT to address impairments and assist with return to PLOF. No problems anticipated with DC home.  -EE       Row Name 04/18/24 1048          Therapy Assessment/Plan (PT)    Rehab Potential (PT) good, to achieve stated therapy goals  -EE     Criteria for Skilled Interventions Met (PT) yes;meets criteria;skilled treatment is necessary  -EE     Therapy Frequency (PT) 5 times/wk  -EE       Row Name 04/18/24 1048          Vital Signs    Pre SpO2 (%) 100  -EE     O2 Delivery Pre Treatment supplemental O2  -EE     O2 Delivery Intra Treatment supplemental O2  -EE     Post SpO2 (%) 97  -EE     O2 Delivery Post Treatment supplemental O2  -EE     Pre Patient Position Supine  -EE     Intra Patient Position Standing  -EE     Post Patient Position Sitting  -EE       Row Name 04/18/24 1048          Positioning and Restraints    Pre-Treatment Position in bed  -EE     Post Treatment Position chair  -EE     In Chair sitting;call light within reach;encouraged to call for assist;exit alarm on;notified nsg  -EE               User Key  (r) = Recorded By, (t) = Taken By, (c) = Cosigned By      Initials Name Provider Type    Leeann Clark, PT Physical Therapist                   Outcome Measures       Row Name 04/18/24 1052          How much help from another person do you currently need...    Turning from your back to your side while in flat bed without using bedrails? 4   -EE     Moving from lying on back to sitting on the side of a flat bed without bedrails? 4  -EE     Moving to and from a bed to a chair (including a wheelchair)? 3  -EE     Standing up from a chair using your arms (e.g., wheelchair, bedside chair)? 3  -EE     Climbing 3-5 steps with a railing? 3  -EE     To walk in hospital room? 3  -EE     AM-PAC 6 Clicks Score (PT) 20  -EE     Highest Level of Mobility Goal 6 --> Walk 10 steps or more  -EE               User Key  (r) = Recorded By, (t) = Taken By, (c) = Cosigned By      Initials Name Provider Type    EE Leeann Cervantes, SCARLET Physical Therapist                                 Physical Therapy Education       Title: PT OT SLP Therapies (In Progress)       Topic: Physical Therapy (In Progress)       Point: Mobility training (Done)       Learning Progress Summary             Patient Acceptance, E, VU,NR by EE at 4/18/2024 1052                         Point: Home exercise program (Not Started)       Learner Progress:  Not documented in this visit.              Point: Body mechanics (Not Started)       Learner Progress:  Not documented in this visit.              Point: Precautions (Not Started)       Learner Progress:  Not documented in this visit.                              User Key       Initials Effective Dates Name Provider Type Discipline    EE 06/16/21 -  Leeann Cervantes, SCARLET Physical Therapist PT                  PT Recommendation and Plan  Planned Therapy Interventions (PT): balance training, bed mobility training, gait training, home exercise program, patient/family education, strengthening, transfer training, ROM (range of motion), postural re-education  Plan of Care Reviewed With: patient  Outcome Evaluation: Pt is a 74 yo male who presents from home with chest pain, SOA, human metapneumovirus, PMH of CABG and CAD. Prior to admission, pt was living at home with spouse and independent with mobility. Pt uses cane as needed. Upon exam, pt demonstrates generalized  weakness and decreased endurance limiting mobility. Pt performed bed mobility independently, stood with SBA, and ambulated 40' with CGA. Pt slightly SOA after ambulation but sats stable throughout. Pt will continue to benefit from PT to address impairments and assist with return to PLOF. No problems anticipated with DC home.     Time Calculation:         PT Charges       Row Name 04/18/24 1053             Time Calculation    Start Time 1030  -EE      Stop Time 1044  -EE      Time Calculation (min) 14 min  -EE      PT Received On 04/18/24  -EE      PT - Next Appointment 04/19/24  -EE      PT Goal Re-Cert Due Date 04/25/24  -EE         Time Calculation- PT    Total Timed Code Minutes- PT 8 minute(s)  -EE         Timed Charges    27700 - PT Therapeutic Activity Minutes 8  -EE         Total Minutes    Timed Charges Total Minutes 8  -EE       Total Minutes 8  -EE                User Key  (r) = Recorded By, (t) = Taken By, (c) = Cosigned By      Initials Name Provider Type    EE Leeann Cervantes, PT Physical Therapist                  Therapy Charges for Today       Code Description Service Date Service Provider Modifiers Qty    99703210504 HC PT THERAPEUTIC ACT EA 15 MIN 4/18/2024 Leeann Cervantes, PT GP 1    53376416229 HC PT EVAL MOD COMPLEXITY 2 4/18/2024 Leeann Cervantes, PT GP 1            PT G-Codes  AM-PAC 6 Clicks Score (PT): 20  PT Discharge Summary  Anticipated Discharge Disposition (PT): home    Leeann Cervantes PT  4/18/2024

## 2024-04-18 NOTE — PLAN OF CARE
Goal Outcome Evaluation:              Outcome Evaluation: Pt is aox4, hypertensive, rsolved after admin of bp meds, voiding spontanetously

## 2024-04-19 ENCOUNTER — APPOINTMENT (OUTPATIENT)
Dept: NUCLEAR MEDICINE | Facility: HOSPITAL | Age: 74
End: 2024-04-19
Payer: MEDICARE

## 2024-04-19 LAB
ANION GAP SERPL CALCULATED.3IONS-SCNC: 11 MMOL/L (ref 5–15)
AORTIC ARCH: 3.1 CM
AORTIC DIMENSIONLESS INDEX: 0.7 (DI)
ASCENDING AORTA: 3.4 CM
BH CV ECHO MEAS - ACS: 1.33 CM
BH CV ECHO MEAS - AO MAX PG: 12.7 MMHG
BH CV ECHO MEAS - AO MEAN PG: 7 MMHG
BH CV ECHO MEAS - AO ROOT DIAM: 3.3 CM
BH CV ECHO MEAS - AO V2 MAX: 178 CM/SEC
BH CV ECHO MEAS - AO V2 VTI: 34.6 CM
BH CV ECHO MEAS - AVA(I,D): 2.38 CM2
BH CV ECHO MEAS - EDV(CUBED): 81.7 ML
BH CV ECHO MEAS - EDV(MOD-SP2): 93 ML
BH CV ECHO MEAS - EDV(MOD-SP4): 119 ML
BH CV ECHO MEAS - EF(MOD-BP): 45.1 %
BH CV ECHO MEAS - EF(MOD-SP2): 46.2 %
BH CV ECHO MEAS - EF(MOD-SP4): 44.5 %
BH CV ECHO MEAS - ESV(CUBED): 41 ML
BH CV ECHO MEAS - ESV(MOD-SP2): 50 ML
BH CV ECHO MEAS - ESV(MOD-SP4): 66 ML
BH CV ECHO MEAS - FS: 20.5 %
BH CV ECHO MEAS - IVS/LVPW: 0.99 CM
BH CV ECHO MEAS - IVSD: 1.12 CM
BH CV ECHO MEAS - LAT PEAK E' VEL: 8.1 CM/SEC
BH CV ECHO MEAS - LV MASS(C)D: 170 GRAMS
BH CV ECHO MEAS - LV MAX PG: 6.2 MMHG
BH CV ECHO MEAS - LV MEAN PG: 3 MMHG
BH CV ECHO MEAS - LV V1 MAX: 124 CM/SEC
BH CV ECHO MEAS - LV V1 VTI: 24.9 CM
BH CV ECHO MEAS - LVIDD: 4.3 CM
BH CV ECHO MEAS - LVIDS: 3.4 CM
BH CV ECHO MEAS - LVOT AREA: 3.3 CM2
BH CV ECHO MEAS - LVOT DIAM: 2.05 CM
BH CV ECHO MEAS - LVPWD: 1.13 CM
BH CV ECHO MEAS - MED PEAK E' VEL: 6.7 CM/SEC
BH CV ECHO MEAS - MV A DUR: 0.11 SEC
BH CV ECHO MEAS - MV A MAX VEL: 101 CM/SEC
BH CV ECHO MEAS - MV DEC SLOPE: 538.2 CM/SEC2
BH CV ECHO MEAS - MV DEC TIME: 0.16 SEC
BH CV ECHO MEAS - MV E MAX VEL: 125 CM/SEC
BH CV ECHO MEAS - MV E/A: 1.24
BH CV ECHO MEAS - MV MAX PG: 6.4 MMHG
BH CV ECHO MEAS - MV MEAN PG: 2.9 MMHG
BH CV ECHO MEAS - MV P1/2T: 68.7 MSEC
BH CV ECHO MEAS - MV V2 VTI: 32.8 CM
BH CV ECHO MEAS - MVA(P1/2T): 3.2 CM2
BH CV ECHO MEAS - MVA(VTI): 2.5 CM2
BH CV ECHO MEAS - PA ACC TIME: 0.1 SEC
BH CV ECHO MEAS - PA V2 MAX: 78.7 CM/SEC
BH CV ECHO MEAS - QP/QS: 0.69
BH CV ECHO MEAS - RAP SYSTOLE: 8 MMHG
BH CV ECHO MEAS - RV MAX PG: 1.36 MMHG
BH CV ECHO MEAS - RV V1 MAX: 58.3 CM/SEC
BH CV ECHO MEAS - RV V1 VTI: 15.8 CM
BH CV ECHO MEAS - RVOT DIAM: 2.13 CM
BH CV ECHO MEAS - RVSP: 22 MMHG
BH CV ECHO MEAS - SUP REN AO DIAM: 2.4 CM
BH CV ECHO MEAS - SV(LVOT): 82.3 ML
BH CV ECHO MEAS - SV(MOD-SP2): 43 ML
BH CV ECHO MEAS - SV(MOD-SP4): 53 ML
BH CV ECHO MEAS - SV(RVOT): 56.6 ML
BH CV ECHO MEAS - TAPSE (>1.6): 1.8 CM
BH CV ECHO MEAS - TR MAX PG: 13.5 MMHG
BH CV ECHO MEAS - TR MAX VEL: 184 CM/SEC
BH CV ECHO MEASUREMENTS AVERAGE E/E' RATIO: 16.89
BH CV REST NUCLEAR ISOTOPE DOSE: 10.9 MCI
BH CV STRESS COMMENTS STAGE 1: NORMAL
BH CV STRESS DOSE REGADENOSON STAGE 1: 0.4
BH CV STRESS DURATION MIN STAGE 1: 0
BH CV STRESS DURATION SEC STAGE 1: 10
BH CV STRESS NUCLEAR ISOTOPE DOSE: 29.7 MCI
BH CV STRESS PROTOCOL 1: NORMAL
BH CV STRESS RECOVERY BP: NORMAL MMHG
BH CV STRESS RECOVERY HR: 87 BPM
BH CV STRESS STAGE 1: 1
BH CV XLRA - RV BASE: 3.3 CM
BH CV XLRA - RV LENGTH: 8.3 CM
BH CV XLRA - RV MID: 2.6 CM
BH CV XLRA - TDI S': 11.9 CM/SEC
BUN SERPL-MCNC: 37 MG/DL (ref 8–23)
BUN/CREAT SERPL: 23.6 (ref 7–25)
CALCIUM SPEC-SCNC: 9.1 MG/DL (ref 8.6–10.5)
CHLORIDE SERPL-SCNC: 102 MMOL/L (ref 98–107)
CO2 SERPL-SCNC: 27 MMOL/L (ref 22–29)
CREAT SERPL-MCNC: 1.57 MG/DL (ref 0.76–1.27)
DEPRECATED RDW RBC AUTO: 44.8 FL (ref 37–54)
EGFRCR SERPLBLD CKD-EPI 2021: 45.7 ML/MIN/1.73
ERYTHROCYTE [DISTWIDTH] IN BLOOD BY AUTOMATED COUNT: 13.6 % (ref 12.3–15.4)
GLUCOSE SERPL-MCNC: 155 MG/DL (ref 65–99)
HCT VFR BLD AUTO: 43.1 % (ref 37.5–51)
HGB BLD-MCNC: 14.6 G/DL (ref 13–17.7)
LEFT ATRIUM VOLUME INDEX: 44.4 ML/M2
LV EF NUC BP: 60 %
MAXIMAL PREDICTED HEART RATE: 145 BPM
MCH RBC QN AUTO: 30.5 PG (ref 26.6–33)
MCHC RBC AUTO-ENTMCNC: 33.9 G/DL (ref 31.5–35.7)
MCV RBC AUTO: 90.2 FL (ref 79–97)
PERCENT MAX PREDICTED HR: 61.38 %
PLATELET # BLD AUTO: 262 10*3/MM3 (ref 140–450)
PMV BLD AUTO: 11.3 FL (ref 6–12)
POTASSIUM SERPL-SCNC: 4.1 MMOL/L (ref 3.5–5.2)
RBC # BLD AUTO: 4.78 10*6/MM3 (ref 4.14–5.8)
SINUS: 3.4 CM
SODIUM SERPL-SCNC: 140 MMOL/L (ref 136–145)
STJ: 2.8 CM
STRESS BASELINE BP: NORMAL MMHG
STRESS BASELINE HR: 84 BPM
STRESS PERCENT HR: 72 %
STRESS POST PEAK BP: NORMAL MMHG
STRESS POST PEAK HR: 89 BPM
STRESS TARGET HR: 123 BPM
WBC NRBC COR # BLD AUTO: 28.81 10*3/MM3 (ref 3.4–10.8)

## 2024-04-19 PROCEDURE — 85027 COMPLETE CBC AUTOMATED: CPT | Performed by: STUDENT IN AN ORGANIZED HEALTH CARE EDUCATION/TRAINING PROGRAM

## 2024-04-19 PROCEDURE — 94760 N-INVAS EAR/PLS OXIMETRY 1: CPT

## 2024-04-19 PROCEDURE — 94799 UNLISTED PULMONARY SVC/PX: CPT

## 2024-04-19 PROCEDURE — 97530 THERAPEUTIC ACTIVITIES: CPT

## 2024-04-19 PROCEDURE — 78452 HT MUSCLE IMAGE SPECT MULT: CPT | Performed by: INTERNAL MEDICINE

## 2024-04-19 PROCEDURE — 94761 N-INVAS EAR/PLS OXIMETRY MLT: CPT

## 2024-04-19 PROCEDURE — 78452 HT MUSCLE IMAGE SPECT MULT: CPT

## 2024-04-19 PROCEDURE — 99232 SBSQ HOSP IP/OBS MODERATE 35: CPT

## 2024-04-19 PROCEDURE — 93017 CV STRESS TEST TRACING ONLY: CPT

## 2024-04-19 PROCEDURE — A9500 TC99M SESTAMIBI: HCPCS | Performed by: STUDENT IN AN ORGANIZED HEALTH CARE EDUCATION/TRAINING PROGRAM

## 2024-04-19 PROCEDURE — 94664 DEMO&/EVAL PT USE INHALER: CPT

## 2024-04-19 PROCEDURE — 25010000002 REGADENOSON 0.4 MG/5ML SOLUTION: Performed by: STUDENT IN AN ORGANIZED HEALTH CARE EDUCATION/TRAINING PROGRAM

## 2024-04-19 PROCEDURE — 80048 BASIC METABOLIC PNL TOTAL CA: CPT | Performed by: STUDENT IN AN ORGANIZED HEALTH CARE EDUCATION/TRAINING PROGRAM

## 2024-04-19 PROCEDURE — 25010000002 METHYLPREDNISOLONE PER 40 MG: Performed by: INTERNAL MEDICINE

## 2024-04-19 PROCEDURE — 93018 CV STRESS TEST I&R ONLY: CPT | Performed by: INTERNAL MEDICINE

## 2024-04-19 PROCEDURE — 0 TECHNETIUM SESTAMIBI: Performed by: STUDENT IN AN ORGANIZED HEALTH CARE EDUCATION/TRAINING PROGRAM

## 2024-04-19 RX ORDER — ISOSORBIDE MONONITRATE 30 MG/1
30 TABLET, EXTENDED RELEASE ORAL
Status: DISCONTINUED | OUTPATIENT
Start: 2024-04-19 | End: 2024-04-24 | Stop reason: HOSPADM

## 2024-04-19 RX ORDER — REGADENOSON 0.08 MG/ML
0.4 INJECTION, SOLUTION INTRAVENOUS
Status: COMPLETED | OUTPATIENT
Start: 2024-04-19 | End: 2024-04-19

## 2024-04-19 RX ORDER — CARVEDILOL 12.5 MG/1
12.5 TABLET ORAL 2 TIMES DAILY WITH MEALS
Status: DISCONTINUED | OUTPATIENT
Start: 2024-04-19 | End: 2024-04-21

## 2024-04-19 RX ORDER — PREDNISONE 20 MG/1
40 TABLET ORAL
Status: DISCONTINUED | OUTPATIENT
Start: 2024-04-20 | End: 2024-04-22

## 2024-04-19 RX ADMIN — CARVEDILOL 12.5 MG: 12.5 TABLET, FILM COATED ORAL at 18:46

## 2024-04-19 RX ADMIN — REGADENOSON 0.4 MG: 0.08 INJECTION, SOLUTION INTRAVENOUS at 09:04

## 2024-04-19 RX ADMIN — FLUTICASONE PROPIONATE 2 SPRAY: 50 SPRAY, METERED NASAL at 10:33

## 2024-04-19 RX ADMIN — ATORVASTATIN CALCIUM 40 MG: 20 TABLET, FILM COATED ORAL at 14:45

## 2024-04-19 RX ADMIN — IPRATROPIUM BROMIDE AND ALBUTEROL SULFATE 3 ML: .5; 3 SOLUTION RESPIRATORY (INHALATION) at 20:34

## 2024-04-19 RX ADMIN — IPRATROPIUM BROMIDE AND ALBUTEROL SULFATE 3 ML: .5; 3 SOLUTION RESPIRATORY (INHALATION) at 11:46

## 2024-04-19 RX ADMIN — DOCUSATE SODIUM 50MG AND SENNOSIDES 8.6MG 2 TABLET: 8.6; 5 TABLET, FILM COATED ORAL at 14:45

## 2024-04-19 RX ADMIN — CLOPIDOGREL BISULFATE 75 MG: 75 TABLET, FILM COATED ORAL at 14:45

## 2024-04-19 RX ADMIN — ISOSORBIDE MONONITRATE 30 MG: 30 TABLET, EXTENDED RELEASE ORAL at 18:46

## 2024-04-19 RX ADMIN — TAMSULOSIN HYDROCHLORIDE 0.4 MG: 0.4 CAPSULE ORAL at 14:45

## 2024-04-19 RX ADMIN — CLONIDINE HYDROCHLORIDE 0.1 MG: 0.1 TABLET ORAL at 20:49

## 2024-04-19 RX ADMIN — ASPIRIN 81 MG: 81 TABLET, COATED ORAL at 14:45

## 2024-04-19 RX ADMIN — LISINOPRIL 5 MG: 5 TABLET ORAL at 14:45

## 2024-04-19 RX ADMIN — METHYLPREDNISOLONE SODIUM SUCCINATE 40 MG: 40 INJECTION, POWDER, FOR SOLUTION INTRAMUSCULAR; INTRAVENOUS at 10:33

## 2024-04-19 RX ADMIN — TECHNETIUM TC 99M SESTAMIBI 1 DOSE: 1 INJECTION INTRAVENOUS at 09:04

## 2024-04-19 RX ADMIN — TECHNETIUM TC 99M SESTAMIBI 1 DOSE: 1 INJECTION INTRAVENOUS at 06:15

## 2024-04-19 RX ADMIN — CLONIDINE HYDROCHLORIDE 0.1 MG: 0.1 TABLET ORAL at 14:45

## 2024-04-19 NOTE — PROGRESS NOTES
[facility]  Cardiology Progress Note    Patient Identification:  Name: Negrito Navarro  Age: 73 y.o.  Sex: male  :  1950  MRN: [id number]                 Follow Up / Chief Complaint: follow up for chest pain    Interval History: Stress test and echo today, no chest pain, remains on 2l nc, still with cough and still with shortness of breath      Objective:    Past Medical History:  Past Medical History:   Diagnosis Date    Benign prostatic hyperplasia 11/10/2016    COPD (chronic obstructive pulmonary disease) 6/10/2021    Coronary artery disease involving coronary bypass graft of native heart without angina pectoris 6/10/2021    Essential hypertension 11/10/2016    Hyperlipidemia     SERENITY (obstructive sleep apnea) 11/10/2016     Past Surgical History:  Past Surgical History:   Procedure Laterality Date    CARDIAC CATHETERIZATION N/A 2023    Procedure: Left Heart Cath;  Surgeon: Jayne Villalobos MD;  [facility];  Service: Cardiovascular;  Laterality: N/A;    CARDIAC CATHETERIZATION N/A 2023    Procedure: Coronary angiography;  Surgeon: Jayne Villalobos MD;  [facility];  Service: Cardiovascular;  Laterality: N/A;    CARDIAC CATHETERIZATION N/A 2023    Procedure: Left ventriculography;  Surgeon: Jayne Villalobos MD;  [facility];  Service: Cardiovascular;  Laterality: N/A;    CARDIAC CATHETERIZATION  2023    Procedure: Saphenous Vein Graft;  Surgeon: Jayne Villalobos MD;  [facility];  Service: Cardiovascular;;    CARDIAC CATHETERIZATION N/A 2023    Procedure: Native mammary injection;  Surgeon: Jayne Villalobos MD;  [facility];  Service: Cardiovascular;  Laterality: N/A;    CARDIAC CATHETERIZATION N/A 2023    Procedure: Percutaneous Coronary Intervention;  Surgeon: Jayne Villalobos MD;   Location:  [facility];  Service: Cardiovascular;  Laterality: N/A;    CARDIAC CATHETERIZATION N/A 4/21/2023    Procedure: Stent BETO coronary;  Surgeon: Jayne Villalobos MD;  Location:  [facility];  Service: Cardiovascular;  Laterality: N/A;    CARDIAC CATHETERIZATION  4/21/2023    Procedure: Percutaneous Manual Thrombectomy;  Surgeon: Jayne Villalobos MD;  Location:  [facility];  Service: Cardiovascular;;        Social History:   Social History     Tobacco Use    Smoking status: Never    Smokeless tobacco: Never   Substance Use Topics    Alcohol use: Not Currently      Family History:  History reviewed. No pertinent family history.       Allergies:  Allergies   Allergen Reactions    Shellfish-Derived Products Unknown - Low Severity     gout  gout       Scheduled Meds:  aspirin, 81 mg, Daily  atorvastatin, 40 mg, Daily  carvedilol, 12.5 mg, BID With Meals  cefTRIAXone, 2,000 mg, Q24H  cloNIDine, 0.1 mg, Q12H  clopidogrel, 75 mg, Daily  fluticasone, 2 spray, Daily  ipratropium-albuterol, 3 mL, Q6H While Awake - RT  lisinopril, 5 mg, Q24H  [START ON 4/20/2024] predniSONE, 40 mg, Daily With Breakfast  [Held by provider] spironolactone, 50 mg, Daily  tamsulosin, 0.4 mg, Daily            INTAKE AND OUTPUT:    Intake/Output Summary (Last 24 hours) at 4/19/2024 1425  Last data filed at 4/18/2024 2324  Gross per 24 hour   Intake --   Output 400 ml   Net -400 ml       Review of Systems:   GI: no n/v or abd pain  Cardiac: no chest pain or palpitations  Pulmonary: no shortness of breath or cough      Constitutional:  Temp:  [97.2 °F (36.2 °C)-98.4 °F (36.9 °C)] 97.2 °F (36.2 °C)  Heart Rate:  [60-87] 79  Resp:  [16-18] 18  BP: (175-187)/() 186/93    Physical Exam:  General:  Appears in no acute distress  Eyes: eom normal no conjunctival drainage  HEENT:  No JVD. Thyroid not visibly enlarged. No mucosal pallor or cyanosis  Respiratory: Respirations regular  and unlabored at rest. BBS with good air entry in all fields. No crackles, rubs or wheezes auscultated  Cardiovascular: S1S2 Regular rate and rhythm. No murmur, rub or gallop. No carotid bruits. DP/PT pulses   2+  . No pretibial pitting edema  Gastrointestinal: Abdomen soft, non tender. Bowel sounds present.   Musculoskeletal: VILLARREAL x4. No abnormal movements  Neuro: AAO x3 CN II-XII grossly intact  Psych: Mood and affect normal, pleasant and cooperative          Cardiographics    Echocardiogram:   Interpretation Summary         Left ventricular ejection fraction appears to be 46 - 50%.    Left ventricular diastolic function is consistent with (grade I) impaired relaxation.    The left atrial cavity is mild to moderately dilated.    There is calcification of the aortic valve.    Estimated right ventricular systolic pressure from tricuspid regurgitation is normal (<35 mmHg).     Lab Review   Results from last 7 days   Lab Units 04/18/24  0234 04/18/24  0026 04/17/24  1616   HSTROP T ng/L 40* 38* 38*         Results from last 7 days   Lab Units 04/19/24  1315   SODIUM mmol/L 140   POTASSIUM mmol/L 4.1   BUN mg/dL 37*   CREATININE mg/dL 1.57*   CALCIUM mg/dL 9.1     @[id number]@  Results from last 7 days   Lab Units 04/19/24  1315 04/18/24  0234 04/17/24  1616   WBC 10*3/mm3 28.81* 10.98* 12.86*   HEMOGLOBIN g/dL 14.6 14.8 14.0   HEMATOCRIT % 43.1 44.0 41.0   PLATELETS 10*3/mm3 262 229 230             Assessment:  Acute hypoxic respiratory failure  Pneumonia  Human metapneumovirus infection  Chest pain  Coronary artery disease s/p CABG times 4/20/2016 and BETO to RCA 4/2023  Hypertension  Hyperlipidemia  COPD  Sleep apnea  CKD      Plan:  BP elevated, meds just given, will monitor for response  No chest pain  Remains on 2l nc, still with cough and some shortness of breath.   Stress test with small lateral wall ischemia, treat medically. Continue aspirin, plavix, coreg. I will start imdur 30mg.   Echo with ef 46-50%,  "grade I LV diastolic dysfunction.       )4/19/2024  HANNAH Tamez      EMR Dragon/Transcription:   \"Dictated utilizing Dragon dictation\".     "

## 2024-04-19 NOTE — DISCHARGE PLACEMENT REQUEST
"Wilbur Navarro (73 y.o. Male)       Date of Birth   1950    Social Security Number       Address   [address] [place], [place] [address]    Home Phone   [phone]    MRN   [id number]       Jehov[employer] witness   Mandaeism    Marital Status   Legally                             Admission Date   4/17/24    Admission Type   Emergency    Admitting Provider   Alejandra Warren MD    Attending Provider   Chica Brumfield MD    Department, Room/Bed   [address], [address]       Discharge Date       Discharge Disposition       Discharge Destination                                 Attending Provider: Chica Brumfield MD    Allergies: Shellfish-derived Products    Isolation: Contact   Infection: Human Metapneumovirus  (04/17/24)   Code Status: CPR    Ht: 172.7 cm (68\")   Wt: 88.7 kg (195 lb 8.8 oz)    Admission Cmt: None   Principal Problem: Acute hypoxic respiratory failure [J96.01]                   Active Insurance as of 4/17/2024       Primary Coverage       Payor Plan Insurance Group Employer/Plan Group    ANTHEM MEDICARE REPLACEMENT ANTHEM MEDICARE ADVANTAGE [id number]       Payor Plan Address Payor Plan Phone Number Payor Plan Fax Number Effective Dates    [address] [phone]  1/1/2022 - None Entered    [facility] [address]         Subscriber Name Subscriber Birth Date Member ID       WILBUR NAVARRO 1950 [id number]               Secondary Coverage       Payor Plan Insurance Group Employer/Plan Group    [place]        Payor Plan Address Payor Plan Phone Number Payor Plan Fax Number Effective Dates    [address] [phone]  6/9/2021 - None Entered    [facility] [id number]         Subscriber Name Subscriber Birth Date Member ID       WILBUR NAVARRO 1950 [id number]                     Emergency Contacts        (Rel.) Home Phone Work Phone Mobile Phone    JOAQUIM SERRATO (Daughter) -- -- [phone]    NavarroDrea " (Daughter) -- -- --

## 2024-04-19 NOTE — PROGRESS NOTES
Name: Negrito Navarro ADMIT: 2024   : 1950  PCP: Karen Yu MD    MRN: [id number] LOS: 2 days   AGE/SEX: 73 y.o. male  ROOM: [facility]     Subjective   Subjective   No acute events overnight.  Patient states he is feeling about the same.  Still having some shortness of breath.  Remains on 3 L nasal cannula.  Denies any chest pain.  Eating and drinking well.    Objective   Objective     Vital Signs  Temp:  [97.3 °F (36.3 °C)-98.4 °F (36.9 °C)] 97.3 °F (36.3 °C)  Heart Rate:  [60-84] 78  Resp:  [18] 18  BP: (175-187)/() 175/102  SpO2:  [87 %-99 %] 93 %  on  Flow (L/min):  [3] 3;   Device (Oxygen Therapy): nasal cannula  Body mass index is 29.73 kg/m².    Physical Exam  General: Alert, no acute distress.  Chronically ill-appearing.  Sitting up in bed.  ENT: No conjunctival injection or scleral icterus. Moist mucous membranes.  Neuro: Eyes open and moving in all directions, strength normal in all extremities, no focal deficits.   Lungs: Coarse breath sounds throughout.  Occasional expiratory wheeze, has improved from yesterday.  No distress.  Nasal cannula in place.  Heart: RRR, no murmurs. No edema.  Abdomen: Soft, non-tender, non-distended. Normal bowel sounds.   Ext: Warm and well-perfused. No edema.   Skin: No rashes or lesions. IV site without swelling or erythema.     Results Review     I reviewed the patient's new clinical results:  Results from last 7 days   Lab Units 24  0234 24  1616   WBC 10*3/mm3 10.98* 12.86*   HEMOGLOBIN g/dL 14.8 14.0   PLATELETS 10*3/mm3 229 230     Results from last 7 days   Lab Units 24  0234 24  1616   SODIUM mmol/L 139 139   POTASSIUM mmol/L 4.0 4.3   CHLORIDE mmol/L 101 102   CO2 mmol/L 27.0 24.3   BUN mg/dL 28* 23   CREATININE mg/dL 1.92* 1.69*   GLUCOSE mg/dL 132* 113*   EGFR mL/min/1.73 35.9* 41.8*     Results from last 7 days   Lab Units 24  1616   ALBUMIN g/dL 3.7   BILIRUBIN mg/dL 1.4*   ALK PHOS U/L 88   AST (SGOT) U/L  "30   ALT (SGPT) U/L 14     Results from last 7 days   Lab Units 04/18/24  0234 04/17/24  1616   CALCIUM mg/dL 8.4* 8.7   ALBUMIN g/dL  --  3.7     Results from last 7 days   Lab Units 04/17/24  2041 04/17/24  1616   PROCALCITONIN ng/mL  --  0.35*   LACTATE mmol/L 1.5  --      No results found for: \"HGBA1C\", \"POCGLU\"    CT Angiogram Chest Pulmonary Embolism    Result Date: 4/17/2024   No pulmonary embolism. Bilateral reticulonodular infiltrates suggesting pneumonia, follow-up CT advised to exclude any possibility of underlying pulmonary nodules. Nonspecific mediastinal and hilar adenopathy, further evaluation/follow-up advised as indicated.  This report was finalized on 4/17/2024 7:25 PM by Dr. Rigoberto Herrera M.D on Workstation: [employer]      XR Chest 1 View    Result Date: 4/17/2024  Mild bilateral basilar atelectasis or infiltrate. Borderline heart size.  This report was finalized on 4/17/2024 4:03 PM by Dr. Rigoberto Herrera M.D on Workstation: [employer]       I have personally reviewed all medications:  Scheduled Medications  aspirin, 81 mg, Oral, Daily  atorvastatin, 40 mg, Oral, Daily  carvedilol, 12.5 mg, Oral, BID With Meals  cefTRIAXone, 2,000 mg, Intravenous, Q24H  cloNIDine, 0.1 mg, Oral, Q12H  clopidogrel, 75 mg, Oral, Daily  fluticasone, 2 spray, Nasal, Daily  ipratropium-albuterol, 3 mL, Nebulization, Q6H While Awake - RT  lisinopril, 5 mg, Oral, Q24H  methylPREDNISolone sodium succinate, 40 mg, Intravenous, Q8H  [Held by provider] spironolactone, 50 mg, Oral, Daily  tamsulosin, 0.4 mg, Oral, Daily    Infusions   Diet  NPO Diet NPO Type: Strict NPO    Assessment/Plan     Active Hospital Problems    Diagnosis  POA    **Acute hypoxic respiratory failure [J96.01]  Yes      Resolved Hospital Problems   No resolved problems to display.       73 y.o. male with Acute hypoxic respiratory failure.    Acute hypoxic respiratory failure  Pneumonia  Human metapneumovirus  -CT chest with no pulmonary embolism, " bilateral reticulonodular infiltrates suggestive of pneumonia; will need CT follow-up after resolution of acute illness  -Viral respiratory panel positive for human metapneumovirus  -Currently on 3 L nasal cannula, wean as able  -WBC elevated at 13 K, improved to 11 K yesterday.  Labs pending, will follow-up.  -Blood cultures pending, no growth to date  -Continue DuoNebs and steroids  -Ceftriaxone x 5 days  -Suspect patient may need a couple more days to wean off oxygen then improved.  Will also restart diuretics pending creatinine today.    Chest pain  Coronary artery disease  Hypertension  HFrEF  -Patient underwent PCI to the RCA in April 2023  -Blood pressure remains elevated above goal  -S/p Lasix 80 mg IV in ER  -Continue home lisinopril and clonidine, increase dose of carvedilol today  -If increased Coreg does not control blood pressure, can increase dose of clonidine  -Continue oral as needed hydralazine for elevated blood pressure  -Continue ASA, statin, Plavix  -Cardiology consulted  -EKG without evidence of acute ischemic changes  -Troponin elevated at 38, recheck is 42.  This is in setting of CKD.  -Echocardiogram completed, read pending  -Stress test completed, read pending    BAO on CKD  -Most recent creatinine in system was last year at 1.3  -Creatinine further increased to 1.92 yesterday, labs pending today.  Will follow-up.  -Hold off on further diuresis for now pending labs today  -Hold spironolactone  -Monitor very closely with daily BMP  -If creatinine increases on check today, consult nephrology for further evaluation    SCDs for DVT prophylaxis.  Full code.  Discussed with patient, nursing staff, and care team on multidisciplinary rounds.  Anticipate discharge home timing yet to be determined.      Chica Brumfield MD  [facility]  04/19/24  11:24 EDT    Addendum: Reviewed.  WBC increased substantially to 29 K.  The patient has received large amounts of IV steroids.  Will  transition this to oral.  Still seems to be responding to the antibiotics with clinical improvement, so we will hold off on additional infectious workup for now.  If WBC is not improved tomorrow, will likely need additional workup.  This would also apply if the patient were to worsen clinically.  Kidney function has improved with creatinine of 1.57.  Will restart the patient's home

## 2024-04-19 NOTE — CASE MANAGEMENT/SOCIAL WORK
Continued Stay Note  [facility]     Patient Name: Negrito Navarro  MRN: [id number]  Today's Date: 4/19/2024    Admit Date: 4/17/2024    Plan: Home with family and potentially new oxygen   Discharge Plan       Row Name 04/19/24 0652       Plan    Plan Home with family and potentially new oxygen    Patient/Family in Agreement with Plan yes    Plan Comments Pt remains on oxygen inpt, referral made to pt choice of [employer] for DME, pt will need qualifying sats documented, walking ox and orders if needed. Weekend CCP can be notified to arrange if d/c over weekend and needs. CCP will follow - Chica BAXTER                   Discharge Codes    No documentation.                 Expected Discharge Date and Time       Expected Discharge Date Expected Discharge Time    Apr 20, 2024               Chica Myers RN

## 2024-04-19 NOTE — PLAN OF CARE
Goal Outcome Evaluation:  Plan of Care Reviewed With: patient        Progress: improving  Outcome Evaluation: Pt demos steady progress with strength and endurance, as evidenced by tolerance of increased ambulation distance. Pt performed bed mobility independently, stood with SBA, and ambulated 150' with CGA. Pt slightly unsteady with turns but demos no overt LOB. Pt did demo increased SOA while ambulating on 3 L O2 per nc. Sats 86% immediately after ambulation. Improved to 93% after seated rest and cues for pursed lip breathing. Pt will continue to benefit from PT to address balance and endurance in order to assist with return to PLOF. No problems anticipated with DC home when medically stable.      Anticipated Discharge Disposition (PT): home

## 2024-04-19 NOTE — SIGNIFICANT NOTE
Attempted clinical swallow eval x2. Pt NPO and off the floor for procedure. Will ck tha today as able. Please contact speech therapy when pt becomes appropriate for eval.

## 2024-04-19 NOTE — THERAPY TREATMENT NOTE
Patient Name: Negrito Navarro  : 1950    MRN: [id number]                              Today's Date: 2024       Admit Date: 2024    Visit Dx:     ICD-10-CM ICD-9-CM   1. Acute hypoxic respiratory failure  J96.01 518.81   2. Infection due to human metapneumovirus (hMPV)  B34.8 079.89   3. Chest pain, unspecified type  R07.9 786.50   4. History of coronary artery bypass graft  Z95.1 V45.81   5. History of CHF (congestive heart failure)  Z86.79 V12.59     Patient Active Problem List   Diagnosis    Hyperlipidemia    Benign prostatic hyperplasia    Chronic constipation    Primary hypertension    SERENITY (obstructive sleep apnea)    Oxygen dependent    COPD (chronic obstructive pulmonary disease)    Coronary artery disease of native artery of native heart with stable angina pectoris    Acute coronary syndrome with high troponin    Precordial chest pain    History of coronary angioplasty with insertion of stent    Hx of CABG    CAD S/P percutaneous coronary angioplasty    Long term (current) use of antithrombotics/antiplatelets    Acute hypoxic respiratory failure     Past Medical History:   Diagnosis Date    Benign prostatic hyperplasia 11/10/2016    COPD (chronic obstructive pulmonary disease) 6/10/2021    Coronary artery disease involving coronary bypass graft of native heart without angina pectoris 6/10/2021    Essential hypertension 11/10/2016    Hyperlipidemia     SERENITY (obstructive sleep apnea) 11/10/2016     Past Surgical History:   Procedure Laterality Date    CARDIAC CATHETERIZATION N/A 2023    Procedure: Left Heart Cath;  Surgeon: Jayne Villalobos MD;  Location:  [facility];  Service: Cardiovascular;  Laterality: N/A;    CARDIAC CATHETERIZATION N/A 2023    Procedure: Coronary angiography;  Surgeon: Jayne Villalobos MD;  Location:  [facility];  Service: Cardiovascular;  Laterality: N/A;    CARDIAC CATHETERIZATION N/A 2023    Procedure: Left  ventriculography;  Surgeon: Jayne Villalobos MD;  Location:  [facility];  Service: Cardiovascular;  Laterality: N/A;    CARDIAC CATHETERIZATION  4/21/2023    Procedure: Saphenous Vein Graft;  Surgeon: Jayne Villalobos MD;  Location:  [facility];  Service: Cardiovascular;;    CARDIAC CATHETERIZATION N/A 4/21/2023    Procedure: Native mammary injection;  Surgeon: Jayne Villalobos MD;  Location:  [facility];  Service: Cardiovascular;  Laterality: N/A;    CARDIAC CATHETERIZATION N/A 4/21/2023    Procedure: Percutaneous Coronary Intervention;  Surgeon: Jayne Villalobos MD;  Location:  [facility];  Service: Cardiovascular;  Laterality: N/A;    CARDIAC CATHETERIZATION N/A 4/21/2023    Procedure: Stent BETO coronary;  Surgeon: Jayne Villalobos MD;  [facility];  Service: Cardiovascular;  Laterality: N/A;    CARDIAC CATHETERIZATION  4/21/2023    Procedure: Percutaneous Manual Thrombectomy;  Surgeon: Jayne Villalobos MD;  Location:  [facility]VAS[facility];  Service: Cardiovascular;;      General Information       Row Name 04/19/24 1118          Physical Therapy Time and Intention    Document Type therapy note (daily note)  -EE     Mode of Treatment individual therapy;physical therapy  -EE       Row Name 04/19/24 1118          General Information    Existing Precautions/Restrictions fall;oxygen therapy device and L/min  -EE     Barriers to Rehab none identified  -EE       Row Name 04/19/24 1118          Cognition    Orientation Status (Cognition) oriented x 4  -EE       Row Name 04/19/24 1118          Safety Issues, Functional Mobility    Impairments Affecting Function (Mobility) balance;endurance/activity tolerance;shortness of breath;strength  -EE               User Key  (r) = Recorded By, (t) = Taken By, (c) = Cosigned By      Initials Name Provider Type    EE Leeann Cervantes PT Physical  Therapist                   Mobility       Row Name 04/19/24 1118          Bed Mobility    Supine-Sit [place] (Bed Mobility) modified independence  -EE     Assistive Device (Bed Mobility) head of bed elevated  -EE       Row Name 04/19/24 1118          Sit-Stand Transfer    Sit-Stand [place] (Transfers) standby assist;contact guard  -EE       Row Name 04/19/24 1118          Gait/Stairs (Locomotion)    [place] Level (Gait) contact guard  -EE     Distance in Feet (Gait) 150  -EE     Deviations/Abnormal Patterns (Gait) obed decreased;base of support, narrow  -EE     Bilateral Gait Deviations forward flexed posture  -EE     Comment, (Gait/Stairs) Slightly unsteady at times but no overt LOB. Increased SOA noted with activity; cues for pursed lip breathing.  -EE               User Key  (r) = Recorded By, (t) = Taken By, (c) = Cosigned By      Initials Name Provider Type    Leeann Clark PT Physical Therapist                   Obj/Interventions       Row Name 04/19/24 1119          Balance    Static Standing Balance standby assist  -EE     Dynamic Standing Balance contact guard  -EE               User Key  (r) = Recorded By, (t) = Taken By, (c) = Cosigned By      Initials Name Provider Type    Leeann Clark, SCARLET Physical Therapist                   Goals/Plan    No documentation.                  Clinical Impression       Row Name 04/19/24 1119          Pain    Pretreatment Pain Rating 0/10 - no pain  -EE     Posttreatment Pain Rating 0/10 - no pain  -EE       Row Name 04/19/24 1119          Plan of Care Review    Plan of Care Reviewed With patient  -EE     Progress improving  -EE     Outcome Evaluation Pt demos steady progress with strength and endurance, as evidenced by tolerance of increased ambulation distance. Pt performed bed mobility independently, stood with SBA, and ambulated 150' with CGA. Pt slightly unsteady with turns but demos no overt LOB. Pt did demo increased SOA while ambulating on 3  L O2 per nc. Sats 86% immediately after ambulation. Improved to 93% after seated rest and cues for pursed lip breathing. Pt will continue to benefit from PT to address balance and endurance in order to assist with return to PLOF. No problems anticipated with DC home when medically stable.  -EE       Row Name 04/19/24 1119          Vital Signs    O2 Delivery Pre Treatment supplemental O2  -EE     Intra SpO2 (%) 86  -EE     O2 Delivery Intra Treatment supplemental O2  -EE     Post SpO2 (%) 93  -EE     O2 Delivery Post Treatment supplemental O2  -EE     Pre Patient Position Supine  -EE     Intra Patient Position Standing  -EE     Post Patient Position Sitting  -EE       Row Name 04/19/24 1119          Positioning and Restraints    Pre-Treatment Position in bed  -EE     Post Treatment Position chair  -EE     In Chair reclined;call light within reach;encouraged to call for assist;notified nsg;exit alarm on;legs elevated  -EE               User Key  (r) = Recorded By, (t) = Taken By, (c) = Cosigned By      Initials Name Provider Type    Leeann Clark PT Physical Therapist                   Outcome Measures       Row Name 04/19/24 1122          How much help from another person do you currently need...    Turning from your back to your side while in flat bed without using bedrails? 4  -EE     Moving from lying on back to sitting on the side of a flat bed without bedrails? 4  -EE     Moving to and from a bed to a chair (including a wheelchair)? 3  -EE     Standing up from a chair using your arms (e.g., wheelchair, bedside chair)? 3  -EE     Climbing 3-5 steps with a railing? 3  -EE     To walk in hospital room? 3  -EE     AM-PAC 6 Clicks Score (PT) 20  -EE     Highest Level of Mobility Goal 6 --> Walk 10 steps or more  -EE               User Key  (r) = Recorded By, (t) = Taken By, (c) = Cosigned By      Initials Name Provider Type    Leeann Clark PT Physical Therapist                                 Physical Therapy  Education       Title: PT OT SLP Therapies (In Progress)       Topic: Physical Therapy (In Progress)       Point: Mobility training (Done)       Learning Progress Summary             Patient Acceptance, E, VU,NR by EE at 4/19/2024 1122    Acceptance, E, VU,NR by EE at 4/18/2024 1052                         Point: Home exercise program (Not Started)       Learner Progress:  Not documented in this visit.              Point: Body mechanics (Not Started)       Learner Progress:  Not documented in this visit.              Point: Precautions (Not Started)       Learner Progress:  Not documented in this visit.                              User Key       Initials Effective Dates Name Provider Type Discipline     06/16/21 -  Leeann Cervantes, PT Physical Therapist PT                  PT Recommendation and Plan  Planned Therapy Interventions (PT): balance training, bed mobility training, gait training, home exercise program, patient/family education, strengthening, transfer training, ROM (range of motion), postural re-education  Plan of Care Reviewed With: patient  Progress: improving  Outcome Evaluation: Pt demos steady progress with strength and endurance, as evidenced by tolerance of increased ambulation distance. Pt performed bed mobility independently, stood with SBA, and ambulated 150' with CGA. Pt slightly unsteady with turns but demos no overt LOB. Pt did demo increased SOA while ambulating on 3 L O2 per nc. Sats 86% immediately after ambulation. Improved to 93% after seated rest and cues for pursed lip breathing. Pt will continue to benefit from PT to address balance and endurance in order to assist with return to PLOF. No problems anticipated with DC home when medically stable.     Time Calculation:         PT Charges       Row Name 04/19/24 1122             Time Calculation    Start Time 1103  -EE      Stop Time 1114  -EE      Time Calculation (min) 11 min  -EE      PT Received On 04/19/24  -EE      PT - Next  Appointment 04/22/24  -EE         Time Calculation- PT    Total Timed Code Minutes- PT 11 minute(s)  -EE         Timed Charges    38358 - PT Therapeutic Activity Minutes 11  -EE         Total Minutes    Timed Charges Total Minutes 11  -EE       Total Minutes 11  -EE                User Key  (r) = Recorded By, (t) = Taken By, (c) = Cosigned By      Initials Name Provider Type    EE Leeann Cervantes, PT Physical Therapist                  Therapy Charges for Today       Code Description Service Date Service Provider Modifiers Qty    55133283719 HC PT THERAPEUTIC ACT EA 15 MIN 4/18/2024 Leeann Cervantes, PT GP 1    77074827840 HC PT EVAL MOD COMPLEXITY 2 4/18/2024 Leeann Cervantes, PT GP 1    76915876380 HC PT THERAPEUTIC ACT EA 15 MIN 4/19/2024 Leeann Cervantes, PT GP 1            PT G-Codes  AM-PAC 6 Clicks Score (PT): 20  PT Discharge Summary  Anticipated Discharge Disposition (PT): home    Leeann Cervantes PT  4/19/2024

## 2024-04-20 LAB
ANION GAP SERPL CALCULATED.3IONS-SCNC: 8 MMOL/L (ref 5–15)
BUN SERPL-MCNC: 34 MG/DL (ref 8–23)
BUN/CREAT SERPL: 24.5 (ref 7–25)
CALCIUM SPEC-SCNC: 8.6 MG/DL (ref 8.6–10.5)
CHLORIDE SERPL-SCNC: 105 MMOL/L (ref 98–107)
CO2 SERPL-SCNC: 29 MMOL/L (ref 22–29)
CREAT SERPL-MCNC: 1.39 MG/DL (ref 0.76–1.27)
DEPRECATED RDW RBC AUTO: 44.2 FL (ref 37–54)
EGFRCR SERPLBLD CKD-EPI 2021: 52.9 ML/MIN/1.73
ERYTHROCYTE [DISTWIDTH] IN BLOOD BY AUTOMATED COUNT: 13.7 % (ref 12.3–15.4)
GLUCOSE SERPL-MCNC: 125 MG/DL (ref 65–99)
HBA1C MFR BLD: 6.6 % (ref 4.8–5.6)
HCT VFR BLD AUTO: 39.5 % (ref 37.5–51)
HGB BLD-MCNC: 13.4 G/DL (ref 13–17.7)
L PNEUMO1 AG UR QL IA: NEGATIVE
MCH RBC QN AUTO: 30.2 PG (ref 26.6–33)
MCHC RBC AUTO-ENTMCNC: 33.9 G/DL (ref 31.5–35.7)
MCV RBC AUTO: 89.2 FL (ref 79–97)
MRSA DNA SPEC QL NAA+PROBE: NORMAL
PLATELET # BLD AUTO: 249 10*3/MM3 (ref 140–450)
PMV BLD AUTO: 10.9 FL (ref 6–12)
POTASSIUM SERPL-SCNC: 3.9 MMOL/L (ref 3.5–5.2)
PROCALCITONIN SERPL-MCNC: 0.2 NG/ML (ref 0–0.25)
RBC # BLD AUTO: 4.43 10*6/MM3 (ref 4.14–5.8)
S PNEUM AG SPEC QL LA: NEGATIVE
SODIUM SERPL-SCNC: 142 MMOL/L (ref 136–145)
WBC NRBC COR # BLD AUTO: 26.12 10*3/MM3 (ref 3.4–10.8)

## 2024-04-20 PROCEDURE — 85027 COMPLETE CBC AUTOMATED: CPT | Performed by: STUDENT IN AN ORGANIZED HEALTH CARE EDUCATION/TRAINING PROGRAM

## 2024-04-20 PROCEDURE — 25010000002 HEPARIN (PORCINE) PER 1000 UNITS: Performed by: INTERNAL MEDICINE

## 2024-04-20 PROCEDURE — 87899 AGENT NOS ASSAY W/OPTIC: CPT | Performed by: INTERNAL MEDICINE

## 2024-04-20 PROCEDURE — 80048 BASIC METABOLIC PNL TOTAL CA: CPT | Performed by: STUDENT IN AN ORGANIZED HEALTH CARE EDUCATION/TRAINING PROGRAM

## 2024-04-20 PROCEDURE — 99232 SBSQ HOSP IP/OBS MODERATE 35: CPT | Performed by: NURSE PRACTITIONER

## 2024-04-20 PROCEDURE — 94799 UNLISTED PULMONARY SVC/PX: CPT

## 2024-04-20 PROCEDURE — 94761 N-INVAS EAR/PLS OXIMETRY MLT: CPT

## 2024-04-20 PROCEDURE — 87641 MR-STAPH DNA AMP PROBE: CPT | Performed by: INTERNAL MEDICINE

## 2024-04-20 PROCEDURE — 87449 NOS EACH ORGANISM AG IA: CPT | Performed by: INTERNAL MEDICINE

## 2024-04-20 PROCEDURE — 25010000002 CEFTRIAXONE PER 250 MG: Performed by: INTERNAL MEDICINE

## 2024-04-20 PROCEDURE — 94760 N-INVAS EAR/PLS OXIMETRY 1: CPT

## 2024-04-20 PROCEDURE — 63710000001 PREDNISONE PER 1 MG: Performed by: STUDENT IN AN ORGANIZED HEALTH CARE EDUCATION/TRAINING PROGRAM

## 2024-04-20 PROCEDURE — 92610 EVALUATE SWALLOWING FUNCTION: CPT

## 2024-04-20 PROCEDURE — 94664 DEMO&/EVAL PT USE INHALER: CPT

## 2024-04-20 PROCEDURE — 84145 PROCALCITONIN (PCT): CPT | Performed by: INTERNAL MEDICINE

## 2024-04-20 PROCEDURE — 83036 HEMOGLOBIN GLYCOSYLATED A1C: CPT | Performed by: INTERNAL MEDICINE

## 2024-04-20 RX ORDER — BUDESONIDE AND FORMOTEROL FUMARATE DIHYDRATE 160; 4.5 UG/1; UG/1
2 AEROSOL RESPIRATORY (INHALATION)
Status: DISCONTINUED | OUTPATIENT
Start: 2024-04-20 | End: 2024-04-24 | Stop reason: HOSPADM

## 2024-04-20 RX ORDER — HEPARIN SODIUM 5000 [USP'U]/ML
5000 INJECTION, SOLUTION INTRAVENOUS; SUBCUTANEOUS EVERY 8 HOURS SCHEDULED
Status: DISCONTINUED | OUTPATIENT
Start: 2024-04-20 | End: 2024-04-24 | Stop reason: HOSPADM

## 2024-04-20 RX ORDER — DOCUSATE SODIUM 100 MG/1
100 CAPSULE, LIQUID FILLED ORAL 2 TIMES DAILY
Status: DISCONTINUED | OUTPATIENT
Start: 2024-04-20 | End: 2024-04-24 | Stop reason: HOSPADM

## 2024-04-20 RX ORDER — GUAIFENESIN 600 MG/1
600 TABLET, EXTENDED RELEASE ORAL EVERY 12 HOURS SCHEDULED
Status: DISCONTINUED | OUTPATIENT
Start: 2024-04-20 | End: 2024-04-24 | Stop reason: HOSPADM

## 2024-04-20 RX ORDER — POLYETHYLENE GLYCOL 3350 17 G/17G
17 POWDER, FOR SOLUTION ORAL
Status: DISCONTINUED | OUTPATIENT
Start: 2024-04-20 | End: 2024-04-24 | Stop reason: HOSPADM

## 2024-04-20 RX ORDER — MONTELUKAST SODIUM 10 MG/1
10 TABLET ORAL NIGHTLY
Status: DISCONTINUED | OUTPATIENT
Start: 2024-04-20 | End: 2024-04-24 | Stop reason: HOSPADM

## 2024-04-20 RX ADMIN — BISACODYL 10 MG: 10 SUPPOSITORY RECTAL at 09:30

## 2024-04-20 RX ADMIN — GUAIFENESIN 600 MG: 600 TABLET, EXTENDED RELEASE ORAL at 20:50

## 2024-04-20 RX ADMIN — BUDESONIDE AND FORMOTEROL FUMARATE DIHYDRATE 2 PUFF: 160; 4.5 AEROSOL RESPIRATORY (INHALATION) at 21:58

## 2024-04-20 RX ADMIN — PREDNISONE 40 MG: 20 TABLET ORAL at 08:32

## 2024-04-20 RX ADMIN — CLOPIDOGREL BISULFATE 75 MG: 75 TABLET, FILM COATED ORAL at 08:32

## 2024-04-20 RX ADMIN — TAMSULOSIN HYDROCHLORIDE 0.4 MG: 0.4 CAPSULE ORAL at 08:32

## 2024-04-20 RX ADMIN — POLYETHYLENE GLYCOL 3350 17 G: 17 POWDER, FOR SOLUTION ORAL at 17:25

## 2024-04-20 RX ADMIN — Medication 3 MG: at 23:48

## 2024-04-20 RX ADMIN — ATORVASTATIN CALCIUM 40 MG: 20 TABLET, FILM COATED ORAL at 09:30

## 2024-04-20 RX ADMIN — MONTELUKAST SODIUM 10 MG: 10 TABLET, FILM COATED ORAL at 20:50

## 2024-04-20 RX ADMIN — CLONIDINE HYDROCHLORIDE 0.1 MG: 0.1 TABLET ORAL at 08:32

## 2024-04-20 RX ADMIN — DOCUSATE SODIUM 100 MG: 100 CAPSULE, LIQUID FILLED ORAL at 13:08

## 2024-04-20 RX ADMIN — HEPARIN SODIUM 5000 UNITS: 5000 INJECTION INTRAVENOUS; SUBCUTANEOUS at 20:50

## 2024-04-20 RX ADMIN — CEFTRIAXONE 2000 MG: 2 INJECTION, POWDER, FOR SOLUTION INTRAMUSCULAR; INTRAVENOUS at 01:24

## 2024-04-20 RX ADMIN — LISINOPRIL 5 MG: 5 TABLET ORAL at 08:32

## 2024-04-20 RX ADMIN — HYDROCODONE BITARTRATE AND ACETAMINOPHEN 1 TABLET: 7.5; 325 TABLET ORAL at 01:24

## 2024-04-20 RX ADMIN — CEFTRIAXONE 2000 MG: 2 INJECTION, POWDER, FOR SOLUTION INTRAMUSCULAR; INTRAVENOUS at 23:48

## 2024-04-20 RX ADMIN — GUAIFENESIN 600 MG: 600 TABLET, EXTENDED RELEASE ORAL at 13:08

## 2024-04-20 RX ADMIN — ISOSORBIDE MONONITRATE 30 MG: 30 TABLET, EXTENDED RELEASE ORAL at 08:32

## 2024-04-20 RX ADMIN — IPRATROPIUM BROMIDE AND ALBUTEROL SULFATE 3 ML: .5; 3 SOLUTION RESPIRATORY (INHALATION) at 10:40

## 2024-04-20 RX ADMIN — CARVEDILOL 12.5 MG: 12.5 TABLET, FILM COATED ORAL at 17:24

## 2024-04-20 RX ADMIN — Medication 3 MG: at 01:23

## 2024-04-20 RX ADMIN — ASPIRIN 81 MG: 81 TABLET, COATED ORAL at 08:32

## 2024-04-20 RX ADMIN — CARVEDILOL 12.5 MG: 12.5 TABLET, FILM COATED ORAL at 08:32

## 2024-04-20 RX ADMIN — CLONIDINE HYDROCHLORIDE 0.1 MG: 0.1 TABLET ORAL at 20:50

## 2024-04-20 RX ADMIN — HYDROCODONE BITARTRATE AND ACETAMINOPHEN 1 TABLET: 7.5; 325 TABLET ORAL at 23:48

## 2024-04-20 RX ADMIN — FLUTICASONE PROPIONATE 2 SPRAY: 50 SPRAY, METERED NASAL at 08:37

## 2024-04-20 RX ADMIN — IPRATROPIUM BROMIDE AND ALBUTEROL SULFATE 3 ML: .5; 3 SOLUTION RESPIRATORY (INHALATION) at 21:50

## 2024-04-20 RX ADMIN — HEPARIN SODIUM 5000 UNITS: 5000 INJECTION INTRAVENOUS; SUBCUTANEOUS at 13:08

## 2024-04-20 RX ADMIN — SPIRONOLACTONE 50 MG: 50 TABLET, FILM COATED ORAL at 08:32

## 2024-04-20 NOTE — PLAN OF CARE
Problem: Adult Inpatient Plan of Care  Goal: Plan of Care Review  Outcome: Ongoing, Progressing   Goal Outcome Evaluation:            Pt seen for clinical bedside swallow due to complaints by pt that he is inconsistently having food stuck in his esophagus. Pt stated that he used to receive botox and have his esophagus stretched by his primary doctor who is now retired. He also stated that he has not had the named procedures in several years. During this evaluation no difficulty was noted with thin liquids. Pt exhibited difficulty masticating mechanical soft consistency. He swallowed some pieces whole and complained that he could feel the bolus stuck on his right side. Pt was asked to perform chin tuck, move his head to the left/right to close off either side during a swallow. Pt stated that the strategies did not help. SLP explained the importance of wearing his teeth and masticating his food. Family member present stated binu he marcum not want to wear his dentures. SLP recommended a pureed diet with thin liquids. The pt refused the pureed recommendation.. Pt will remain on a regular diet with thin liquids. SLP recommends medication crushed in puree. SLP also recommends a VFSS study to determine the integrity of the esophagus and other swallow strategies.

## 2024-04-20 NOTE — PLAN OF CARE
Problem: Adult Inpatient Plan of Care  Goal: Plan of Care Review  Flowsheets (Taken 4/20/2024 0536)  Progress: improving  Plan of Care Reviewed With: patient  Outcome Evaluation: No acute changes. VSS. O2 in use @ 2L via NC. AOx4. Medicated for pain per MAR. Makes needs known. Resting comfortably @ this time. Bed locked and in lowest position. Side rails up x2. Call light within reach. Will continue to assess.  Goal: Absence of Hospital-Acquired Illness or Injury  Intervention: Identify and Manage Fall Risk  Flowsheets  Taken 4/20/2024 0407  Safety Promotion/Fall Prevention:   activity supervised   assistive device/personal items within reach   clutter free environment maintained   fall prevention program maintained   lighting adjusted   nonskid shoes/slippers when out of bed   room organization consistent   safety round/check completed  Taken 4/20/2024 0210  Safety Promotion/Fall Prevention:   activity supervised   clutter free environment maintained   assistive device/personal items within reach   fall prevention program maintained   lighting adjusted   nonskid shoes/slippers when out of bed   room organization consistent   safety round/check completed  Taken 4/20/2024 0043  Safety Promotion/Fall Prevention:   assistive device/personal items within reach   activity supervised   clutter free environment maintained   fall prevention program maintained   lighting adjusted   nonskid shoes/slippers when out of bed   safety round/check completed   room organization consistent  Taken 4/19/2024 2244  Safety Promotion/Fall Prevention:   activity supervised   assistive device/personal items within reach   clutter free environment maintained   fall prevention program maintained   lighting adjusted   nonskid shoes/slippers when out of bed   room organization consistent   safety round/check completed  Taken 4/19/2024 2050  Safety Promotion/Fall Prevention:   assistive device/personal items within reach   activity  supervised   clutter free environment maintained   fall prevention program maintained   lighting adjusted   nonskid shoes/slippers when out of bed   room organization consistent   safety round/check completed  Intervention: Prevent Skin Injury  Flowsheets  Taken 4/20/2024 0407  Body Position: position changed independently  Taken 4/20/2024 0210  Body Position: position changed independently  Taken 4/20/2024 0043  Body Position: position changed independently  Skin Protection:   adhesive use limited   incontinence pads utilized   transparent dressing maintained   tubing/devices free from skin contact  Taken 4/19/2024 2244  Body Position: position changed independently  Taken 4/19/2024 2050  Body Position: position changed independently  Skin Protection:   adhesive use limited   incontinence pads utilized   transparent dressing maintained   tubing/devices free from skin contact  Intervention: Prevent and Manage VTE (Venous Thromboembolism) Risk  Flowsheets  Taken 4/20/2024 0407 by Peter Osborne RN  Activity Management: activity encouraged  Taken 4/20/2024 0210 by Peter Osborne RN  Activity Management: activity encouraged  Taken 4/20/2024 0043 by Peter Osborne RN  Activity Management: activity encouraged  Range of Motion:   active ROM (range of motion) encouraged   ROM (range of motion) performed  Taken 4/19/2024 2244 by Peter Osborne RN  Activity Management: activity encouraged  Taken 4/19/2024 2050 by Peter Osborne RN  Activity Management: activity encouraged  Range of Motion:   active ROM (range of motion) encouraged   ROM (range of motion) performed  Taken 4/19/2024 1030 by Gabrielle Marrero RN  VTE Prevention/Management: (asa, plavix) other (see comments)  Intervention: Prevent Infection  Flowsheets (Taken 4/20/2024 0300 by Elisha Chapman, PCT)  Infection Prevention:   environmental surveillance performed   rest/sleep promoted   single patient room provided  Goal: Optimal Comfort and Wellbeing  Intervention:  Monitor Pain and Promote Comfort  Flowsheets (Taken 4/18/2024 2125 by Amy Quiros, RN)  Pain Management Interventions: see MAR  Intervention: Provide Person-Centered Care  Flowsheets  Taken 4/20/2024 0043  Trust Relationship/Rapport:   care explained   choices provided   emotional support provided   empathic listening provided   questions answered   questions encouraged   thoughts/feelings acknowledged   reassurance provided  Taken 4/19/2024 2050  Trust Relationship/Rapport:   care explained   emotional support provided   choices provided   empathic listening provided   questions encouraged   questions answered   thoughts/feelings acknowledged   reassurance provided  Goal: Readiness for Transition of Care  Intervention: Mutually Develop Transition Plan  Flowsheets (Taken 4/18/2024 1606 by Chica Myers, RN)  Equipment Needed After Discharge: none  Equipment Currently Used at Home: none  Anticipated Changes Related to Illness: none  Transportation Anticipated: family or friend will provide  Concerns to be Addressed: no discharge needs identified  Readmission Within the Last 30 Days: no previous admission in last 30 days  Patient/Family Anticipated Services at Transition: none  Patient/Family Anticipates Transition to: home with family     Problem: Skin Injury Risk Increased  Goal: Skin Health and Integrity  Intervention: Promote and Optimize Oral Intake  Flowsheets (Taken 4/20/2024 0536)  Oral Nutrition Promotion:   medicated   rest periods promoted  Intervention: Optimize Skin Protection  Flowsheets  Taken 4/20/2024 0407  Head of Bed (HOB) Positioning: HOB elevated  Taken 4/20/2024 0210  Head of Bed (HOB) Positioning: HOB elevated  Taken 4/20/2024 0043  Pressure Reduction Techniques:   frequent weight shift encouraged   weight shift assistance provided  Head of Bed (HOB) Positioning: HOB elevated  Pressure Reduction Devices:   positioning supports utilized   pressure-redistributing mattress utilized  Skin  Protection:   adhesive use limited   incontinence pads utilized   transparent dressing maintained   tubing/devices free from skin contact  Taken 4/19/2024 2244  Head of Bed (HOB) Positioning: HOB elevated  Taken 4/19/2024 2050  Pressure Reduction Techniques:   frequent weight shift encouraged   weight shift assistance provided  Head of Bed (HOB) Positioning: HOB elevated  Pressure Reduction Devices:   positioning supports utilized   pressure-redistributing mattress utilized  Skin Protection:   adhesive use limited   incontinence pads utilized   transparent dressing maintained   tubing/devices free from skin contact     Problem: Fall Injury Risk  Goal: Absence of Fall and Fall-Related Injury  Intervention: Identify and Manage Contributors  Flowsheets (Taken 4/19/2024 1800 by Gabrielle Marrero RN)  Medication Review/Management: medications reviewed  Intervention: Promote Injury-Free Environment  Flowsheets  Taken 4/20/2024 0407  Safety Promotion/Fall Prevention:   activity supervised   assistive device/personal items within reach   clutter free environment maintained   fall prevention program maintained   lighting adjusted   nonskid shoes/slippers when out of bed   room organization consistent   safety round/check completed  Taken 4/20/2024 0210  Safety Promotion/Fall Prevention:   activity supervised   clutter free environment maintained   assistive device/personal items within reach   fall prevention program maintained   lighting adjusted   nonskid shoes/slippers when out of bed   room organization consistent   safety round/check completed  Taken 4/20/2024 0043  Safety Promotion/Fall Prevention:   assistive device/personal items within reach   activity supervised   clutter free environment maintained   fall prevention program maintained   lighting adjusted   nonskid shoes/slippers when out of bed   safety round/check completed   room organization consistent  Taken 4/19/2024 2244  Safety Promotion/Fall  Prevention:   activity supervised   assistive device/personal items within reach   clutter free environment maintained   fall prevention program maintained   lighting adjusted   nonskid shoes/slippers when out of bed   room organization consistent   safety round/check completed  Taken 4/19/2024 2050  Safety Promotion/Fall Prevention:   assistive device/personal items within reach   activity supervised   clutter free environment maintained   fall prevention program maintained   lighting adjusted   nonskid shoes/slippers when out of bed   room organization consistent   safety round/check completed     Problem: Gas Exchange Impaired  Goal: Optimal Gas Exchange  Intervention: Optimize Oxygenation and Ventilation  Flowsheets  Taken 4/20/2024 0407  Head of Bed (HOB) Positioning: HOB elevated  Taken 4/20/2024 0210  Head of Bed (HOB) Positioning: HOB elevated  Taken 4/20/2024 0043  Head of Bed (HOB) Positioning: HOB elevated  Taken 4/19/2024 2244  Head of Bed (HOB) Positioning: HOB elevated  Taken 4/19/2024 2050  Head of Bed (HOB) Positioning: HOB elevated   Goal Outcome Evaluation:  Plan of Care Reviewed With: patient        Progress: improving  Outcome Evaluation: No acute changes. VSS. O2 in use @ 2L via NC. AOx4. Medicated for pain per MAR. Makes needs known. Resting comfortably @ this time. Bed locked and in lowest position. Side rails up x2. Call light within reach. Will continue to assess.

## 2024-04-20 NOTE — CONSULTS
CONSULT NOTE    Patient Identification:  Negrito Navarro  73 y.o.  male  1950  [id number]            Requesting physician: Dr Chica Brumfield    Reason for Consultation:  SOA cough    CC: flank pain cough soa cp    History of Present Illness:  Patient is a 73-year-old with a previous medical history of  CAD obstructive sleep apnea chronic hypoxemic respiratory failure who presented to the emergency room with acute chest pain associated with shortness of breath that actually responded to nitroglycerin.  He has an extensive coronary artery disease history with bypass and PCI's.  He had a systolic blood pressure 184 upon arrival.  Has needed oxygen during hospital stay.    Procalcitonin negative at 0.2  D-dimer elevated 0.86 and respiratory viral panel positive for human metapneumovirus.    Secondary to a positive D-dimer CT angiogram showed no pulmonary emboli however did show by lateral reticular nodule for trait suggestive of viral pneumonia.  Also had mild lymphadenopathy suggestive reactive secondary to viral process.    Pt denies any history of copd despite listed in chart, denies alb inhaler despite listed in chart.  Was sent home on oxygen after cabg but doesn't ever use at home anymore doesn't check his levels.  States he doesn't use any inhalers  Never smoker  No asthma history.      Review of Systems:  As above otherwise a 12 system review of systems performed and all else negative    Past Medical History:   Diagnosis Date    Benign prostatic hyperplasia 11/10/2016    COPD (chronic obstructive pulmonary disease) 6/10/2021    Coronary artery disease involving coronary bypass graft of native heart without angina pectoris 6/10/2021    Essential hypertension 11/10/2016    Hyperlipidemia     SERENITY (obstructive sleep apnea) 11/10/2016       Past Surgical History:   Procedure Laterality Date    CARDIAC CATHETERIZATION N/A 4/21/2023    Procedure: Left Heart Cath;  Surgeon: Jayne Villalobos MD;   Location:  [facility];  Service: Cardiovascular;  Laterality: N/A;    CARDIAC CATHETERIZATION N/A 4/21/2023    Procedure: Coronary angiography;  Surgeon: Jayne Villalobos MD;  Location:  [facility];  Service: Cardiovascular;  Laterality: N/A;    CARDIAC CATHETERIZATION N/A 4/21/2023    Procedure: Left ventriculography;  Surgeon: Jayne Villalobos MD;  Location:  [facility];  Service: Cardiovascular;  Laterality: N/A;    CARDIAC CATHETERIZATION  4/21/2023    Procedure: Saphenous Vein Graft;  Surgeon: Jayne Villalobos MD;  Location:  [facility];  Service: Cardiovascular;;    CARDIAC CATHETERIZATION N/A 4/21/2023    Procedure: Native mammary injection;  Surgeon: Jayne Villalobos MD;  Location:  [facility];  Service: Cardiovascular;  Laterality: N/A;    CARDIAC CATHETERIZATION N/A 4/21/2023    Procedure: Percutaneous Coronary Intervention;  Surgeon: Jayne Villalobos MD;  [facility];  Service: Cardiovascular;  Laterality: N/A;    CARDIAC CATHETERIZATION N/A 4/21/2023    Procedure: Stent BETO coronary;  Surgeon: Jayne Villalobos MD;  [facility];  Service: Cardiovascular;  Laterality: N/A;    CARDIAC CATHETERIZATION  4/21/2023    Procedure: Percutaneous Manual Thrombectomy;  Surgeon: Jayne Villalobos MD;  [facility];  Service: Cardiovascular;;        Medications Prior to Admission   Medication Sig Dispense Refill Last Dose    aspirin 81 MG EC tablet Take 1 tablet by mouth Daily.   4/17/2024    atorvastatin (LIPITOR) 40 MG tablet Take 1 tablet by mouth Daily.   4/16/2024    carvedilol (COREG) 6.25 MG tablet Take 1 tablet by mouth 2 (Two) Times a Day With Meals. 180 tablet 3 4/16/2024    cloNIDine (CATAPRES) 0.1 MG tablet Take 1 tablet by mouth 2 (Two) Times a Day.   4/16/2024    clopidogrel (PLAVIX) 75 MG  "tablet Take 1 tablet by mouth Daily. 90 tablet 3 4/17/2024    fluticasone (FLONASE) 50 MCG/ACT nasal spray 2 sprays into the nostril(s) as directed by provider Daily.   4/16/2024    HYDROcodone-acetaminophen (NORCO) 7.5-325 MG per tablet Take 1 tablet by mouth Every 12 (Twelve) Hours As Needed for Moderate Pain.   4/16/2024    lisinopril (PRINIVIL,ZESTRIL) 5 MG tablet Take 1 tablet by mouth Daily. 90 tablet 3 4/16/2024    nitroglycerin (NITROSTAT) 0.4 MG SL tablet Place 1 tablet under the tongue Every 5 (Five) Minutes As Needed for Chest Pain for up to 1 dose. Take no more than 3 doses in 15 minutes. 25 tablet 12 4/17/2024    tamsulosin (FLOMAX) 0.4 MG capsule 24 hr capsule Take 1 capsule by mouth Daily.   4/17/2024    albuterol sulfate  (90 Base) MCG/ACT inhaler Inhale 2 puffs Every 4 (Four) Hours As Needed for Wheezing.   Unknown    aspirin 81 MG EC tablet Take 1 tablet by mouth Daily. 30 tablet 6     ketoconazole (NIZORAL) 2 % cream Apply  topically to the appropriate area as directed Daily.   Unknown    spironolactone (ALDACTONE) 50 MG tablet Take 1 tablet by mouth Daily for 30 days. 30 tablet 0     spironolactone (ALDACTONE) 50 MG tablet Take 1 tablet by mouth Daily.   Unknown       Allergies   Allergen Reactions    Shellfish-Derived Products Unknown - Low Severity     gout  gout         Social History     Socioeconomic History    Marital status: Legally    Tobacco Use    Smoking status: Never    Smokeless tobacco: Never   Vaping Use    Vaping status: Never Used   Substance and Sexual Activity    Alcohol use: Not Currently    Drug use: Never    Sexual activity: Defer       History reviewed. No pertinent family history.    Physical Exam:  /81 (BP Location: Left arm, Patient Position: Sitting)   Pulse 84   Temp 97.7 °F (36.5 °C) (Oral)   Resp 18   Ht 172.7 cm (68\")   Wt 91.6 kg (201 lb 14.4 oz)   SpO2 100%   BMI 30.70 kg/m²   Body mass index is 30.7 kg/m².   General appearance: NAD, " conversant   Eyes: Anicteric sclerae, moist conjunctivae; no lid lag;    HENT: Atraumatic; oropharynx clear with moist mucous membranes and no mucosal ulcerations; normal hard and soft palate  Neck: Trachea midline; large, supple, no thyromegaly or lymphadenopathy  Lungs: tatiana with wheeze and rhonchi, with normal respiratory effort and no intercostal retractions  CV: RRR, no MRGs   Abdomen: obese bs  Extremities: No peripheral edema or extremity lymphadenopathy  Skin: Normal temperature, turgor and texture; no rash, ulcers or subcutaneous nodules  Psych: Appropriate affect, alert   Neuro speech intact moves all ext cns 2-12 grossly intact moves all ext.    LABS:  Results from last 7 days   Lab Units 04/20/24  0534 04/19/24  1315 04/18/24  0234 04/17/24  1616   WBC 10*3/mm3 26.12* 28.81* 10.98* 12.86*   HEMOGLOBIN g/dL 13.4 14.6 14.8 14.0   PLATELETS 10*3/mm3 249 262 229 230     Results from last 7 days   Lab Units 04/20/24  0534 04/19/24  1315 04/18/24  0234 04/17/24  1616   SODIUM mmol/L 142 140 139 139   POTASSIUM mmol/L 3.9 4.1 4.0 4.3   CHLORIDE mmol/L 105 102 101 102   CO2 mmol/L 29.0 27.0 27.0 24.3   BUN mg/dL 34* 37* 28* 23   CREATININE mg/dL 1.39* 1.57* 1.92* 1.69*   GLUCOSE mg/dL 125* 155* 132* 113*   CALCIUM mg/dL 8.6 9.1 8.4* 8.7   Estimated Creatinine Clearance: 50.5 mL/min (A) (by C-G formula based on SCr of 1.39 mg/dL (H)).    Imaging: I personally visualized the images of scans/x-rays performed within last 3 days.  Imaging Results (Most Recent)       Procedure Component Value Units Date/Time    CT Angiogram Chest Pulmonary Embolism [[id number]] Collected: 04/17/24 1919     Updated: 04/17/24 1928    Narrative:      CT ANGIOGRAM CHEST PULMONARY EMBOLISM-     INDICATIONS: Chest pain, cough, fever     TECHNIQUE: Radiation dose reduction techniques were utilized, including  automated exposure control and exposure modulation based on body size.  CT angiography of the chest. Three-dimensional  reconstructions.     COMPARISON: None available      FINDINGS:     No pulmonary embolism. No aortic dissection.     The heart size is enlarged without pericardial effusion. Coronary chill  calcifications are present. A few subcentimeter short axis mediastinal  lymph nodes are seen. A subcarinal lymph node is present, 1.5 cm, and a  1.4 cm short axis right hilar lymph node on axial image 56, may be  reactive in nature, but potentially evidence of neoplasm, clinical  correlation and CT follow-up advised (if further imaging evaluation is  indicated, PET/CT could be considered. Likewise, left hilar lymph node  measures 1.3 cm in axial image 70.     The airways appear clear.     No pleural effusion or pneumothorax.     The lungs show multifocal bilateral reticulonodular infiltrates, most  prominently in the right lower lobe, but also in right upper lobe, right  middle lobe, lingula. CT follow-up advised to exclude the possibility of  underlying nodule. Atelectasis/consolidation is also apparent in right  middle lobe, left lower lobe.     Upper abdominal structures show no acute findings. Suspected splenule in  the left upper quadrant, with no normal appearing spleen noted. Colonic  diverticula are seen that do not appear inflamed. Mid mesenteric  haziness and nodularity may reflect mesenteric panniculitis. Nonspecific  thickening of the adrenal glands is seen. Fatty infiltration of the  pancreas is present.     Degenerative changes are seen in the spine. Old bilateral rib fractures  are apparent. Sternotomy wires are noted. No acute fracture is  identified.       Impression:         No pulmonary embolism. Bilateral reticulonodular infiltrates suggesting  pneumonia, follow-up CT advised to exclude any possibility of underlying  pulmonary nodules. Nonspecific mediastinal and hilar adenopathy, further  evaluation/follow-up advised as indicated.     This report was finalized on 4/17/2024 7:25 PM by Dr. Rigoberto MCINTYRE  TATA Herrera on Workstation: [id number]       XR Chest 1 View [[id number]] Collected: 04/17/24 1555     Updated: 04/17/24 1606    Narrative:      XR CHEST 1 VW-     HISTORY: Male who is 73 years-old, short of breath     TECHNIQUE: Frontal view of the chest     COMPARISON: 4/20/2023     FINDINGS: The heart size is borderline. Sternotomy wires are present.  Pulmonary vasculature is unremarkable. Mild bilateral basilar  atelectasis or infiltrate. No large pleural effusion or pneumothorax. No  acute osseous process.       Impression:      Mild bilateral basilar atelectasis or infiltrate. Borderline  heart size.     This report was finalized on 4/17/2024 4:03 PM by Dr. Rigoberto Herrera M.D on Workstation: [id number]               Assessment / Recommendations:  HMPV viral bronchopneumonia  Bronchospasm from above  Patient Active Problem List   Diagnosis    Hyperlipidemia    Benign prostatic hyperplasia    Chronic constipation    Primary hypertension    SERENITY (obstructive sleep apnea)    Oxygen dependent    COPD (chronic obstructive pulmonary disease)    Coronary artery disease of native artery of native heart with stable angina pectoris    Acute coronary syndrome with high troponin    Precordial chest pain    History of coronary angioplasty with insertion of stent    Hx of CABG    CAD S/P percutaneous coronary angioplasty    Long term (current) use of antithrombotics/antiplatelets    Acute hypoxic respiratory failure     Outpt pfts  Pred  Symbicort  Albutrol   Suspect viral induced bronchospasm.  Needs outpt pfts  Dw rn  And family at pt permission.        Arturo Gomes MD  [facility]  04/20/24  15:35 EDT

## 2024-04-20 NOTE — PROGRESS NOTES
Name: Negrito Navarro ADMIT: 2024   : 1950  PCP: Karen Yu MD    MRN: [id number] LOS: 3 days   AGE/SEX: 73 y.o. male  ROOM: [facility]     Subjective   Subjective   Patient reports no improvement in the shortness of breath/cough productive of yellowish sputum/wheezing.  No chest pain.  No palpitation.  No ankle edema.  No fever or chills.  No hemoptysis    Review of Systems  GI.  Positive constipation.  Positive abdominal pain.  No nausea or vomiting.  .  No dysuria or hematuria.     Objective   Objective   Vital Signs  Temp:  [97.2 °F (36.2 °C)-97.5 °F (36.4 °C)] 97.5 °F (36.4 °C)  Heart Rate:  [69-87] 74  Resp:  [16-20] 16  BP: (109-186)/() 144/78  SpO2:  [74 %-100 %] 94 %  on  Flow (L/min):  [2-4] 2;   Device (Oxygen Therapy): nasal cannula  No intake or output data in the 24 hours ending 24 1052  Body mass index is 30.7 kg/m².      24  1502 24  0632 24  0527   Weight: 89.4 kg (197 lb) 88.7 kg (195 lb 8.8 oz) 91.6 kg (201 lb 14.4 oz)     Physical Exam  General.  Elderly gentleman.  Alert and oriented x 4.  In mild respiratory distress and audible wheeze.  Eyes.  Pupils equal round and reactive.  Intact extraocular musculature.  No pallor or jaundice  Oral cavity.  Moist mucous membrane.  Neck.  Supple.  No JVD.  No lymphadenopathy or thyromegaly.  Cardiovascular.  Regular rate and rhythm with grade 2 systolic murmur  Chest.  Poor bilateral air entry with bilateral inspiratory and expiratory wheezes  Abdomen.  Soft lax.  No tenderness.  No organomegaly.  No guarding or rebound  Extremities.  No clubbing/cyanosis/edema.  CNS.  No acute focal neurological deficits.      Results Review:      Results from last 7 days   Lab Units 24  0534 24  1315 24  0234 24  1616   SODIUM mmol/L 142 140 139 139   POTASSIUM mmol/L 3.9 4.1 4.0 4.3   CHLORIDE mmol/L 105 102 101 102   CO2 mmol/L 29.0 27.0 27.0 24.3   BUN mg/dL 34* 37* 28* 23   CREATININE mg/dL  "1.39* 1.57* 1.92* 1.69*   GLUCOSE mg/dL 125* 155* 132* 113*   CALCIUM mg/dL 8.6 9.1 8.4* 8.7   AST (SGOT) U/L  --   --   --  30   ALT (SGPT) U/L  --   --   --  14     Estimated Creatinine Clearance: 50.5 mL/min (A) (by C-G formula based on SCr of 1.39 mg/dL (H)).          Results from last 7 days   Lab Units 04/18/24  0234 04/18/24  0026 04/17/24  1616   HSTROP T ng/L 40* 38* 38*     Results from last 7 days   Lab Units 04/17/24  1616   PROBNP pg/mL 2,363.0*                   Invalid input(s): \"LDLCALC\"  Results from last 7 days   Lab Units 04/20/24  0534 04/19/24  1315 04/18/24  0234 04/17/24  1616   WBC 10*3/mm3 26.12* 28.81* 10.98* 12.86*   HEMOGLOBIN g/dL 13.4 14.6 14.8 14.0   HEMATOCRIT % 39.5 43.1 44.0 41.0   PLATELETS 10*3/mm3 249 262 229 230   MCV fL 89.2 90.2 91.3 89.9   MCH pg 30.2 30.5 30.7 30.7   MCHC g/dL 33.9 33.9 33.6 34.1   RDW % 13.7 13.6 13.8 13.7   RDW-SD fl 44.2 44.8 46.0 44.1   MPV fL 10.9 11.3 10.9 10.8   NEUTROPHIL % %  --   --   --  71.5   LYMPHOCYTE % %  --   --   --  9.0*   MONOCYTES % %  --   --   --  18.7*   EOSINOPHIL % %  --   --   --  0.1*   BASOPHIL % %  --   --   --  0.4   IMM GRAN % %  --   --   --  0.3   NEUTROS ABS 10*3/mm3  --   --   --  9.20*   LYMPHS ABS 10*3/mm3  --   --   --  1.16   MONOS ABS 10*3/mm3  --   --   --  2.40*   EOS ABS 10*3/mm3  --   --   --  0.01   BASOS ABS 10*3/mm3  --   --   --  0.05   IMMATURE GRANS (ABS) 10*3/mm3  --   --   --  0.04   NRBC /100 WBC  --   --   --  0.0             Results from last 7 days   Lab Units 04/17/24 2041 04/17/24  1616   PROCALCITONIN ng/mL  --  0.35*   LACTATE mmol/L 1.5  --              Results from last 7 days   Lab Units 04/17/24 2048 04/17/24 2041   BLOODCX  No growth at 2 days No growth at 2 days     Results from last 7 days   Lab Units 04/17/24  1616   ADENOVIRUS DETECTION BY PCR  Not Detected   CORONAVIRUS 229E  Not Detected   CORONAVIRUS HKU1  Not Detected   CORONAVIRUS NL63  Not Detected   CORONAVIRUS OC43  Not Detected "   HUMAN METAPNEUMOVIRUS  Detected*   HUMAN RHINOVIRUS/ENTEROVIRUS  Not Detected   INFLUENZA B PCR  Not Detected   PARAINFLUENZA 1  Not Detected   PARAINFLUENZA VIRUS 2  Not Detected   PARAINFLUENZA VIRUS 3  Not Detected   PARAINFLUENZA VIRUS 4  Not Detected   BORDETELLA PERTUSSIS PCR  Not Detected   CHLAMYDOPHILA PNEUMONIAE PCR  Not Detected   MYCOPLAMA PNEUMO PCR  Not Detected   INFLUENZA A PCR  Not Detected   RSV, PCR  Not Detected               Imaging:  Imaging Results (Last 24 Hours)       ** No results found for the last 24 hours. **               I reviewed the patient's new clinical results / labs / tests / procedures      Assessment/Plan     Active Hospital Problems    Diagnosis  POA    **Acute hypoxic respiratory failure [J96.01]  Yes      Resolved Hospital Problems   No resolved problems to display.           Acute hypoxemic respiratory failure secondary to bilateral human metapneumovirus pneumonia and reactive airway disease.  Positive D-dimer but CTA of the chest is negative for PE and positive for bilateral pneumonia.  Respiratory PCR panel is positive for human metapneumovirus.  Blood cultures are negative till now.  Still requiring oxygen.  Will check urine Legionella and Streptococcus antigen/MRSA screen and sputum cultures..  Currently on day #4 of her Rocephin.  Switched to p.o. steroids yesterday.  On DuoNebs.  Will add Mucinex/Singulair/incentive spirometry.  Will consult pulmonary secondary to poor response to treatment.  Coronary artery disease/hypertension/chronic systolic congestive heart failure.  There is no clinical evidence of angina or congestive heart failure.  Blood pressure improved after medication adjustment.  Currently on lisinopril/clonidine/Coreg/lisinopril/aspirin.  2D echo with normal low ejection fraction between 46 and 50% and grade 1 diastolic dysfunction.  Nuclear stress test with small area of lateral ischemia.  Cardiology on board and decided medical treatment.  Acute on  top of stage IIIa chronic renal failure.  Baseline creatinine at 1.3.  The patient is euvolemic.  Improved/resolved acute kidney failure which was mostly secondary to pneumonia and poor p.o. intake.  Patient renal function is at baseline at this time.  Leukocytosis.  Mostly secondary to steroids.  Check procalcitonin.  Continue Rocephin.  Blood cultures negative till now.  Hyperglycemia.  Check A1c.  VTE prophylaxis.  Will initiate subcu heparin.      Discussed my findings and plan of treatment with the patient.  Disposition.  To be determined based on clinical course.        Nithya Leo MD  [facility]  04/20/24  10:52 EDT

## 2024-04-20 NOTE — PROGRESS NOTES
HOSPITAL PROGRESS NOTE    Date of Service: 24  LOS:  LOS: 3 days   Patient Name: Negrito Navarro  Age/Sex: 73 y.o. male  : 1950  MRN: [id number]  Primary Cardiologist: Dr. Jayne Villalobos    Subjective:     Chief Complaint/Follow up:   Chest Pain, SOB, Cough    Interval History:   Standing up in room.  Shortness of breath and cough have improved.  Denies chest pain.  Couple episodes of dizziness today.    Objective:     Objective:  Temp:  [97.5 °F (36.4 °C)-97.7 °F (36.5 °C)] 97.7 °F (36.5 °C)  Heart Rate:  [74-84] 84  Resp:  [16-20] 18  BP: (109-168)/() 125/81  Body mass index is 30.7 kg/m².    Intake/Output Summary (Last 24 hours) at 2024 1641  Last data filed at 2024 0900  Gross per 24 hour   Intake 320 ml   Output --   Net 320 ml         24  1502 24  0632 24  0527   Weight: 89.4 kg (197 lb) 88.7 kg (195 lb 8.8 oz) 91.6 kg (201 lb 14.4 oz)       Physical Exam:   General Appearance: Alert, cooperative, in no acute distress. AAOx4.   Neck: No JVD.  Lungs: Rhonchi noted.    Heart: Regular rate and rhythm, normal S1 and S2, no murmurs, gallops or rubs.  Extremities: No edema.     Lab Review:   Results from last 7 days   Lab Units 24  0534 24  1315 24  0234 24  1616   SODIUM mmol/L 142 140   < > 139   POTASSIUM mmol/L 3.9 4.1   < > 4.3   CHLORIDE mmol/L 105 102   < > 102   CO2 mmol/L 29.0 27.0   < > 24.3   BUN mg/dL 34* 37*   < > 23   CREATININE mg/dL 1.39* 1.57*   < > 1.69*   GLUCOSE mg/dL 125* 155*   < > 113*   CALCIUM mg/dL 8.6 9.1   < > 8.7   AST (SGOT) U/L  --   --   --  30   ALT (SGPT) U/L  --   --   --  14    < > = values in this interval not displayed.     Results from last 7 days   Lab Units 24  0234 24  0026 24  1616   HSTROP T ng/L 40* 38* 38*     Results from last 7 days   Lab Units 24  0534 24  1315   WBC 10*3/mm3 26.12* 28.81*   HEMOGLOBIN g/dL 13.4 14.6   HEMATOCRIT % 39.5 43.1   PLATELETS  10*3/mm3 249 262                 Results from last 7 days   Lab Units 04/17/24  1616   PROBNP pg/mL 2,363.0*           Results for orders placed during the hospital encounter of 04/17/24    Adult Transthoracic Echo Complete W/ Cont if Necessary Per Protocol    Interpretation Summary    Left ventricular ejection fraction appears to be 46 - 50%.    Left ventricular diastolic function is consistent with (grade I) impaired relaxation.    The left atrial cavity is mild to moderately dilated.    There is calcification of the aortic valve.    Estimated right ventricular systolic pressure from tricuspid regurgitation is normal (<35 mmHg).    I reviewed the patient's new clinical results.  I personally viewed and interpreted the patient's EKG/Telemetry data/Labs/Test Results.     Current Medications:   Scheduled Meds:  aspirin, 81 mg, Oral, Daily  atorvastatin, 40 mg, Oral, Daily  budesonide-formoterol, 2 puff, Inhalation, BID - RT  carvedilol, 12.5 mg, Oral, BID With Meals  cefTRIAXone, 2,000 mg, Intravenous, Q24H  cloNIDine, 0.1 mg, Oral, Q12H  clopidogrel, 75 mg, Oral, Daily  docusate sodium, 100 mg, Oral, BID  fluticasone, 2 spray, Nasal, Daily  guaiFENesin, 600 mg, Oral, Q12H  heparin (porcine), 5,000 Units, Subcutaneous, Q8H  ipratropium-albuterol, 3 mL, Nebulization, Q6H While Awake - RT  isosorbide mononitrate, 30 mg, Oral, Q24H  lisinopril, 5 mg, Oral, Q24H  montelukast, 10 mg, Oral, Nightly  polyethylene glycol, 17 g, Oral, BID AC  predniSONE, 40 mg, Oral, Daily With Breakfast  spironolactone, 50 mg, Oral, Daily  tamsulosin, 0.4 mg, Oral, Daily        Allergies:  Allergies   Allergen Reactions    Shellfish-Derived Products Unknown - Low Severity     gout  gout         Assessment:       Acute hypoxic respiratory failure        Plan:   Assessment & Plan       1.  Acute hypoxic respiratory failure and reactive airway disease.  On Rocephin.  2.  Pneumonia  3.  Human metapneumovirus infection positive per respiratory  panel.  Questions  4.  Left ventricular dysfunction EF 46-50% and grade 1 diastolic dysfunction.  5.  Coronary artery disease: Status post CABG (2016) and BETO to RCA (4/2023).  Stress test showed small lateral wall ischemia with medical treatment recommended.  Isosorbide 30 mg started 4/19.  6.  Hypertension-BP normal  7.  Hyperlipidemia  8.  Chronic kidney disease  9.  Obstructive sleep apnea    Plan:  No changes made to medications today    HANNAH Sibley  [facility]   04/20/24  16:41 EDT

## 2024-04-20 NOTE — THERAPY EVALUATION
Acute Care - Speech Language Pathology   Swallow Initial Evaluation [facility]     Patient Name: Negrito Navarro  : 1950  MRN: [id number]  Today's Date: 2024               Admit Date: 2024    Visit Dx:     ICD-10-CM ICD-9-CM   1. Acute hypoxic respiratory failure  J96.01 518.81   2. Infection due to human metapneumovirus (hMPV)  B34.8 079.89   3. Chest pain, unspecified type  R07.9 786.50   4. History of coronary artery bypass graft  Z95.1 V45.81   5. History of CHF (congestive heart failure)  Z86.79 V12.59     Patient Active Problem List   Diagnosis    Hyperlipidemia    Benign prostatic hyperplasia    Chronic constipation    Primary hypertension    SERENITY (obstructive sleep apnea)    Oxygen dependent    COPD (chronic obstructive pulmonary disease)    Coronary artery disease of native artery of native heart with stable angina pectoris    Acute coronary syndrome with high troponin    Precordial chest pain    History of coronary angioplasty with insertion of stent    Hx of CABG    CAD S/P percutaneous coronary angioplasty    Long term (current) use of antithrombotics/antiplatelets    Acute hypoxic respiratory failure     Past Medical History:   Diagnosis Date    Benign prostatic hyperplasia 11/10/2016    COPD (chronic obstructive pulmonary disease) 6/10/2021    Coronary artery disease involving coronary bypass graft of native heart without angina pectoris 6/10/2021    Essential hypertension 11/10/2016    Hyperlipidemia     SERENITY (obstructive sleep apnea) 11/10/2016     Past Surgical History:   Procedure Laterality Date    CARDIAC CATHETERIZATION N/A 2023    Procedure: Left Heart Cath;  Surgeon: Jayne Villalobos MD;  Location:  [facility];  Service: Cardiovascular;  Laterality: N/A;    CARDIAC CATHETERIZATION N/A 2023    Procedure: Coronary angiography;  Surgeon: Jayne Villalobos MD;  Location:  [facility];  Service: Cardiovascular;  Laterality:  N/A;    CARDIAC CATHETERIZATION N/A 4/21/2023    Procedure: Left ventriculography;  Surgeon: Jayne Villalobos MD;  Location:  [facility];  Service: Cardiovascular;  Laterality: N/A;    CARDIAC CATHETERIZATION  4/21/2023    Procedure: Saphenous Vein Graft;  Surgeon: Jayne Villalobos MD;  Location:  [facility];  Service: Cardiovascular;;    CARDIAC CATHETERIZATION N/A 4/21/2023    Procedure: Native mammary injection;  Surgeon: Jayne Villalobos MD;  Location:  [facility];  Service: Cardiovascular;  Laterality: N/A;    CARDIAC CATHETERIZATION N/A 4/21/2023    Procedure: Percutaneous Coronary Intervention;  Surgeon: Jayne Villalobos MD;  Location:  [facility];  Service: Cardiovascular;  Laterality: N/A;    CARDIAC CATHETERIZATION N/A 4/21/2023    Procedure: Stent BETO coronary;  Surgeon: Jayne Villalobos MD;  Location:  [facility];  Service: Cardiovascular;  Laterality: N/A;    CARDIAC CATHETERIZATION  4/21/2023    Procedure: Percutaneous Manual Thrombectomy;  Surgeon: Jayne Villalobos MD;  Location:  [facility];  Service: Cardiovascular;;       SLP Recommendation and Plan     SLP Diet Recommendation: regular textures, thin liquids (04/20/24 1400)  Recommended Precautions and Strategies: upright posture during/after eating, small bites of food and sips of liquid, alternate between small bites of food and sips of liquid (04/20/24 1400)  SLP Rec. for Method of Medication Administration: meds crushed, with puree (04/20/24 1400)     Monitor for Signs of Aspiration: yes, notify SLP if any concerns (04/20/24 1400)  Recommended Diagnostics: VFSS (MBS) (04/20/24 1400)        Rehab Potential/Prognosis, Swallowing: good, to achieve stated therapy goals (04/20/24 1400)  Therapy Frequency (Swallow): PRN (04/20/24 1400)  Predicted Duration Therapy Intervention (Days): until discharge (04/20/24  1400)  Oral Care Recommendations: Oral Care BID/PRN (04/20/24 1400)                                               SWALLOW EVALUATION (Last 72 Hours)       SLP Adult Swallow Evaluation       Row Name 04/20/24 1400                   Rehab Evaluation    Document Type evaluation  -LS        Patient Effort good  -LS           General Information    Patient Profile Reviewed yes  -LS        Pertinent History Of Current Problem 73 year old gentleman presented to the emergency room with shortness of breath and chest pain and cough; he took nitroglycerin which helped to relieve the pain; he has a history of cad and cabg; he was hypoxic and placed on oxygen; he has a history of copd but is not on home oxygen; denies sick contacts Pt complained of food being stuck in his esophagus.  Pt told SLP that is  who is now retired, would stretch is esophagus and give him botox.  He stated he has not had his esophagus stretched or been given botox in  a number of years.  -LS        Current Method of Nutrition regular textures;thin liquids  -LS        Precautions/Limitations, Vision WFL;for purposes of eval  -LS        Precautions/Limitations, Hearing WFL;for purposes of eval  -LS        Prior Level of Function-Communication WFL  -LS        Prior Level of Function-Swallowing no diet consistency restrictions  -LS        Plans/Goals Discussed with patient and family  -LS        Barriers to Rehab medically complex  -LS           Oral Motor Structure and Function    Dentition Assessment edentulous  -LS        Secretion Management WNL/WFL  -LS        Mucosal Quality moist, healthy  -LS           Oral Musculature and Cranial Nerve Assessment    Oral Motor General Assessment generalized oral motor weakness  -LS           Clinical Swallow Eval    Oral Prep Phase impaired  -LS        Oral Transit WFL  -LS        Oral Residue WFL  -LS        Pharyngeal Phase suspected pharyngeal impairment  -LS        Clinical Swallow Evaluation Summary Pt seen  "for clinical bedside swallow due to complaints by pt that he is inconsistently having food stuck in his esophagus. Pt stated that he used to receive botox and have his esophagus stretched by his primary doctor who is now retired.  He also stated that he has not had the named procedures in several years.  During this evaluation no difficulty was noted with thin liquids.  Pt exhibited difficulty masticating mechanical soft consistency.  He swallowed some pieces whole and complained that he could feel the bolus stuck on his right side.  Pt was asked to perform chin tuck, move his head to the left/right to close off either side during a swallow.  Pt stated that the strategies did not help. SLP explained the importance of wearing his teeth and masticating his food.  Family member present stated binu he marcum not want to wear his dentures.  SLP recommended a pureed diet with thin liquids.  The pt refused the pureed recommendation.. Pt will remain on a regular diet with thin liquids.  SLP recommends medication crushed in puree. SLP also recommends a VFSS study to determine the integrity of the esophagus and other swallow strategies.  -LS           Pharyngeal Phase Concerns    Pharyngeal Phase Concerns other (see comments)  Pt complains of food being \"stuck\"  -LS           SLP Evaluation Clinical Impression    Rehab Potential/Prognosis, Swallowing good, to achieve stated therapy goals  -LS           Recommendations    Therapy Frequency (Swallow) PRN  -LS        Predicted Duration Therapy Intervention (Days) until discharge  -LS        SLP Diet Recommendation regular textures;thin liquids  -LS        Recommended Diagnostics VFSS (MBS)  -LS        Recommended Precautions and Strategies upright posture during/after eating;small bites of food and sips of liquid;alternate between small bites of food and sips of liquid  -LS        Oral Care Recommendations Oral Care BID/PRN  -LS        SLP Rec. for Method of Medication " Administration meds crushed;with puree  -LEONILA        Monitor for Signs of Aspiration yes;notify SLP if any concerns  -LS                  User Key  (r) = Recorded By, (t) = Taken By, (c) = Cosigned By      Initials Name Effective Dates    Kehinde Abdalla SLP 01/05/24 -                     EDUCATION  The patient has been educated in the following areas:   Dysphagia (Swallowing Impairment).              Time Calculation:       Therapy Charges for Today       Code Description Service Date Service Provider Modifiers Qty    [id number]  ST EVAL ORAL PHARYNG SWALLOW 5 4/20/2024 Kehinde Curry SLP GN 1                 ALTAF Stevens  4/20/2024

## 2024-04-21 LAB
ANION GAP SERPL CALCULATED.3IONS-SCNC: 10 MMOL/L (ref 5–15)
BUN SERPL-MCNC: 32 MG/DL (ref 8–23)
BUN/CREAT SERPL: 22.9 (ref 7–25)
CALCIUM SPEC-SCNC: 8.7 MG/DL (ref 8.6–10.5)
CHLORIDE SERPL-SCNC: 104 MMOL/L (ref 98–107)
CO2 SERPL-SCNC: 27 MMOL/L (ref 22–29)
CREAT SERPL-MCNC: 1.4 MG/DL (ref 0.76–1.27)
DEPRECATED RDW RBC AUTO: 44.1 FL (ref 37–54)
EGFRCR SERPLBLD CKD-EPI 2021: 52.4 ML/MIN/1.73
ERYTHROCYTE [DISTWIDTH] IN BLOOD BY AUTOMATED COUNT: 13.5 % (ref 12.3–15.4)
GLUCOSE SERPL-MCNC: 125 MG/DL (ref 65–99)
HCT VFR BLD AUTO: 42.2 % (ref 37.5–51)
HGB BLD-MCNC: 15.5 G/DL (ref 13–17.7)
MCH RBC QN AUTO: 33 PG (ref 26.6–33)
MCHC RBC AUTO-ENTMCNC: 36.7 G/DL (ref 31.5–35.7)
MCV RBC AUTO: 89.8 FL (ref 79–97)
PLATELET # BLD AUTO: 271 10*3/MM3 (ref 140–450)
PMV BLD AUTO: 10.7 FL (ref 6–12)
POTASSIUM SERPL-SCNC: 4 MMOL/L (ref 3.5–5.2)
RBC # BLD AUTO: 4.7 10*6/MM3 (ref 4.14–5.8)
SODIUM SERPL-SCNC: 141 MMOL/L (ref 136–145)
WBC NRBC COR # BLD AUTO: 12.43 10*3/MM3 (ref 3.4–10.8)

## 2024-04-21 PROCEDURE — 63710000001 PREDNISONE PER 1 MG: Performed by: STUDENT IN AN ORGANIZED HEALTH CARE EDUCATION/TRAINING PROGRAM

## 2024-04-21 PROCEDURE — 94799 UNLISTED PULMONARY SVC/PX: CPT

## 2024-04-21 PROCEDURE — 94761 N-INVAS EAR/PLS OXIMETRY MLT: CPT

## 2024-04-21 PROCEDURE — 85027 COMPLETE CBC AUTOMATED: CPT | Performed by: STUDENT IN AN ORGANIZED HEALTH CARE EDUCATION/TRAINING PROGRAM

## 2024-04-21 PROCEDURE — 25010000002 HEPARIN (PORCINE) PER 1000 UNITS: Performed by: INTERNAL MEDICINE

## 2024-04-21 PROCEDURE — 80048 BASIC METABOLIC PNL TOTAL CA: CPT | Performed by: STUDENT IN AN ORGANIZED HEALTH CARE EDUCATION/TRAINING PROGRAM

## 2024-04-21 PROCEDURE — 25010000002 CEFTRIAXONE PER 250 MG: Performed by: INTERNAL MEDICINE

## 2024-04-21 PROCEDURE — 94664 DEMO&/EVAL PT USE INHALER: CPT

## 2024-04-21 PROCEDURE — 94760 N-INVAS EAR/PLS OXIMETRY 1: CPT

## 2024-04-21 PROCEDURE — 99232 SBSQ HOSP IP/OBS MODERATE 35: CPT | Performed by: NURSE PRACTITIONER

## 2024-04-21 RX ORDER — CARVEDILOL 25 MG/1
25 TABLET ORAL 2 TIMES DAILY WITH MEALS
Status: DISCONTINUED | OUTPATIENT
Start: 2024-04-21 | End: 2024-04-24 | Stop reason: HOSPADM

## 2024-04-21 RX ADMIN — ISOSORBIDE MONONITRATE 30 MG: 30 TABLET, EXTENDED RELEASE ORAL at 08:00

## 2024-04-21 RX ADMIN — HYDRALAZINE HYDROCHLORIDE 25 MG: 25 TABLET ORAL at 08:08

## 2024-04-21 RX ADMIN — BUDESONIDE AND FORMOTEROL FUMARATE DIHYDRATE 2 PUFF: 160; 4.5 AEROSOL RESPIRATORY (INHALATION) at 20:21

## 2024-04-21 RX ADMIN — MONTELUKAST SODIUM 10 MG: 10 TABLET, FILM COATED ORAL at 21:20

## 2024-04-21 RX ADMIN — HEPARIN SODIUM 5000 UNITS: 5000 INJECTION INTRAVENOUS; SUBCUTANEOUS at 21:20

## 2024-04-21 RX ADMIN — CLOPIDOGREL BISULFATE 75 MG: 75 TABLET, FILM COATED ORAL at 08:00

## 2024-04-21 RX ADMIN — POLYETHYLENE GLYCOL 3350 17 G: 17 POWDER, FOR SOLUTION ORAL at 07:49

## 2024-04-21 RX ADMIN — ASPIRIN 81 MG: 81 TABLET, COATED ORAL at 08:00

## 2024-04-21 RX ADMIN — BUDESONIDE AND FORMOTEROL FUMARATE DIHYDRATE 2 PUFF: 160; 4.5 AEROSOL RESPIRATORY (INHALATION) at 08:04

## 2024-04-21 RX ADMIN — HYDRALAZINE HYDROCHLORIDE 25 MG: 25 TABLET ORAL at 00:56

## 2024-04-21 RX ADMIN — GUAIFENESIN 600 MG: 600 TABLET, EXTENDED RELEASE ORAL at 08:00

## 2024-04-21 RX ADMIN — CEFTRIAXONE 2000 MG: 2 INJECTION, POWDER, FOR SOLUTION INTRAMUSCULAR; INTRAVENOUS at 23:33

## 2024-04-21 RX ADMIN — IPRATROPIUM BROMIDE AND ALBUTEROL SULFATE 3 ML: .5; 3 SOLUTION RESPIRATORY (INHALATION) at 11:22

## 2024-04-21 RX ADMIN — HEPARIN SODIUM 5000 UNITS: 5000 INJECTION INTRAVENOUS; SUBCUTANEOUS at 13:12

## 2024-04-21 RX ADMIN — PREDNISONE 40 MG: 20 TABLET ORAL at 08:00

## 2024-04-21 RX ADMIN — HEPARIN SODIUM 5000 UNITS: 5000 INJECTION INTRAVENOUS; SUBCUTANEOUS at 06:43

## 2024-04-21 RX ADMIN — CARVEDILOL 25 MG: 25 TABLET, FILM COATED ORAL at 17:26

## 2024-04-21 RX ADMIN — ATORVASTATIN CALCIUM 40 MG: 20 TABLET, FILM COATED ORAL at 08:00

## 2024-04-21 RX ADMIN — CARVEDILOL 12.5 MG: 12.5 TABLET, FILM COATED ORAL at 08:00

## 2024-04-21 RX ADMIN — TAMSULOSIN HYDROCHLORIDE 0.4 MG: 0.4 CAPSULE ORAL at 08:00

## 2024-04-21 RX ADMIN — CLONIDINE HYDROCHLORIDE 0.1 MG: 0.1 TABLET ORAL at 21:19

## 2024-04-21 RX ADMIN — POLYETHYLENE GLYCOL 3350 17 G: 17 POWDER, FOR SOLUTION ORAL at 17:27

## 2024-04-21 RX ADMIN — LISINOPRIL 5 MG: 5 TABLET ORAL at 08:00

## 2024-04-21 RX ADMIN — SPIRONOLACTONE 50 MG: 50 TABLET, FILM COATED ORAL at 08:00

## 2024-04-21 RX ADMIN — GUAIFENESIN 600 MG: 600 TABLET, EXTENDED RELEASE ORAL at 21:20

## 2024-04-21 RX ADMIN — IPRATROPIUM BROMIDE AND ALBUTEROL SULFATE 3 ML: .5; 3 SOLUTION RESPIRATORY (INHALATION) at 20:21

## 2024-04-21 RX ADMIN — HYDROCODONE BITARTRATE AND ACETAMINOPHEN 1 TABLET: 7.5; 325 TABLET ORAL at 23:33

## 2024-04-21 RX ADMIN — CLONIDINE HYDROCHLORIDE 0.1 MG: 0.1 TABLET ORAL at 08:00

## 2024-04-21 RX ADMIN — DOCUSATE SODIUM 100 MG: 100 CAPSULE, LIQUID FILLED ORAL at 08:00

## 2024-04-21 RX ADMIN — IPRATROPIUM BROMIDE AND ALBUTEROL SULFATE 3 ML: .5; 3 SOLUTION RESPIRATORY (INHALATION) at 08:01

## 2024-04-21 RX ADMIN — FLUTICASONE PROPIONATE 2 SPRAY: 50 SPRAY, METERED NASAL at 08:00

## 2024-04-21 RX ADMIN — HYDRALAZINE HYDROCHLORIDE 25 MG: 25 TABLET ORAL at 17:35

## 2024-04-21 RX ADMIN — Medication 3 MG: at 23:33

## 2024-04-21 NOTE — PROGRESS NOTES
HOSPITAL PROGRESS NOTE    Date of Service: 24  LOS:  LOS: 4 days   Patient Name: Negrito Navarro  Age/Sex: 73 y.o. male  : 1950  MRN: [id number]  Primary Cardiologist: Dr. Jayne Villalobso    Subjective:     Chief Complaint/Follow up:   Chest Pain, SOB, Cough    Interval History:   Sitting up in chair.  Reports mild shortness of breath and cough; both improved.  Denies chest pain today, palpitations, or edema.  Blood pressure elevated.      Objective:     Objective:  Temp:  [97.5 °F (36.4 °C)-98.6 °F (37 °C)] 98.6 °F (37 °C)  Heart Rate:  [73-83] 78  Resp:  [16-18] 16  BP: (146-217)/() 160/97  Body mass index is 31.31 kg/m².    Intake/Output Summary (Last 24 hours) at 2024 1431  Last data filed at 2024 1700  Gross per 24 hour   Intake 320 ml   Output 350 ml   Net -30 ml         24  0632 24  0527 24  0537   Weight: 88.7 kg (195 lb 8.8 oz) 91.6 kg (201 lb 14.4 oz) 93.4 kg (205 lb 14.6 oz)       Physical Exam:   General Appearance: Alert, cooperative, in no acute distress. AAOx4.   Neck: No JVD.  Lungs: Rhonchi noted.    Heart: Regular rate and rhythm, normal S1 and S2, no murmurs, gallops or rubs.  Extremities: No edema.     Lab Review:   Results from last 7 days   Lab Units 24  0631 24  0534 24  0234 24  1616   SODIUM mmol/L 141 142   < > 139   POTASSIUM mmol/L 4.0 3.9   < > 4.3   CHLORIDE mmol/L 104 105   < > 102   CO2 mmol/L 27.0 29.0   < > 24.3   BUN mg/dL 32* 34*   < > 23   CREATININE mg/dL 1.40* 1.39*   < > 1.69*   GLUCOSE mg/dL 125* 125*   < > 113*   CALCIUM mg/dL 8.7 8.6   < > 8.7   AST (SGOT) U/L  --   --   --  30   ALT (SGPT) U/L  --   --   --  14    < > = values in this interval not displayed.     Results from last 7 days   Lab Units 24  0234 24  0026 24  1616   HSTROP T ng/L 40* 38* 38*     Results from last 7 days   Lab Units 24  0631 24  0534   WBC 10*3/mm3 12.43* 26.12*   HEMOGLOBIN g/dL  15.5 13.4   HEMATOCRIT % 42.2 39.5   PLATELETS 10*3/mm3 271 249                 Results from last 7 days   Lab Units 04/17/24  1616   PROBNP pg/mL 2,363.0*           Results for orders placed during the hospital encounter of 04/17/24    Adult Transthoracic Echo Complete W/ Cont if Necessary Per Protocol    Interpretation Summary    Left ventricular ejection fraction appears to be 46 - 50%.    Left ventricular diastolic function is consistent with (grade I) impaired relaxation.    The left atrial cavity is mild to moderately dilated.    There is calcification of the aortic valve.    Estimated right ventricular systolic pressure from tricuspid regurgitation is normal (<35 mmHg).    I reviewed the patient's new clinical results.  I personally viewed and interpreted the patient's EKG/Telemetry data/Labs/Test Results.     Current Medications:   Scheduled Meds:  aspirin, 81 mg, Oral, Daily  atorvastatin, 40 mg, Oral, Daily  budesonide-formoterol, 2 puff, Inhalation, BID - RT  carvedilol, 25 mg, Oral, BID With Meals  cefTRIAXone, 2,000 mg, Intravenous, Q24H  cloNIDine, 0.1 mg, Oral, Q12H  clopidogrel, 75 mg, Oral, Daily  docusate sodium, 100 mg, Oral, BID  fluticasone, 2 spray, Nasal, Daily  guaiFENesin, 600 mg, Oral, Q12H  heparin (porcine), 5,000 Units, Subcutaneous, Q8H  ipratropium-albuterol, 3 mL, Nebulization, Q6H While Awake - RT  isosorbide mononitrate, 30 mg, Oral, Q24H  lisinopril, 5 mg, Oral, Q24H  montelukast, 10 mg, Oral, Nightly  polyethylene glycol, 17 g, Oral, BID AC  predniSONE, 40 mg, Oral, Daily With Breakfast  spironolactone, 50 mg, Oral, Daily  tamsulosin, 0.4 mg, Oral, Daily        Allergies:  Allergies   Allergen Reactions    Shellfish-Derived Products Unknown - Low Severity     gout  gout         Assessment:       Acute hypoxic respiratory failure        Plan:   Assessment & Plan       1.  Acute hypoxic respiratory failure and reactive airway disease.  On Rocephin.  2.  Pneumonia  3.  Human  metapneumovirus infection positive per respiratory panel.  Questions  4.  Left ventricular dysfunction EF 46-50% and grade 1 diastolic dysfunction.  5.  Coronary artery disease: Status post CABG (2016) and BETO to RCA (4/2023).  Stress test showed small lateral wall ischemia with medical treatment recommended.  Isosorbide 30 mg started 4/19.  6.  Hypertension-BP normal  7.  Hyperlipidemia  8.  Chronic kidney disease  9.  Obstructive sleep apnea    Plan:  Medicine increase carvedilol for elevated blood pressure  Dr. Mata /team to see tomorrow to decide any further recommendations on stress testing.      HANNAH Sibley  [facility]   04/21/24  14:31 EDT

## 2024-04-21 NOTE — PLAN OF CARE
Problem: Adult Inpatient Plan of Care  Goal: Plan of Care Review  Flowsheets (Taken 4/21/2024 0513)  Progress: no change  Plan of Care Reviewed With: patient  Outcome Evaluation: No acute changes. VSS. O2 in use @ 2L via NC. AOx4. Medicated for pain per MAR. Makes needs known. Resting comfortably @ this time. Bed locked and in lowest position. Side rails up x2. Call light within reach. Will continue to assess.  Goal: Absence of Hospital-Acquired Illness or Injury  Intervention: Identify and Manage Fall Risk  Flowsheets  Taken 4/21/2024 0447  Safety Promotion/Fall Prevention:   activity supervised   assistive device/personal items within reach   clutter free environment maintained   fall prevention program maintained   lighting adjusted   nonskid shoes/slippers when out of bed   room organization consistent   safety round/check completed  Taken 4/21/2024 0210  Safety Promotion/Fall Prevention:   activity supervised   assistive device/personal items within reach   clutter free environment maintained   fall prevention program maintained   lighting adjusted   nonskid shoes/slippers when out of bed   room organization consistent   safety round/check completed  Taken 4/21/2024 0030  Safety Promotion/Fall Prevention:   activity supervised   assistive device/personal items within reach   clutter free environment maintained   fall prevention program maintained   lighting adjusted   nonskid shoes/slippers when out of bed   room organization consistent   safety round/check completed  Taken 4/20/2024 2234  Safety Promotion/Fall Prevention:   activity supervised   assistive device/personal items within reach   clutter free environment maintained   fall prevention program maintained   lighting adjusted   nonskid shoes/slippers when out of bed   room organization consistent   safety round/check completed  Taken 4/20/2024 2030  Safety Promotion/Fall Prevention:   activity supervised   assistive device/personal items within  reach   clutter free environment maintained   fall prevention program maintained   lighting adjusted   nonskid shoes/slippers when out of bed   room organization consistent   safety round/check completed  Intervention: Prevent Skin Injury  Flowsheets  Taken 4/21/2024 0447  Body Position: position changed independently  Taken 4/21/2024 0210  Body Position: position changed independently  Taken 4/21/2024 0030  Body Position: position changed independently  Taken 4/20/2024 2234  Body Position: position changed independently  Taken 4/20/2024 2030  Body Position: position changed independently  Skin Protection:   adhesive use limited   incontinence pads utilized   tubing/devices free from skin contact   transparent dressing maintained  Intervention: Prevent and Manage VTE (Venous Thromboembolism) Risk  Flowsheets  Taken 4/21/2024 0447 by Peter Osborne RN  Activity Management: activity encouraged  Taken 4/21/2024 0210 by Peter Osborne RN  Activity Management: activity encouraged  Taken 4/21/2024 0030 by Peter Osborne RN  Activity Management: activity encouraged  Taken 4/20/2024 2234 by Peter Osborne RN  Activity Management: activity encouraged  Taken 4/20/2024 2030 by Peter Osborne RN  Activity Management: activity encouraged  Range of Motion:   active ROM (range of motion) encouraged   ROM (range of motion) performed  Taken 4/20/2024 1405 by Danitza Hinton RN  VTE Prevention/Management: (Heparin SQ) --  Intervention: Prevent Infection  Flowsheets (Taken 4/21/2024 0259 by Waqas Valdes, PCT)  Infection Prevention:   single patient room provided   personal protective equipment utilized  Goal: Optimal Comfort and Wellbeing  Intervention: Monitor Pain and Promote Comfort  Flowsheets (Taken 4/18/2024 2125 by Amy Quiros, RN)  Pain Management Interventions: see MAR  Intervention: Provide Person-Centered Care  Flowsheets (Taken 4/20/2024 2030)  Trust Relationship/Rapport:   care explained   emotional support provided    choices provided   empathic listening provided   questions answered   questions encouraged   reassurance provided   thoughts/feelings acknowledged  Goal: Readiness for Transition of Care  Intervention: Mutually Develop Transition Plan  Flowsheets (Taken 4/18/2024 1606 by Chica Myers RN)  Equipment Needed After Discharge: none  Equipment Currently Used at Home: none  Anticipated Changes Related to Illness: none  Transportation Anticipated: family or friend will provide  Concerns to be Addressed: no discharge needs identified  Readmission Within the Last 30 Days: no previous admission in last 30 days  Patient/Family Anticipated Services at Transition: none  Patient/Family Anticipates Transition to: home with family     Problem: Skin Injury Risk Increased  Goal: Skin Health and Integrity  Intervention: Promote and Optimize Oral Intake  Flowsheets (Taken 4/20/2024 2030)  Oral Nutrition Promotion:   medicated   rest periods promoted  Intervention: Optimize Skin Protection  Flowsheets  Taken 4/21/2024 0447  Head of Bed (HOB) Positioning: HOB elevated  Taken 4/21/2024 0210  Head of Bed (HOB) Positioning: HOB elevated  Taken 4/21/2024 0030  Head of Bed (HOB) Positioning: HOB elevated  Taken 4/20/2024 2234  Head of Bed (HOB) Positioning: HOB elevated  Taken 4/20/2024 2030  Pressure Reduction Techniques:   frequent weight shift encouraged   weight shift assistance provided  Head of Bed (HOB) Positioning: HOB elevated  Pressure Reduction Devices:   alternating pressure pump (ADD)   positioning supports utilized   pressure-redistributing mattress utilized  Skin Protection:   adhesive use limited   incontinence pads utilized   tubing/devices free from skin contact   transparent dressing maintained     Problem: Fall Injury Risk  Goal: Absence of Fall and Fall-Related Injury  Intervention: Identify and Manage Contributors  Flowsheets  Taken 4/21/2024 0030  Medication Review/Management: medications reviewed  Taken 4/20/2024  2030  Medication Review/Management: medications reviewed  Intervention: Promote Injury-Free Environment  Flowsheets  Taken 4/21/2024 0447  Safety Promotion/Fall Prevention:   activity supervised   assistive device/personal items within reach   clutter free environment maintained   fall prevention program maintained   lighting adjusted   nonskid shoes/slippers when out of bed   room organization consistent   safety round/check completed  Taken 4/21/2024 0210  Safety Promotion/Fall Prevention:   activity supervised   assistive device/personal items within reach   clutter free environment maintained   fall prevention program maintained   lighting adjusted   nonskid shoes/slippers when out of bed   room organization consistent   safety round/check completed  Taken 4/21/2024 0030  Safety Promotion/Fall Prevention:   activity supervised   assistive device/personal items within reach   clutter free environment maintained   fall prevention program maintained   lighting adjusted   nonskid shoes/slippers when out of bed   room organization consistent   safety round/check completed  Taken 4/20/2024 2234  Safety Promotion/Fall Prevention:   activity supervised   assistive device/personal items within reach   clutter free environment maintained   fall prevention program maintained   lighting adjusted   nonskid shoes/slippers when out of bed   room organization consistent   safety round/check completed  Taken 4/20/2024 2030  Safety Promotion/Fall Prevention:   activity supervised   assistive device/personal items within reach   clutter free environment maintained   fall prevention program maintained   lighting adjusted   nonskid shoes/slippers when out of bed   room organization consistent   safety round/check completed     Problem: Gas Exchange Impaired  Goal: Optimal Gas Exchange  Intervention: Optimize Oxygenation and Ventilation  Flowsheets  Taken 4/21/2024 0447  Head of Bed (HOB) Positioning: HOB elevated  Taken 4/21/2024  0210  Head of Bed (HOB) Positioning: HOB elevated  Taken 4/21/2024 0030  Head of Bed (HOB) Positioning: HOB elevated  Taken 4/20/2024 2234  Head of Bed (HOB) Positioning: HOB elevated  Taken 4/20/2024 2030  Head of Bed (HOB) Positioning: HOB elevated   Goal Outcome Evaluation:  Plan of Care Reviewed With: patient        Progress: no change  Outcome Evaluation: No acute changes. VSS. O2 in use @ 2L via NC. AOx4. Medicated for pain per MAR. Makes needs known. Resting comfortably @ this time. Bed locked and in lowest position. Side rails up x2. Call light within reach. Will continue to assess.

## 2024-04-21 NOTE — PROGRESS NOTES
Name: Negrito Navarro ADMIT: 2024   : 1950  PCP: Karen Yu MD    MRN: [id number] LOS: 4 days   AGE/SEX: 73 y.o. male  ROOM: [facility]     Subjective   Subjective   No change in the shortness of breath/cough productive of yellowish sputum/wheezing.  No chest pain.  No palpitation.  No ankle edema.  No fever or chills.  No hemoptysis    Review of Systems  GI.    Positive abdominal pain.  No nausea or vomiting.  Normal bowel movement yesterday without fresh bright blood per rectum or melena.  .  No dysuria or hematuria.     Objective   Objective   Vital Signs  Temp:  [97.5 °F (36.4 °C)-97.7 °F (36.5 °C)] 97.5 °F (36.4 °C)  Heart Rate:  [73-84] 77  Resp:  [16-18] 16  BP: (125-217)/() 217/110  SpO2:  [92 %-100 %] 99 %  on  Flow (L/min):  [2] 2;   Device (Oxygen Therapy): nasal cannula    Intake/Output Summary (Last 24 hours) at 2024 1238  Last data filed at 2024 1700  Gross per 24 hour   Intake 600 ml   Output 350 ml   Net 250 ml     Body mass index is 31.31 kg/m².      24  0632 24  0527 24  0537   Weight: 88.7 kg (195 lb 8.8 oz) 91.6 kg (201 lb 14.4 oz) 93.4 kg (205 lb 14.6 oz)     Physical Exam  General.  Elderly gentleman.  Alert and oriented x 4.  No respiratory distress   Eyes.  Pupils equal round and reactive.  Intact extraocular musculature.  No pallor or jaundice  Oral cavity.  Moist mucous membrane.  Neck.  Supple.  No JVD.  No lymphadenopathy or thyromegaly.  Cardiovascular.  Regular rate and rhythm with grade 2 systolic murmur  Chest.  Improved bilateral air entry with decreased bilateral expiratory wheeze.    Abdomen.  Soft lax.  No tenderness.  No organomegaly.  No guarding or rebound  Extremities.  No clubbing/cyanosis/edema.  CNS.  No acute focal neurological deficits.      Results Review:      Results from last 7 days   Lab Units 24  0631 24  0534 24  1315 24  0234 24  1616   SODIUM mmol/L 141 142 140 139 139  "  POTASSIUM mmol/L 4.0 3.9 4.1 4.0 4.3   CHLORIDE mmol/L 104 105 102 101 102   CO2 mmol/L 27.0 29.0 27.0 27.0 24.3   BUN mg/dL 32* 34* 37* 28* 23   CREATININE mg/dL 1.40* 1.39* 1.57* 1.92* 1.69*   GLUCOSE mg/dL 125* 125* 155* 132* 113*   CALCIUM mg/dL 8.7 8.6 9.1 8.4* 8.7   AST (SGOT) U/L  --   --   --   --  30   ALT (SGPT) U/L  --   --   --   --  14     Estimated Creatinine Clearance: 50.6 mL/min (A) (by C-G formula based on SCr of 1.4 mg/dL (H)).  Results from last 7 days   Lab Units 04/20/24  1206   HEMOGLOBIN A1C % 6.60*         Results from last 7 days   Lab Units 04/18/24  0234 04/18/24  0026 04/17/24  1616   HSTROP T ng/L 40* 38* 38*     Results from last 7 days   Lab Units 04/17/24  1616   PROBNP pg/mL 2,363.0*                   Invalid input(s): \"LDLCALC\"  Results from last 7 days   Lab Units 04/21/24  0631 04/20/24  0534 04/19/24  1315 04/18/24  0234 04/17/24  1616   WBC 10*3/mm3 12.43* 26.12* 28.81* 10.98* 12.86*   HEMOGLOBIN g/dL 15.5 13.4 14.6 14.8 14.0   HEMATOCRIT % 42.2 39.5 43.1 44.0 41.0   PLATELETS 10*3/mm3 271 249 262 229 230   MCV fL 89.8 89.2 90.2 91.3 89.9   MCH pg 33.0 30.2 30.5 30.7 30.7   MCHC g/dL 36.7* 33.9 33.9 33.6 34.1   RDW % 13.5 13.7 13.6 13.8 13.7   RDW-SD fl 44.1 44.2 44.8 46.0 44.1   MPV fL 10.7 10.9 11.3 10.9 10.8   NEUTROPHIL % %  --   --   --   --  71.5   LYMPHOCYTE % %  --   --   --   --  9.0*   MONOCYTES % %  --   --   --   --  18.7*   EOSINOPHIL % %  --   --   --   --  0.1*   BASOPHIL % %  --   --   --   --  0.4   IMM GRAN % %  --   --   --   --  0.3   NEUTROS ABS 10*3/mm3  --   --   --   --  9.20*   LYMPHS ABS 10*3/mm3  --   --   --   --  1.16   MONOS ABS 10*3/mm3  --   --   --   --  2.40*   EOS ABS 10*3/mm3  --   --   --   --  0.01   BASOS ABS 10*3/mm3  --   --   --   --  0.05   IMMATURE GRANS (ABS) 10*3/mm3  --   --   --   --  0.04   NRBC /100 WBC  --   --   --   --  0.0             Results from last 7 days   Lab Units 04/20/24  1206 04/17/24  2041 04/17/24  1616 "   PROCALCITONIN ng/mL 0.20  --  0.35*   LACTATE mmol/L  --  1.5  --              Results from last 7 days   Lab Units 04/17/24  2048 04/17/24  2041   BLOODCX  No growth at 3 days No growth at 3 days     Results from last 7 days   Lab Units 04/17/24  1616   ADENOVIRUS DETECTION BY PCR  Not Detected   CORONAVIRUS 229E  Not Detected   CORONAVIRUS HKU1  Not Detected   CORONAVIRUS NL63  Not Detected   CORONAVIRUS OC43  Not Detected   HUMAN METAPNEUMOVIRUS  Detected*   HUMAN RHINOVIRUS/ENTEROVIRUS  Not Detected   INFLUENZA B PCR  Not Detected   PARAINFLUENZA 1  Not Detected   PARAINFLUENZA VIRUS 2  Not Detected   PARAINFLUENZA VIRUS 3  Not Detected   PARAINFLUENZA VIRUS 4  Not Detected   BORDETELLA PERTUSSIS PCR  Not Detected   CHLAMYDOPHILA PNEUMONIAE PCR  Not Detected   MYCOPLAMA PNEUMO PCR  Not Detected   INFLUENZA A PCR  Not Detected   RSV, PCR  Not Detected               Imaging:  Imaging Results (Last 24 Hours)       ** No results found for the last 24 hours. **               I reviewed the patient's new clinical results / labs / tests / procedures      Assessment/Plan     Active Hospital Problems    Diagnosis  POA    **Acute hypoxic respiratory failure [J96.01]  Yes      Resolved Hospital Problems   No resolved problems to display.           Acute hypoxemic respiratory failure secondary to bilateral human metapneumovirus pneumonia and reactive airway disease.  Positive D-dimer but CTA of the chest is negative for PE and positive for bilateral pneumonia.  Respiratory PCR panel is positive for human metapneumovirus.  Blood cultures are negative till now.  Still requiring oxygen and still symptomatic with mild objective improvement..  Negative urine Legionella and Streptococcus antigen.  Negative MRSA screen.  No sputum collected.  Currently on day #5 of Rocephin.  Switched to p.o. steroids yesterday.  On DuoNebs./Mucinex/Singulair/incentive spirometry/OPEP.  Speech planning video swallow study.  Noted and appreciate  pulmonary consult.    Coronary artery disease/hypertension/chronic systolic congestive heart failure.  There is no clinical evidence of angina or congestive heart failure.  Blood pressure suboptimally controlled and I will increase Coreg..  Currently on lisinopril/clonidine/Coreg/lisinopril/aspirin.  2D echo with normal low ejection fraction between 46 and 50% and grade 1 diastolic dysfunction.  Nuclear stress test with small area of lateral ischemia.  Cardiology on board and decided medical treatment.  Acute on top of stage IIIa chronic renal failure.  Baseline creatinine at 1.3.  The patient is euvolemic.  Improved/resolved acute kidney failure which was mostly secondary to pneumonia and poor p.o. intake.  Patient renal function is at baseline at this time.  Leukocytosis.  Mostly secondary to steroids.  Normal procalcitonin.  Continue Rocephin.  Blood cultures negative till now.  Type 2 diabetes.  A1c 6.6.  Diet at discharge.  VTE prophylaxis.  Subcu heparin.      Discussed my findings and plan of treatment with the patient/pulmonary.  Disposition.  To be determined based on clinical course.  Anticipate discharge in 2 days.        Nithya Leo MD  [facility]  04/21/24  12:38 EDT

## 2024-04-21 NOTE — PLAN OF CARE
Goal Outcome Evaluation:              Outcome Evaluation: O2 at 1.5L humidified NC.  Up with standby assist.  Occasional nonproductive cough.  Medicated prn for high blood pressure.  SOA on exertion.

## 2024-04-21 NOTE — PROGRESS NOTES
"  Daily Progress Note.   [address]  4/21/2024    Patient:  Name:  Negrito Navarro  MRN:  [id number]  1950  73 y.o.  male         Requesting physician: Dr Chica Brumfield     Reason for Consultation:  SOA cough     CC: flank pain cough soa cp          Interval History:    Afebrile  Still with cough congestion  On 2lnc saturation of 99%  Sbp 217 this am.  Awake alert  No lethargy no worsening confusion  Tolerating a diet no emesis      Physical Exam:  BP (!) 217/110 (BP Location: Right arm, Patient Position: Lying)   Pulse 77   Temp 97.5 °F (36.4 °C) (Oral)   Resp 16   Ht 172.7 cm (68\")   Wt 93.4 kg (205 lb 14.6 oz)   SpO2 99%   BMI 31.31 kg/m²   Body mass index is 31.31 kg/m².    Intake/Output Summary (Last 24 hours) at 4/21/2024 0938  Last data filed at 4/20/2024 1700  Gross per 24 hour   Intake 600 ml   Output 350 ml   Net 250 ml     General appearance: Nontoxic, conversant   Eyes: Anicteric sclerae, moist conjunctivae; no lid lag;    HENT: Atraumatic; oropharynx clear with moist mucous membranes and no mucosal ulcerations; normal hard and soft palate  Neck: Trachea midline; large, supple, no thyromegaly or lymphadenopathy  Lungs: tatiana positive wheeze positive rhonchi, with normal respiratory effort and no intercostal retractions  CV: RRR, no MRGs   Abdomen: obese bs  Skin: Warm dry well-perfused  Psych: Appropriate affect, alert   Neuro speech intact moves all ext cns 2-12 grossly intact moves all ext.    Data Review:  Notable Labs:  Results from last 7 days   Lab Units 04/21/24  0631 04/20/24  0534 04/19/24  1315 04/18/24  0234 04/17/24  1616   WBC 10*3/mm3 12.43* 26.12* 28.81* 10.98* 12.86*   HEMOGLOBIN g/dL 15.5 13.4 14.6 14.8 14.0   PLATELETS 10*3/mm3 271 249 262 229 230     Results from last 7 days   Lab Units 04/21/24  0631 04/20/24  0534 04/19/24  1315 04/18/24  0234 04/17/24  1616   SODIUM mmol/L 141 142 140 139 139   POTASSIUM mmol/L 4.0 3.9 4.1 4.0 4.3   CHLORIDE mmol/L 104 " 105 102 101 102   CO2 mmol/L 27.0 29.0 27.0 27.0 24.3   BUN mg/dL 32* 34* 37* 28* 23   CREATININE mg/dL 1.40* 1.39* 1.57* 1.92* 1.69*   GLUCOSE mg/dL 125* 125* 155* 132* 113*   CALCIUM mg/dL 8.7 8.6 9.1 8.4* 8.7   Estimated Creatinine Clearance: 50.6 mL/min (A) (by C-G formula based on SCr of 1.4 mg/dL (H)).    Results from last 7 days   Lab Units 04/21/24  0631 04/20/24  1206 04/20/24  0534 04/19/24  1315 04/18/24  0234 04/17/24  2041 04/17/24  1616   AST (SGOT) U/L  --   --   --   --   --   --  30   ALT (SGPT) U/L  --   --   --   --   --   --  14   PROCALCITONIN ng/mL  --  0.20  --   --   --   --  0.35*   LACTATE mmol/L  --   --   --   --   --  1.5  --    D DIMER QUANT MCGFEU/mL  --   --   --   --   --   --  0.86*   PLATELETS 10*3/mm3 271  --  249 262   < >  --  230    < > = values in this interval not displayed.             Imaging:  Reviewed chest images personally from past 3 days    ASSESSMENT  /  PLAN:  HMPV viral bronchopneumonia  Bronchospasm from above  Problem List       Patient Active Problem List   Diagnosis    Hyperlipidemia    Benign prostatic hyperplasia    Chronic constipation    Primary hypertension    SERENITY (obstructive sleep apnea)    Oxygen dependent    COPD (chronic obstructive pulmonary disease)    Coronary artery disease of native artery of native heart with stable angina pectoris    Acute coronary syndrome with high troponin    Precordial chest pain    History of coronary angioplasty with insertion of stent    Hx of CABG    CAD S/P percutaneous coronary angioplasty    Long term (current) use of antithrombotics/antiplatelets    Acute hypoxic respiratory failure        Pred burst  Symbicort  Albutrol   Suspect viral induced bronchospasm.  Needs outpt pfts    Coordinated care with Dr. Leo      Electronically signed by Arturo Gomes MD, 04/21/24, 9:38 AM EDT.  [facility]

## 2024-04-22 ENCOUNTER — APPOINTMENT (OUTPATIENT)
Dept: GENERAL RADIOLOGY | Facility: HOSPITAL | Age: 74
End: 2024-04-22
Payer: MEDICARE

## 2024-04-22 LAB
ANION GAP SERPL CALCULATED.3IONS-SCNC: 6.8 MMOL/L (ref 5–15)
BACTERIA SPEC AEROBE CULT: NORMAL
BACTERIA SPEC AEROBE CULT: NORMAL
BUN SERPL-MCNC: 31 MG/DL (ref 8–23)
BUN/CREAT SERPL: 25 (ref 7–25)
CALCIUM SPEC-SCNC: 9.3 MG/DL (ref 8.6–10.5)
CHLORIDE SERPL-SCNC: 104 MMOL/L (ref 98–107)
CO2 SERPL-SCNC: 31.2 MMOL/L (ref 22–29)
CREAT SERPL-MCNC: 1.24 MG/DL (ref 0.76–1.27)
DEPRECATED RDW RBC AUTO: 44.8 FL (ref 37–54)
EGFRCR SERPLBLD CKD-EPI 2021: 60.6 ML/MIN/1.73
ERYTHROCYTE [DISTWIDTH] IN BLOOD BY AUTOMATED COUNT: 13.5 % (ref 12.3–15.4)
GLUCOSE SERPL-MCNC: 117 MG/DL (ref 65–99)
HCT VFR BLD AUTO: 42.7 % (ref 37.5–51)
HGB BLD-MCNC: 14.2 G/DL (ref 13–17.7)
MCH RBC QN AUTO: 30 PG (ref 26.6–33)
MCHC RBC AUTO-ENTMCNC: 33.3 G/DL (ref 31.5–35.7)
MCV RBC AUTO: 90.1 FL (ref 79–97)
PLATELET # BLD AUTO: 266 10*3/MM3 (ref 140–450)
PMV BLD AUTO: 11 FL (ref 6–12)
POTASSIUM SERPL-SCNC: 4.3 MMOL/L (ref 3.5–5.2)
RBC # BLD AUTO: 4.74 10*6/MM3 (ref 4.14–5.8)
SODIUM SERPL-SCNC: 142 MMOL/L (ref 136–145)
WBC NRBC COR # BLD AUTO: 11.39 10*3/MM3 (ref 3.4–10.8)

## 2024-04-22 PROCEDURE — 97530 THERAPEUTIC ACTIVITIES: CPT

## 2024-04-22 PROCEDURE — 63710000001 PREDNISONE PER 1 MG: Performed by: STUDENT IN AN ORGANIZED HEALTH CARE EDUCATION/TRAINING PROGRAM

## 2024-04-22 PROCEDURE — 25010000002 HEPARIN (PORCINE) PER 1000 UNITS: Performed by: INTERNAL MEDICINE

## 2024-04-22 PROCEDURE — 94799 UNLISTED PULMONARY SVC/PX: CPT

## 2024-04-22 PROCEDURE — 94760 N-INVAS EAR/PLS OXIMETRY 1: CPT

## 2024-04-22 PROCEDURE — 85027 COMPLETE CBC AUTOMATED: CPT | Performed by: STUDENT IN AN ORGANIZED HEALTH CARE EDUCATION/TRAINING PROGRAM

## 2024-04-22 PROCEDURE — 74230 X-RAY XM SWLNG FUNCJ C+: CPT

## 2024-04-22 PROCEDURE — 94664 DEMO&/EVAL PT USE INHALER: CPT

## 2024-04-22 PROCEDURE — 99232 SBSQ HOSP IP/OBS MODERATE 35: CPT

## 2024-04-22 PROCEDURE — 80048 BASIC METABOLIC PNL TOTAL CA: CPT | Performed by: STUDENT IN AN ORGANIZED HEALTH CARE EDUCATION/TRAINING PROGRAM

## 2024-04-22 PROCEDURE — 94761 N-INVAS EAR/PLS OXIMETRY MLT: CPT

## 2024-04-22 PROCEDURE — 92611 MOTION FLUOROSCOPY/SWALLOW: CPT

## 2024-04-22 RX ORDER — PREDNISONE 10 MG/1
10 TABLET ORAL
Status: DISCONTINUED | OUTPATIENT
Start: 2024-04-23 | End: 2024-04-23

## 2024-04-22 RX ORDER — LISINOPRIL 10 MG/1
10 TABLET ORAL
Status: DISCONTINUED | OUTPATIENT
Start: 2024-04-23 | End: 2024-04-22

## 2024-04-22 RX ORDER — CLONIDINE HYDROCHLORIDE 0.1 MG/1
0.2 TABLET ORAL EVERY 12 HOURS SCHEDULED
Status: DISCONTINUED | OUTPATIENT
Start: 2024-04-22 | End: 2024-04-23

## 2024-04-22 RX ORDER — LISINOPRIL 40 MG/1
40 TABLET ORAL
Status: DISCONTINUED | OUTPATIENT
Start: 2024-04-23 | End: 2024-04-24 | Stop reason: HOSPADM

## 2024-04-22 RX ADMIN — HYDRALAZINE HYDROCHLORIDE 25 MG: 25 TABLET ORAL at 00:46

## 2024-04-22 RX ADMIN — ISOSORBIDE MONONITRATE 30 MG: 30 TABLET, EXTENDED RELEASE ORAL at 09:46

## 2024-04-22 RX ADMIN — HEPARIN SODIUM 5000 UNITS: 5000 INJECTION INTRAVENOUS; SUBCUTANEOUS at 20:57

## 2024-04-22 RX ADMIN — CLONIDINE HYDROCHLORIDE 0.2 MG: 0.1 TABLET ORAL at 20:49

## 2024-04-22 RX ADMIN — IPRATROPIUM BROMIDE AND ALBUTEROL SULFATE 3 ML: .5; 3 SOLUTION RESPIRATORY (INHALATION) at 11:02

## 2024-04-22 RX ADMIN — HYDRALAZINE HYDROCHLORIDE 25 MG: 25 TABLET ORAL at 13:07

## 2024-04-22 RX ADMIN — GUAIFENESIN 600 MG: 600 TABLET, EXTENDED RELEASE ORAL at 09:47

## 2024-04-22 RX ADMIN — HEPARIN SODIUM 5000 UNITS: 5000 INJECTION INTRAVENOUS; SUBCUTANEOUS at 13:06

## 2024-04-22 RX ADMIN — CLOPIDOGREL BISULFATE 75 MG: 75 TABLET, FILM COATED ORAL at 09:46

## 2024-04-22 RX ADMIN — BUDESONIDE AND FORMOTEROL FUMARATE DIHYDRATE 2 PUFF: 160; 4.5 AEROSOL RESPIRATORY (INHALATION) at 20:14

## 2024-04-22 RX ADMIN — LISINOPRIL 5 MG: 5 TABLET ORAL at 09:46

## 2024-04-22 RX ADMIN — IPRATROPIUM BROMIDE AND ALBUTEROL SULFATE 3 ML: .5; 3 SOLUTION RESPIRATORY (INHALATION) at 20:06

## 2024-04-22 RX ADMIN — IPRATROPIUM BROMIDE AND ALBUTEROL SULFATE 3 ML: .5; 3 SOLUTION RESPIRATORY (INHALATION) at 06:54

## 2024-04-22 RX ADMIN — LISINOPRIL 30 MG: 20 TABLET ORAL at 14:52

## 2024-04-22 RX ADMIN — MONTELUKAST SODIUM 10 MG: 10 TABLET, FILM COATED ORAL at 20:49

## 2024-04-22 RX ADMIN — ATORVASTATIN CALCIUM 40 MG: 20 TABLET, FILM COATED ORAL at 09:46

## 2024-04-22 RX ADMIN — BUDESONIDE AND FORMOTEROL FUMARATE DIHYDRATE 2 PUFF: 160; 4.5 AEROSOL RESPIRATORY (INHALATION) at 06:58

## 2024-04-22 RX ADMIN — CARVEDILOL 25 MG: 25 TABLET, FILM COATED ORAL at 09:46

## 2024-04-22 RX ADMIN — SPIRONOLACTONE 50 MG: 50 TABLET, FILM COATED ORAL at 09:46

## 2024-04-22 RX ADMIN — BARIUM SULFATE 50 ML: 400 SUSPENSION ORAL at 11:31

## 2024-04-22 RX ADMIN — BARIUM SULFATE 55 ML: 0.81 POWDER, FOR SUSPENSION ORAL at 11:30

## 2024-04-22 RX ADMIN — BARIUM SULFATE 1 TEASPOON(S): 0.6 CREAM ORAL at 11:30

## 2024-04-22 RX ADMIN — ASPIRIN 81 MG: 81 TABLET, COATED ORAL at 09:47

## 2024-04-22 RX ADMIN — CLONIDINE HYDROCHLORIDE 0.1 MG: 0.1 TABLET ORAL at 09:46

## 2024-04-22 RX ADMIN — FLUTICASONE PROPIONATE 2 SPRAY: 50 SPRAY, METERED NASAL at 09:48

## 2024-04-22 RX ADMIN — CARVEDILOL 25 MG: 25 TABLET, FILM COATED ORAL at 18:50

## 2024-04-22 RX ADMIN — BARIUM SULFATE 4 ML: 980 POWDER, FOR SUSPENSION ORAL at 11:31

## 2024-04-22 RX ADMIN — PREDNISONE 40 MG: 20 TABLET ORAL at 09:46

## 2024-04-22 RX ADMIN — DOCUSATE SODIUM 100 MG: 100 CAPSULE, LIQUID FILLED ORAL at 20:50

## 2024-04-22 RX ADMIN — GUAIFENESIN 600 MG: 600 TABLET, EXTENDED RELEASE ORAL at 20:49

## 2024-04-22 RX ADMIN — TAMSULOSIN HYDROCHLORIDE 0.4 MG: 0.4 CAPSULE ORAL at 09:46

## 2024-04-22 NOTE — PROGRESS NOTES
[facility]  Cardiology Progress Note    Patient Identification:  Name: Negrito Navarro  Age: 73 y.o.  Sex: male  :  1950  MRN: [id number]                 Follow Up / Chief Complaint: follow up for chest pain    Interval History: resting in bed, no chest pain, on 1l nc, still with cough. Shortness of breath improving      Objective:    Past Medical History:  Past Medical History:   Diagnosis Date    Benign prostatic hyperplasia 11/10/2016    COPD (chronic obstructive pulmonary disease) 6/10/2021    Coronary artery disease involving coronary bypass graft of native heart without angina pectoris 6/10/2021    Essential hypertension 11/10/2016    Hyperlipidemia     SERENITY (obstructive sleep apnea) 11/10/2016     Past Surgical History:  Past Surgical History:   Procedure Laterality Date    CARDIAC CATHETERIZATION N/A 2023    Procedure: Left Heart Cath;  Surgeon: Jayne Villalobos MD;  Location: [facility] CATH INVASIVE LOCATION;  Service: Cardiovascular;  Laterality: N/A;    CARDIAC CATHETERIZATION N/A 2023    Procedure: Coronary angiography;  Surgeon: Jayne Villalobos MD;  Location: [facility] CATH INVASIVE LOCATION;  Service: Cardiovascular;  Laterality: N/A;    CARDIAC CATHETERIZATION N/A 2023    Procedure: Left ventriculography;  Surgeon: Jayne Villalobos MD;  Location: [facility] CATH INVASIVE LOCATION;  Service: Cardiovascular;  Laterality: N/A;    CARDIAC CATHETERIZATION  2023    Procedure: Saphenous Vein Graft;  Surgeon: Jayne Villalobos MD;  Location: [facility] CATH INVASIVE LOCATION;  Service: Cardiovascular;;    CARDIAC CATHETERIZATION N/A 2023    Procedure: Native mammary injection;  Surgeon: Jayne Villalobos MD;  Location: [facility] CATH INVASIVE LOCATION;  Service: Cardiovascular;  Laterality: N/A;    CARDIAC CATHETERIZATION N/A 2023    Procedure: Percutaneous Coronary Intervention;  Surgeon: Jayne Villalobos MD;  Location: [facility] CATH INVASIVE  LOCATION;  Service: Cardiovascular;  Laterality: N/A;    CARDIAC CATHETERIZATION N/A 4/21/2023    Procedure: Stent BETO coronary;  Surgeon: Jayne Villalobos MD;  Location:  [facility] CATH INVASIVE LOCATION;  Service: Cardiovascular;  Laterality: N/A;    CARDIAC CATHETERIZATION  4/21/2023    Procedure: Percutaneous Manual Thrombectomy;  Surgeon: Jayne Villalobos MD;  Location:  [facility] CATH INVASIVE LOCATION;  Service: Cardiovascular;;        Social History:   Social History     Tobacco Use    Smoking status: Never    Smokeless tobacco: Never   Substance Use Topics    Alcohol use: Not Currently      Family History:  History reviewed. No pertinent family history.       Allergies:  Allergies   Allergen Reactions    Shellfish-Derived Products Unknown - Low Severity     gout  gout       Scheduled Meds:  aspirin, 81 mg, Daily  atorvastatin, 40 mg, Daily  budesonide-formoterol, 2 puff, BID - RT  carvedilol, 25 mg, BID With Meals  cloNIDine, 0.1 mg, Q12H  clopidogrel, 75 mg, Daily  docusate sodium, 100 mg, BID  fluticasone, 2 spray, Daily  guaiFENesin, 600 mg, Q12H  heparin (porcine), 5,000 Units, Q8H  ipratropium-albuterol, 3 mL, Q6H While Awake - RT  isosorbide mononitrate, 30 mg, Q24H  [START ON 4/23/2024] lisinopril, 10 mg, Q24H  montelukast, 10 mg, Nightly  polyethylene glycol, 17 g, BID AC  predniSONE, 40 mg, Daily With Breakfast  spironolactone, 50 mg, Daily  tamsulosin, 0.4 mg, Daily            INTAKE AND OUTPUT:  No intake or output data in the 24 hours ending 04/22/24 1133    Review of Systems:   GI: no n/v or abd pain  Cardiac: no chest pain or palpitations  Pulmonary: no shortness of breath + cough        Constitutional:  Temp:  [97.3 °F (36.3 °C)-98.6 °F (37 °C)] 97.3 °F (36.3 °C)  Heart Rate:  [64-83] 74  Resp:  [16-18] 18  BP: (160-230)/() 216/112    Physical Exam:  General:  Appears in no acute distress  Eyes: eom normal no conjunctival drainage  HEENT:  No JVD. Thyroid not visibly enlarged. No  mucosal pallor or cyanosis  Respiratory: Respirations regular and unlabored at rest. BBS with good air entry in all fields. No crackles, rubs or wheezes auscultated  Cardiovascular: S1S2 Regular rate and rhythm. No murmur, rub or gallop. No carotid bruits. DP/PT pulses   2+  . No pretibial pitting edema  Gastrointestinal: Abdomen soft, non tender. Bowel sounds present.   Musculoskeletal: VILLARREAL x4. No abnormal movements  Neuro: AAO x3 CN II-XII grossly intact  Psych: Mood and affect normal, pleasant and cooperative          Cardiographics    Echocardiogram:   Interpretation Summary         Left ventricular ejection fraction appears to be 46 - 50%.    Left ventricular diastolic function is consistent with (grade I) impaired relaxation.    The left atrial cavity is mild to moderately dilated.    There is calcification of the aortic valve.    Estimated right ventricular systolic pressure from tricuspid regurgitation is normal (<35 mmHg).     Lab Review   Results from last 7 days   Lab Units 04/18/24  0234 04/18/24  0026 04/17/24  1616   HSTROP T ng/L 40* 38* 38*         Results from last 7 days   Lab Units 04/22/24  0550   SODIUM mmol/L 142   POTASSIUM mmol/L 4.3   BUN mg/dL 31*   CREATININE mg/dL 1.24   CALCIUM mg/dL 9.3     @[id number]@  Results from last 7 days   Lab Units 04/22/24  0550 04/21/24  0631 04/20/24  0534   WBC 10*3/mm3 11.39* 12.43* 26.12*   HEMOGLOBIN g/dL 14.2 15.5 13.4   HEMATOCRIT % 42.7 42.2 39.5   PLATELETS 10*3/mm3 266 271 249             Assessment:  Acute hypoxic respiratory failure  Pneumonia  Human metapneumovirus infection: treatment per IM  Chest pain: stress test positive, treat medically. Continue dapt, coreg, imdur started  Coronary artery disease s/p CABG times 4/20/2016 and BETO to RCA 4/2023: dapt, bb, imdur  Hypertension  Hyperlipidemia  COPD  Sleep apnea  CKD      Plan:  BP elevated, increase lisinopril, prn hydralazine, bp has not been checked since am meds given, discussed with RN,  "will recheck bp after pt returns to floor  No chest pain, continue coreg, imdur, lisinopril, clonidine      )4/22/2024  HANNAH Tamez/Transcription:   \"Dictated utilizing Dragon dictation\".     "

## 2024-04-22 NOTE — PROGRESS NOTES
"  Daily Progress Note.   [address]  4/22/2024    Patient:  Name:  Negrito Navarro  MRN:  [id number]  1950  73 y.o.  male         Requesting physician: Dr Chica Brumfield     Reason for Consultation:  SOA cough     CC: flank pain cough soa cp        Interval History:  Robust BP  Afebrile  Still with cough congestion no sputum no hemoptysis  No confusion no lethargy  No abd pain no emesis    Physical Exam:  BP (!) 216/112   Pulse 74   Temp 97.3 °F (36.3 °C) (Oral)   Resp 18   Ht 172.7 cm (68\")   Wt 93.4 kg (205 lb 14.6 oz)   SpO2 100%   BMI 31.31 kg/m²   Body mass index is 31.31 kg/m².    Intake/Output Summary (Last 24 hours) at 4/22/2024 1254  Last data filed at 4/22/2024 0900  Gross per 24 hour   Intake 120 ml   Output --   Net 120 ml     General appearance: Nontoxic, conversant   Eyes: Anicteric sclerae, moist conjunctivae; no lid lag;    HENT: Atraumatic; oropharynx clear with moist mucous membranes and no mucosal ulcerations; normal hard and soft palate  Neck: Trachea midline; large, supple, no thyromegaly or lymphadenopathy  Lungs: tatiana mild sig improved wheeze no rhonchi, with normal respiratory effort and no intercostal retractions  CV: RRR, no MRGs   Abdomen: obese bs  Skin: Warm dry well-perfused  Psych: Appropriate affect, alert   Neuro speech intact moves all ext cns 2-12 grossly intact moves all ext.    Data Review:  Notable Labs:  Results from last 7 days   Lab Units 04/22/24  0550 04/21/24  0631 04/20/24  0534 04/19/24  1315 04/18/24  0234 04/17/24  1616   WBC 10*3/mm3 11.39* 12.43* 26.12* 28.81* 10.98* 12.86*   HEMOGLOBIN g/dL 14.2 15.5 13.4 14.6 14.8 14.0   PLATELETS 10*3/mm3 266 271 249 262 229 230     Results from last 7 days   Lab Units 04/22/24  0550 04/21/24  0631 04/20/24  0534 04/19/24  1315 04/18/24  0234 04/17/24  1616   SODIUM mmol/L 142 141 142 140 139 139   POTASSIUM mmol/L 4.3 4.0 3.9 4.1 4.0 4.3   CHLORIDE mmol/L 104 104 105 102 101 102   CO2 mmol/L 31.2* " 27.0 29.0 27.0 27.0 24.3   BUN mg/dL 31* 32* 34* 37* 28* 23   CREATININE mg/dL 1.24 1.40* 1.39* 1.57* 1.92* 1.69*   GLUCOSE mg/dL 117* 125* 125* 155* 132* 113*   CALCIUM mg/dL 9.3 8.7 8.6 9.1 8.4* 8.7   Estimated Creatinine Clearance: 57.1 mL/min (by C-G formula based on SCr of 1.24 mg/dL).    Results from last 7 days   Lab Units 04/22/24  0550 04/21/24  0631 04/20/24  1206 04/20/24  0534 04/18/24  0234 04/17/24  2041 04/17/24  1616   AST (SGOT) U/L  --   --   --   --   --   --  30   ALT (SGPT) U/L  --   --   --   --   --   --  14   PROCALCITONIN ng/mL  --   --  0.20  --   --   --  0.35*   LACTATE mmol/L  --   --   --   --   --  1.5  --    D DIMER QUANT MCGFEU/mL  --   --   --   --   --   --  0.86*   PLATELETS 10*3/mm3 266 271  --  249   < >  --  230    < > = values in this interval not displayed.             Imaging:  Reviewed chest images personally from past 3 days    ASSESSMENT  /  PLAN:  HMPV viral bronchopneumonia  Bronchospasm from above  Hypertensive urgency  Problem List       Patient Active Problem List   Diagnosis    Hyperlipidemia    Benign prostatic hyperplasia    Chronic constipation    Primary hypertension    SERENITY (obstructive sleep apnea)    Oxygen dependent    COPD (chronic obstructive pulmonary disease)    Coronary artery disease of native artery of native heart with stable angina pectoris    Acute coronary syndrome with high troponin    Precordial chest pain    History of coronary angioplasty with insertion of stent    Hx of CABG    CAD S/P percutaneous coronary angioplasty    Long term (current) use of antithrombotics/antiplatelets    Acute hypoxic respiratory failure        Decrease prednisone - contributing to htn?  Hypertensive urgency per cardiology.  If they do not feel can be managed on floor, can transfer to icu if they feel needed.    Symbicort  Albutrol   Suspect viral induced bronchospasm improving  Needs outpt pfts        Electronically signed by Arturo Gomes MD, 04/22/24, 1:05  PM EDT.

## 2024-04-22 NOTE — PROGRESS NOTES
Name: Negrito Navarro ADMIT: 2024   : 1950  PCP: Karen Yu MD    MRN: [id number] LOS: 5 days   AGE/SEX: 73 y.o. male  ROOM: [facility]     Subjective   Subjective   Improved dyspnea.  Continues with cough productive of yellowish sputum/wheezing.  No chest pain.  No palpitation.  No ankle edema.  No fever or chills.  No hemoptysis    Review of Systems  GI.  No abdominal pain.  No nausea or vomiting.    .  No dysuria or hematuria.     Objective   Objective   Vital Signs  Temp:  [97.3 °F (36.3 °C)-98.6 °F (37 °C)] 97.3 °F (36.3 °C)  Heart Rate:  [64-83] 74  Resp:  [16-18] 18  BP: (160-230)/() 216/112  SpO2:  [92 %-100 %] 100 %  on  Flow (L/min):  [2] 2;   Device (Oxygen Therapy): humidified;nasal cannula    Intake/Output Summary (Last 24 hours) at 2024 1158  Last data filed at 2024 0900  Gross per 24 hour   Intake 120 ml   Output --   Net 120 ml     Body mass index is 31.31 kg/m².      24  0632 24  0527 24  0537   Weight: 88.7 kg (195 lb 8.8 oz) 91.6 kg (201 lb 14.4 oz) 93.4 kg (205 lb 14.6 oz)     Physical Exam  General.  Elderly gentleman.  Alert and oriented x 4.  No respiratory distress   Eyes.  Pupils equal round and reactive.  Intact extraocular musculature.  No pallor or jaundice  Oral cavity.  Moist mucous membrane.  Neck.  Supple.  No JVD.  No lymphadenopathy or thyromegaly.  Cardiovascular.  Regular rate and rhythm with grade 2 systolic murmur  Chest.  Improved bilateral air entry with decreased bilateral expiratory wheeze (improved from the last 2 days)..    Abdomen.  Soft lax.  No tenderness.  No organomegaly.  No guarding or rebound  Extremities.  No clubbing/cyanosis/edema.  CNS.  No acute focal neurological deficits.      Results Review:      Results from last 7 days   Lab Units 24  0550 24  0631 24  0534 24  1315 24  0234 24  1616   SODIUM mmol/L 142 141 142 140 139 139   POTASSIUM mmol/L 4.3 4.0 3.9 4.1 4.0 4.3  "  CHLORIDE mmol/L 104 104 105 102 101 102   CO2 mmol/L 31.2* 27.0 29.0 27.0 27.0 24.3   BUN mg/dL 31* 32* 34* 37* 28* 23   CREATININE mg/dL 1.24 1.40* 1.39* 1.57* 1.92* 1.69*   GLUCOSE mg/dL 117* 125* 125* 155* 132* 113*   CALCIUM mg/dL 9.3 8.7 8.6 9.1 8.4* 8.7   AST (SGOT) U/L  --   --   --   --   --  30   ALT (SGPT) U/L  --   --   --   --   --  14     Estimated Creatinine Clearance: 57.1 mL/min (by C-G formula based on SCr of 1.24 mg/dL).  Results from last 7 days   Lab Units 04/20/24  1206   HEMOGLOBIN A1C % 6.60*         Results from last 7 days   Lab Units 04/18/24  0234 04/18/24  0026 04/17/24  1616   HSTROP T ng/L 40* 38* 38*     Results from last 7 days   Lab Units 04/17/24  1616   PROBNP pg/mL 2,363.0*                   Invalid input(s): \"LDLCALC\"  Results from last 7 days   Lab Units 04/22/24  0550 04/21/24  0631 04/20/24  0534 04/19/24  1315 04/18/24  0234 04/17/24  1616   WBC 10*3/mm3 11.39* 12.43* 26.12* 28.81* 10.98* 12.86*   HEMOGLOBIN g/dL 14.2 15.5 13.4 14.6 14.8 14.0   HEMATOCRIT % 42.7 42.2 39.5 43.1 44.0 41.0   PLATELETS 10*3/mm3 266 271 249 262 229 230   MCV fL 90.1 89.8 89.2 90.2 91.3 89.9   MCH pg 30.0 33.0 30.2 30.5 30.7 30.7   MCHC g/dL 33.3 36.7* 33.9 33.9 33.6 34.1   RDW % 13.5 13.5 13.7 13.6 13.8 13.7   RDW-SD fl 44.8 44.1 44.2 44.8 46.0 44.1   MPV fL 11.0 10.7 10.9 11.3 10.9 10.8   NEUTROPHIL % %  --   --   --   --   --  71.5   LYMPHOCYTE % %  --   --   --   --   --  9.0*   MONOCYTES % %  --   --   --   --   --  18.7*   EOSINOPHIL % %  --   --   --   --   --  0.1*   BASOPHIL % %  --   --   --   --   --  0.4   IMM GRAN % %  --   --   --   --   --  0.3   NEUTROS ABS 10*3/mm3  --   --   --   --   --  9.20*   LYMPHS ABS 10*3/mm3  --   --   --   --   --  1.16   MONOS ABS 10*3/mm3  --   --   --   --   --  2.40*   EOS ABS 10*3/mm3  --   --   --   --   --  0.01   BASOS ABS 10*3/mm3  --   --   --   --   --  0.05   IMMATURE GRANS (ABS) 10*3/mm3  --   --   --   --   --  0.04   NRBC /100 WBC  --   " --   --   --   --  0.0             Results from last 7 days   Lab Units 04/20/24  1206 04/17/24  2041 04/17/24  1616   PROCALCITONIN ng/mL 0.20  --  0.35*   LACTATE mmol/L  --  1.5  --              Results from last 7 days   Lab Units 04/17/24  2048 04/17/24  2041   BLOODCX  No growth at 4 days No growth at 4 days     Results from last 7 days   Lab Units 04/17/24  1616   ADENOVIRUS DETECTION BY PCR  Not Detected   CORONAVIRUS 229E  Not Detected   CORONAVIRUS HKU1  Not Detected   CORONAVIRUS NL63  Not Detected   CORONAVIRUS OC43  Not Detected   HUMAN METAPNEUMOVIRUS  Detected*   HUMAN RHINOVIRUS/ENTEROVIRUS  Not Detected   INFLUENZA B PCR  Not Detected   PARAINFLUENZA 1  Not Detected   PARAINFLUENZA VIRUS 2  Not Detected   PARAINFLUENZA VIRUS 3  Not Detected   PARAINFLUENZA VIRUS 4  Not Detected   BORDETELLA PERTUSSIS PCR  Not Detected   CHLAMYDOPHILA PNEUMONIAE PCR  Not Detected   MYCOPLAMA PNEUMO PCR  Not Detected   INFLUENZA A PCR  Not Detected   RSV, PCR  Not Detected               Imaging:  Imaging Results (Last 24 Hours)       Procedure Component Value Units Date/Time    FL Video Swallow Single Contrast [[id number]] Resulted: 04/22/24 1122     Updated: 04/22/24 1131               I reviewed the patient's new clinical results / labs / tests / procedures      Assessment/Plan     Active Hospital Problems    Diagnosis  POA    **Acute hypoxic respiratory failure [J96.01]  Yes      Resolved Hospital Problems   No resolved problems to display.           Acute hypoxemic respiratory failure secondary to bilateral human metapneumovirus pneumonia and reactive airway disease.  Positive D-dimer but CTA of the chest is negative for PE and positive for bilateral pneumonia.  Respiratory PCR panel is positive for human metapneumovirus.  Blood cultures are negative till now.  Still requiring oxygen.  Slow clinical improvement...  Negative urine Legionella and Streptococcus antigen.  Negative MRSA screen.  No sputum collected.   Status post 5 days of IV Rocephin.  Continue p.o. steroids/DuoNebs./Mucinex/Singulair/incentive spirometry/OPEP.  Awaiting video swallow study results.  Noted and appreciate pulmonary consult.    Coronary artery disease/hypertension/chronic systolic congestive heart failure.  There is no clinical evidence of angina or congestive heart failure.  Blood pressure suboptimally controlled and I will increase lisinopril and clonidine.  Coreg increased yesterday.  Continue lisinopril/clonidine/Coreg/lisinopril/aspirin/Aldactone.  2D echo with normal low ejection fraction between 46 and 50% and grade 1 diastolic dysfunction.  Nuclear stress test with small area of lateral ischemia.  Cardiology on board and decided medical treatment.  Acute on top of stage IIIa chronic renal failure.  Baseline creatinine at 1.3.  The patient is euvolemic.  Improved/resolved acute kidney failure which was mostly secondary to pneumonia and poor p.o. intake.  Patient renal function is at baseline at this time.  Leukocytosis.  Mostly secondary to steroids.  Normal procalcitonin.  Status post IV Rocephin x 5 days.  Blood cultures negative till now.  Type 2 diabetes.  A1c 6.6.  Diet at discharge.  VTE prophylaxis.  Subcu heparin.      Discussed my findings and plan of treatment with the patient/pulmonary.  Disposition.  To be determined based on clinical course.  Anticipate discharge home tomorrow if continues to improve and if okay with pulmonary and cardiology      Nithya Leo MD  [facility]  04/22/24  11:58 EDT

## 2024-04-22 NOTE — PLAN OF CARE
Goal Outcome Evaluation:  Plan of Care Reviewed With: patient        Progress: improving  Outcome Evaluation: Pt continues to demo steady progress with strength and endurance, as evidenced by tolerance of increased activity. Pt up in chair upon PT arrival; on 2 L O2 per nc prior to session. Pt ambulated 200' with SBA on room air; no LOB but did demo increased SOA with activity. Lowest observed SpO2 was 87% while ambulating on room air. Sats improved to 93% after being replaced on 2 L and pt given seated rest break. Pt will continue to benefit from PT to address endurance and strength. No problems anticipated with DC home when medically stable.      Anticipated Discharge Disposition (PT): home with assist

## 2024-04-22 NOTE — THERAPY TREATMENT NOTE
Patient Name: Negrito Navarro  : 1950    MRN: [id number]                              Today's Date: 2024       Admit Date: 2024    Visit Dx:     ICD-10-CM ICD-9-CM   1. Acute hypoxic respiratory failure  J96.01 518.81   2. Infection due to human metapneumovirus (hMPV)  B34.8 079.89   3. Chest pain, unspecified type  R07.9 786.50   4. History of coronary artery bypass graft  Z95.1 V45.81   5. History of CHF (congestive heart failure)  Z86.79 V12.59     Patient Active Problem List   Diagnosis    Hyperlipidemia    Benign prostatic hyperplasia    Chronic constipation    Primary hypertension    SERENITY (obstructive sleep apnea)    Oxygen dependent    COPD (chronic obstructive pulmonary disease)    Coronary artery disease of native artery of native heart with stable angina pectoris    Acute coronary syndrome with high troponin    Precordial chest pain    History of coronary angioplasty with insertion of stent    Hx of CABG    CAD S/P percutaneous coronary angioplasty    Long term (current) use of antithrombotics/antiplatelets    Acute hypoxic respiratory failure     Past Medical History:   Diagnosis Date    Benign prostatic hyperplasia 11/10/2016    COPD (chronic obstructive pulmonary disease) 6/10/2021    Coronary artery disease involving coronary bypass graft of native heart without angina pectoris 6/10/2021    Essential hypertension 11/10/2016    Hyperlipidemia     SERENITY (obstructive sleep apnea) 11/10/2016     Past Surgical History:   Procedure Laterality Date    CARDIAC CATHETERIZATION N/A 2023    Procedure: Left Heart Cath;  Surgeon: Jayne Villalobos MD;  Location:  [facility] CATH INVASIVE LOC[facility];  Service: Cardiovascular;  Laterality: N/A;    CARDIAC CATHETERIZATION N/A 2023    Procedure: Coronary angiography;  Surgeon: Jayne Villalobos MD;  Location:  [facility]ATH [facility]AS[facility];  Service: Cardiovascular;  Laterality: N/A;    CARDIAC CATHETERIZATION N/A 2023    Procedure: Left  ventriculography;  Surgeon: Jayne Villalobos MD;  Location:  [facility]AS[facility];  Service: Cardiovascular;  Laterality: N/A;    CARDIAC CATHETERIZATION  4/21/2023    Procedure: Saphenous Vein Graft;  Surgeon: Jayne Villalobos MD;  Location:  [facility]VASIVE LOC[facility];  Service: Cardiovascular;;    CARDIAC CATHETERIZATION N/A 4/21/2023    Procedure: Native mammary injection;  Surgeon: Jayne Villalobos MD;  Location:  [facility]VAS[facility];  Service: Cardiovascular;  Laterality: N/A;    CARDIAC CATHETERIZATION N/A 4/21/2023    Procedure: Percutaneous Coronary Intervention;  Surgeon: Jayne Villalobos MD;  Location:  [facility]VAS[facility];  Service: Cardiovascular;  Laterality: N/A;    CARDIAC CATHETERIZATION N/A 4/21/2023    Procedure: Stent BETO coronary;  Surgeon: Jayne Villalobos MD;  [facility];  Service: Cardiovascular;  Laterality: N/A;    CARDIAC CATHETERIZATION  4/21/2023    Procedure: Percutaneous Manual Thrombectomy;  Surgeon: Jayne Villalobos MD;  [facility];  Service: Cardiovascular;;      General Information       Row Name 04/22/24 1029          Physical Therapy Time and Intention    Document Type therapy note (daily note)  -EE     Mode of Treatment individual therapy;physical therapy  -EE       Row Name 04/22/24 1029          General Information    Existing Precautions/Restrictions fall;oxygen therapy device and L/min  -EE       Row Name 04/22/24 1029          Cognition    Orientation Status (Cognition) oriented x 4  -EE       Row Name 04/22/24 1029          Safety Issues, Functional Mobility    Impairments Affecting Function (Mobility) endurance/activity tolerance;shortness of breath;strength  -EE               User Key  (r) = Recorded By, (t) = Taken By, (c) = Cosigned By      Initials Name Provider Type    EE Leeann Cervantes PT Physical Therapist                   Mobility       Row  Name 04/22/24 1029          Bed Mobility    Comment, (Bed Mobility) not tested; pt up in chair  -EE       Row Name 04/22/24 1029          Sit-Stand Transfer    Sit-Stand [place] (Transfers) standby assist  -EE       Row Name 04/22/24 1029          Gait/Stairs (Locomotion)    [place] Level (Gait) standby assist  -EE     Distance in Feet (Gait) 200  -EE     Deviations/Abnormal Patterns (Gait) obed decreased;base of support, narrow;stride length decreased  -EE     Bilateral Gait Deviations forward flexed posture  -EE     Comment, (Gait/Stairs) Slight SOA with ambulation but no overt LOB or difficulty negotiating turns in hallway. Cues for pursed lip breathing.  -EE               User Key  (r) = Recorded By, (t) = Taken By, (c) = Cosigned By      Initials Name Provider Type    Leeann Clark, SCARLET Physical Therapist                   Obj/Interventions       Row Name 04/22/24 1030          Balance    Static Standing Balance standby assist  -EE     Dynamic Standing Balance standby assist  -EE     Position/Device Used, Standing Balance unsupported  -EE               User Key  (r) = Recorded By, (t) = Taken By, (c) = Cosigned By      Initials Name Provider Type    Leeann Clark, PT Physical Therapist                   Goals/Plan    No documentation.                  Clinical Impression       Row Name 04/22/24 1030          Pain    Pretreatment Pain Rating 0/10 - no pain  -EE     Posttreatment Pain Rating 0/10 - no pain  -EE       Row Name 04/22/24 1030          Plan of Care Review    Plan of Care Reviewed With patient  -EE     Progress improving  -EE     Outcome Evaluation Pt continues to demo steady progress with strength and endurance, as evidenced by tolerance of increased activity. Pt up in chair upon PT arrival; on 2 L O2 per nc prior to session. Pt ambulated 200' with SBA on room air; no LOB but did demo increased SOA with activity. Lowest observed SpO2 was 87% while ambulating on room air. Sats improved  to 93% after being replaced on 2 L and pt given seated rest break. Pt will continue to benefit from PT to address endurance and strength. No problems anticipated with DC home when medically stable.  -EE       Row Name 04/22/24 1030          Therapy Assessment/Plan (PT)    Therapy Frequency (PT) 3 times/wk  -EE       Row Name 04/22/24 1030          Vital Signs    Pre SpO2 (%) 94  -EE     O2 Delivery Pre Treatment supplemental O2  -EE     Intra SpO2 (%) 87  -EE     O2 Delivery Intra Treatment room air  -EE     Post SpO2 (%) 93  -EE     O2 Delivery Post Treatment supplemental O2  -EE     Pre Patient Position Sitting  -EE     Intra Patient Position Standing  -EE     Post Patient Position Sitting  -EE       Row Name 04/22/24 1030          Positioning and Restraints    Pre-Treatment Position sitting in chair/recliner  -EE     Post Treatment Position chair  -EE     In Chair sitting;call light within reach;encouraged to call for assist;notified nsg  -EE               User Key  (r) = Recorded By, (t) = Taken By, (c) = Cosigned By      Initials Name Provider Type    Leeann Clark PT Physical Therapist                   Outcome Measures       Row Name 04/22/24 1033          How much help from another person do you currently need...    Turning from your back to your side while in flat bed without using bedrails? 4  -EE     Moving from lying on back to sitting on the side of a flat bed without bedrails? 4  -EE     Moving to and from a bed to a chair (including a wheelchair)? 3  -EE     Standing up from a chair using your arms (e.g., wheelchair, bedside chair)? 3  -EE     Climbing 3-5 steps with a railing? 3  -EE     To walk in hospital room? 3  -EE     AM-PAC 6 Clicks Score (PT) 20  -EE     Highest Level of Mobility Goal 6 --> Walk 10 steps or more  -EE               User Key  (r) = Recorded By, (t) = Taken By, (c) = Cosigned By      Initials Name Provider Type    Leeann Clark PT Physical Therapist                                  Physical Therapy Education       Title: PT OT SLP Therapies (Done)       Topic: Physical Therapy (Done)       Point: Mobility training (Done)       Learning Progress Summary             Patient Acceptance, E,D, VU,NR by EE at 4/22/2024 1033    Acceptance, E,TB, VU by CB at 4/21/2024 0036    Acceptance, E,TB, VU by CB at 4/20/2024 0045    Acceptance, E, VU,NR by EE at 4/19/2024 1122    Acceptance, E, VU,NR by EE at 4/18/2024 1052                         Point: Home exercise program (Done)       Learning Progress Summary             Patient Acceptance, E,TB, VU by CB at 4/21/2024 0036    Acceptance, E,TB, VU by CB at 4/20/2024 0045                         Point: Body mechanics (Done)       Learning Progress Summary             Patient Acceptance, E,TB, VU by CB at 4/21/2024 0036    Acceptance, E,TB, VU by CB at 4/20/2024 0045                         Point: Precautions (Done)       Learning Progress Summary             Patient Acceptance, E,D, VU,NR by EE at 4/22/2024 1033    Acceptance, E,TB, VU by CB at 4/21/2024 0036    Acceptance, E,TB, VU by CB at 4/20/2024 0045                                         User Key       Initials Effective Dates Name Provider Type Discipline    EE 06/16/21 -  Leeann Cervantes, SCARLET Physical Therapist PT    CB 02/21/24 -  Peter Osborne, RN Registered Nurse Nurse                  PT Recommendation and Plan  Planned Therapy Interventions (PT): balance training, bed mobility training, gait training, home exercise program, patient/family education, strengthening, transfer training, ROM (range of motion), postural re-education  Plan of Care Reviewed With: patient  Progress: improving  Outcome Evaluation: Pt continues to demo steady progress with strength and endurance, as evidenced by tolerance of increased activity. Pt up in chair upon PT arrival; on 2 L O2 per nc prior to session. Pt ambulated 200' with SBA on room air; no LOB but did demo increased SOA with activity. Lowest observed SpO2  was 87% while ambulating on room air. Sats improved to 93% after being replaced on 2 L and pt given seated rest break. Pt will continue to benefit from PT to address endurance and strength. No problems anticipated with DC home when medically stable.     Time Calculation:         PT Charges       Row Name 04/22/24 1033             Time Calculation    Start Time 1018  -EE      Stop Time 1028  -EE      Time Calculation (min) 10 min  -EE      PT Received On 04/22/24  -EE      PT - Next Appointment 04/24/24  -EE         Time Calculation- PT    Total Timed Code Minutes- PT 10 minute(s)  -EE         Timed Charges    34548 - PT Therapeutic Activity Minutes 10  -EE         Total Minutes    Timed Charges Total Minutes 10  -EE       Total Minutes 10  -EE                User Key  (r) = Recorded By, (t) = Taken By, (c) = Cosigned By      Initials Name Provider Type    EE Leeann Cervantes, PT Physical Therapist                  Therapy Charges for Today       Code Description Service Date Service Provider Modifiers Qty    [id number]  PT THERAPEUTIC ACT EA 15 MIN 4/22/2024 Leeann Cervantes, PT GP 1            PT G-Codes  AM-PAC 6 Clicks Score (PT): 20  PT Discharge Summary  Anticipated Discharge Disposition (PT): home with assist    Leeann Cervantes PT  4/22/2024

## 2024-04-22 NOTE — PLAN OF CARE
Goal Outcome Evaluation:  Plan of Care Reviewed With: patient           Outcome Evaluation: Radiology APRN, Bouchra Cruz, present. Functional oropharyngeal swallow. No aspiration observed. Poor mastication at times due to edentulous state. Patient is refusing modified solids. Trace upper esophageal backflow observed at times. Consider dedicated esophageal assessment d/t hx of esophageal dysphagia and patient complaints.

## 2024-04-22 NOTE — MBS/VFSS/FEES
Acute Care - Speech Language Pathology   Swallow VFSS & Discharge [facility]     Patient Name: Negrito Navarro  : 1950  MRN: [id number]  Today's Date: 2024               Admit Date: 2024    Visit Dx:     ICD-10-CM ICD-9-CM   1. Acute hypoxic respiratory failure  J96.01 518.81   2. Infection due to human metapneumovirus (hMPV)  B34.8 079.89   3. Chest pain, unspecified type  R07.9 786.50   4. History of coronary artery bypass graft  Z95.1 V45.81   5. History of CHF (congestive heart failure)  Z86.79 V12.59     Patient Active Problem List   Diagnosis    Hyperlipidemia    Benign prostatic hyperplasia    Chronic constipation    Primary hypertension    SERENITY (obstructive sleep apnea)    Oxygen dependent    COPD (chronic obstructive pulmonary disease)    Coronary artery disease of native artery of native heart with stable angina pectoris    Acute coronary syndrome with high troponin    Precordial chest pain    History of coronary angioplasty with insertion of stent    Hx of CABG    CAD S/P percutaneous coronary angioplasty    Long term (current) use of antithrombotics/antiplatelets    Acute hypoxic respiratory failure     Past Medical History:   Diagnosis Date    Benign prostatic hyperplasia 11/10/2016    COPD (chronic obstructive pulmonary disease) 6/10/2021    Coronary artery disease involving coronary bypass graft of native heart without angina pectoris 6/10/2021    Essential hypertension 11/10/2016    Hyperlipidemia     SERENITY (obstructive sleep apnea) 11/10/2016     Past Surgical History:   Procedure Laterality Date    CARDIAC CATHETERIZATION N/A 2023    Procedure: Left Heart Cath;  Surgeon: Jayne Villalobos MD;  Location:  [facility]ATH INVAS[facility];  Service: Cardiovascular;  Laterality: N/A;    CARDIAC CATHETERIZATION N/A 2023    Procedure: Coronary angiography;  Surgeon: Jayne Villalobos MD;  Location:  [facility] CATH INVASIVE LOCATION;  Service: Cardiovascular;  Laterality: N/A;     CARDIAC CATHETERIZATION N/A 4/21/2023    Procedure: Left ventriculography;  Surgeon: Jayne Villalobos MD;  Location:  [facility] INVAS[facility];  Service: Cardiovascular;  Laterality: N/A;    CARDIAC CATHETERIZATION  4/21/2023    Procedure: Saphenous Vein Graft;  Surgeon: Jayne Villalobos MD;  Location:  [facility]VAS[facility];  Service: Cardiovascular;;    CARDIAC CATHETERIZATION N/A 4/21/2023    Procedure: Native mammary injection;  Surgeon: Jayne Villalobos MD;  Location:  [facility] INVAS[facility] LOC[facility];  Service: Cardiovascular;  Laterality: N/A;    CARDIAC CATHETERIZATION N/A 4/21/2023    Procedure: Percutaneous Coronary Intervention;  Surgeon: Jayne Villalobos MD;  Location:  [facility];  Service: Cardiovascular;  Laterality: N/A;    CARDIAC CATHETERIZATION N/A 4/21/2023    Procedure: Stent BETO coronary;  Surgeon: Jayne Villalobos MD;  Location:  [facility];  Service: Cardiovascular;  Laterality: N/A;    CARDIAC CATHETERIZATION  4/21/2023    Procedure: Percutaneous Manual Thrombectomy;  Surgeon: Jayne Villalobos MD;  Location:  [facility];  Service: Cardiovascular;;       SLP Recommendation and Plan  SLP Swallowing Diagnosis: functional oral phase, functional pharyngeal phase (04/22/24 1300)  SLP Diet Recommendation: regular textures, thin liquids (04/22/24 1300)  Recommended Precautions and Strategies: upright posture during/after eating, small bites of food and sips of liquid, alternate between small bites of food and sips of liquid (04/22/24 1300)  SLP Rec. for Method of Medication Administration: meds whole, meds crushed, with thin liquids, with puree, as tolerated (04/22/24 1300)     Monitor for Signs of Aspiration: yes, notify SLP if any concerns (04/22/24 1300)              Therapy Frequency (Swallow): evaluation only (04/22/24 1300)     Oral Care Recommendations: Oral Care BID/PRN (04/22/24 1300)                                         Plan of Care Reviewed With: patient  Outcome Evaluation: Radiology APRNBouchra, present. Functional oropharyngeal swallow. No aspiration observed. Poor mastication at times due to edentulous state. Patient is refusing modified solids. Trace upper esophageal backflow observed at times. Consider dedicated esophageal assessment d/t hx of esophageal dysphagia and patient complaints.      SWALLOW EVALUATION (Last 72 Hours)       SLP Adult Swallow Evaluation       Row Name 04/22/24 1300       Rehab Evaluation    Document Type evaluation  -    Patient Effort excellent  -       General Information    Patient Profile Reviewed yes  -SH    Pertinent History Of Current Problem PNA, hx esophageal dysphagia  -    Current Method of Nutrition regular textures;thin liquids  -    Precautions/Limitations, Vision WFL;for purposes of eval  -    Precautions/Limitations, Hearing WFL;for purposes of eval  -    Prior Level of Function-Communication WFL  -    Prior Level of Function-Swallowing esophageal concerns  -    Plans/Goals Discussed with patient  -    Barriers to Rehab --       Pain    Additional Documentation Pain Scale: FACES Pre/Post-Treatment (Group)  -       Pain Scale: FACES Pre/Post-Treatment    Pain: FACES Scale, Pretreatment 0-->no hurt  -       Oral Motor Structure and Function    Dentition Assessment edentulous  -    Secretion Management --    Mucosal Quality --       MBS/VFSS Interpretation    VFSS Summary Patient presents with a functional oropharyngeal swallow. No aspiration observed. Poor mastication at times due to edentulous state. Transient penetration during the swallow with thins via cup and straw. Mild lingual and valleculae residue post swallow, which patient clears with a spontaneous second swallow. Spill to the valleculae before the swallow with puree. Mild lingual and valleculae residue post swallow. Trace, transient posterior penetration before the  swallow with thin portion of mech soft. Mild valleculae residue after double swallow. Rapid, at times inefficient mastication with solids. Normal swallow initiation with regular solids. No penetration observed with nectar via straw. Trace upper esophageal backflow observed at times.  -       SLP Communication to Radiology    Summary Statement Radiology APRN, Bouchra rCuz, present. Functional oropharyngeal swallow. No aspiration observed. Poor mastication at times due to edentulous state. Patient is refusing modified solids. Trace upper esophageal backflow observed at times.  -       SLP Evaluation Clinical Impression    SLP Swallowing Diagnosis functional oral phase;functional pharyngeal phase  -    Rehab Potential/Prognosis, Swallowing --       Recommendations    Therapy Frequency (Swallow) evaluation only  -    Predicted Duration Therapy Intervention (Days) --    SLP Diet Recommendation regular textures;thin liquids  -    Recommended Diagnostics --    Recommended Precautions and Strategies upright posture during/after eating;small bites of food and sips of liquid;alternate between small bites of food and sips of liquid  -    Oral Care Recommendations Oral Care BID/PRN  -    SLP Rec. for Method of Medication Administration meds whole;meds crushed;with thin liquids;with puree;as tolerated  -    Monitor for Signs of Aspiration yes;notify SLP if any concerns  -              User Key  (r) = Recorded By, (t) = Taken By, (c) = Cosigned By      Initials Name Effective Dates     Kehinde Curry SLP 01/05/24 -      Chica Rice SLP 01/05/24 -                     EDUCATION  The patient has been educated in the following areas:   Dysphagia (Swallowing Impairment).              Time Calculation:    Time Calculation- SLP       Row Name 04/22/24 1331             Time Calculation- [facility]    SLP Start Time 1100  -      SLP Received On 04/22/24  -                User Key  (r) = Recorded By, (t) = Taken By,  (c) = Cosigned By      Initials Name Provider Type     Chica Rice, SLP Speech and Language Pathologist                    Therapy Charges for Today       Code Description Service Date Service Provider Modifiers Qty    14728165001 HC ST MOTION FLUORO EVAL SWALLOW 5 4/22/2024 Chica Rice SLP GN 1                 ALTAF Basilio  4/22/2024

## 2024-04-23 LAB
ANION GAP SERPL CALCULATED.3IONS-SCNC: 9.5 MMOL/L (ref 5–15)
BUN SERPL-MCNC: 30 MG/DL (ref 8–23)
BUN/CREAT SERPL: 26.3 (ref 7–25)
CALCIUM SPEC-SCNC: 8.6 MG/DL (ref 8.6–10.5)
CHLORIDE SERPL-SCNC: 104 MMOL/L (ref 98–107)
CO2 SERPL-SCNC: 27.5 MMOL/L (ref 22–29)
CREAT SERPL-MCNC: 1.14 MG/DL (ref 0.76–1.27)
DEPRECATED RDW RBC AUTO: 45.3 FL (ref 37–54)
EGFRCR SERPLBLD CKD-EPI 2021: 67.1 ML/MIN/1.73
ERYTHROCYTE [DISTWIDTH] IN BLOOD BY AUTOMATED COUNT: 13.9 % (ref 12.3–15.4)
GLUCOSE SERPL-MCNC: 120 MG/DL (ref 65–99)
HCT VFR BLD AUTO: 39 % (ref 37.5–51)
HGB BLD-MCNC: 13.2 G/DL (ref 13–17.7)
MCH RBC QN AUTO: 30.6 PG (ref 26.6–33)
MCHC RBC AUTO-ENTMCNC: 33.8 G/DL (ref 31.5–35.7)
MCV RBC AUTO: 90.3 FL (ref 79–97)
PLATELET # BLD AUTO: 253 10*3/MM3 (ref 140–450)
PMV BLD AUTO: 10.8 FL (ref 6–12)
POTASSIUM SERPL-SCNC: 4.3 MMOL/L (ref 3.5–5.2)
RBC # BLD AUTO: 4.32 10*6/MM3 (ref 4.14–5.8)
SODIUM SERPL-SCNC: 141 MMOL/L (ref 136–145)
WBC NRBC COR # BLD AUTO: 14.41 10*3/MM3 (ref 3.4–10.8)

## 2024-04-23 PROCEDURE — 99232 SBSQ HOSP IP/OBS MODERATE 35: CPT | Performed by: INTERNAL MEDICINE

## 2024-04-23 PROCEDURE — 94664 DEMO&/EVAL PT USE INHALER: CPT

## 2024-04-23 PROCEDURE — 25010000002 HEPARIN (PORCINE) PER 1000 UNITS: Performed by: INTERNAL MEDICINE

## 2024-04-23 PROCEDURE — 85027 COMPLETE CBC AUTOMATED: CPT | Performed by: STUDENT IN AN ORGANIZED HEALTH CARE EDUCATION/TRAINING PROGRAM

## 2024-04-23 PROCEDURE — 94799 UNLISTED PULMONARY SVC/PX: CPT

## 2024-04-23 PROCEDURE — 94618 PULMONARY STRESS TESTING: CPT

## 2024-04-23 PROCEDURE — 94761 N-INVAS EAR/PLS OXIMETRY MLT: CPT

## 2024-04-23 PROCEDURE — 80048 BASIC METABOLIC PNL TOTAL CA: CPT | Performed by: STUDENT IN AN ORGANIZED HEALTH CARE EDUCATION/TRAINING PROGRAM

## 2024-04-23 RX ORDER — HYDRALAZINE HYDROCHLORIDE 50 MG/1
50 TABLET, FILM COATED ORAL EVERY 8 HOURS SCHEDULED
Status: DISCONTINUED | OUTPATIENT
Start: 2024-04-23 | End: 2024-04-24 | Stop reason: HOSPADM

## 2024-04-23 RX ORDER — CLONIDINE HYDROCHLORIDE 0.1 MG/1
0.2 TABLET ORAL EVERY 8 HOURS SCHEDULED
Status: DISCONTINUED | OUTPATIENT
Start: 2024-04-23 | End: 2024-04-23

## 2024-04-23 RX ORDER — PANTOPRAZOLE SODIUM 40 MG/1
40 TABLET, DELAYED RELEASE ORAL
Status: DISCONTINUED | OUTPATIENT
Start: 2024-04-23 | End: 2024-04-24 | Stop reason: HOSPADM

## 2024-04-23 RX ORDER — CLONIDINE HYDROCHLORIDE 0.1 MG/1
0.2 TABLET ORAL EVERY 8 HOURS
Status: DISCONTINUED | OUTPATIENT
Start: 2024-04-23 | End: 2024-04-24 | Stop reason: HOSPADM

## 2024-04-23 RX ORDER — SPIRONOLACTONE 50 MG/1
50 TABLET, FILM COATED ORAL DAILY
Status: DISCONTINUED | OUTPATIENT
Start: 2024-04-23 | End: 2024-04-24 | Stop reason: HOSPADM

## 2024-04-23 RX ADMIN — GUAIFENESIN 600 MG: 600 TABLET, EXTENDED RELEASE ORAL at 10:27

## 2024-04-23 RX ADMIN — HEPARIN SODIUM 5000 UNITS: 5000 INJECTION INTRAVENOUS; SUBCUTANEOUS at 05:29

## 2024-04-23 RX ADMIN — POLYETHYLENE GLYCOL 3350 17 G: 17 POWDER, FOR SOLUTION ORAL at 18:57

## 2024-04-23 RX ADMIN — MONTELUKAST SODIUM 10 MG: 10 TABLET, FILM COATED ORAL at 20:09

## 2024-04-23 RX ADMIN — HYDRALAZINE HYDROCHLORIDE 50 MG: 50 TABLET ORAL at 20:09

## 2024-04-23 RX ADMIN — IPRATROPIUM BROMIDE AND ALBUTEROL SULFATE 3 ML: .5; 3 SOLUTION RESPIRATORY (INHALATION) at 18:52

## 2024-04-23 RX ADMIN — BUDESONIDE AND FORMOTEROL FUMARATE DIHYDRATE 2 PUFF: 160; 4.5 AEROSOL RESPIRATORY (INHALATION) at 18:53

## 2024-04-23 RX ADMIN — DOCUSATE SODIUM 100 MG: 100 CAPSULE, LIQUID FILLED ORAL at 10:26

## 2024-04-23 RX ADMIN — CLONIDINE HYDROCHLORIDE 0.2 MG: 0.1 TABLET ORAL at 20:09

## 2024-04-23 RX ADMIN — GUAIFENESIN 600 MG: 600 TABLET, EXTENDED RELEASE ORAL at 20:09

## 2024-04-23 RX ADMIN — HEPARIN SODIUM 5000 UNITS: 5000 INJECTION INTRAVENOUS; SUBCUTANEOUS at 20:08

## 2024-04-23 RX ADMIN — POLYETHYLENE GLYCOL 3350 17 G: 17 POWDER, FOR SOLUTION ORAL at 10:11

## 2024-04-23 RX ADMIN — ASPIRIN 81 MG: 81 TABLET, COATED ORAL at 10:23

## 2024-04-23 RX ADMIN — HEPARIN SODIUM 5000 UNITS: 5000 INJECTION INTRAVENOUS; SUBCUTANEOUS at 13:50

## 2024-04-23 RX ADMIN — HYDRALAZINE HYDROCHLORIDE 50 MG: 50 TABLET ORAL at 13:50

## 2024-04-23 RX ADMIN — FLUTICASONE PROPIONATE 2 SPRAY: 50 SPRAY, METERED NASAL at 10:27

## 2024-04-23 RX ADMIN — PANTOPRAZOLE SODIUM 40 MG: 40 TABLET, DELAYED RELEASE ORAL at 13:50

## 2024-04-23 RX ADMIN — CLONIDINE HYDROCHLORIDE 0.2 MG: 0.1 TABLET ORAL at 10:52

## 2024-04-23 RX ADMIN — SPIRONOLACTONE 50 MG: 50 TABLET, FILM COATED ORAL at 10:51

## 2024-04-23 RX ADMIN — ATORVASTATIN CALCIUM 40 MG: 20 TABLET, FILM COATED ORAL at 10:24

## 2024-04-23 RX ADMIN — CARVEDILOL 25 MG: 25 TABLET, FILM COATED ORAL at 18:57

## 2024-04-23 RX ADMIN — ISOSORBIDE MONONITRATE 30 MG: 30 TABLET, EXTENDED RELEASE ORAL at 10:27

## 2024-04-23 RX ADMIN — DOCUSATE SODIUM 100 MG: 100 CAPSULE, LIQUID FILLED ORAL at 20:09

## 2024-04-23 RX ADMIN — HYDROCODONE BITARTRATE AND ACETAMINOPHEN 1 TABLET: 7.5; 325 TABLET ORAL at 01:22

## 2024-04-23 RX ADMIN — BUDESONIDE AND FORMOTEROL FUMARATE DIHYDRATE 2 PUFF: 160; 4.5 AEROSOL RESPIRATORY (INHALATION) at 08:21

## 2024-04-23 RX ADMIN — LISINOPRIL 40 MG: 40 TABLET ORAL at 10:28

## 2024-04-23 RX ADMIN — Medication 3 MG: at 01:22

## 2024-04-23 RX ADMIN — IPRATROPIUM BROMIDE AND ALBUTEROL SULFATE 3 ML: .5; 3 SOLUTION RESPIRATORY (INHALATION) at 11:11

## 2024-04-23 RX ADMIN — IPRATROPIUM BROMIDE AND ALBUTEROL SULFATE 3 ML: .5; 3 SOLUTION RESPIRATORY (INHALATION) at 08:20

## 2024-04-23 RX ADMIN — TAMSULOSIN HYDROCHLORIDE 0.4 MG: 0.4 CAPSULE ORAL at 10:30

## 2024-04-23 RX ADMIN — CLOPIDOGREL BISULFATE 75 MG: 75 TABLET, FILM COATED ORAL at 10:26

## 2024-04-23 RX ADMIN — CARVEDILOL 25 MG: 25 TABLET, FILM COATED ORAL at 10:12

## 2024-04-23 RX ADMIN — HYDRALAZINE HYDROCHLORIDE 25 MG: 25 TABLET ORAL at 05:29

## 2024-04-23 NOTE — PROGRESS NOTES
Exercise Oximetry    Patient Name:Negrito Navarro   MRN: [id number]   Date: 04/23/24             ROOM AIR BASELINE   SpO2% 95   Heart Rate 75   Blood Pressure      EXERCISE ON ROOM AIR SpO2% EXERCISE ON O2 @ 1 LPM SpO2%   1 MINUTE 91 1 MINUTE 90   2 MINUTES 88 2 MINUTES 90   3 MINUTES  3 MINUTES 91   4 MINUTES  4 MINUTES 91   5 MINUTES  5 MINUTES    6 MINUTES  6 MINUTES               Distance Walked   Distance Walked   Dyspnea (Dee Scale)   Dyspnea (Dee Scale)   Fatigue (Dee Scale)   Fatigue (Dee Scale)   SpO2% Post Exercise  92% SpO2% Post Exercise   HR Post Exercise  78 HR Post Exercise   Time to Recovery  N/A Time to Recovery     Comments: This RT walked  with this patient without walking device, finger probe, and no reports of weakness or dizziness.  The patient was able to walk for the full 6 minutes, however after approximately 2 minutes, he desaturated to 88% and was placed on 1LNC and saturations regained and maintained above 90%.  The patient was returned to bed and was given scheduled breathing treatment.  As of this assessment, pt qualifies for home O2.

## 2024-04-23 NOTE — PROGRESS NOTES
Name: Negrito Navarro ADMIT: 2024   : 1950  PCP: Karen Yu MD    MRN: [id number] LOS: 6 days   AGE/SEX: 73 y.o. male  ROOM: [facility]     Subjective   Subjective   Shortness of breath/cough/wheeze continues to improve.  No chest pain.  No palpitation.  No ankle edema.  No fever or chills.  No hemoptysis.  Positive occasional nosebleed.    Review of Systems  GI.  No abdominal pain.  No nausea or vomiting.  No dysphagia or odynophagia  .  No dysuria or hematuria.  CNS.  No headache.  No dizziness.  No focal neurological symptoms     Objective   Objective   Vital Signs  Temp:  [97.3 °F (36.3 °C)-98.1 °F (36.7 °C)] 97.5 °F (36.4 °C)  Heart Rate:  [63-83] 75  Resp:  [18-24] 24  BP: (143-207)/() (P) 208/108  SpO2:  [92 %-100 %] 94 %  on  Flow (L/min):  [1-2] 1;   Device (Oxygen Therapy): nasal cannula    Intake/Output Summary (Last 24 hours) at 2024 1019  Last data filed at 2024 0821  Gross per 24 hour   Intake 240 ml   Output 200 ml   Net 40 ml     Body mass index is 31.15 kg/m².      24  0527 24  0537 24  0639   Weight: 91.6 kg (201 lb 14.4 oz) 93.4 kg (205 lb 14.6 oz) 92.9 kg (204 lb 14.4 oz)     Physical Exam  General.  Elderly gentleman.  Alert and oriented x 4.  No respiratory distress   Eyes.  Pupils equal round and reactive.  Intact extraocular musculature.  No pallor or jaundice  Oral cavity.  Moist mucous membrane.  Neck.  Supple.  No JVD.  No lymphadenopathy or thyromegaly.  Cardiovascular.  Regular rate and rhythm with grade 2 systolic murmur  Chest.  Improved bilateral air entry with minimal bilateral rhonchi and no wheezes or crackles.  Abdomen.  Soft lax.  No tenderness.  No organomegaly.  No guarding or rebound  Extremities.  No clubbing/cyanosis.  Trace bilateral lower extremity edema..  CNS.  No acute focal neurological deficits.      Results Review:      Results from last 7 days   Lab Units 24  0629 24  0550 24  0631  "04/20/24  0534 04/19/24  1315 04/18/24  0234 04/17/24  1616   SODIUM mmol/L 141 142 141 142 140 139 139   POTASSIUM mmol/L 4.3 4.3 4.0 3.9 4.1 4.0 4.3   CHLORIDE mmol/L 104 104 104 105 102 101 102   CO2 mmol/L 27.5 31.2* 27.0 29.0 27.0 27.0 24.3   BUN mg/dL 30* 31* 32* 34* 37* 28* 23   CREATININE mg/dL 1.14 1.24 1.40* 1.39* 1.57* 1.92* 1.69*   GLUCOSE mg/dL 120* 117* 125* 125* 155* 132* 113*   CALCIUM mg/dL 8.6 9.3 8.7 8.6 9.1 8.4* 8.7   AST (SGOT) U/L  --   --   --   --   --   --  30   ALT (SGPT) U/L  --   --   --   --   --   --  14     Estimated Creatinine Clearance: 61.9 mL/min (by C-G formula based on SCr of 1.14 mg/dL).  Results from last 7 days   Lab Units 04/20/24  1206   HEMOGLOBIN A1C % 6.60*         Results from last 7 days   Lab Units 04/18/24  0234 04/18/24  0026 04/17/24  1616   HSTROP T ng/L 40* 38* 38*     Results from last 7 days   Lab Units 04/17/24  1616   PROBNP pg/mL 2,363.0*                   Invalid input(s): \"LDLCALC\"  Results from last 7 days   Lab Units 04/23/24  0629 04/22/24  0550 04/21/24  0631 04/20/24  0534 04/19/24  1315 04/18/24  0234 04/17/24  1616   WBC 10*3/mm3 14.41* 11.39* 12.43* 26.12* 28.81* 10.98* 12.86*   HEMOGLOBIN g/dL 13.2 14.2 15.5 13.4 14.6 14.8 14.0   HEMATOCRIT % 39.0 42.7 42.2 39.5 43.1 44.0 41.0   PLATELETS 10*3/mm3 253 266 271 249 262 229 230   MCV fL 90.3 90.1 89.8 89.2 90.2 91.3 89.9   MCH pg 30.6 30.0 33.0 30.2 30.5 30.7 30.7   MCHC g/dL 33.8 33.3 36.7* 33.9 33.9 33.6 34.1   RDW % 13.9 13.5 13.5 13.7 13.6 13.8 13.7   RDW-SD fl 45.3 44.8 44.1 44.2 44.8 46.0 44.1   MPV fL 10.8 11.0 10.7 10.9 11.3 10.9 10.8   NEUTROPHIL % %  --   --   --   --   --   --  71.5   LYMPHOCYTE % %  --   --   --   --   --   --  9.0*   MONOCYTES % %  --   --   --   --   --   --  18.7*   EOSINOPHIL % %  --   --   --   --   --   --  0.1*   BASOPHIL % %  --   --   --   --   --   --  0.4   IMM GRAN % %  --   --   --   --   --   --  0.3   NEUTROS ABS 10*3/mm3  --   --   --   --   --   --  9.20* "   LYMPHS ABS 10*3/mm3  --   --   --   --   --   --  1.16   MONOS ABS 10*3/mm3  --   --   --   --   --   --  2.40*   EOS ABS 10*3/mm3  --   --   --   --   --   --  0.01   BASOS ABS 10*3/mm3  --   --   --   --   --   --  0.05   IMMATURE GRANS (ABS) 10*3/mm3  --   --   --   --   --   --  0.04   NRBC /100 WBC  --   --   --   --   --   --  0.0             Results from last 7 days   Lab Units 04/20/24  1206 04/17/24  2041 04/17/24  1616   PROCALCITONIN ng/mL 0.20  --  0.35*   LACTATE mmol/L  --  1.5  --              Results from last 7 days   Lab Units 04/17/24  2048 04/17/24  2041   BLOODCX  No growth at 5 days No growth at 5 days     Results from last 7 days   Lab Units 04/17/24  1616   ADENOVIRUS DETECTION BY PCR  Not Detected   CORONAVIRUS 229E  Not Detected   CORONAVIRUS HKU1  Not Detected   CORONAVIRUS NL63  Not Detected   CORONAVIRUS OC43  Not Detected   HUMAN METAPNEUMOVIRUS  Detected*   HUMAN RHINOVIRUS/ENTEROVIRUS  Not Detected   INFLUENZA B PCR  Not Detected   PARAINFLUENZA 1  Not Detected   PARAINFLUENZA VIRUS 2  Not Detected   PARAINFLUENZA VIRUS 3  Not Detected   PARAINFLUENZA VIRUS 4  Not Detected   BORDETELLA PERTUSSIS PCR  Not Detected   CHLAMYDOPHILA PNEUMONIAE PCR  Not Detected   MYCOPLAMA PNEUMO PCR  Not Detected   INFLUENZA A PCR  Not Detected   RSV, PCR  Not Detected               Imaging:  Imaging Results (Last 24 Hours)       Procedure Component Value Units Date/Time    FL Video Swallow Single Contrast [[id number]] Collected: 04/22/24 1348     Updated: 04/22/24 1720    Narrative:      VIDEO SWALLOWING EXAMINATION BY SPEECH PATHOLOGY     Clinical: Dysphasia     Reference air kerma: 5.65 mGy     Video swallowing examination performed under the direction of speech  pathology. Imaging reviewed by radiologist who concurs with the  findings.     Speech pathology summary:      Radiology APRN, Bouchra Cruz, present. Functional oropharyngeal swallow.  No aspiration observed. Poor mastication at times due to  edentulous  state. Patient is refusing modified solids. Trace upper esophageal  backflow observed at times.            This report was finalized on 4/22/2024 5:17 PM by Dr. Naldo Bland M.D on Workstation: [id number]                  I reviewed the patient's new clinical results / labs / tests / procedures      Assessment/Plan     Active Hospital Problems    Diagnosis  POA    **Acute hypoxic respiratory failure [J96.01]  Yes      Resolved Hospital Problems   No resolved problems to display.           Acute hypoxemic respiratory failure secondary to bilateral human metapneumovirus pneumonia and reactive airway disease.  Positive D-dimer but CTA of the chest is negative for PE and positive for bilateral pneumonia.  Respiratory PCR panel is positive for human metapneumovirus.  Blood cultures are negative till now.  Still requiring oxygen.  ...  Negative urine Legionella and Streptococcus antigen.  Negative MRSA screen.  No sputum collected.  Status post 5 days of IV Rocephin.  Status post IV steroids.  P.o. steroids taper and I will stop today.  Continue DuoNebs./Mucinex/Singulair/incentive spirometry/OPEP.  Swallow study with no aspiration.  Appreciate pulmonary consultation.    Coronary artery disease/hypertension/chronic systolic congestive heart failure.  There is no clinical evidence of angina or congestive heart failure.  Blood pressure suboptimally controlled even after increase of lisinopril/clonidine/Coreg.  I will further increase clonidine and hydralazine to Coreg/lisinopril/Aldactone/aspirin.  2D echo with normal low ejection fraction between 46 and 50% and grade 1 diastolic dysfunction.  Nuclear stress test with small area of lateral ischemia.  Cardiology on board and decided medical treatment.  Acute on top of stage IIIa chronic renal failure.  Baseline creatinine at 1.3.  The patient is euvolemic.  Improved/resolved acute kidney failure which was mostly secondary to pneumonia and poor p.o. intake.   Patient renal function is at baseline at this time.  Leukocytosis.  Mostly secondary to steroids.  Normal procalcitonin.  Status post IV Rocephin x 5 days.  Blood cultures negative till now.  Type 2 diabetes.  A1c 6.6.  Diet at discharge.  VTE prophylaxis.  Subcu heparin.      Discussed my findings and plan of treatment with the patient/pulmonary.  Disposition.  To be determined based on clinical course.  Anticipate discharge home tomorrow if continues to improve and if blood pressure is controlled.    Nithya Leo MD  [facility]  04/23/24  10:19 EDT

## 2024-04-23 NOTE — PLAN OF CARE
Problem: Adult Inpatient Plan of Care  Goal: Plan of Care Review  Outcome: Ongoing, Progressing  Flowsheets (Taken 4/23/2024 9852)  Progress: improving  Plan of Care Reviewed With: patient  Goal: Patient-Specific Goal (Individualized)  Outcome: Ongoing, Progressing  Goal: Absence of Hospital-Acquired Illness or Injury  Outcome: Ongoing, Progressing  Goal: Optimal Comfort and Wellbeing  Outcome: Ongoing, Progressing  Goal: Readiness for Transition of Care  Outcome: Ongoing, Progressing     Problem: Skin Injury Risk Increased  Goal: Skin Health and Integrity  Outcome: Ongoing, Progressing     Problem: Fall Injury Risk  Goal: Absence of Fall and Fall-Related Injury  Outcome: Ongoing, Progressing     Problem: Gas Exchange Impaired  Goal: Optimal Gas Exchange  Outcome: Ongoing, Progressing   Goal Outcome Evaluation:  Plan of Care Reviewed With: patient        Progress: improving

## 2024-04-23 NOTE — PROGRESS NOTES
"  Daily Progress Note.   [address]  4/23/2024    Patient:  Name:  Negrito Navarro  MRN:  [id number]  1950  73 y.o.  male         Requesting physician: Dr Chica Brumfield     Reason for Consultation:  SOA cough     CC: flank pain cough soa cp        Interval History:  Robust BP  Awake alert feels like his breathing has improved no lethargy no confusion no chest pain mild cough improved no hemoptysis tolerating a diet no emesis      Physical Exam:  /76 (BP Location: Right arm, Patient Position: Sitting)   Pulse 69   Temp 97.5 °F (36.4 °C) (Oral)   Resp 16   Ht 172.7 cm (68\")   Wt 92.9 kg (204 lb 14.4 oz)   SpO2 93%   BMI 31.15 kg/m²   Body mass index is 31.15 kg/m².    Intake/Output Summary (Last 24 hours) at 4/23/2024 1322  Last data filed at 4/23/2024 0821  Gross per 24 hour   Intake 240 ml   Output --   Net 240 ml     General appearance: Nontoxic, conversant   Eyes: Anicteric sclerae, moist conjunctivae; no lid lag;    HENT: Atraumatic; oropharynx clear with moist mucous membranes and no mucosal ulcerations; normal hard and soft palate  Neck: Trachea midline; large, supple, no thyromegaly or lymphadenopathy  Lungs: tatiana no wheeze no rhonchi, with unlabored respiratory effort and no intercostal retractions  CV: RRR, no MRGs   Abdomen: obese bs  Skin: Warm dry well-perfused  Psych: Appropriate affect, alert   Neuro speech intact moves all ext cns 2-12 grossly intact moves all ext.    Data Review:  Notable Labs:  Results from last 7 days   Lab Units 04/23/24  0629 04/22/24  0550 04/21/24  0631 04/20/24  0534 04/19/24  1315 04/18/24  0234 04/17/24  1616   WBC 10*3/mm3 14.41* 11.39* 12.43* 26.12* 28.81* 10.98* 12.86*   HEMOGLOBIN g/dL 13.2 14.2 15.5 13.4 14.6 14.8 14.0   PLATELETS 10*3/mm3 253 266 271 249 262 229 230     Results from last 7 days   Lab Units 04/23/24  0629 04/22/24  0550 04/21/24  0631 04/20/24  0534 04/19/24  1315 04/18/24  0234 04/17/24  1616   SODIUM mmol/L 141 142 " 141 142 140 139 139   POTASSIUM mmol/L 4.3 4.3 4.0 3.9 4.1 4.0 4.3   CHLORIDE mmol/L 104 104 104 105 102 101 102   CO2 mmol/L 27.5 31.2* 27.0 29.0 27.0 27.0 24.3   BUN mg/dL 30* 31* 32* 34* 37* 28* 23   CREATININE mg/dL 1.14 1.24 1.40* 1.39* 1.57* 1.92* 1.69*   GLUCOSE mg/dL 120* 117* 125* 125* 155* 132* 113*   CALCIUM mg/dL 8.6 9.3 8.7 8.6 9.1 8.4* 8.7   Estimated Creatinine Clearance: 61.9 mL/min (by C-G formula based on SCr of 1.14 mg/dL).    Results from last 7 days   Lab Units 04/23/24  0629 04/22/24  0550 04/21/24  0631 04/20/24  1206 04/18/24  0234 04/17/24  2041 04/17/24  1616   AST (SGOT) U/L  --   --   --   --   --   --  30   ALT (SGPT) U/L  --   --   --   --   --   --  14   PROCALCITONIN ng/mL  --   --   --  0.20  --   --  0.35*   LACTATE mmol/L  --   --   --   --   --  1.5  --    D DIMER QUANT MCGFEU/mL  --   --   --   --   --   --  0.86*   PLATELETS 10*3/mm3 253 266 271  --    < >  --  230    < > = values in this interval not displayed.             Imaging:  Reviewed chest images personally from past 3 days    ASSESSMENT  /  PLAN:  HMPV viral bronchopneumonia  Bronchospasm from above  Hypertensive urgency  Problem List       Patient Active Problem List   Diagnosis    Hyperlipidemia    Benign prostatic hyperplasia    Chronic constipation    Primary hypertension    SERENITY (obstructive sleep apnea)    Oxygen dependent    COPD (chronic obstructive pulmonary disease)    Coronary artery disease of native artery of native heart with stable angina pectoris    Acute coronary syndrome with high troponin    Precordial chest pain    History of coronary angioplasty with insertion of stent    Hx of CABG    CAD S/P percutaneous coronary angioplasty    Long term (current) use of antithrombotics/antiplatelets    Acute hypoxic respiratory failure        Decrease prednisone - contributing to htn?  Can stop tomorrow  Hypertensive urgency per cardiology.  If they do not feel can be managed on floor, can transfer to icu if they  feel needed.    Symbicort for bronchospasm  Albutrol   Suspect viral induced bronchospasm improving  Needs outpt pfts    Coordinated care with Dr.Hussein Tarik mandujano.        Electronically signed by Arturo Gomes MD, 04/23/24, 2:01 PM EDT.

## 2024-04-23 NOTE — PROGRESS NOTES
[facility]  Cardiology Progress Note    Patient Identification:  Name: Negrito Navarro  Age: 73 y.o.  Sex: male  :  1950  MRN: [id number]                 Follow Up / Chief Complaint: follow up for chest pain    Interval History: resting in bed, no chest pain, on 1l nc, still with cough. Shortness of breath improving      Objective:    Past Medical History:  Past Medical History:   Diagnosis Date    Benign prostatic hyperplasia 11/10/2016    COPD (chronic obstructive pulmonary disease) 6/10/2021    Coronary artery disease involving coronary bypass graft of native heart without angina pectoris 6/10/2021    Essential hypertension 11/10/2016    Hyperlipidemia     SERENITY (obstructive sleep apnea) 11/10/2016     Past Surgical History:  Past Surgical History:   Procedure Laterality Date    CARDIAC CATHETERIZATION N/A 2023    Procedure: Left Heart Cath;  Surgeon: Jayne Villalobos MD;  [facility]AS[facility];  Service: Cardiovascular;  Laterality: N/A;    CARDIAC CATHETERIZATION N/A 2023    Procedure: Coronary angiography;  Surgeon: Jayne Villalobos MD;  Location[facility] LOC[facility];  Service: Cardiovascular;  Laterality: N/A;    CARDIAC CATHETERIZATION N/A 2023    Procedure: Left ventriculography;  Surgeon: Jayne Villalobos MD;  Location: [facility] CATH [facility]AS[facility] LOC[facility];  Service: Cardiovascular;  Laterality: N/A;    CARDIAC CATHETERIZATION  2023    Procedure: Saphenous Vein Graft;  Surgeon: Jayne Villalobos MD;  Location: [facility];  Service: Cardiovascular;;    CARDIAC CATHETERIZATION N/A 2023    Procedure: Native mammary injection;  Surgeon: Jayne Villalobos MD;  Location: [facility] CATH INVASIVE LOC[facility];  Service: Cardiovascular;  Laterality: N/A;    CARDIAC CATHETERIZATION N/A 2023    Procedure: Percutaneous Coronary Intervention;  Surgeon: Jayne Villalobos MD;  Location: [facility] CATH INVASIVE  LOCATION;  Service: Cardiovascular;  Laterality: N/A;    CARDIAC CATHETERIZATION N/A 4/21/2023    Procedure: Stent BETO coronary;  Surgeon: Jayne Villalobos MD;  Location:  [facility] CATH INVASIVE LOCATION;  Service: Cardiovascular;  Laterality: N/A;    CARDIAC CATHETERIZATION  4/21/2023    Procedure: Percutaneous Manual Thrombectomy;  Surgeon: Jayne Villalobos MD;  Location:  [facility] CATH INVASIVE LOCATION;  Service: Cardiovascular;;        Social History:   Social History     Tobacco Use    Smoking status: Never    Smokeless tobacco: Never   Substance Use Topics    Alcohol use: Not Currently      Family History:  History reviewed. No pertinent family history.       Allergies:  Allergies   Allergen Reactions    Shellfish-Derived Products Unknown - Low Severity     gout  gout       Scheduled Meds:  aspirin, 81 mg, Daily  atorvastatin, 40 mg, Daily  budesonide-formoterol, 2 puff, BID - RT  carvedilol, 25 mg, BID With Meals  cloNIDine, 0.2 mg, Q12H  clopidogrel, 75 mg, Daily  docusate sodium, 100 mg, BID  fluticasone, 2 spray, Daily  guaiFENesin, 600 mg, Q12H  heparin (porcine), 5,000 Units, Q8H  ipratropium-albuterol, 3 mL, Q6H While Awake - RT  isosorbide mononitrate, 30 mg, Q24H  lisinopril, 40 mg, Q24H  montelukast, 10 mg, Nightly  polyethylene glycol, 17 g, BID AC  predniSONE, 10 mg, Daily With Breakfast  spironolactone, 50 mg, Daily  tamsulosin, 0.4 mg, Daily            INTAKE AND OUTPUT:    Intake/Output Summary (Last 24 hours) at 4/23/2024 0814  Last data filed at 4/22/2024 1300  Gross per 24 hour   Intake 120 ml   Output 200 ml   Net -80 ml       Review of Systems:   GI: no n/v or abd pain  Cardiac: no chest pain or palpitations  Pulmonary: no shortness of breath + cough        Constitutional:  Temp:  [97.3 °F (36.3 °C)-98.1 °F (36.7 °C)] 97.5 °F (36.4 °C)  Heart Rate:  [63-83] 71  Resp:  [18-20] 20  BP: (143-230)/() 207/115    Physical Exam:  General:  Appears in no acute distress  Eyes: eom  normal no conjunctival drainage  HEENT:  No JVD. Thyroid not visibly enlarged. No mucosal pallor or cyanosis  Respiratory: Respirations regular and unlabored at rest. BBS with good air entry in all fields. No crackles, rubs or wheezes auscultated  Cardiovascular: S1S2 Regular rate and rhythm. No murmur, rub or gallop. No carotid bruits. DP/PT pulses   2+  . No pretibial pitting edema  Gastrointestinal: Abdomen soft, non tender. Bowel sounds present.   Musculoskeletal: VILLARREAL x4. No abnormal movements  Neuro: AAO x3 CN II-XII grossly intact  Psych: Mood and affect normal, pleasant and cooperative          Cardiographics    Echocardiogram:   Interpretation Summary         Left ventricular ejection fraction appears to be 46 - 50%.    Left ventricular diastolic function is consistent with (grade I) impaired relaxation.    The left atrial cavity is mild to moderately dilated.    There is calcification of the aortic valve.    Estimated right ventricular systolic pressure from tricuspid regurgitation is normal (<35 mmHg).     Lab Review   Results from last 7 days   Lab Units 04/18/24  0234 04/18/24  0026 04/17/24  1616   HSTROP T ng/L 40* 38* 38*         Results from last 7 days   Lab Units 04/23/24  0629   SODIUM mmol/L 141   POTASSIUM mmol/L 4.3   BUN mg/dL 30*   CREATININE mg/dL 1.14   CALCIUM mg/dL 8.6     @[id number]@  Results from last 7 days   Lab Units 04/23/24  0629 04/22/24  0550 04/21/24  0631   WBC 10*3/mm3 14.41* 11.39* 12.43*   HEMOGLOBIN g/dL 13.2 14.2 15.5   HEMATOCRIT % 39.0 42.7 42.2   PLATELETS 10*3/mm3 253 266 271             Assessment:  Acute hypoxic respiratory failure  Pneumonia  Human metapneumovirus infection: treatment per IM  Chest pain: stress test positive, treat medically. Continue dapt, coreg, imdur started  Coronary artery disease s/p CABG times 4/20/2016 and BETO to RCA 4/2023: dapt, bb, imdur  Hypertension  Hyperlipidemia  COPD  Sleep apnea  CKD      Plan:  No change in cardiac  "status  Continue conservative management      )4/23/2024  Jayne Villalobos MD      EMR Dragon/Transcription:   \"Dictated utilizing Dragon dictation\".     "

## 2024-04-24 ENCOUNTER — READMISSION MANAGEMENT (OUTPATIENT)
Dept: CALL CENTER | Facility: HOSPITAL | Age: 74
End: 2024-04-24
Payer: MEDICARE

## 2024-04-24 VITALS
SYSTOLIC BLOOD PRESSURE: 140 MMHG | WEIGHT: 207.1 LBS | RESPIRATION RATE: 16 BRPM | HEART RATE: 68 BPM | TEMPERATURE: 97.5 F | HEIGHT: 68 IN | DIASTOLIC BLOOD PRESSURE: 90 MMHG | OXYGEN SATURATION: 97 % | BODY MASS INDEX: 31.39 KG/M2

## 2024-04-24 PROBLEM — J45.909 REACTIVE AIRWAY DISEASE: Status: ACTIVE | Noted: 2024-04-24

## 2024-04-24 PROBLEM — I50.22 CHRONIC SYSTOLIC (CONGESTIVE) HEART FAILURE: Status: ACTIVE | Noted: 2024-04-24

## 2024-04-24 PROBLEM — I25.10 CAD (CORONARY ARTERY DISEASE): Status: ACTIVE | Noted: 2021-06-10

## 2024-04-24 PROBLEM — E11.9 TYPE 2 DIABETES MELLITUS: Status: ACTIVE | Noted: 2024-04-24

## 2024-04-24 PROBLEM — D72.829 LEUKOCYTOSIS: Status: ACTIVE | Noted: 2024-04-24

## 2024-04-24 PROBLEM — I50.22 CHRONIC HFREF (HEART FAILURE WITH REDUCED EJECTION FRACTION): Status: ACTIVE | Noted: 2024-04-24

## 2024-04-24 PROBLEM — N17.9 ACUTE KIDNEY FAILURE: Status: RESOLVED | Noted: 2024-04-24 | Resolved: 2024-04-24

## 2024-04-24 PROBLEM — J12.9 VIRAL PNEUMONIA: Status: ACTIVE | Noted: 2024-04-24

## 2024-04-24 PROBLEM — N18.9 CHRONIC KIDNEY FAILURE: Status: ACTIVE | Noted: 2024-04-24

## 2024-04-24 PROBLEM — N17.9 ACUTE KIDNEY FAILURE: Status: ACTIVE | Noted: 2024-04-24

## 2024-04-24 LAB
ANION GAP SERPL CALCULATED.3IONS-SCNC: 9.5 MMOL/L (ref 5–15)
BUN SERPL-MCNC: 30 MG/DL (ref 8–23)
BUN/CREAT SERPL: 24.2 (ref 7–25)
CALCIUM SPEC-SCNC: 8.5 MG/DL (ref 8.6–10.5)
CHLORIDE SERPL-SCNC: 105 MMOL/L (ref 98–107)
CO2 SERPL-SCNC: 26.5 MMOL/L (ref 22–29)
CREAT SERPL-MCNC: 1.24 MG/DL (ref 0.76–1.27)
DEPRECATED RDW RBC AUTO: 48.2 FL (ref 37–54)
EGFRCR SERPLBLD CKD-EPI 2021: 60.6 ML/MIN/1.73
ERYTHROCYTE [DISTWIDTH] IN BLOOD BY AUTOMATED COUNT: 13.9 % (ref 12.3–15.4)
GLUCOSE SERPL-MCNC: 109 MG/DL (ref 65–99)
HCT VFR BLD AUTO: 42.5 % (ref 37.5–51)
HGB BLD-MCNC: 13.9 G/DL (ref 13–17.7)
MCH RBC QN AUTO: 30.9 PG (ref 26.6–33)
MCHC RBC AUTO-ENTMCNC: 32.7 G/DL (ref 31.5–35.7)
MCV RBC AUTO: 94.4 FL (ref 79–97)
PLATELET # BLD AUTO: 212 10*3/MM3 (ref 140–450)
PMV BLD AUTO: 11.5 FL (ref 6–12)
POTASSIUM SERPL-SCNC: 4.3 MMOL/L (ref 3.5–5.2)
RBC # BLD AUTO: 4.5 10*6/MM3 (ref 4.14–5.8)
SODIUM SERPL-SCNC: 141 MMOL/L (ref 136–145)
WBC NRBC COR # BLD AUTO: 13.43 10*3/MM3 (ref 3.4–10.8)

## 2024-04-24 PROCEDURE — 94799 UNLISTED PULMONARY SVC/PX: CPT

## 2024-04-24 PROCEDURE — 94664 DEMO&/EVAL PT USE INHALER: CPT

## 2024-04-24 PROCEDURE — 94761 N-INVAS EAR/PLS OXIMETRY MLT: CPT

## 2024-04-24 PROCEDURE — 80048 BASIC METABOLIC PNL TOTAL CA: CPT | Performed by: STUDENT IN AN ORGANIZED HEALTH CARE EDUCATION/TRAINING PROGRAM

## 2024-04-24 PROCEDURE — 25010000002 HEPARIN (PORCINE) PER 1000 UNITS: Performed by: INTERNAL MEDICINE

## 2024-04-24 PROCEDURE — 85027 COMPLETE CBC AUTOMATED: CPT | Performed by: STUDENT IN AN ORGANIZED HEALTH CARE EDUCATION/TRAINING PROGRAM

## 2024-04-24 RX ORDER — BUDESONIDE AND FORMOTEROL FUMARATE DIHYDRATE 160; 4.5 UG/1; UG/1
2 AEROSOL RESPIRATORY (INHALATION)
Qty: 10.2 G | Refills: 1 | Status: SHIPPED | OUTPATIENT
Start: 2024-04-24

## 2024-04-24 RX ORDER — HYDRALAZINE HYDROCHLORIDE 50 MG/1
50 TABLET, FILM COATED ORAL EVERY 8 HOURS SCHEDULED
Qty: 90 TABLET | Refills: 3 | Status: SHIPPED | OUTPATIENT
Start: 2024-04-24

## 2024-04-24 RX ORDER — ISOSORBIDE MONONITRATE 30 MG/1
30 TABLET, EXTENDED RELEASE ORAL
Qty: 30 TABLET | Refills: 3 | Status: SHIPPED | OUTPATIENT
Start: 2024-04-25

## 2024-04-24 RX ORDER — MONTELUKAST SODIUM 10 MG/1
10 TABLET ORAL NIGHTLY
Qty: 30 TABLET | Refills: 0 | Status: SHIPPED | OUTPATIENT
Start: 2024-04-24

## 2024-04-24 RX ORDER — CLONIDINE HYDROCHLORIDE 0.2 MG/1
0.2 TABLET ORAL EVERY 8 HOURS
Qty: 90 TABLET | Refills: 3 | Status: SHIPPED | OUTPATIENT
Start: 2024-04-24

## 2024-04-24 RX ORDER — LISINOPRIL 40 MG/1
40 TABLET ORAL
Qty: 30 TABLET | Refills: 3 | Status: SHIPPED | OUTPATIENT
Start: 2024-04-25

## 2024-04-24 RX ORDER — CARVEDILOL 25 MG/1
25 TABLET ORAL 2 TIMES DAILY WITH MEALS
Qty: 60 TABLET | Refills: 3 | Status: SHIPPED | OUTPATIENT
Start: 2024-04-24

## 2024-04-24 RX ADMIN — HYDROCODONE BITARTRATE AND ACETAMINOPHEN 1 TABLET: 7.5; 325 TABLET ORAL at 00:47

## 2024-04-24 RX ADMIN — IPRATROPIUM BROMIDE AND ALBUTEROL SULFATE 3 ML: .5; 3 SOLUTION RESPIRATORY (INHALATION) at 07:08

## 2024-04-24 RX ADMIN — GUAIFENESIN 600 MG: 600 TABLET, EXTENDED RELEASE ORAL at 09:23

## 2024-04-24 RX ADMIN — HYDRALAZINE HYDROCHLORIDE 50 MG: 50 TABLET ORAL at 05:47

## 2024-04-24 RX ADMIN — SPIRONOLACTONE 50 MG: 50 TABLET, FILM COATED ORAL at 09:22

## 2024-04-24 RX ADMIN — CLONIDINE HYDROCHLORIDE 0.2 MG: 0.1 TABLET ORAL at 05:47

## 2024-04-24 RX ADMIN — ASPIRIN 81 MG: 81 TABLET, COATED ORAL at 09:23

## 2024-04-24 RX ADMIN — TAMSULOSIN HYDROCHLORIDE 0.4 MG: 0.4 CAPSULE ORAL at 09:23

## 2024-04-24 RX ADMIN — ATORVASTATIN CALCIUM 40 MG: 20 TABLET, FILM COATED ORAL at 09:23

## 2024-04-24 RX ADMIN — DOCUSATE SODIUM 100 MG: 100 CAPSULE, LIQUID FILLED ORAL at 09:23

## 2024-04-24 RX ADMIN — BUDESONIDE AND FORMOTEROL FUMARATE DIHYDRATE 2 PUFF: 160; 4.5 AEROSOL RESPIRATORY (INHALATION) at 07:09

## 2024-04-24 RX ADMIN — HEPARIN SODIUM 5000 UNITS: 5000 INJECTION INTRAVENOUS; SUBCUTANEOUS at 05:47

## 2024-04-24 RX ADMIN — Medication 3 MG: at 00:48

## 2024-04-24 RX ADMIN — IPRATROPIUM BROMIDE AND ALBUTEROL SULFATE 3 ML: .5; 3 SOLUTION RESPIRATORY (INHALATION) at 11:55

## 2024-04-24 RX ADMIN — CARVEDILOL 25 MG: 25 TABLET, FILM COATED ORAL at 09:23

## 2024-04-24 RX ADMIN — CLOPIDOGREL BISULFATE 75 MG: 75 TABLET, FILM COATED ORAL at 09:23

## 2024-04-24 RX ADMIN — LISINOPRIL 40 MG: 40 TABLET ORAL at 09:23

## 2024-04-24 RX ADMIN — POLYETHYLENE GLYCOL 3350 17 G: 17 POWDER, FOR SOLUTION ORAL at 09:23

## 2024-04-24 RX ADMIN — ISOSORBIDE MONONITRATE 30 MG: 30 TABLET, EXTENDED RELEASE ORAL at 09:23

## 2024-04-24 RX ADMIN — FLUTICASONE PROPIONATE 2 SPRAY: 50 SPRAY, METERED NASAL at 09:24

## 2024-04-24 RX ADMIN — PANTOPRAZOLE SODIUM 40 MG: 40 TABLET, DELAYED RELEASE ORAL at 05:47

## 2024-04-24 NOTE — CASE MANAGEMENT/SOCIAL WORK
Case Management Discharge Note      Final Note: You delivered tank to bedside, home with family to transport         Selected Continued Care - Discharged on 4/24/2024 Admission date: 4/17/2024 - Discharge disposition: Home or Self Care      Destination    No services have been selected for the patient.                Durable Medical Equipment       Service Provider Selected Services Address Phone Fax Patient Preferred    [facility] [id number], [place] [address] [phone] --              Dialysis/Infusion    No services have been selected for the patient.                Home Medical Care    No services have been selected for the patient.                Therapy    No services have been selected for the patient.                Community Resources    No services have been selected for the patient.                Community & DME    No services have been selected for the patient.                    Transportation Services  Private: Car    Final Discharge Disposition Code: 01 - home or self-care

## 2024-04-24 NOTE — PROGRESS NOTES
"  Daily Progress Note.   [address]  4/24/2024    Patient:  Name:  Negrito Navarro  MRN:  [id number]  1950  73 y.o.  male         Requesting physician: Dr Chica Brumfield     Reason for Consultation:  SOA cough     CC: flank pain cough soa cp        Interval History:  Awake alert no shortness of breath less cough less shortness of breath no lethargy no confusion tolerating a diet no chest pain no pleuritic chest pain      Physical Exam:  BP (!) 188/108 (BP Location: Right arm, Patient Position: Lying)   Pulse 68   Temp 97.5 °F (36.4 °C) (Oral)   Resp 16   Ht 172.7 cm (68\")   Wt 93.9 kg (207 lb 1.6 oz)   SpO2 97%   BMI 31.49 kg/m²   Body mass index is 31.49 kg/m².    Intake/Output Summary (Last 24 hours) at 4/24/2024 0935  Last data filed at 4/24/2024 0548  Gross per 24 hour   Intake 720 ml   Output 325 ml   Net 395 ml     General appearance: Nontoxic, conversant   Eyes: Anicteric sclerae, moist conjunctivae; no lid lag;    HENT: Atraumatic; oropharynx clear with moist mucous membranes and no mucosal ulcerations; normal hard and soft palate  Neck: Trachea midline; large, supple, no thyromegaly or lymphadenopathy  Lungs: tatiana no wheeze no rhonchi, with unlabored respiratory effort and no intercostal retractions  CV: RRR, no MRGs   Abdomen: obese bs  Skin: Warm dry well-perfused  Psych: Appropriate affect, alert   Neuro speech intact moves all ext cns 2-12 grossly intact moves all ext.    Data Review:  Notable Labs:  Results from last 7 days   Lab Units 04/24/24  0549 04/23/24  0629 04/22/24  0550 04/21/24  0631 04/20/24  0534 04/19/24  1315 04/18/24  0234   WBC 10*3/mm3 13.43* 14.41* 11.39* 12.43* 26.12* 28.81* 10.98*   HEMOGLOBIN g/dL 13.9 13.2 14.2 15.5 13.4 14.6 14.8   PLATELETS 10*3/mm3 212 253 266 271 249 262 229     Results from last 7 days   Lab Units 04/23/24  0629 04/22/24  0550 04/21/24  0631 04/20/24  0534 04/19/24  1315 04/18/24  0234 04/17/24  1616   SODIUM mmol/L 141 142 141 " 142 140 139 139   POTASSIUM mmol/L 4.3 4.3 4.0 3.9 4.1 4.0 4.3   CHLORIDE mmol/L 104 104 104 105 102 101 102   CO2 mmol/L 27.5 31.2* 27.0 29.0 27.0 27.0 24.3   BUN mg/dL 30* 31* 32* 34* 37* 28* 23   CREATININE mg/dL 1.14 1.24 1.40* 1.39* 1.57* 1.92* 1.69*   GLUCOSE mg/dL 120* 117* 125* 125* 155* 132* 113*   CALCIUM mg/dL 8.6 9.3 8.7 8.6 9.1 8.4* 8.7   Estimated Creatinine Clearance: 62.2 mL/min (by C-G formula based on SCr of 1.14 mg/dL).    Results from last 7 days   Lab Units 04/24/24  0549 04/23/24  0629 04/22/24  0550 04/21/24  0631 04/20/24  1206 04/18/24  0234 04/17/24  2041 04/17/24  1616   AST (SGOT) U/L  --   --   --   --   --   --   --  30   ALT (SGPT) U/L  --   --   --   --   --   --   --  14   PROCALCITONIN ng/mL  --   --   --   --  0.20  --   --  0.35*   LACTATE mmol/L  --   --   --   --   --   --  1.5  --    D DIMER QUANT MCGFEU/mL  --   --   --   --   --   --   --  0.86*   PLATELETS 10*3/mm3 212 253 266   < >  --    < >  --  230    < > = values in this interval not displayed.             Imaging:  Reviewed chest images personally from past 3 days    ASSESSMENT  /  PLAN:  HMPV viral bronchopneumonia  Bronchospasm from above  Hypertensive urgency  Problem List       Patient Active Problem List   Diagnosis    Hyperlipidemia    Benign prostatic hyperplasia    Chronic constipation    Primary hypertension    SERENITY (obstructive sleep apnea)    Oxygen dependent    COPD (chronic obstructive pulmonary disease)    Coronary artery disease of native artery of native heart with stable angina pectoris    Acute coronary syndrome with high troponin    Precordial chest pain    History of coronary angioplasty with insertion of stent    Hx of CABG    CAD S/P percutaneous coronary angioplasty    Long term (current) use of antithrombotics/antiplatelets    Acute hypoxic respiratory failure        Stop pred     Hypertensive urgency per cardiology.  If they do not feel can be managed on floor, can transfer to icu if they feel  needed. ?CINDI?  Coordinated care with admitting physician Dr. Mars Catalan for bronchospasm  Albuterol   Suspect viral induced bronchospasm improving  Needs outpt pfts    1lnc upon dc  Followup outpt pfts    No pulmonary contraindication for discharge defer blood pressure management to primary/cardiology.    Electronically signed by Arturo Gomes MD, 04/24/24, 9:59 AM EDT.

## 2024-04-24 NOTE — CASE MANAGEMENT/SOCIAL WORK
Continued Stay Note  [facility]     Patient Name: Negrito Navarro  MRN: [id number]  Today's Date: 4/24/2024    Admit Date: 4/17/2024    Plan: Home with family and oxygen   Discharge Plan       Row Name 04/24/24 1215       Plan    Plan Home with family and oxygen    Patient/Family in Agreement with Plan yes    Plan Comments Noted walking ox report and orders for home oxygen, notified [facility] she will deliver tank to bedside, checked in with pt and dtr at bedside, explained to call [facility] when he gets home for home set up, he voiced understanding, denies further d/c needs, dtr to transport. -Chica BAXTER                   Discharge Codes    No documentation.                 Expected Discharge Date and Time       Expected Discharge Date Expected Discharge Time    Apr 24, 2024               Chica Myers RN

## 2024-04-24 NOTE — PLAN OF CARE
Goal Outcome Evaluation:  Plan of Care Reviewed With: patient        Progress: improving  Outcome Evaluation: Possible DC today.  Up ad anand in room.  O2 1L NC.  NP cough.

## 2024-04-25 NOTE — OUTREACH NOTE
Prep Survey      Flowsheet Row Responses   [facility] facility patient discharged from? [facility]   Is LACE score < 7 ? No   Eligibility Readm Mgmt   Discharge diagnosis *Acute hypoxic respiratory failure   Does the patient have one of the following disease processes/diagnoses(primary or secondary)? Other   Does the patient have Home health ordered? No   Is there a DME ordered? Yes   What DME was ordered? [facility]   Prep survey completed? Yes            QUINTON HENRIQUEZ - Registered Nurse

## 2024-04-26 ENCOUNTER — TELEPHONE (OUTPATIENT)
Dept: CARDIOLOGY | Facility: CLINIC | Age: 74
End: 2024-04-26

## 2024-04-26 NOTE — TELEPHONE ENCOUNTER
Caller: Negrito Navarro    Relationship to patient: Self [phone]    Best call back number: PATIENT CALLED TO SCHEDULE HOSPITAL FOLLOW UP. NO SCHEDULING DIRECTION IN DISCHARGE NOTES.

## 2024-04-26 NOTE — DISCHARGE SUMMARY
Patient Name: Negrito Navarro  : 1950  MRN: [id number]    Date of Admission: 2024  Date of Discharge:  2024  Primary Care Physician: Karen Yu MD      Discharge Diagnoses     Active Hospital Problems    Diagnosis  POA    **Acute hypoxic respiratory failure [J96.01]  Yes    Viral pneumonia [J12.9]  Yes    Reactive airway disease [J45.909]  Yes    Chronic systolic (congestive) heart failure [I50.22]  Yes    Chronic kidney failure [N18.9]  Yes    Leukocytosis [D72.829]  Yes    Type 2 diabetes mellitus [E11.9]  Yes    Chronic HFrEF (heart failure with reduced ejection fraction) [I50.22]  Yes    CAD (coronary artery disease) [I25.10]  Yes    Essential hypertension [I10]  Yes      Resolved Hospital Problems    Diagnosis Date Resolved POA    Acute kidney failure [N17.9] 2024 Yes        Hospital Course     Brief admission history and physical.  Please refer to the H&P for full details.  A pleasant 73 years old gentleman with a past history of COPD/coronary artery disease/hypertension/dyslipidemia/obstructive sleep apnea/benign prostatic hypertrophy who presented to the emergency department with shortness of breath/chest pain/cough.  Physical examination on admission included a temperature of 100.4 F a pulse of 81 respirate rate of 16 blood pressure 194/117.  The rest of the examination is remarkable for chronically ill-appearing gentleman/bilateral expiratory wheeze and chest.  Hospital course.  In the emergency department patient was found to be hypoxemic.  Further workup included a procalcitonin that is elevated at 0.35.  D-dimer positive at 0.86.  Respiratory PCR panel was positive for human metapneumovirus.  CBC normal except a white count of 12.86.  Troponin elevated at 38.  proBNP is elevated at 2363.  CMP normal except a blood sugar of 113, creatinine 1.69, total bilirubin 1.4, GFR of 41.8.  Blood cultures were drawn.  Lactic acid was normal.  Repeat troponin was elevated at 38 and  40 with a positive to delta.  Chest x-ray revealed mild bilateral basilar atelectasis or infiltrates.  CTA of the chest revealed no pulmonary embolus with bilateral reticulonodular infiltrate suggestive of pneumonia with positive nonspecific mediastinal and hilar lymphadenopathy.  Hospital course will be addressed in a problem-oriented manner as below.  1.  Acute hypoxemic respiratory failure secondary to bilateral human metapneumovirus pneumonia with reactive airway disease.  D-dimer was positive but CTA of the chest was negative for PE and positive for bilateral pneumonia.  Respiratory PCR panel was positive for human metapneumovirus.  Blood cultures obtained and they were negative by the time of discharge.  He was started on IV Rocephin and IV steroids together with DuoNeb/Mucinex/Singulair/OPEP and incentive spirometry.  A swallow study did not reveal any aspiration.  Pulmonary was consulted secondary to slow improvement and they approved the above measures.  Walking pulse ox study prior to discharge indicated that the patient becomes hypoxemic with exertion and oxygen was arranged.  Eventually the patient respiratory status has improved and he was switched to p.o. steroids and he has finished a 5 days course of Rocephin.  Pulmonary okayed the discharge with follow-up.  Switched back to bronchodilator inhalers.  2.  History of coronary artery disease/hypertension/chronic systolic congestive heart failure.  There was no clinical evidence of angina or congestive heart failure.  Troponins were elevated and so wire proBNP.  Cardiology consult was obtained.  It was noticed that his blood pressure was suboptimally controlled during this admission.  Blood pressure has improved with increasing lisinopril/Coreg/clonidine/hydralazine addition.  2D echo was done revealed a normal ejection fraction between 46 and 50% and grade 1 diastolic dysfunction.  Cardiology performed a nuclear stress test revealing a small area of  lateral ischemia.  Cardiology recommended medical treatment.  Cardiology was okay with discharge with outpatient follow-up.  3.  Acute on top of chronic stage IIIa renal failure.  Patient baseline creatinine is 1.3.  He remained euvolemic throughout the hospital stay and has acute kidney failure was mostly secondary to pneumonia and poor p.o. intake and this has resolved during this admission.  Renal function is better than baseline at the time of discharge.  4.  Leukocytosis.  He was noted to have elevated white count which was thought to be secondary to steroid.  Procalcitonin was negative.  He has received 5 days of Rocephin.  Blood cultures were negative during this admission.  5.  Type 2 diabetes.  She was noted to be hyperglycemic.  A1c was 6.6 indicating type 2 diabetes.  He was counseled about diabetic diet at discharge and further follow-up with his primary care physician for this.  During the hospital stay he remained on subcu heparin for VTE prophylaxis.  At the time of discharge his shortness of breath and cough were much better and he had no fever.  His leukocytosis has improved.  And he was discharged home in a hemodynamically stable condition.  Follow-up with primary MD/cardiology/pulmonary    Consultants     Consult Orders (all) (From admission, onward)       Start     Ordered    04/20/24 1052  Inpatient Pulmonology Consult  Once        Specialty:  Pulmonary Disease  Provider:  Juancho Ballard MD    04/20/24 1052    04/17/24 2314  Inpatient Cardiology Consult  Once        Specialty:  Cardiology  Provider:  Jayne Villalobos MD    04/17/24 2313    04/17/24 1724  [facility] (on-call MD unless specified) Details  Once,   Status:  Canceled        Specialty:  Hospitalist  Provider:  (Not yet assigned)    04/17/24 1723                  Procedures     Imaging Results (All)       Procedure Component Value Units Date/Time    FL Video Swallow Single Contrast [[id number]] Collected: 04/22/24 1348     Updated:  04/22/24 1720    Narrative:      VIDEO SWALLOWING EXAMINATION BY SPEECH PATHOLOGY     Clinical: Dysphasia     Reference air kerma: 5.65 mGy     Video swallowing examination performed under the direction of speech  pathology. Imaging reviewed by radiologist who concurs with the  findings.     Speech pathology summary:      Radiology APRN, Bouchra Cruz, present. Functional oropharyngeal swallow.  No aspiration observed. Poor mastication at times due to edentulous  state. Patient is refusing modified solids. Trace upper esophageal  backflow observed at times.            This report was finalized on 4/22/2024 5:17 PM by Dr. Naldo Bland M.D on Workstation: [id number]       CT Angiogram Chest Pulmonary Embolism [[id number]] Collected: 04/17/24 1919     Updated: 04/17/24 1928    Narrative:      CT ANGIOGRAM CHEST PULMONARY EMBOLISM-     INDICATIONS: Chest pain, cough, fever     TECHNIQUE: Radiation dose reduction techniques were utilized, including  automated exposure control and exposure modulation based on body size.  CT angiography of the chest. Three-dimensional reconstructions.     COMPARISON: None available      FINDINGS:     No pulmonary embolism. No aortic dissection.     The heart size is enlarged without pericardial effusion. Coronary chill  calcifications are present. A few subcentimeter short axis mediastinal  lymph nodes are seen. A subcarinal lymph node is present, 1.5 cm, and a  1.4 cm short axis right hilar lymph node on axial image 56, may be  reactive in nature, but potentially evidence of neoplasm, clinical  correlation and CT follow-up advised (if further imaging evaluation is  indicated, PET/CT could be considered. Likewise, left hilar lymph node  measures 1.3 cm in axial image 70.     The airways appear clear.     No pleural effusion or pneumothorax.     The lungs show multifocal bilateral reticulonodular infiltrates, most  prominently in the right lower lobe, but also in right upper lobe,  right  middle lobe, lingula. CT follow-up advised to exclude the possibility of  underlying nodule. Atelectasis/consolidation is also apparent in right  middle lobe, left lower lobe.     Upper abdominal structures show no acute findings. Suspected splenule in  the left upper quadrant, with no normal appearing spleen noted. Colonic  diverticula are seen that do not appear inflamed. Mid mesenteric  haziness and nodularity may reflect mesenteric panniculitis. Nonspecific  thickening of the adrenal glands is seen. Fatty infiltration of the  pancreas is present.     Degenerative changes are seen in the spine. Old bilateral rib fractures  are apparent. Sternotomy wires are noted. No acute fracture is  identified.       Impression:         No pulmonary embolism. Bilateral reticulonodular infiltrates suggesting  pneumonia, follow-up CT advised to exclude any possibility of underlying  pulmonary nodules. Nonspecific mediastinal and hilar adenopathy, further  evaluation/follow-up advised as indicated.     This report was finalized on 4/17/2024 7:25 PM by Dr. Rigoberto Herrera M.D on Workstation: [id number]       XR Chest 1 View [[id number]] Collected: 04/17/24 1555     Updated: 04/17/24 1606    Narrative:      XR CHEST 1 VW-     HISTORY: Male who is 73 years-old, short of breath     TECHNIQUE: Frontal view of the chest     COMPARISON: 4/20/2023     FINDINGS: The heart size is borderline. Sternotomy wires are present.  Pulmonary vasculature is unremarkable. Mild bilateral basilar  atelectasis or infiltrate. No large pleural effusion or pneumothorax. No  acute osseous process.       Impression:      Mild bilateral basilar atelectasis or infiltrate. Borderline  heart size.     This report was finalized on 4/17/2024 4:03 PM by Dr. Rigoberto Herrera M.D on Workstation: [id number]               Pertinent Labs     Results from last 7 days   Lab Units 04/24/24  0549 04/23/24  0629 04/22/24  0550 04/21/24  0631   WBC 10*3/mm3 13.43*  "14.41* 11.39* 12.43*   HEMOGLOBIN g/dL 13.9 13.2 14.2 15.5   PLATELETS 10*3/mm3 212 253 266 271     Results from last 7 days   Lab Units 04/24/24  0839 04/23/24  0629 04/22/24  0550 04/21/24  0631   SODIUM mmol/L 141 141 142 141   POTASSIUM mmol/L 4.3 4.3 4.3 4.0   CHLORIDE mmol/L 105 104 104 104   CO2 mmol/L 26.5 27.5 31.2* 27.0   BUN mg/dL 30* 30* 31* 32*   CREATININE mg/dL 1.24 1.14 1.24 1.40*   GLUCOSE mg/dL 109* 120* 117* 125*   Estimated Creatinine Clearance: 57.2 mL/min (by C-G formula based on SCr of 1.24 mg/dL).    Results from last 7 days   Lab Units 04/24/24  0839 04/23/24  0629 04/22/24  0550 04/21/24  0631   CALCIUM mg/dL 8.5* 8.6 9.3 8.7               Invalid input(s): \"LDLCALC\"      Imaging Results (Last 24 Hours)       ** No results found for the last 24 hours. **            Test Results Pending at Discharge         Discharge Exam   Physical Exam  Vitals.  Temperature 97.5 a pulse of 68 respiratory rate of 16 and blood pressure 140/90 and O2 sats of 97% on 1 L.  General.  Elderly gentleman.  Alert and oriented x 4.  No respiratory distress   Eyes.  Pupils equal round and reactive.  Intact extraocular musculature.  No pallor or jaundice  Oral cavity.  Moist mucous membrane.  Neck.  Supple.  No JVD.  No lymphadenopathy or thyromegaly.  Cardiovascular.  Regular rate and rhythm with grade 2 systolic murmur  Chest.  Improved bilateral air entry with minimal bilateral scattered expiratory wheeze with no crackles.    Abdomen.  Soft lax.  No tenderness.  No organomegaly.  No guarding or rebound  Extremities.  No clubbing/cyanosis.  Trace bilateral lower extremity edema..  CNS.  No acute focal neurological deficits.  Discharge Details        Discharge Medications        New Medications        Instructions Start Date   budesonide-formoterol 160-4.5 MCG/ACT inhaler  Commonly known as: SYMBICORT   2 puffs, Inhalation, 2 Times Daily - RT      hydrALAZINE 50 MG tablet  Commonly known as: APRESOLINE   50 mg, Oral, " Every 8 Hours Scheduled      isosorbide mononitrate 30 MG 24 hr tablet  Commonly known as: IMDUR   30 mg, Oral, Every 24 Hours Scheduled      montelukast 10 MG tablet  Commonly known as: SINGULAIR   10 mg, Oral, Nightly             Changes to Medications        Instructions Start Date   aspirin 81 MG EC tablet  What changed: Another medication with the same name was removed. Continue taking this medication, and follow the directions you see here.   81 mg, Oral, Daily      carvedilol 25 MG tablet  Commonly known as: COREG  What changed:   medication strength  how much to take   25 mg, Oral, 2 Times Daily With Meals      cloNIDine 0.2 MG tablet  Commonly known as: CATAPRES  What changed:   medication strength  how much to take  when to take this   0.2 mg, Oral, Every 8 Hours      lisinopril 40 MG tablet  Commonly known as: PRINIVILZESTRIL  What changed:   medication strength  how much to take   40 mg, Oral, Every 24 Hours Scheduled      spironolactone 50 MG tablet  Commonly known as: ALDACTONE  What changed: Another medication with the same name was removed. Continue taking this medication, and follow the directions you see here.   50 mg, Oral, Daily             Continue These Medications        Instructions Start Date   albuterol sulfate  (90 Base) MCG/ACT inhaler  Commonly known as: PROVENTIL HFA;VENTOLIN HFA;PROAIR HFA   2 puffs, Inhalation, Every 4 Hours PRN      atorvastatin 40 MG tablet  Commonly known as: LIPITOR   40 mg, Oral, Daily      clopidogrel 75 MG tablet  Commonly known as: PLAVIX   75 mg, Oral, Daily      fluticasone 50 MCG/ACT nasal spray  Commonly known as: FLONASE   2 sprays, Nasal, Daily      HYDROcodone-acetaminophen 7.5-325 MG per tablet  Commonly known as: NORCO   1 tablet, Oral, Every 12 Hours PRN      ketoconazole 2 % cream  Commonly known as: NIZORAL   Topical, Daily      nitroglycerin 0.4 MG SL tablet  Commonly known as: NITROSTAT   0.4 mg, Sublingual, Every 5 Minutes PRN, Take no  more than 3 doses in 15 minutes.      tamsulosin 0.4 MG capsule 24 hr capsule  Commonly known as: FLOMAX   1 capsule, Oral, Daily               Allergies   Allergen Reactions    Shellfish-Derived Products Unknown - Low Severity     gout  gout           Discharge Disposition:  Condition: Stable    Diet: Healthy cardiac/diabetic 2000-calorie    Activity:   Activity Instructions       Activity as Tolerated                CODE STATUS:    Code Status and Medical Interventions:   Ordered at: 04/17/24 1738     Code Status (Patient has no pulse and is not breathing):    CPR (Attempt to Resuscitate)     Medical Interventions (Patient has pulse or is breathing):    Full       Future Appointments   Date Time Provider Department Center   5/16/2024  1:00 PM Jayne Villalobos MD [facility]     Additional Instructions for the Follow-ups that You Need to Schedule       Call MD With Problems / Concerns   As directed      Instructions: Call MD or return to ER if chest pain/palpitations/worsening shortness of breath/worsening cough/worsening wheezing/hemoptysis/fever or chills    Order Comments: Instructions: Call MD or return to ER if chest pain/palpitations/worsening shortness of breath/worsening cough/worsening wheezing/hemoptysis/fever or chills         Discharge Follow-up with PCP   As directed       Currently Documented PCP:    Karen Yu MD    PCP Phone Number:    [phone]     Follow Up Details: Primary MD.  1 week.  Acute hypoxemic respiratory failure secondary to bilateral viral pneumonia and reactive airway disease/CAD/HTN/chronic systolic CHF/acute kidney failure/stage IIIa kidney failure/leukocytosis/DM2        Discharge Follow-up with Specified Provider: Cardiology; 2 Weeks   As directed      To: Cardiology   Follow Up: 2 Weeks   Follow Up Details: CAD/HTN/chronic systolic congestive heart failure        Discharge Follow-up with Specified Provider: Pulmonary; 2 Weeks   As directed      To:  Pulmonary   Follow Up: 2 Weeks   Follow Up Details: Acute hypoxemic respiratory failure/bilateral bilateral pneumonia/reactive airway disease               Contact information for follow-up providers       Karen Yu MD .    Specialty: Plastic Surgery  Why: Primary MD.  1 week.  Acute hypoxemic respiratory failure secondary to bilateral viral pneumonia and reactive airway disease/CAD/HTN/chronic systolic CHF/acute kidney failure/stage IIIa kidney failure/leukocytosis/DM2  Contact information:  [address] [id number]  [phone]               Karen Yu MD .    Specialty: Plastic Surgery  Contact information:  [address] [facility] [address]  [phone]                       Contact information for after-discharge care       Durable Medical Equipment       [facility] .    Service: Durable Medical Equipment  Contact information:  [address]  [facility] [id number] [phone]                                     Time Spent on Discharge:  Greater than 30 minutes      Nithya Leo MD  [facility]  04/26/24  17:14 EDT

## 2024-04-30 ENCOUNTER — READMISSION MANAGEMENT (OUTPATIENT)
Dept: CALL CENTER | Facility: HOSPITAL | Age: 74
End: 2024-04-30
Payer: MEDICARE

## 2024-04-30 NOTE — OUTREACH NOTE
Medical Week 1 Survey      Flowsheet Row Responses   [facility] patient discharged from? [place]   Does the patient have one of the following disease processes/diagnoses(primary or secondary)? Other   Week 1 attempt successful? No   Unsuccessful attempts Attempt 1            Jazmín BONILLA - Licensed Nurse

## 2024-05-14 ENCOUNTER — READMISSION MANAGEMENT (OUTPATIENT)
Dept: CALL CENTER | Facility: HOSPITAL | Age: 74
End: 2024-05-14
Payer: MEDICARE

## 2024-05-14 NOTE — OUTREACH NOTE
Medical Week 3 Survey      Flowsheet Row Responses   [facility] [place] patient discharged from? [place]   Does the patient have one of the following disease processes/diagnoses(primary or secondary)? Other   Week 3 attempt successful? No   Unsuccessful attempts Attempt 1            Norma MORRIS - Registered Nurse

## 2024-05-21 ENCOUNTER — READMISSION MANAGEMENT (OUTPATIENT)
Dept: CALL CENTER | Facility: HOSPITAL | Age: 74
End: 2024-05-21
Payer: MEDICARE

## 2024-05-21 NOTE — OUTREACH NOTE
Medical Week 3 Survey      Flowsheet Row Responses   [facility] patient discharged from? [place]   Does the patient have one of the following disease processes/diagnoses(primary or secondary)? Other   Week 3 attempt successful? No   Unsuccessful attempts Attempt 2            Shira Fermin Registered Nurse

## 2024-09-10 ENCOUNTER — APPOINTMENT (OUTPATIENT)
Dept: GENERAL RADIOLOGY | Facility: HOSPITAL | Age: 74
End: 2024-09-10
Payer: MEDICARE

## 2024-09-10 ENCOUNTER — HOSPITAL ENCOUNTER (OUTPATIENT)
Facility: HOSPITAL | Age: 74
Setting detail: OBSERVATION
Discharge: HOME | End: 2024-09-11
Attending: EMERGENCY MEDICINE | Admitting: STUDENT IN AN ORGANIZED HEALTH CARE EDUCATION/TRAINING PROGRAM
Payer: MEDICARE

## 2024-09-10 ENCOUNTER — APPOINTMENT (OUTPATIENT)
Dept: CT IMAGING | Facility: HOSPITAL | Age: 74
End: 2024-09-10
Payer: MEDICARE

## 2024-09-10 DIAGNOSIS — I95.9 TRANSIENT HYPOTENSION: ICD-10-CM

## 2024-09-10 DIAGNOSIS — R55 SYNCOPE, UNSPECIFIED SYNCOPE TYPE: Primary | ICD-10-CM

## 2024-09-10 DIAGNOSIS — R07.9 CHEST PAIN, UNSPECIFIED TYPE: ICD-10-CM

## 2024-09-10 DIAGNOSIS — I50.9 CHRONIC CONGESTIVE HEART FAILURE, UNSPECIFIED HEART FAILURE TYPE: ICD-10-CM

## 2024-09-10 LAB
ALBUMIN SERPL-MCNC: 3.7 G/DL (ref 3.5–5.2)
ALBUMIN/GLOB SERPL: 1.2 G/DL
ALP SERPL-CCNC: 79 U/L (ref 39–117)
ALT SERPL W P-5'-P-CCNC: 17 U/L (ref 1–41)
ANION GAP SERPL CALCULATED.3IONS-SCNC: 5 MMOL/L (ref 5–15)
AST SERPL-CCNC: 16 U/L (ref 1–40)
BASOPHILS # BLD AUTO: 0.05 10*3/MM3 (ref 0–0.2)
BASOPHILS NFR BLD AUTO: 0.5 % (ref 0–1.5)
BILIRUB SERPL-MCNC: 0.4 MG/DL (ref 0–1.2)
BUN SERPL-MCNC: 19 MG/DL (ref 8–23)
BUN/CREAT SERPL: 16.5 (ref 7–25)
CALCIUM SPEC-SCNC: 9.3 MG/DL (ref 8.6–10.5)
CHLORIDE SERPL-SCNC: 105 MMOL/L (ref 98–107)
CO2 SERPL-SCNC: 29 MMOL/L (ref 22–29)
CREAT SERPL-MCNC: 1.15 MG/DL (ref 0.76–1.27)
D DIMER PPP FEU-MCNC: 0.62 MCGFEU/ML (ref 0–0.76)
DEPRECATED RDW RBC AUTO: 46.4 FL (ref 37–54)
EGFRCR SERPLBLD CKD-EPI 2021: 66 ML/MIN/1.73
EOSINOPHIL # BLD AUTO: 0.4 10*3/MM3 (ref 0–0.4)
EOSINOPHIL NFR BLD AUTO: 3.7 % (ref 0.3–6.2)
ERYTHROCYTE [DISTWIDTH] IN BLOOD BY AUTOMATED COUNT: 14.1 % (ref 12.3–15.4)
GEN 5 2HR TROPONIN T REFLEX: 7 NG/L
GLOBULIN UR ELPH-MCNC: 3.1 GM/DL
GLUCOSE SERPL-MCNC: 115 MG/DL (ref 65–99)
HCT VFR BLD AUTO: 42.5 % (ref 37.5–51)
HGB BLD-MCNC: 14.2 G/DL (ref 13–17.7)
IMM GRANULOCYTES # BLD AUTO: 0.04 10*3/MM3 (ref 0–0.05)
IMM GRANULOCYTES NFR BLD AUTO: 0.4 % (ref 0–0.5)
LYMPHOCYTES # BLD AUTO: 1.23 10*3/MM3 (ref 0.7–3.1)
LYMPHOCYTES NFR BLD AUTO: 11.5 % (ref 19.6–45.3)
MCH RBC QN AUTO: 30.1 PG (ref 26.6–33)
MCHC RBC AUTO-ENTMCNC: 33.4 G/DL (ref 31.5–35.7)
MCV RBC AUTO: 90 FL (ref 79–97)
MONOCYTES # BLD AUTO: 1.37 10*3/MM3 (ref 0.1–0.9)
MONOCYTES NFR BLD AUTO: 12.8 % (ref 5–12)
NEUTROPHILS NFR BLD AUTO: 7.61 10*3/MM3 (ref 1.7–7)
NEUTROPHILS NFR BLD AUTO: 71.1 % (ref 42.7–76)
NRBC BLD AUTO-RTO: 0 /100 WBC (ref 0–0.2)
NT-PROBNP SERPL-MCNC: 958 PG/ML (ref 0–1800)
PLATELET # BLD AUTO: 278 10*3/MM3 (ref 140–450)
PMV BLD AUTO: 10.2 FL (ref 6–12)
POTASSIUM SERPL-SCNC: 4.1 MMOL/L (ref 3.5–5.2)
PROCALCITONIN SERPL-MCNC: 0.08 NG/ML (ref 0–0.25)
PROT SERPL-MCNC: 6.8 G/DL (ref 6–8.5)
QT INTERVAL: 490 MS
QTC INTERVAL: 522 MS
RBC # BLD AUTO: 4.72 10*6/MM3 (ref 4.14–5.8)
SODIUM SERPL-SCNC: 139 MMOL/L (ref 136–145)
TROPONIN T DELTA: -18 NG/L
TROPONIN T SERPL HS-MCNC: 25 NG/L
TROPONIN T SERPL HS-MCNC: 7 NG/L
WBC NRBC COR # BLD AUTO: 10.7 10*3/MM3 (ref 3.4–10.8)

## 2024-09-10 PROCEDURE — 99285 EMERGENCY DEPT VISIT HI MDM: CPT

## 2024-09-10 PROCEDURE — G0378 HOSPITAL OBSERVATION PER HR: HCPCS

## 2024-09-10 PROCEDURE — 94799 UNLISTED PULMONARY SVC/PX: CPT

## 2024-09-10 PROCEDURE — 80053 COMPREHEN METABOLIC PANEL: CPT | Performed by: EMERGENCY MEDICINE

## 2024-09-10 PROCEDURE — 93010 ELECTROCARDIOGRAM REPORT: CPT | Performed by: INTERNAL MEDICINE

## 2024-09-10 PROCEDURE — 85025 COMPLETE CBC W/AUTO DIFF WBC: CPT | Performed by: EMERGENCY MEDICINE

## 2024-09-10 PROCEDURE — 84484 ASSAY OF TROPONIN QUANT: CPT | Performed by: EMERGENCY MEDICINE

## 2024-09-10 PROCEDURE — 36415 COLL VENOUS BLD VENIPUNCTURE: CPT

## 2024-09-10 PROCEDURE — 72125 CT NECK SPINE W/O DYE: CPT

## 2024-09-10 PROCEDURE — 84484 ASSAY OF TROPONIN QUANT: CPT | Performed by: PHYSICIAN ASSISTANT

## 2024-09-10 PROCEDURE — 71045 X-RAY EXAM CHEST 1 VIEW: CPT

## 2024-09-10 PROCEDURE — 93005 ELECTROCARDIOGRAM TRACING: CPT | Performed by: EMERGENCY MEDICINE

## 2024-09-10 PROCEDURE — 85379 FIBRIN DEGRADATION QUANT: CPT | Performed by: EMERGENCY MEDICINE

## 2024-09-10 PROCEDURE — 70450 CT HEAD/BRAIN W/O DYE: CPT

## 2024-09-10 PROCEDURE — 83880 ASSAY OF NATRIURETIC PEPTIDE: CPT | Performed by: EMERGENCY MEDICINE

## 2024-09-10 PROCEDURE — 84145 PROCALCITONIN (PCT): CPT | Performed by: PHYSICIAN ASSISTANT

## 2024-09-10 RX ORDER — HYDROCODONE BITARTRATE AND ACETAMINOPHEN 10; 325 MG/1; MG/1
1 TABLET ORAL EVERY 8 HOURS PRN
Status: DISCONTINUED | OUTPATIENT
Start: 2024-09-10 | End: 2024-09-11 | Stop reason: HOSPADM

## 2024-09-10 RX ORDER — SODIUM CHLORIDE 0.9 % (FLUSH) 0.9 %
10 SYRINGE (ML) INJECTION AS NEEDED
Status: DISCONTINUED | OUTPATIENT
Start: 2024-09-10 | End: 2024-09-11 | Stop reason: HOSPADM

## 2024-09-10 RX ORDER — AMOXICILLIN 250 MG
2 CAPSULE ORAL 2 TIMES DAILY PRN
Status: DISCONTINUED | OUTPATIENT
Start: 2024-09-10 | End: 2024-09-11 | Stop reason: HOSPADM

## 2024-09-10 RX ORDER — CARVEDILOL 12.5 MG/1
12.5 TABLET ORAL 2 TIMES DAILY WITH MEALS
COMMUNITY

## 2024-09-10 RX ORDER — LISINOPRIL 20 MG/1
40 TABLET ORAL
Status: DISCONTINUED | OUTPATIENT
Start: 2024-09-10 | End: 2024-09-11 | Stop reason: HOSPADM

## 2024-09-10 RX ORDER — BISACODYL 5 MG/1
5 TABLET, DELAYED RELEASE ORAL DAILY PRN
Status: DISCONTINUED | OUTPATIENT
Start: 2024-09-10 | End: 2024-09-11 | Stop reason: HOSPADM

## 2024-09-10 RX ORDER — SODIUM CHLORIDE 0.9 % (FLUSH) 0.9 %
10 SYRINGE (ML) INJECTION EVERY 12 HOURS SCHEDULED
Status: DISCONTINUED | OUTPATIENT
Start: 2024-09-10 | End: 2024-09-11 | Stop reason: HOSPADM

## 2024-09-10 RX ORDER — CLONIDINE HYDROCHLORIDE 0.1 MG/1
0.2 TABLET ORAL EVERY 8 HOURS
Status: DISCONTINUED | OUTPATIENT
Start: 2024-09-10 | End: 2024-09-11 | Stop reason: HOSPADM

## 2024-09-10 RX ORDER — CLOPIDOGREL BISULFATE 75 MG/1
75 TABLET ORAL DAILY
Status: DISCONTINUED | OUTPATIENT
Start: 2024-09-10 | End: 2024-09-11 | Stop reason: HOSPADM

## 2024-09-10 RX ORDER — POLYETHYLENE GLYCOL 3350 17 G/17G
17 POWDER, FOR SOLUTION ORAL DAILY PRN
Status: DISCONTINUED | OUTPATIENT
Start: 2024-09-10 | End: 2024-09-11 | Stop reason: HOSPADM

## 2024-09-10 RX ORDER — ONDANSETRON 2 MG/ML
4 INJECTION INTRAMUSCULAR; INTRAVENOUS EVERY 6 HOURS PRN
Status: DISCONTINUED | OUTPATIENT
Start: 2024-09-10 | End: 2024-09-11 | Stop reason: HOSPADM

## 2024-09-10 RX ORDER — BUDESONIDE AND FORMOTEROL FUMARATE DIHYDRATE 160; 4.5 UG/1; UG/1
2 AEROSOL RESPIRATORY (INHALATION)
Status: DISCONTINUED | OUTPATIENT
Start: 2024-09-10 | End: 2024-09-11 | Stop reason: HOSPADM

## 2024-09-10 RX ORDER — ONDANSETRON 4 MG/1
4 TABLET, ORALLY DISINTEGRATING ORAL EVERY 6 HOURS PRN
Status: DISCONTINUED | OUTPATIENT
Start: 2024-09-10 | End: 2024-09-11 | Stop reason: HOSPADM

## 2024-09-10 RX ORDER — HYDRALAZINE HYDROCHLORIDE 50 MG/1
50 TABLET, FILM COATED ORAL EVERY 8 HOURS SCHEDULED
Status: DISCONTINUED | OUTPATIENT
Start: 2024-09-10 | End: 2024-09-11 | Stop reason: HOSPADM

## 2024-09-10 RX ORDER — ATORVASTATIN CALCIUM 20 MG/1
40 TABLET, FILM COATED ORAL DAILY
Status: DISCONTINUED | OUTPATIENT
Start: 2024-09-10 | End: 2024-09-11 | Stop reason: HOSPADM

## 2024-09-10 RX ORDER — ASPIRIN 81 MG/1
81 TABLET ORAL DAILY
Status: DISCONTINUED | OUTPATIENT
Start: 2024-09-10 | End: 2024-09-11 | Stop reason: HOSPADM

## 2024-09-10 RX ORDER — SODIUM CHLORIDE 9 MG/ML
40 INJECTION, SOLUTION INTRAVENOUS AS NEEDED
Status: DISCONTINUED | OUTPATIENT
Start: 2024-09-10 | End: 2024-09-11 | Stop reason: HOSPADM

## 2024-09-10 RX ORDER — CARVEDILOL 12.5 MG/1
12.5 TABLET ORAL 2 TIMES DAILY WITH MEALS
Status: DISCONTINUED | OUTPATIENT
Start: 2024-09-10 | End: 2024-09-11 | Stop reason: HOSPADM

## 2024-09-10 RX ORDER — HYDRALAZINE HYDROCHLORIDE 100 MG/1
100 TABLET, FILM COATED ORAL 3 TIMES DAILY
COMMUNITY
End: 2024-09-11 | Stop reason: HOSPADM

## 2024-09-10 RX ORDER — PANTOPRAZOLE SODIUM 40 MG/1
40 TABLET, DELAYED RELEASE ORAL 2 TIMES DAILY
COMMUNITY
Start: 2024-05-01 | End: 2024-10-28

## 2024-09-10 RX ORDER — BISACODYL 10 MG
10 SUPPOSITORY, RECTAL RECTAL DAILY PRN
Status: DISCONTINUED | OUTPATIENT
Start: 2024-09-10 | End: 2024-09-11 | Stop reason: HOSPADM

## 2024-09-10 RX ADMIN — CLOPIDOGREL BISULFATE 75 MG: 75 TABLET, FILM COATED ORAL at 19:41

## 2024-09-10 RX ADMIN — HYDROCODONE BITARTRATE AND ACETAMINOPHEN 1 TABLET: 10; 325 TABLET ORAL at 19:51

## 2024-09-10 RX ADMIN — HYDRALAZINE HYDROCHLORIDE 50 MG: 50 TABLET ORAL at 22:06

## 2024-09-10 RX ADMIN — LISINOPRIL 40 MG: 20 TABLET ORAL at 19:41

## 2024-09-10 RX ADMIN — CLONIDINE HYDROCHLORIDE 0.2 MG: 0.1 TABLET ORAL at 19:41

## 2024-09-10 RX ADMIN — ATORVASTATIN CALCIUM 40 MG: 20 TABLET, FILM COATED ORAL at 19:41

## 2024-09-10 RX ADMIN — Medication 10 ML: at 22:06

## 2024-09-10 NOTE — H&P
[facility]   HISTORY AND PHYSICAL    Patient Name: Negrito Navarro  : 1950  MRN: [id number]  Primary Care Physician:  Gaby Yu MD  Date of admission: 9/10/2024    Subjective   Subjective     Chief Complaint:   Chief Complaint   Patient presents with    Syncope    Chest Pain         HPI:    Negrito Navarro is a 74 y.o. male with significant past medical history of CHF, COPD, coronary disease, hypertension who presents to the ED complaint of acute chest pain and syncope.  Patient reports he had chest pain earlier today and then passed out while attempting to change his shirt to go to the doctor.  EMS was called to the scene.  Patient was found to be hypotensive on the ground with a blood pressure of 80/50.  Patient reports he was recently seen at his doctor's office and his hydralazine was increased from 50 mg to 100 mg 3 times daily.  He does however report he is continued to take the 50 mg 3 times daily and has not been taking the 100 mg dose.  She reports he had a similar episode roughly 5 weeks ago.  Patient is on 2 L O2 chronically for his COPD.  His cardiologist is Dr. Villalobos.    In the ED patient had a CT of the head and C-spine without contrast.  There was no acute intracranial process.  Patient was noted to have sinusitis.  CT C-spine so multilevel DJD, no fractures.  Chest x-ray showed cardiomegaly and tortuous aorta.  Left hemidiaphragm was elevated with mild basilar atelectasis or infiltrate.  Overall chest x-ray picture was similar to prior exam.  There was no large pleural effusion or pneumothorax.  Patient's ECG was read at 1513 is likely sinus, rate 68, P and OR interval not definitely visualized, narrow QRS, left axis, no acute ST or T wave changes.  There was said to be unchanged from 2024.    ED labs: Patient's D-dimer was normal, , high-sensitivity troponin 25 which is improved from 40 in 2024.  Patient sodium was 139, potassium 4.1, anion gap 5, glucose 115.   WBC 10.7, hemoglobin 14.2, platelets 278.    Review of Systems   All systems were reviewed and negative except for: That listed above    Personal History     Past Medical History:   Diagnosis Date    Benign prostatic hyperplasia 11/10/2016    COPD (chronic obstructive pulmonary disease) 6/10/2021    Coronary artery disease involving coronary bypass graft of native heart without angina pectoris 6/10/2021    Essential hypertension 11/10/2016    Hyperlipidemia     SERENITY (obstructive sleep apnea) 11/10/2016       Past Surgical History:   Procedure Laterality Date    CARDIAC CATHETERIZATION N/A 4/21/2023    Procedure: Left Heart Cath;  Surgeon: Jayne Villalobos MD;  Location:  [facility] CATH INVASIVE LOCATION;  Service: Cardiovascular;  Laterality: N/A;    CARDIAC CATHETERIZATION N/A 4/21/2023    Procedure: Coronary angiography;  Surgeon: Jayne Villalobos MD;  [facility];  Service: Cardiovascular;  Laterality: N/A;    CARDIAC CATHETERIZATION N/A 4/21/2023    Procedure: Left ventriculography;  Surgeon: Jayne Villalobos MD;  [facility];  Service: Cardiovascular;  Laterality: N/A;    CARDIAC CATHETERIZATION  4/21/2023    Procedure: Saphenous Vein Graft;  Surgeon: Jayne Villalobos MD;  [facility];  Service: Cardiovascular;;    CARDIAC CATHETERIZATION N/A 4/21/2023    Procedure: Native mammary injection;  Surgeon: Jayne Villalobos MD;  [facility];  Service: Cardiovascular;  Laterality: N/A;    CARDIAC CATHETERIZATION N/A 4/21/2023    Procedure: Percutaneous Coronary Intervention;  Surgeon: Jayne Villalobos MD;  [facility];  Service: Cardiovascular;  Laterality: N/A;    CARDIAC CATHETERIZATION N/A 4/21/2023    Procedure: Stent BETO coronary;  Surgeon: Jayne Villalobos MD;  [facility];  Service: Cardiovascular;   Laterality: N/A;    CARDIAC CATHETERIZATION  4/21/2023    Procedure: Percutaneous Manual Thrombectomy;  Surgeon: Jayne Villalobos MD;  Location: [facility] INVAS[facility];  Service: Cardiovascular;;       Family History: family history is not on file. Otherwise pertinent FHx was reviewed and not pertinent to current issue.    Social History:  reports that he has never smoked. He has never used smokeless tobacco. He reports that he does not currently use alcohol. He reports that he does not use drugs.    Home Medications:  HYDROcodone-Acetaminophen, albuterol sulfate HFA, aspirin, atorvastatin, budesonide-formoterol, carvedilol, cloNIDine, clopidogrel, fluticasone, hydrALAZINE, isosorbide mononitrate, ketoconazole, lisinopril, montelukast, nitroglycerin, pantoprazole, and spironolactone    Allergies:  Allergies   Allergen Reactions    Shellfish-Derived Products Unknown - Low Severity     gout  gout         Objective   Objective     Vitals:   Temp:  [97.7 °F (36.5 °C)] 97.7 °F (36.5 °C)  Heart Rate:  [59-71] 59  Resp:  [16] 16  BP: (107-164)/(75-99) 107/76  Flow (L/min):  [2] 2  Physical Exam    Constitutional: Awake, alert   Eyes: PERRLA, sclerae anicteric, no conjunctival injection   HENT: NCAT, mucous membranes moist   Neck: Supple, no thyromegaly, no lymphadenopathy, trachea midline   Respiratory: Clear to auscultation bilaterally, nonlabored respirations    Cardiovascular: RRR, no murmurs, rubs, or gallops, palpable pedal pulses bilaterally   Gastrointestinal: Positive bowel sounds, soft, nontender, nondistended   Musculoskeletal: No bilateral ankle edema, no clubbing or cyanosis to extremities   Psychiatric: Appropriate affect, cooperative   Neurologic: Oriented x 3, strength symmetric in all extremities, Cranial Nerves grossly intact to confrontation, speech clear   Skin: No rashes     Result Review    Result Review:  I have personally reviewed the results from the time of this admission to 9/10/2024  19:04 EDT and agree with these findings:  [x]  Laboratory list / accordion  []  Microbiology  [x]  Radiology  [x]  EKG/Telemetry   []  Cardiology/Vascular   []  Pathology  []  Old records  []  Other:        Assessment & Plan   Assessment / Plan     Brief Patient Summary:  Negrito Navarro is a 74 y.o. male who was admitted to the ED for further evaluation of chest pain and syncope.  Patient was found to be orthostatic by EMS on the scene.  Initial troponin in the ED was 25.  ECG was probable sinus.  Patient admitted to the ED observation unit for further evaluation.  Patient pain-free at this time.    Active Hospital Problems:  Active Hospital Problems    Diagnosis     **Syncope      Plan:     Chest pain  -Continuous cardiac monitoring  -Serial ECGs as needed  -Trend troponin  -Sublingual nitro and IV morphine if needed  -TTE echo  -Cardiology consultation    Hypercholesterolemia  -Continue home atorvastatin    Hypertension  -Hold carvedilol  -Continue with home lisinopril  -Continue with home clonidine  -Continue with home hydralazine    CAD  -Continue with Plavix    Chest x-ray/atelectasis versus infiltrate  -Obtain procalcitonin      VTE Prophylaxis:  Mechanical VTE prophylaxis orders are present.        CODE STATUS:    Level Of Support Discussed With: Patient  Code Status (Patient has no pulse and is not breathing): CPR (Attempt to Resuscitate)  Medical Interventions (Patient has pulse or is breathing): Full Support    Admission Status:  I believe this patient meets observation status.    Electronically signed by Maverick Claudio III, PA, 09/10/24, 5:09 PM EDT.        75 minutes has been spent by [facility] providers in the care of this patient while under observation status      I have worn appropriate PPE during this patient encounter, sanitized my hands both with entering and exiting patient's room.    I have discussed plan of care with patient including advance care plan  and/or surrogate decision maker.  Patient advises that their daughter/Myra Martin will be their primary surrogate decision maker

## 2024-09-10 NOTE — ED PROVIDER NOTES
EMERGENCY DEPARTMENT ENCOUNTER  Room Number:  29/29  PCP: Gaby Yu MD  Independent Historians: Patient and EMS      HPI:  Chief Complaint: had concerns including Syncope and Chest Pain.     A complete HPI/ROS/PMH/PSH/SH/FH are unobtainable due to: None    Chronic or social conditions impacting patient care (Social Determinants of Health): None      Context: Negrito Navarro is a 74 y.o. male with a medical history of congestive heart failure, COPD, coronary disease, hypertension who presents to the ED c/o acute chest pain and syncope.  The patient reports he had chest pain earlier today and then passed out.  The patient reports chronic neck and shoulder pain.  EMS found him hypotensive on the ground.  He was at 80/50.  The patient is chronically on 2 L of nasal cannula oxygen.  He denies any current chest pain.  He reports recently his doctor increased his blood pressure medicine from 50 to 100 mg 3 times a day.  He states he has not been taking that 100 mg dose.  He states his last syncopal episode was about 5 weeks ago.  He reports mild edema to the lower extremities.      Review of prior external notes (non-ED) -and- Review of prior external test results outside of this encounter:  Primary care note dated 7/31/2024 notes uncontrolled hypertension.  He had no exertional chest pain or discomfort.  His hydralazine was increased to 100 mg 3 times daily.    Prescription drug monitoring program review:         PAST MEDICAL HISTORY  Active Ambulatory Problems     Diagnosis Date Noted    Hyperlipidemia 06/09/2021    Benign prostatic hyperplasia 11/10/2016    Chronic constipation 07/06/2018    Essential hypertension 11/10/2016    SERENITY (obstructive sleep apnea) 11/10/2016    Oxygen dependent 07/06/2018    COPD (chronic obstructive pulmonary disease) 06/10/2021    CAD (coronary artery disease) 06/10/2021    Acute coronary syndrome with high troponin 04/20/2023    Precordial chest pain 04/20/2023    History of coronary  angioplasty with insertion of stent 04/20/2023    Hx of CABG 04/20/2023    CAD S/P percutaneous coronary angioplasty 05/18/2023    Long term (current) use of antithrombotics/antiplatelets 05/18/2023    Acute hypoxic respiratory failure 04/17/2024    Viral pneumonia 04/24/2024    Reactive airway disease 04/24/2024    Chronic systolic (congestive) heart failure 04/24/2024    Chronic kidney failure 04/24/2024    Leukocytosis 04/24/2024    Type 2 diabetes mellitus 04/24/2024    Chronic HFrEF (heart failure with reduced ejection fraction) 04/24/2024     Resolved Ambulatory Problems     Diagnosis Date Noted    Acute on chronic congestive heart failure 06/09/2021    Hypokalemia 06/09/2021    Hypertensive urgency 06/10/2021    Acute on chronic diastolic heart failure 06/11/2021    Acute kidney failure 04/24/2024     Past Medical History:   Diagnosis Date    Coronary artery disease involving coronary bypass graft of native heart without angina pectoris 6/10/2021         PAST SURGICAL HISTORY  Past Surgical History:   Procedure Laterality Date    CARDIAC CATHETERIZATION N/A 4/21/2023    Procedure: Left Heart Cath;  Surgeon: Jayne Villalobos MD;  Location: [facility] CATH INVASIVE LOCATION;  Service: Cardiovascular;  Laterality: N/A;    CARDIAC CATHETERIZATION N/A 4/21/2023    Procedure: Coronary angiography;  Surgeon: Jayne Villalobos MD;  Location: [facility] CATH INVASIVE LOCATION;  Service: Cardiovascular;  Laterality: N/A;    CARDIAC CATHETERIZATION N/A 4/21/2023    Procedure: Left ventriculography;  Surgeon: Jayne Villalobos MD;  Location: [facility] CATH INVASIVE LOCATION;  Service: Cardiovascular;  Laterality: N/A;    CARDIAC CATHETERIZATION  4/21/2023    Procedure: Saphenous Vein Graft;  Surgeon: Jayne Villalobos MD;  Location: [facility] CATH INVASIVE LOCATION;  Service: Cardiovascular;;    CARDIAC CATHETERIZATION N/A 4/21/2023    Procedure: Native mammary injection;  Surgeon: Jayne Villalobos MD;   Location:  [facility] CATH INVASIVE LOCATION;  Service: Cardiovascular;  Laterality: N/A;    CARDIAC CATHETERIZATION N/A 4/21/2023    Procedure: Percutaneous Coronary Intervention;  Surgeon: Jayne Villalobos MD;  Location:  [facility] CATH INVASIVE LOCATION;  Service: Cardiovascular;  Laterality: N/A;    CARDIAC CATHETERIZATION N/A 4/21/2023    Procedure: Stent BETO coronary;  Surgeon: Jayne Villalobos MD;  [facility];  Service: Cardiovascular;  Laterality: N/A;    CARDIAC CATHETERIZATION  4/21/2023    Procedure: Percutaneous Manual Thrombectomy;  Surgeon: Jayne Villalobos MD;  Location: [facility] CATH INVASIVE LOCATION;  Service: Cardiovascular;;         FAMILY HISTORY  No family history on file.      SOCIAL HISTORY  Social History     Socioeconomic History    Marital status: Legally    Tobacco Use    Smoking status: Never    Smokeless tobacco: Never   Vaping Use    Vaping status: Never Used   Substance and Sexual Activity    Alcohol use: Not Currently    Drug use: Never    Sexual activity: Defer         ALLERGIES  Shellfish-derived products      REVIEW OF SYSTEMS  Review of Systems  Included in HPI  All systems reviewed and negative except for those discussed in HPI.      PHYSICAL EXAM    I have reviewed the triage vital signs and nursing notes.    ED Triage Vitals [09/10/24 1408]   Temp Heart Rate Resp BP SpO2   97.7 °F (36.5 °C) 71 16 124/75 99 %      Temp src Heart Rate Source Patient Position BP Location FiO2 (%)   Tympanic -- -- -- --       Physical Exam  GENERAL: Awake, alert, no acute distress  SKIN: Warm, dry  HENT: Normocephalic, atraumatic  EYES: no scleral icterus  CV: regular rhythm, regular rate  RESPIRATORY: normal effort, lungs clear  ABDOMEN: soft, nontender, nondistended  MUSCULOSKELETAL: no deformity, no cervical thoracic or lumbar spine tenderness.  No tenderness to hips or knees or ankles.  NEURO: alert, moves all extremities, follows  commands            LAB RESULTS  Recent Results (from the past 24 hour(s))   Comprehensive Metabolic Panel    Collection Time: 09/10/24  2:41 PM    Specimen: Blood   Result Value Ref Range    Glucose 115 (H) 65 - 99 mg/dL    BUN 19 8 - 23 mg/dL    Creatinine 1.15 0.76 - 1.27 mg/dL    Sodium 139 136 - 145 mmol/L    Potassium 4.1 3.5 - 5.2 mmol/L    Chloride 105 98 - 107 mmol/L    CO2 29.0 22.0 - 29.0 mmol/L    Calcium 9.3 8.6 - 10.5 mg/dL    Total Protein 6.8 6.0 - 8.5 g/dL    Albumin 3.7 3.5 - 5.2 g/dL    ALT (SGPT) 17 1 - 41 U/L    AST (SGOT) 16 1 - 40 U/L    Alkaline Phosphatase 79 39 - 117 U/L    Total Bilirubin 0.4 0.0 - 1.2 mg/dL    Globulin 3.1 gm/dL    A/G Ratio 1.2 g/dL    BUN/Creatinine Ratio 16.5 7.0 - 25.0    Anion Gap 5.0 5.0 - 15.0 mmol/L    eGFR 66.0 >60.0 mL/min/1.73   BNP    Collection Time: 09/10/24  2:41 PM    Specimen: Blood   Result Value Ref Range    proBNP 958.0 0.0 - 1,800.0 pg/mL   High Sensitivity Troponin T    Collection Time: 09/10/24  2:41 PM    Specimen: Blood   Result Value Ref Range    HS Troponin T 25 (H) <22 ng/L   CBC Auto Differential    Collection Time: 09/10/24  2:41 PM    Specimen: Blood   Result Value Ref Range    WBC 10.70 3.40 - 10.80 10*3/mm3    RBC 4.72 4.14 - 5.80 10*6/mm3    Hemoglobin 14.2 13.0 - 17.7 g/dL    Hematocrit 42.5 37.5 - 51.0 %    MCV 90.0 79.0 - 97.0 fL    MCH 30.1 26.6 - 33.0 pg    MCHC 33.4 31.5 - 35.7 g/dL    RDW 14.1 12.3 - 15.4 %    RDW-SD 46.4 37.0 - 54.0 fl    MPV 10.2 6.0 - 12.0 fL    Platelets 278 140 - 450 10*3/mm3    Neutrophil % 71.1 42.7 - 76.0 %    Lymphocyte % 11.5 (L) 19.6 - 45.3 %    Monocyte % 12.8 (H) 5.0 - 12.0 %    Eosinophil % 3.7 0.3 - 6.2 %    Basophil % 0.5 0.0 - 1.5 %    Immature Grans % 0.4 0.0 - 0.5 %    Neutrophils, Absolute 7.61 (H) 1.70 - 7.00 10*3/mm3    Lymphocytes, Absolute 1.23 0.70 - 3.10 10*3/mm3    Monocytes, Absolute 1.37 (H) 0.10 - 0.90 10*3/mm3    Eosinophils, Absolute 0.40 0.00 - 0.40 10*3/mm3    Basophils, Absolute  0.05 0.00 - 0.20 10*3/mm3    Immature Grans, Absolute 0.04 0.00 - 0.05 10*3/mm3    nRBC 0.0 0.0 - 0.2 /100 WBC   D-dimer, Quantitative    Collection Time: 09/10/24  2:41 PM    Specimen: Blood   Result Value Ref Range    D-Dimer, Quantitative 0.62 0.00 - 0.76 MCGFEU/mL   ECG 12 Lead Syncope    Collection Time: 09/10/24  3:13 PM   Result Value Ref Range    QT Interval 490 ms    QTC Interval 522 ms         RADIOLOGY  CT Head Without Contrast, CT Cervical Spine Without Contrast    Result Date: 9/10/2024  HISTORY: Syncope. Headache. Neck pain.  CT OF THE BRAIN WITHOUT CONTRAST0 9/10/2024  TECHNIQUE: Axial images were obtained through the brain without intravenous contrast.  FINDINGS: There is mild to moderate diffuse atrophy. There is decreased attenuation of the periventricular white matter bilaterally consistent with small vessel white matter ischemic disease.  There is no evidence of acute infarction, hemorrhage, midline shift or mass effect. There is mucosal thickening in the bilateral ethmoid sinuses. Minimal mucosal thickening in the posterior left maxillary sinus and anterior right maxillary sinus is seen. There is partial opacification of the frontal sinus.      1. No acute intracranial process. 2. Sinusitis.  CT OF THE CERVICAL SPINE WITHOUT CONTRAST  TECHNIQUE: Axial images were obtained from the skull base to the upper thoracic spine. Sagittal and coronal reconstruction images were reviewed.  FINDINGS: There is degenerative disease of the C1-C2 articulation. There is mild spondylosis at C3-4. Mild to moderate spondylosis at C5-6 and mild spondylosis at C6-7 is seen. No fracture or subluxation is seen. There is multilevel facet joint disease.  There is bilateral carotid calcification.  IMPRESSION: 1. Multilevel cervical degenerative disease. 2. No fractures are seen.  Radiation dose reduction techniques were utilized, including automated exposure control and exposure modulation based on body size.       XR  Chest 1 View    Result Date: 9/10/2024  XR CHEST 1 VW-  HISTORY: Male who is 74 years-old, syncope  TECHNIQUE: Frontal view of the chest  COMPARISON: 4/17/2024  FINDINGS: The heart is enlarged. Aorta is tortuous. Sternotomy wires are present. Left hemidiaphragm is elevated, with mild left basilar atelectasis or infiltrate; this appearance is similar to prior exam. No large pleural effusion, or pneumothorax. No acute osseous process.      As described.  This report was finalized on 9/10/2024 2:44 PM by Dr. Rigoberto Herrera M.D on Workstation: [employer]         MEDICATIONS GIVEN IN ER  Medications - No data to display      ORDERS PLACED DURING THIS VISIT:  Orders Placed This Encounter   Procedures    XR Chest 1 View    CT Head Without Contrast    CT Cervical Spine Without Contrast    Comprehensive Metabolic Panel    BNP    High Sensitivity Troponin T    CBC Auto Differential    D-dimer, Quantitative    High Sensitivity Troponin T 2Hr    Orthostatic Vitals    ECG 12 Lead Syncope    CBC & Differential         OUTPATIENT MEDICATION MANAGEMENT:  No current Epic-ordered facility-administered medications on file.     Current Outpatient Medications Ordered in Epic   Medication Sig Dispense Refill    albuterol sulfate  (90 Base) MCG/ACT inhaler Inhale 2 puffs Every 4 (Four) Hours As Needed for Wheezing.      aspirin 81 MG EC tablet Take 1 tablet by mouth Daily.      atorvastatin (LIPITOR) 40 MG tablet Take 1 tablet by mouth Daily.      budesonide-formoterol (SYMBICORT) 160-4.5 MCG/ACT inhaler Inhale 2 puffs 2 (Two) Times a Day. 10.2 g 1    carvedilol (COREG) 25 MG tablet Take 1 tablet by mouth 2 (Two) Times a Day With Meals. 60 tablet 3    cloNIDine (CATAPRES) 0.2 MG tablet Take 1 tablet by mouth Every 8 (Eight) Hours. 90 tablet 3    clopidogrel (PLAVIX) 75 MG tablet Take 1 tablet by mouth Daily. 90 tablet 3    fluticasone (FLONASE) 50 MCG/ACT nasal spray 2 sprays into the nostril(s) as directed by provider Daily.       hydrALAZINE (APRESOLINE) 50 MG tablet Take 1 tablet by mouth Every 8 (Eight) Hours. 90 tablet 3    HYDROcodone-acetaminophen (NORCO) 7.5-325 MG per tablet Take 1 tablet by mouth Every 12 (Twelve) Hours As Needed for Moderate Pain.      isosorbide mononitrate (IMDUR) 30 MG 24 hr tablet Take 1 tablet by mouth Daily. 30 tablet 3    ketoconazole (NIZORAL) 2 % cream Apply  topically to the appropriate area as directed Daily.      lisinopril (PRINIVIL,ZESTRIL) 40 MG tablet Take 1 tablet by mouth Daily. 30 tablet 3    montelukast (SINGULAIR) 10 MG tablet Take 1 tablet by mouth Every Night. 30 tablet 0    nitroglycerin (NITROSTAT) 0.4 MG SL tablet Place 1 tablet under the tongue Every 5 (Five) Minutes As Needed for Chest Pain for up to 1 dose. Take no more than 3 doses in 15 minutes. 25 tablet 12    spironolactone (ALDACTONE) 50 MG tablet Take 1 tablet by mouth Daily.      tamsulosin (FLOMAX) 0.4 MG capsule 24 hr capsule Take 1 capsule by mouth Daily.           PROCEDURES  Procedures            PROGRESS, DATA ANALYSIS, CONSULTS, AND MEDICAL DECISION MAKING  All labs have been independently interpreted by me.  All radiology studies have been reviewed by me. All EKG's have been independently viewed and interpreted by me.  Discussion below represents my analysis of pertinent findings related to patient's condition, differential diagnosis, treatment plan and final disposition.    Differential diagnosis includes but is not limited to acute coronary syndrome, acute aortic syndrome, PE, pneumothorax, unstable angina, arrhythmia.    Clinical Scores:         [place] Syncope Rule Score: 1           ED Course as of 09/10/24 1634   Tue Sep 10, 2024   1453 XR Chest 1 View  My independent interpretation of the imaging study is no pneumothorax [TR]   1521 EKG          EKG time: 1513  Rhythm/Rate: Likely normal sinus, rate 68  P waves and MO: Not definitively visualized  QRS, axis: Narrow QRS, left axis  ST and T waves: No  acute    Independently Interpreted by me  Not significantly changed compared to prior 4/17/2024   [TR]   1554 I reviewed the workup and findings with the patient at the bedside.  Answered all questions.  He has a history of CHF.  He has some mild lower extremity edema that is equal bilaterally.  It is unclear why he had a syncopal episode.  He has a normal D-dimer.  He has not increased his blood pressure medicine as he was directed to in July.  He has no active chest pain but did have chest pain prior to the syncopal episode.  Plan admission for further cardiac and syncope workup.  He is agreeable. [TR]   1631 Discussing with Bobby Claudio with the observation unit. He agrees to admit. [TR]      ED Course User Index  [TR] David Rose MD             AS OF 16:34 EDT VITALS:    BP - 164/99  HR - 69  TEMP - 97.7 °F (36.5 °C) (Tympanic)  O2 SATS - 96%    COMPLEXITY OF CARE  The patient requires admission.      DIAGNOSIS  Final diagnoses:   Syncope, unspecified syncope type   Transient hypotension   Chest pain, unspecified type   Chronic congestive heart failure, unspecified heart failure type         DISPOSITION  ED Disposition       ED Disposition   Decision to Admit    Condition   --    Comment   --                Please note that portions of this document were completed with a voice recognition program.    Note Disclaimer: At [facility], we believe that sharing information builds trust and better relationships. You are receiving this note because you recently visited [facility]. It is possible you will see health information before a provider has talked with you about it. This kind of information can be easy to misunderstand. To help you fully understand what it means for your health, we urge you to discuss this note with your provider.         David Rose MD  09/10/24 [id number]

## 2024-09-10 NOTE — PROGRESS NOTES
Clinical Pharmacy Services: Medication History    Negrito Navarro is a 74 y.o. male presenting to [facility] for   Chief Complaint   Patient presents with    Syncope    Chest Pain       He  has a past medical history of Benign prostatic hyperplasia (11/10/2016), COPD (chronic obstructive pulmonary disease) (6/10/2021), Coronary artery disease involving coronary bypass graft of native heart without angina pectoris (6/10/2021), Essential hypertension (11/10/2016), Hyperlipidemia, and SERENITY (obstructive sleep apnea) (11/10/2016).    Allergies as of 09/10/2024 - Reviewed 09/10/2024   Allergen Reaction Noted    Shellfish-derived products Unknown - Low Severity 05/20/2013       Medication information was obtained from: Patient & Surescript   Pharmacy and Phone Number:     Prior to Admission Medications       Prescriptions Last Dose Informant Patient Reported? Taking?    albuterol sulfate  (90 Base) MCG/ACT inhaler  Pharmacy Yes Yes    Inhale 2 puffs Every 4 (Four) Hours As Needed for Wheezing.    aspirin 81 MG EC tablet  Pharmacy Yes Yes    Take 1 tablet by mouth Daily.    atorvastatin (LIPITOR) 40 MG tablet  Pharmacy Yes Yes    Take 1 tablet by mouth Daily.    budesonide-formoterol (SYMBICORT) 160-4.5 MCG/ACT inhaler  Pharmacy No Yes    Inhale 2 puffs 2 (Two) Times a Day.    carvedilol (COREG) 12.5 MG tablet  Pharmacy Yes Yes    Take 1 tablet by mouth 2 (Two) Times a Day With Meals.    carvedilol (COREG) 25 MG tablet   No No    Take 1 tablet by mouth 2 (Two) Times a Day With Meals.    Patient taking differently:  Take 0.5 tablets by mouth 2 (Two) Times a Day With Meals.    cloNIDine (CATAPRES) 0.2 MG tablet  Pharmacy No Yes    Take 1 tablet by mouth Every 8 (Eight) Hours.    clopidogrel (PLAVIX) 75 MG tablet  Pharmacy No Yes    Take 1 tablet by mouth Daily.    fluticasone (FLONASE) 50 MCG/ACT nasal spray   Yes No    2 sprays into the nostril(s) as directed by provider Daily.    hydrALAZINE (APRESOLINE)  100 MG tablet  Pharmacy Yes Yes    Take 1 tablet by mouth 3 (Three) Times a Day.    hydrALAZINE (APRESOLINE) 50 MG tablet   No No    Take 1 tablet by mouth Every 8 (Eight) Hours.    HYDROcodone-Acetaminophen (XODOL )  MG per tablet  Pharmacy Yes Yes    Take 1 tablet by mouth Every 8 (Eight) Hours As Needed for Moderate Pain.    isosorbide mononitrate (IMDUR) 30 MG 24 hr tablet   No No    Take 1 tablet by mouth Daily.    ketoconazole (NIZORAL) 2 % cream  Pharmacy Yes No    Apply  topically to the appropriate area as directed Daily.    lisinopril (PRINIVIL,ZESTRIL) 40 MG tablet  Pharmacy No Yes    Take 1 tablet by mouth Daily.    montelukast (SINGULAIR) 10 MG tablet   No No    Take 1 tablet by mouth Every Night.    nitroglycerin (NITROSTAT) 0.4 MG SL tablet   No No    Place 1 tablet under the tongue Every 5 (Five) Minutes As Needed for Chest Pain for up to 1 dose. Take no more than 3 doses in 15 minutes.    pantoprazole (PROTONIX) 40 MG EC tablet   Yes No    Take 1 tablet by mouth 2 (Two) Times a Day.    spironolactone (ALDACTONE) 50 MG tablet   Yes No    Take 1 tablet by mouth Daily.              Medication notes: Spouse stated pt is not compliant with medications.     This medication list is complete to the best of my knowledge as of 9/10/2024    Please call if questions.    Ivette López  Medication History Technician   [phone]    9/10/2024 17:25 EDT

## 2024-09-10 NOTE — ED NOTES
"Nursing report ED to floor  Negrito Navarro  74 y.o.  male    HPI :  HPI (Adult)  Stated Reason for Visit: Pt to ED from home via EMS. Pt reports he was having CP earlier when he had an unwitnessed syncopal episode, unsure if he hit his head but is complaining of pain from his neck/shoulder to his leg on the L side. When EMS arrived pt was still on the ground and hypotensive, initially 80s/50s. Pt reports recent change to his BP meds. Pt reports he used to be on Plavix but was recently taken off, unsure when. Pt arrives on his chronic 2L NC.  History Obtained From: patient, EMS    Chief Complaint  Chief Complaint   Patient presents with    Syncope    Chest Pain       Admitting doctor:   Naldo Kang MD    Admitting diagnosis:   The primary encounter diagnosis was Syncope, unspecified syncope type. Diagnoses of Transient hypotension, Chest pain, unspecified type, and Chronic congestive heart failure, unspecified heart failure type were also pertinent to this visit.    Code status:   Current Code Status       Date Active Code Status Order ID Comments User Context       Prior            Allergies:   Shellfish-derived products    Isolation:   No active isolations    Intake and Output  No intake or output data in the 24 hours ending 09/10/24 1704    Weight:       09/10/24  1426   Weight: 95.8 kg (211 lb 1.6 oz)       Most recent vitals:   Vitals:    09/10/24 1408 09/10/24 1425 09/10/24 1426   BP: 124/75 164/99    Pulse: 71 69    Resp: 16     Temp: 97.7 °F (36.5 °C)     TempSrc: Tympanic     SpO2: 99% 96%    Weight:   95.8 kg (211 lb 1.6 oz)   Height:   172.7 cm (68\")       Active LDAs/IV Access:   Lines, Drains & Airways       Active LDAs       Name Placement date Placement time Site Days    Peripheral IV 09/10/24 1440 Left Antecubital 09/10/24  1440  Antecubital  less than 1                    Labs (abnormal labs have a star):   Labs Reviewed   COMPREHENSIVE METABOLIC PANEL - Abnormal; Notable for the following " components:       Result Value    Glucose 115 (*)     All other components within normal limits    Narrative:     GFR Normal >60  Chronic Kidney Disease <60  Kidney Failure <15    The GFR formula is only valid for adults with stable renal function between ages 18 and 70.   TROPONIN - Abnormal; Notable for the following components:    HS Troponin T 25 (*)     All other components within normal limits    Narrative:     High Sensitive Troponin T Reference Range:  <14.0 ng/L- Negative Female for AMI  <22.0 ng/L- Negative Male for AMI  >=14 - Abnormal Female indicating possible myocardial injury.  >=22 - Abnormal Male indicating possible myocardial injury.   Clinicians would have to utilize clinical acumen, EKG, Troponin, and serial changes to determine if it is an Acute Myocardial Infarction or myocardial injury due to an underlying chronic condition.        CBC WITH AUTO DIFFERENTIAL - Abnormal; Notable for the following components:    Lymphocyte % 11.5 (*)     Monocyte % 12.8 (*)     Neutrophils, Absolute 7.61 (*)     Monocytes, Absolute 1.37 (*)     All other components within normal limits   BNP (IN-HOUSE) - Normal    Narrative:     This assay is used as an aid in the diagnosis of individuals suspected of having heart failure. It can be used as an aid in the diagnosis of acute decompensated heart failure (ADHF) in patients presenting with signs and symptoms of ADHF to the emergency department (ED). In addition, NT-proBNP of <300 pg/mL indicates ADHF is not likely.    Age Range Result Interpretation  NT-proBNP Concentration (pg/mL:      <50             Positive            >450                   Gray                 300-450                    Negative             <300    50-75           Positive            >900                  Gray                300-900                  Negative            <300      >75             Positive            >1800                  Gray                300-1800                  Negative       "      <300   D-DIMER, QUANTITATIVE - Normal    Narrative:     According to the assay 's published package insert, a normal (<0.50 MCGFEU/mL) D-dimer result in conjunction with a non-high clinical probability assessment, excludes deep vein thrombosis (DVT) and pulmonary embolism (PE) with high sensitivity.    D-dimer values increase with age and this can make VTE exclusion of an older population difficult. To address this, the [employer], based on best available evidence and recent guidelines, recommends that clinicians use age-adjusted D-dimer thresholds in patients greater than 50 years of age with: a) a low probability of PE who do not meet all Pulmonary Embolism Rule Out Criteria, or b) in those with intermediate probability of PE.   The formula for an age-adjusted D-dimer cut-off is \"age/100\".  For example, a 60 year old patient would have an age-adjusted cut-off of 0.60 MCGFEU/mL and an 80 year old 0.80 MCGFEU/mL.   HIGH SENSITIVITIY TROPONIN T 2HR   CBC AND DIFFERENTIAL    Narrative:     The following orders were created for panel order CBC & Differential.  Procedure                               Abnormality         Status                     ---------                               -----------         ------                     CBC Auto Differential[[id number]]        Abnormal            Final result                 Please view results for these tests on the individual orders.       EKG:   ECG 12 Lead Syncope   Final Result   HEART RATE=68  bpm   RR Jtwvrehp=561  ms   DE Interval=  ms   P Horizontal Axis=  deg   P Front Axis=  deg   QRSD Interval=97  ms   QT Sykbygbw=973  ms   YZyB=329  ms   QRS Axis=-34  deg   T Wave Axis=79  deg   - ABNORMAL ECG -   Sinus rhythm   Abnormal R-wave progression, early transition   Possible  Inferior  infarct, old   Prolonged QT interval   No change from previous tracing   Electronically Signed By: Emerald Huertas ([facility]) 2024-09-10 15:38:08   Date " and Time of Study:2024-09-10 15:13:55          Meds given in ED:   Medications - No data to display    Imaging results:  CT Head Without Contrast    Result Date: 9/10/2024  1. No acute intracranial process. 2. Sinusitis.  CT OF THE CERVICAL SPINE WITHOUT CONTRAST  TECHNIQUE: Axial images were obtained from the skull base to the upper thoracic spine. Sagittal and coronal reconstruction images were reviewed.  FINDINGS: There is degenerative disease of the C1-C2 articulation. There is mild spondylosis at C3-4. Mild to moderate spondylosis at C5-6 and mild spondylosis at C6-7 is seen. No fracture or subluxation is seen. There is multilevel facet joint disease.  There is bilateral carotid calcification.  IMPRESSION: 1. Multilevel cervical degenerative disease. 2. No fractures are seen.  Radiation dose reduction techniques were utilized, including automated exposure control and exposure modulation based on body size.       CT Cervical Spine Without Contrast    Result Date: 9/10/2024  1. No acute intracranial process. 2. Sinusitis.  CT OF THE CERVICAL SPINE WITHOUT CONTRAST  TECHNIQUE: Axial images were obtained from the skull base to the upper thoracic spine. Sagittal and coronal reconstruction images were reviewed.  FINDINGS: There is degenerative disease of the C1-C2 articulation. There is mild spondylosis at C3-4. Mild to moderate spondylosis at C5-6 and mild spondylosis at C6-7 is seen. No fracture or subluxation is seen. There is multilevel facet joint disease.  There is bilateral carotid calcification.  IMPRESSION: 1. Multilevel cervical degenerative disease. 2. No fractures are seen.  Radiation dose reduction techniques were utilized, including automated exposure control and exposure modulation based on body size.       XR Chest 1 View    Result Date: 9/10/2024  As described.  This report was finalized on 9/10/2024 2:44 PM by Dr. Rigoberto Herrera M.D on Workstation: [email or web address]       Ambulatory status:   -  assist    Social issues:   Social History     Socioeconomic History    Marital status: Legally    Tobacco Use    Smoking status: Never    Smokeless tobacco: Never   Vaping Use    Vaping status: Never Used   Substance and Sexual Activity    Alcohol use: Not Currently    Drug use: Never    Sexual activity: Defer       Peripheral Neurovascular  Peripheral Neurovascular (Adult)  Peripheral Neurovascular WDL: WDL    Neuro Cognitive  Neuro Cognitive (Adult)  Cognitive/Neuro/Behavioral WDL: WDL    Learning       Respiratory  Respiratory (Adult)  Airway WDL: WDL  Respiratory WDL  Respiratory WDL: WDL    Abdominal Pain       Pain Assessments  Pain (Adult)  (0-10) Pain Rating: Rest: 4  (0-10) Pain Rating: Activity: 5  Pain Location: neck, extremity, shoulder    NIH Stroke Scale       Artemio Talbert RN  09/10/24 17:04 EDT

## 2024-09-10 NOTE — PLAN OF CARE
Goal Outcome Evaluation: pt admitted for syncopal episode, hypotension, pain on his left shoulder, left leg, back, neck, as well as chest pain. Pt wears 2 L nasal canula at baseline. Pt will be npo at midnight for cardiology consult. Pt has slight blue tent to lips but vital signs are stable at this time. Pt is A/O x4, is agreeable to the plan of care and verbalizes understanding.       Problem: Fall Injury Risk  Goal: Absence of Fall and Fall-Related Injury  Intervention: Promote Injury-Free Environment  Recent Flowsheet Documentation  Taken 9/10/2024 1841 by Lizette Moreira, RN  Safety Promotion/Fall Prevention:   room organization consistent   safety round/check completed

## 2024-09-11 ENCOUNTER — APPOINTMENT (OUTPATIENT)
Dept: NUCLEAR MEDICINE | Facility: HOSPITAL | Age: 74
End: 2024-09-11
Payer: MEDICARE

## 2024-09-11 ENCOUNTER — READMISSION MANAGEMENT (OUTPATIENT)
Dept: CALL CENTER | Facility: HOSPITAL | Age: 74
End: 2024-09-11
Payer: MEDICARE

## 2024-09-11 ENCOUNTER — APPOINTMENT (OUTPATIENT)
Dept: CARDIOLOGY | Facility: HOSPITAL | Age: 74
End: 2024-09-11
Payer: MEDICARE

## 2024-09-11 VITALS
OXYGEN SATURATION: 98 % | BODY MASS INDEX: 31.98 KG/M2 | SYSTOLIC BLOOD PRESSURE: 135 MMHG | HEIGHT: 68 IN | HEART RATE: 74 BPM | DIASTOLIC BLOOD PRESSURE: 83 MMHG | WEIGHT: 211 LBS | RESPIRATION RATE: 18 BRPM | TEMPERATURE: 98.5 F

## 2024-09-11 LAB
ANION GAP SERPL CALCULATED.3IONS-SCNC: 6.9 MMOL/L (ref 5–15)
AORTIC DIMENSIONLESS INDEX: 0.5 (DI)
BH CV ECHO MEAS - ACS: 1.36 CM
BH CV ECHO MEAS - AO MAX PG: 10 MMHG
BH CV ECHO MEAS - AO MEAN PG: 6 MMHG
BH CV ECHO MEAS - AO ROOT DIAM: 3.9 CM
BH CV ECHO MEAS - AO V2 MAX: 158.1 CM/SEC
BH CV ECHO MEAS - AO V2 VTI: 37.4 CM
BH CV ECHO MEAS - AVA(I,D): 2.1 CM2
BH CV ECHO MEAS - EDV(MOD-SP2): 89 ML
BH CV ECHO MEAS - EDV(MOD-SP4): 105 ML
BH CV ECHO MEAS - EF(MOD-BP): 58.7 %
BH CV ECHO MEAS - EF(MOD-SP2): 59.6 %
BH CV ECHO MEAS - EF(MOD-SP4): 56.2 %
BH CV ECHO MEAS - ESV(MOD-SP2): 36 ML
BH CV ECHO MEAS - ESV(MOD-SP4): 46 ML
BH CV ECHO MEAS - LAT PEAK E' VEL: 4.2 CM/SEC
BH CV ECHO MEAS - LV DIASTOLIC VOL/BSA (35-75): 50.2 CM2
BH CV ECHO MEAS - LV MAX PG: 2.43 MMHG
BH CV ECHO MEAS - LV MEAN PG: 1.35 MMHG
BH CV ECHO MEAS - LV SYSTOLIC VOL/BSA (12-30): 22 CM2
BH CV ECHO MEAS - LV V1 MAX: 78 CM/SEC
BH CV ECHO MEAS - LV V1 VTI: 19.7 CM
BH CV ECHO MEAS - LVOT AREA: 4 CM2
BH CV ECHO MEAS - LVOT DIAM: 2.25 CM
BH CV ECHO MEAS - MED PEAK E' VEL: 5 CM/SEC
BH CV ECHO MEAS - MV A DUR: 0.12 SEC
BH CV ECHO MEAS - MV A MAX VEL: 85.3 CM/SEC
BH CV ECHO MEAS - MV DEC SLOPE: 427.3 CM/SEC2
BH CV ECHO MEAS - MV DEC TIME: 0.22 SEC
BH CV ECHO MEAS - MV E MAX VEL: 95.1 CM/SEC
BH CV ECHO MEAS - MV E/A: 1.12
BH CV ECHO MEAS - MV MAX PG: 4.3 MMHG
BH CV ECHO MEAS - MV MEAN PG: 1.5 MMHG
BH CV ECHO MEAS - MV P1/2T: 70.7 MSEC
BH CV ECHO MEAS - MV V2 VTI: 25.8 CM
BH CV ECHO MEAS - MVA(P1/2T): 3.1 CM2
BH CV ECHO MEAS - MVA(VTI): 3 CM2
BH CV ECHO MEAS - PA ACC TIME: 0.16 SEC
BH CV ECHO MEAS - PA V2 MAX: 60.4 CM/SEC
BH CV ECHO MEAS - RAP SYSTOLE: 15 MMHG
BH CV ECHO MEAS - RV MAX PG: 1.06 MMHG
BH CV ECHO MEAS - RV V1 MAX: 51.4 CM/SEC
BH CV ECHO MEAS - RV V1 VTI: 13.3 CM
BH CV ECHO MEAS - SV(LVOT): 78.5 ML
BH CV ECHO MEAS - SV(MOD-SP2): 53 ML
BH CV ECHO MEAS - SV(MOD-SP4): 59 ML
BH CV ECHO MEAS - SVI(LVOT): 37.6 ML/M2
BH CV ECHO MEAS - SVI(MOD-SP2): 25.3 ML/M2
BH CV ECHO MEAS - SVI(MOD-SP4): 28.2 ML/M2
BH CV ECHO MEAS - TAPSE (>1.6): 2.8 CM
BH CV ECHO MEASUREMENTS AVERAGE E/E' RATIO: 20.67
BH CV REST NUCLEAR ISOTOPE DOSE: 8.3 MCI
BH CV STRESS COMMENTS STAGE 1: NORMAL
BH CV STRESS DOSE REGADENOSON STAGE 1: 0.4
BH CV STRESS DURATION MIN STAGE 1: 0
BH CV STRESS DURATION SEC STAGE 1: 10
BH CV STRESS NUCLEAR ISOTOPE DOSE: 23.9 MCI
BH CV STRESS PROTOCOL 1: NORMAL
BH CV STRESS RECOVERY BP: NORMAL MMHG
BH CV STRESS RECOVERY HR: 71 BPM
BH CV STRESS STAGE 1: 1
BH CV XLRA - TDI S': 10.9 CM/SEC
BUN SERPL-MCNC: 21 MG/DL (ref 8–23)
BUN/CREAT SERPL: 18.1 (ref 7–25)
CALCIUM SPEC-SCNC: 8.8 MG/DL (ref 8.6–10.5)
CHLORIDE SERPL-SCNC: 104 MMOL/L (ref 98–107)
CO2 SERPL-SCNC: 24.1 MMOL/L (ref 22–29)
CREAT SERPL-MCNC: 1.16 MG/DL (ref 0.76–1.27)
DEPRECATED RDW RBC AUTO: 49.9 FL (ref 37–54)
EGFRCR SERPLBLD CKD-EPI 2021: 65.3 ML/MIN/1.73
ERYTHROCYTE [DISTWIDTH] IN BLOOD BY AUTOMATED COUNT: 14.4 % (ref 12.3–15.4)
GLUCOSE SERPL-MCNC: 107 MG/DL (ref 65–99)
HCT VFR BLD AUTO: 43.4 % (ref 37.5–51)
HGB BLD-MCNC: 13.7 G/DL (ref 13–17.7)
LEFT ATRIUM VOLUME INDEX: 29.3 ML/M2
LV EF NUC BP: 60 %
MAXIMAL PREDICTED HEART RATE: 144 BPM
MCH RBC QN AUTO: 29.6 PG (ref 26.6–33)
MCHC RBC AUTO-ENTMCNC: 31.6 G/DL (ref 31.5–35.7)
MCV RBC AUTO: 93.7 FL (ref 79–97)
PERCENT MAX PREDICTED HR: 54.17 %
PLATELET # BLD AUTO: 268 10*3/MM3 (ref 140–450)
PMV BLD AUTO: 10.4 FL (ref 6–12)
POTASSIUM SERPL-SCNC: 4.3 MMOL/L (ref 3.5–5.2)
RBC # BLD AUTO: 4.63 10*6/MM3 (ref 4.14–5.8)
SINUS: 3.7 CM
SODIUM SERPL-SCNC: 135 MMOL/L (ref 136–145)
STRESS BASELINE BP: NORMAL MMHG
STRESS BASELINE HR: 67 BPM
STRESS PERCENT HR: 64 %
STRESS POST PEAK BP: NORMAL MMHG
STRESS POST PEAK HR: 78 BPM
STRESS TARGET HR: 122 BPM
WBC NRBC COR # BLD AUTO: 9.6 10*3/MM3 (ref 3.4–10.8)

## 2024-09-11 PROCEDURE — 93017 CV STRESS TEST TRACING ONLY: CPT

## 2024-09-11 PROCEDURE — 80048 BASIC METABOLIC PNL TOTAL CA: CPT | Performed by: PHYSICIAN ASSISTANT

## 2024-09-11 PROCEDURE — 97161 PT EVAL LOW COMPLEX 20 MIN: CPT

## 2024-09-11 PROCEDURE — 94761 N-INVAS EAR/PLS OXIMETRY MLT: CPT

## 2024-09-11 PROCEDURE — 25010000002 REGADENOSON 0.4 MG/5ML SOLUTION: Performed by: EMERGENCY MEDICINE

## 2024-09-11 PROCEDURE — G0378 HOSPITAL OBSERVATION PER HR: HCPCS

## 2024-09-11 PROCEDURE — 78452 HT MUSCLE IMAGE SPECT MULT: CPT

## 2024-09-11 PROCEDURE — 94664 DEMO&/EVAL PT USE INHALER: CPT

## 2024-09-11 PROCEDURE — 93306 TTE W/DOPPLER COMPLETE: CPT

## 2024-09-11 PROCEDURE — A9502 TC99M TETROFOSMIN: HCPCS | Performed by: EMERGENCY MEDICINE

## 2024-09-11 PROCEDURE — 78452 HT MUSCLE IMAGE SPECT MULT: CPT | Performed by: INTERNAL MEDICINE

## 2024-09-11 PROCEDURE — 94799 UNLISTED PULMONARY SVC/PX: CPT

## 2024-09-11 PROCEDURE — 99222 1ST HOSP IP/OBS MODERATE 55: CPT

## 2024-09-11 PROCEDURE — 93016 CV STRESS TEST SUPVJ ONLY: CPT | Performed by: INTERNAL MEDICINE

## 2024-09-11 PROCEDURE — 93306 TTE W/DOPPLER COMPLETE: CPT | Performed by: INTERNAL MEDICINE

## 2024-09-11 PROCEDURE — 94640 AIRWAY INHALATION TREATMENT: CPT

## 2024-09-11 PROCEDURE — 0 TECHNETIUM TETROFOSMIN KIT: Performed by: EMERGENCY MEDICINE

## 2024-09-11 PROCEDURE — 25510000001 PERFLUTREN 6.52 MG/ML SUSPENSION 2 ML VIAL: Performed by: PHYSICIAN ASSISTANT

## 2024-09-11 PROCEDURE — 93246 EXT ECG>7D<15D RECORDING: CPT

## 2024-09-11 PROCEDURE — 97530 THERAPEUTIC ACTIVITIES: CPT

## 2024-09-11 PROCEDURE — 93018 CV STRESS TEST I&R ONLY: CPT | Performed by: INTERNAL MEDICINE

## 2024-09-11 PROCEDURE — 85027 COMPLETE CBC AUTOMATED: CPT | Performed by: PHYSICIAN ASSISTANT

## 2024-09-11 RX ORDER — REGADENOSON 0.08 MG/ML
0.4 INJECTION, SOLUTION INTRAVENOUS
Status: COMPLETED | OUTPATIENT
Start: 2024-09-11 | End: 2024-09-11

## 2024-09-11 RX ADMIN — TETROFOSMIN 1 DOSE: 1.38 INJECTION, POWDER, LYOPHILIZED, FOR SOLUTION INTRAVENOUS at 10:11

## 2024-09-11 RX ADMIN — REGADENOSON 0.4 MG: 0.08 INJECTION, SOLUTION INTRAVENOUS at 11:27

## 2024-09-11 RX ADMIN — TETROFOSMIN 1 DOSE: 1.38 INJECTION, POWDER, LYOPHILIZED, FOR SOLUTION INTRAVENOUS at 11:27

## 2024-09-11 RX ADMIN — PERFLUTREN 3 ML: 6.52 INJECTION, SUSPENSION INTRAVENOUS at 13:44

## 2024-09-11 RX ADMIN — BUDESONIDE AND FORMOTEROL FUMARATE DIHYDRATE 2 PUFF: 160; 4.5 AEROSOL RESPIRATORY (INHALATION) at 07:41

## 2024-09-11 NOTE — PLAN OF CARE
Goal Outcome Evaluation:  Plan of Care Reviewed With: patient           Outcome Evaluation: Pt is a 75 y/o M admitted to [facility] following a syncope episode and with c/o chest pain. EMS reports pt was hypotensive upon arrival. Pt has a med hx of CHF, COPD, CAD, and HTN. Pt reports he lives with his spouse with 1 VERNON and intermittently uses a SC for mobility at baseline. Pt presents to PT with generalized weakness and decreased endurance. Pt performed bed mobility with SPV. Pt stood and ambulated 100' without an AD requiring SBA/CGA. Pt demo's a slow pace but overall steady gait. Pt denied any dizziness. Pt completed activity on RA with O2 sats remaining in the 90's. PT encouraged pt to mobilize with staff as tolerated. PT recommends home with assist and HHPT at D/C.      Anticipated Discharge Disposition (PT): home with assist, home with home health

## 2024-09-11 NOTE — PROGRESS NOTES
MD Attestation Note    SHARED VISIT: This visit was performed by BOTH a physician and an APC. The substantive portion of the medical decision making was performed by this attesting physician who made or approved the management plan and takes responsibility for patient management. All studies in the APC note (if performed) were independently interpreted by me.       My personal findings are:        Subjective:  Patient admitted for evaluation of syncopal episode.  He states that this occurred after he took his hydralazine yesterday.  EMS found him with blood pressure of 80/50.  Patient states that he took 50 mg of hydralazine.  He was previously prescribed hydralazine 50 mg 3 times daily and recently it was increased to 100 mg 3 times daily.  Patient states that he only took the 100 mg 1 time after getting it filled and it caused him to pass out then as well.  He admits that he does not take the 50 mg 3 times daily as prescribed.  He takes it only sporadically.  He had taken yesterday because he was going to the doctor's office and wanted to have his blood pressure controlled for his doctor visit.        Objective:  GENERAL: Awake, alert, in no acute distress.   HENT:  Normocephalic, atraumatic. Nares patent.   EYES: Pupils equal, reactive. Extra-ocular movements normal.   RESPIRATORY: normal effort.  Nasal cannula oxygen in place.  CARDIOVASCULAR: Regular rhythm, normal rate. No chest wall tenderness.   ABDOMEN:  Nontender. Abdomen nondistended.   NEUROLOGIC: Cranial nerves II-XII normal. Motor strength 5/5 in extremities.   EXTREMITIES: No pedal edema. 2+ pedal pulses bilaterally. No deformity.   SKIN:  no rash, normal to inspection.          Assessment/ Plan:  Syncope  Likely secondary to his blood pressure medications.  It seems the patient is noncompliant and takes his hydralazine only sporadically.  He is also prescribed clonidine.  I explained that it is important for him to take medications as prescribed so  that his providers can know how to titrate his medications to goal blood pressure.    Cardiology following.

## 2024-09-11 NOTE — THERAPY EVALUATION
Patient Name: Negrito Navarro  : 1950    MRN: [id number]                              Today's Date: 2024       Admit Date: 9/10/2024    Visit Dx:     ICD-10-CM ICD-9-CM   1. Syncope, unspecified syncope type  R55 780.2   2. Transient hypotension  I95.9 796.3   3. Chest pain, unspecified type  R07.9 786.50   4. Chronic congestive heart failure, unspecified heart failure type  I50.9 428.0     Patient Active Problem List   Diagnosis    Hyperlipidemia    Benign prostatic hyperplasia    Chronic constipation    Essential hypertension    SERENITY (obstructive sleep apnea)    Oxygen dependent    COPD (chronic obstructive pulmonary disease)    CAD (coronary artery disease)    Acute coronary syndrome with high troponin    Precordial chest pain    History of coronary angioplasty with insertion of stent    Hx of CABG    CAD S/P percutaneous coronary angioplasty    Long term (current) use of antithrombotics/antiplatelets    Acute hypoxic respiratory failure    Viral pneumonia    Reactive airway disease    Chronic systolic (congestive) heart failure    Chronic kidney failure    Leukocytosis    Type 2 diabetes mellitus    Chronic HFrEF (heart failure with reduced ejection fraction)    Syncope     Past Medical History:   Diagnosis Date    Benign prostatic hyperplasia 11/10/2016    COPD (chronic obstructive pulmonary disease) 6/10/2021    Coronary artery disease involving coronary bypass graft of native heart without angina pectoris 6/10/2021    Essential hypertension 11/10/2016    Hyperlipidemia     SERENITY (obstructive sleep apnea) 11/10/2016     Past Surgical History:   Procedure Laterality Date    CARDIAC CATHETERIZATION N/A 2023    Procedure: Left Heart Cath;  Surgeon: Jayne Villalobos MD;  Location:  [facility] CATH INVASIVE LOCATION;  Service: Cardiovascular;  Laterality: N/A;    CARDIAC CATHETERIZATION N/A 2023    Procedure: Coronary angiography;  Surgeon: Jayne Villalobos MD;  Location: [facility] CATH  INVASIVE LOCATION;  Service: Cardiovascular;  Laterality: N/A;    CARDIAC CATHETERIZATION N/A 4/21/2023    Procedure: Left ventriculography;  Surgeon: Jayne Villalobos MD;  Location:  [facility] CATH INVASIVE LOCATION;  Service: Cardiovascular;  Laterality: N/A;    CARDIAC CATHETERIZATION  4/21/2023    Procedure: Saphenous Vein Graft;  Surgeon: Jayne Villalobos MD;  Location:  [facility] CATH INVASIVE LOCATION;  Service: Cardiovascular;;    CARDIAC CATHETERIZATION N/A 4/21/2023    Procedure: Native mammary injection;  Surgeon: Janye Villalobos MD;  Location:  [facility] CATH INVASIVE LOCATION;  Service: Cardiovascular;  Laterality: N/A;    CARDIAC CATHETERIZATION N/A 4/21/2023    Procedure: Percutaneous Coronary Intervention;  Surgeon: Jayne Villalobos MD;  Location:  [facility] CATH INVASIVE LOCATION;  Service: Cardiovascular;  Laterality: N/A;    CARDIAC CATHETERIZATION N/A 4/21/2023    Procedure: Stent BETO coronary;  Surgeon: Jayne Villalobos MD;  Location:  [facility] CATH INVASIVE LOCATION;  Service: Cardiovascular;  Laterality: N/A;    CARDIAC CATHETERIZATION  4/21/2023    Procedure: Percutaneous Manual Thrombectomy;  Surgeon: Jayne Villalobos MD;  Location:  [facility] CATH INVASIVE LOCATION;  Service: Cardiovascular;;      General Information       Row Name 09/11/24 0926          Physical Therapy Time and Intention    Document Type evaluation  -CS     Mode of Treatment individual therapy;physical therapy  -CS       Row Name 09/11/24 0926          General Information    Patient Profile Reviewed yes  -CS     Prior Level of Function independent:;gait;transfer;bed mobility  intermittent use of SC  -CS     Existing Precautions/Restrictions fall;oxygen therapy device and L/min  -CS     Barriers to Rehab none identified  -CS       Row Name 09/11/24 0926          Living Environment    People in Home spouse  -CS       Row Name 09/11/24 0926          Home Main Entrance    Number of Stairs, Main Entrance one  -CS        Row Name 09/11/24 0926          Stairs Within Home, Primary    Number of Stairs, Within Home, Primary none  -CS       Row Name 09/11/24 0926          Cognition    Orientation Status (Cognition) oriented x 4  -CS       Row Name 09/11/24 0926          Safety Issues, Functional Mobility    Impairments Affecting Function (Mobility) endurance/activity tolerance;shortness of breath  -CS               User Key  (r) = Recorded By, (t) = Taken By, (c) = Cosigned By      Initials Name Provider Type    Darling Carey, PT Physical Therapist                   Mobility       Row Name 09/11/24 0927          Bed Mobility    Bed Mobility supine-sit;sit-supine  -CS     Supine-Sit Barnett (Bed Mobility) supervision  -CS     Sit-Supine Barnett (Bed Mobility) supervision  -CS     Assistive Device (Bed Mobility) head of bed elevated  -CS       Row Name 09/11/24 0927          Sit-Stand Transfer    Sit-Stand Barnett (Transfers) standby assist  -       Row Name 09/11/24 0927          Gait/Stairs (Locomotion)    Barnett Level (Gait) contact guard  -CS     Distance in Feet (Gait) 100  -CS     Deviations/Abnormal Patterns (Gait) obed decreased;gait speed decreased;stride length decreased  -CS     Barnett Level (Stairs) not tested  -CS     Comment, (Gait/Stairs) slow pace, no LOB  -CS               User Key  (r) = Recorded By, (t) = Taken By, (c) = Cosigned By      Initials Name Provider Type    Darling Carey, PT Physical Therapist                   Obj/Interventions       Row Name 09/11/24 0927          Range of Motion Comprehensive    General Range of Motion bilateral lower extremity ROM WFL  -CS       Row Name 09/11/24 0927          Strength Comprehensive (MMT)    General Manual Muscle Testing (MMT) Assessment other (see comments)  -CS     Comment, General Manual Muscle Testing (MMT) Assessment B LE grossly 4-/5  -CS       Row Name 09/11/24 0927          Balance    Balance Assessment sitting  static balance;sitting dynamic balance;standing static balance;standing dynamic balance  -CS     Static Sitting Balance supervision  -CS     Dynamic Sitting Balance standby assist  -CS     Position, Sitting Balance unsupported;sitting edge of bed  -CS     Static Standing Balance standby assist  -CS     Dynamic Standing Balance contact guard  -CS     Position/Device Used, Standing Balance unsupported  -CS               User Key  (r) = Recorded By, (t) = Taken By, (c) = Cosigned By      Initials Name Provider Type    CS Darling De León, PT Physical Therapist                   Goals/Plan       Row Name 09/11/24 0931          Bed Mobility Goal 1 (PT)    Activity/Assistive Device (Bed Mobility Goal 1, PT) bed mobility activities, all  -CS     Cameron Level/Cues Needed (Bed Mobility Goal 1, PT) independent  -CS     Time Frame (Bed Mobility Goal 1, PT) 1 week  -CS       Row Name 09/11/24 0931          Transfer Goal 1 (PT)    Activity/Assistive Device (Transfer Goal 1, PT) sit-to-stand/stand-to-sit;bed-to-chair/chair-to-bed  -CS     Cameron Level/Cues Needed (Transfer Goal 1, PT) independent  -CS     Time Frame (Transfer Goal 1, PT) 1 week  -CS       Row Name 09/11/24 0931          Gait Training Goal 1 (PT)    Activity/Assistive Device (Gait Training Goal 1, PT) gait (walking locomotion);assistive device use  -CS     Cameron Level (Gait Training Goal 1, PT) modified independence  -CS     Distance (Gait Training Goal 1, PT) 100'  -CS     Time Frame (Gait Training Goal 1, PT) 1 week  -CS               User Key  (r) = Recorded By, (t) = Taken By, (c) = Cosigned By      Initials Name Provider Type    CS Darling De León, PT Physical Therapist                   Clinical Impression       Row Name 09/11/24 0927          Pain    Pretreatment Pain Rating 2/10  -CS     Posttreatment Pain Rating 2/10  -CS     Pain Location - Side/Orientation Left  -CS     Pain Location anterior  -CS     Pain Location - chest  -CS      Pain Intervention(s) Rest;Repositioned  -CS       Row Name 09/11/24 0927          Plan of Care Review    Plan of Care Reviewed With patient  -CS     Outcome Evaluation Pt is a 75 y/o M admitted to [facility] following a syncope episode and with c/o chest pain. EMS reports pt was hypotensive upon arrival. Pt has a med hx of CHF, COPD, CAD, and HTN. Pt reports he lives with his spouse with 1 VERNON and intermittently uses a SC for mobility at baseline. Pt presents to PT with generalized weakness and decreased endurance. Pt performed bed mobility with SPV. Pt stood and ambulated 100' without an AD requiring SBA/CGA. Pt demo's a slow pace but overall steady gait. Pt denied any dizziness. Pt completed activity on RA with O2 sats remaining in the 90's. PT encouraged pt to mobilize with staff as tolerated. PT recommends home with assist and HHPT at D/C.  -CS       Row Name 09/11/24 0927          Therapy Assessment/Plan (PT)    Rehab Potential (PT) good, to achieve stated therapy goals  -CS     Criteria for Skilled Interventions Met (PT) yes;meets criteria  -CS     Therapy Frequency (PT) 5 times/wk  -CS       Row Name 09/11/24 0927          Vital Signs    Pre SpO2 (%) 99  -CS     O2 Delivery Pre Treatment supplemental O2  -CS     Intra SpO2 (%) 91  -CS     O2 Delivery Intra Treatment room air  -CS     Post SpO2 (%) 93  -CS     O2 Delivery Post Treatment room air  -CS       Row Name 09/11/24 0927          Positioning and Restraints    Pre-Treatment Position in bed  -CS     Post Treatment Position bed  -CS     In Bed fowlers;call light within reach;encouraged to call for assist;exit alarm on  -CS               User Key  (r) = Recorded By, (t) = Taken By, (c) = Cosigned By      Initials Name Provider Type    CS Darling De León, PT Physical Therapist                   Outcome Measures       Row Name 09/11/24 0962          How much help from another person do you currently need...    Turning from your back to your side while in flat  bed without using bedrails? 4  -CS     Moving from lying on back to sitting on the side of a flat bed without bedrails? 4  -CS     Moving to and from a bed to a chair (including a wheelchair)? 4  -CS     Standing up from a chair using your arms (e.g., wheelchair, bedside chair)? 4  -CS     Climbing 3-5 steps with a railing? 3  -CS     To walk in hospital room? 3  -CS     AM-PAC 6 Clicks Score (PT) 22  -CS     Highest Level of Mobility Goal 7 --> Walk 25 feet or more  -CS       Row Name 09/11/24 0932          Functional Assessment    Outcome Measure Options AM-PAC 6 Clicks Basic Mobility (PT)  -CS               User Key  (r) = Recorded By, (t) = Taken By, (c) = Cosigned By      Initials Name Provider Type    CS Darling De León PT Physical Therapist                                 Physical Therapy Education       Title: PT OT SLP Therapies (In Progress)       Topic: Physical Therapy (In Progress)       Point: Mobility training (Done)       Learning Progress Summary             Patient Acceptance, E,TB, VU,DU,NR by  at 9/11/2024 0932                         Point: Home exercise program (Not Started)       Learner Progress:  Not documented in this visit.              Point: Body mechanics (Done)       Learning Progress Summary             Patient Acceptance, E,TB, VU,DU,NR by  at 9/11/2024 0932                         Point: Precautions (Not Started)       Learner Progress:  Not documented in this visit.                              User Key       Initials Effective Dates Name Provider Type Discipline    CS 09/22/22 -  Darling De León PT Physical Therapist PT                  PT Recommendation and Plan     Plan of Care Reviewed With: patient  Outcome Evaluation: Pt is a 73 y/o M admitted to [facility] following a syncope episode and with c/o chest pain. EMS reports pt was hypotensive upon arrival. Pt has a med hx of CHF, COPD, CAD, and HTN. Pt reports he lives with his spouse with 1 VERNON and intermittently uses a SC  for mobility at baseline. Pt presents to PT with generalized weakness and decreased endurance. Pt performed bed mobility with SPV. Pt stood and ambulated 100' without an AD requiring SBA/CGA. Pt demo's a slow pace but overall steady gait. Pt denied any dizziness. Pt completed activity on RA with O2 sats remaining in the 90's. PT encouraged pt to mobilize with staff as tolerated. PT recommends home with assist and HHPT at D/C.     Time Calculation:         PT Charges       Row Name 09/11/24 0933             Time Calculation    Start Time 0830  -CS      Stop Time 0845  -CS      Time Calculation (min) 15 min  -CS      PT Received On 09/11/24  -CS      PT - Next Appointment 09/12/24  -CS      PT Goal Re-Cert Due Date 09/18/24  -CS         Time Calculation- PT    Total Timed Code Minutes- PT 12 minute(s)  -CS         Timed Charges    34389 - PT Therapeutic Activity Minutes 12  -CS         Total Minutes    Timed Charges Total Minutes 12  -CS       Total Minutes 12  -CS                User Key  (r) = Recorded By, (t) = Taken By, (c) = Cosigned By      Initials Name Provider Type    CS Darling De León, PT Physical Therapist                  Therapy Charges for Today       Code Description Service Date Service Provider Modifiers Qty    78180007562 HC PT THERAPEUTIC ACT EA 15 MIN 9/11/2024 Darling De León, PT GP 1    97737762340 HC PT EVAL LOW COMPLEXITY 3 9/11/2024 Darling De León, PT GP 1            PT G-Codes  Outcome Measure Options: AM-PAC 6 Clicks Basic Mobility (PT)  AM-PAC 6 Clicks Score (PT): 22  PT Discharge Summary  Anticipated Discharge Disposition (PT): home with assist, home with home health    Darling De León PT  9/11/2024

## 2024-09-11 NOTE — PROGRESS NOTES
ED OBSERVATION PROGRESS/DISCHARGE SUMMARY    Date of Admission: 9/10/2024   LOS: 0 days   PCP: Gaby Yu MD    Final Diagnosis syncope      Subjective     Hospital Outcome: .  74-year-old male admitted to the observation unit with a complaint of chest pain and syncope.  Initial lab work in the emergency department shows high-sensitivity troponin of 25, 7, delta -18, proBNP 958, glucose 115, all of the lab work is at baseline for the patient.  Procalcitonin was 0.08 and D-dimer was 0.62.  Chest x-ray shows the heart is enlarged. Aorta is tortuous. Sternotomy wires are present. Left hemidiaphragm is elevated, with mild left basilar atelectasis or infiltrate; this appearance is similar to prior exam. No large pleural effusion, or pneumothorax. No acute osseous process.  EKG shows sinus rhythm rate of 68.  CT cervical spine without contrast shows multilevel cervical degenerative disease.  No fractures are seen.  CT head without contrast shows no acute intercranial process.  Cardiology has been consulted to see the patient.    9/11: Patient was seen and evaluated by the cardiology service this morning subsequently underwent echocardiogram and stress testing.  Of note patient states that he is taking his antihypertensives maybe once or twice a day and does not check his blood pressure at home.  Patient's echo shows ejection fraction 56 to 60% with mild aortic valve stenosis.  Stress test performed showed low risk study impression.  Seen and evaluated by cardiologist who recommends discharge home with Holter monitoring.    ROS:  General: no fevers, chills  Respiratory: no cough, dyspnea  Cardiovascular: no chest pain, palpitations  Abdomen: No abdominal pain, nausea, vomiting, or diarrhea  Neurologic: No focal weakness    Objective   Physical Exam:  I have reviewed the vital signs.  Temp:  [97.2 °F (36.2 °C)-97.8 °F (36.6 °C)] 97.6 °F (36.4 °C)  Heart Rate:  [59-71] 70  Resp:  [16-18] 16  BP: (105-164)/(68-99)  105/68  General Appearance:    Alert, cooperative, no distress  Head:    Normocephalic, atraumatic  Eyes:    Sclerae anicteric  Neck:   Supple, no mass  Lungs: Clear to auscultation bilaterally, respirations unlabored  Heart: Regular rate and rhythm, S1 and S2 normal, no murmur, rub or gallop  Abdomen:  Soft, nontender, bowel sounds active, nondistended  Extremities: No clubbing, cyanosis, or edema to lower extremities  Pulses:  2+ and symmetric in distal lower extremities  Skin: No rashes   Neurologic: Oriented x3, Normal strength to extremities    Results Review:    I have reviewed the labs, radiology results and diagnostic studies.    Results from last 7 days   Lab Units 09/10/24  1441   WBC 10*3/mm3 10.70   HEMOGLOBIN g/dL 14.2   HEMATOCRIT % 42.5   PLATELETS 10*3/mm3 278     Results from last 7 days   Lab Units 09/10/24  1441   SODIUM mmol/L 139   POTASSIUM mmol/L 4.1   CHLORIDE mmol/L 105   CO2 mmol/L 29.0   BUN mg/dL 19   CREATININE mg/dL 1.15   CALCIUM mg/dL 9.3   BILIRUBIN mg/dL 0.4   ALK PHOS U/L 79   ALT (SGPT) U/L 17   AST (SGOT) U/L 16   GLUCOSE mg/dL 115*     Imaging Results (Last 24 Hours)       Procedure Component Value Units Date/Time    CT Head Without Contrast [[id number]] Collected: 09/10/24 1626     Updated: 09/10/24 1626    Narrative:      HISTORY: Syncope. Headache. Neck pain.     CT OF THE BRAIN WITHOUT CONTRAST0 9/10/2024     TECHNIQUE: Axial images were obtained through the brain without  intravenous contrast.     FINDINGS: There is mild to moderate diffuse atrophy. There is decreased  attenuation of the periventricular white matter bilaterally consistent  with small vessel white matter ischemic disease.     There is no evidence of acute infarction, hemorrhage, midline shift or  mass effect. There is mucosal thickening in the bilateral ethmoid  sinuses. Minimal mucosal thickening in the posterior left maxillary  sinus and anterior right maxillary sinus is seen. There is  partial  opacification of the frontal sinus.       Impression:      1. No acute intracranial process.  2. Sinusitis.     CT OF THE CERVICAL SPINE WITHOUT CONTRAST     TECHNIQUE: Axial images were obtained from the skull base to the upper  thoracic spine. Sagittal and coronal reconstruction images were  reviewed.     FINDINGS: There is degenerative disease of the C1-C2 articulation. There  is mild spondylosis at C3-4. Mild to moderate spondylosis at C5-6 and  mild spondylosis at C6-7 is seen. No fracture or subluxation is seen.  There is multilevel facet joint disease.     There is bilateral carotid calcification.     IMPRESSION:  1. Multilevel cervical degenerative disease.  2. No fractures are seen.     Radiation dose reduction techniques were utilized, including automated  exposure control and exposure modulation based on body size.          CT Cervical Spine Without Contrast [[id number]] Collected: 09/10/24 1626     Updated: 09/10/24 1626    Narrative:      HISTORY: Syncope. Headache. Neck pain.     CT OF THE BRAIN WITHOUT CONTRAST0 9/10/2024     TECHNIQUE: Axial images were obtained through the brain without  intravenous contrast.     FINDINGS: There is mild to moderate diffuse atrophy. There is decreased  attenuation of the periventricular white matter bilaterally consistent  with small vessel white matter ischemic disease.     There is no evidence of acute infarction, hemorrhage, midline shift or  mass effect. There is mucosal thickening in the bilateral ethmoid  sinuses. Minimal mucosal thickening in the posterior left maxillary  sinus and anterior right maxillary sinus is seen. There is partial  opacification of the frontal sinus.       Impression:      1. No acute intracranial process.  2. Sinusitis.     CT OF THE CERVICAL SPINE WITHOUT CONTRAST     TECHNIQUE: Axial images were obtained from the skull base to the upper  thoracic spine. Sagittal and coronal reconstruction images were  reviewed.     FINDINGS:  There is degenerative disease of the C1-C2 articulation. There  is mild spondylosis at C3-4. Mild to moderate spondylosis at C5-6 and  mild spondylosis at C6-7 is seen. No fracture or subluxation is seen.  There is multilevel facet joint disease.     There is bilateral carotid calcification.     IMPRESSION:  1. Multilevel cervical degenerative disease.  2. No fractures are seen.     Radiation dose reduction techniques were utilized, including automated  exposure control and exposure modulation based on body size.          XR Chest 1 View [[id number]] Collected: 09/10/24 1442     Updated: 09/10/24 1448    Narrative:      XR CHEST 1 VW-     HISTORY: Male who is 74 years-old, syncope     TECHNIQUE: Frontal view of the chest     COMPARISON: 4/17/2024     FINDINGS: The heart is enlarged. Aorta is tortuous. Sternotomy wires are  present. Left hemidiaphragm is elevated, with mild left basilar  atelectasis or infiltrate; this appearance is similar to prior exam. No  large pleural effusion, or pneumothorax. No acute osseous process.       Impression:      As described.     This report was finalized on 9/10/2024 2:44 PM by Dr. Rigoberto Herrear M.D on Workstation: [id number]               I have reviewed the medications.  ---------------------------------------------------------------------------------------------  Assessment & Plan   Assessment/Problem List    Syncope      Plan:  Chest pain  -Continuous cardiac monitoring uneventful  -Sublingual nitro and IV morphine if needed  -High-sensitivity troponin 25, 7  -Cardiology consultation, cleared for discharge home with Holter monitoring  -Negative D-dimer     Hypercholesterolemia  -Continue home atorvastatin     Hypertension  -Resume home medication at discharge     CAD  -Continue with Plavix     Chest x-ray/atelectasis versus infiltrate  -Procalcitonin negative    Disposition: Home    Follow-up after Discharge: Follow-up with PCP    This note will serve as a discharge  summary    Casie Segundo, APRN 09/11/24 03:31 EDT    I have worn appropriate PPE during this patient encounter, sanitized my hands both with entering and exiting patient's room.      31 minutes has been spent by [facility] providers in the care of this patient while under observation status

## 2024-09-11 NOTE — CASE MANAGEMENT/SOCIAL WORK
Discharge Planning Assessment  [facility]     Patient Name: Negrito Navarro  MRN: [id number]  Today's Date: 9/11/2024    Admit Date: 9/10/2024        Discharge Needs Assessment       Row Name 09/11/24 1446       Living Environment    People in Home spouse    Current Living Arrangements apartment    Potentially Unsafe Housing Conditions none    Primary Care Provided by self    Provides Primary Care For no one, unable/limited ability to care for self    Family Caregiver if Needed child(mike), adult    Family Caregiver Names Drea Navarro- daughter    Quality of Family Relationships helpful;involved    Able to Return to Prior Arrangements yes       Resource/Environmental Concerns    Resource/Environmental Concerns none       Transportation Needs    In the past 12 months, has lack of transportation kept you from medical appointments or from getting medications? no    In the past 12 months, has lack of transportation kept you from meetings, work, or from getting things needed for daily living? No       Food Insecurity    Within the past 12 months, you worried that your food would run out before you got the money to buy more. Never true    Within the past 12 months, the food you bought just didn't last and you didn't have money to get more. Never true       Transition Planning    Patient/Family Anticipates Transition to home with family    Patient/Family Anticipated Services at Transition     Transportation Anticipated family or friend will provide       Discharge Needs Assessment    Equipment Currently Used at Home oxygen    Concerns to be Addressed patient refuses services;discharge planning;decision-making    Equipment Needed After Discharge none    Outpatient/Agency/Support Group Needs homecare agency  Pt declined services at this time    Provided Post Acute Provider List? Yes    Delivered To Patient;Support Person    Method of Delivery In person    Current Discharge Risk physical impairment;chronically ill                    Discharge Plan       Row Name 09/11/24 1436       Plan    Plan Comments Entered room, introduced self and explained role to patient and daughter Drea at bedside; Verified information on facesheet; Patient lives in a ground level entry apt w/spouse- is independent w/ADL's- doesnt use any DME for mobility but uses oxygen through [employer]- daughter noted he is suppose to be on it all day but only wears it at night; Verified PCP listed on facesheet; RX are filled at [facility] in [facility]; Pt reportedly had a syncopal episode; Reviewed PT recommendations with patient and daughter- Provided RTR- Pt has declined any PT or extra assistance in the home- daughter noted that she and other family members check on him and his wife- Plan is for patient to return home upon d/c with family to transport                  Continued Care and Services - Admitted Since 9/10/2024    No active coordination exists for this encounter.          Demographic Summary       Row Name 09/11/24 1444       General Information    Admission Type observation    Arrived From home    Reason for Consult decision-making;discharge planning    Preferred Language English       Contact Information    Permission Granted to Share Info With ;family/designee                   Functional Status       Row Name 09/11/24 1445       Functional Status    Usual Activity Tolerance good    Current Activity Tolerance moderate       Assessment of Health Literacy    How often do you have someone help you read hospital materials? Occasionally    How often do you have problems learning about your medical condition because of difficulty understanding written information? Occasionally    How often do you have a problem understanding what is told to you about your medical condition? Occasionally    How confident are you filling out medical forms by yourself? Quite a bit    Health Literacy Good       Functional Status, IADL    Meal Preparation  independent    Housekeeping independent    Laundry independent    Shopping assistive equipment       Mental Status    General Appearance WDL WDL       Mental Status Summary    Recent Changes in Mental Status/Cognitive Functioning no changes       Employment/    Employment Status retired                   Psychosocial    No documentation.                  Abuse/Neglect    No documentation.                  Legal    No documentation.                  Substance Abuse    No documentation.                  Patient Forms    No documentation.                     Nazia Hutchison RN

## 2024-09-11 NOTE — PLAN OF CARE
Goal Outcome Evaluation: pt is cleared for discharge and will follow up with cardiology.      Problem: Pain Acute  Goal: Acceptable Pain Control and Functional Ability  9/11/2024 1751 by Lizette Moreira RN  Outcome: Met  9/11/2024 1751 by Lizette Moreira RN  Outcome: Ongoing, Progressing     Problem: Chest Pain  Goal: Resolution of Chest Pain Symptoms  9/11/2024 1751 by Lizette Moreira RN  Outcome: Met  9/11/2024 1751 by Lizette Moreira RN  Outcome: Ongoing, Progressing     Problem: Fall Injury Risk  Goal: Absence of Fall and Fall-Related Injury  9/11/2024 1751 by Lizette Moreira RN  Outcome: Met  9/11/2024 1751 by Lizette Moreira RN  Outcome: Ongoing, Progressing  Intervention: Promote Injury-Free Environment  Recent Flowsheet Documentation  Taken 9/11/2024 1357 by Lizette Moreira RN  Safety Promotion/Fall Prevention: patient off unit  Taken 9/11/2024 1152 by Lizette Moreira RN  Safety Promotion/Fall Prevention:   patient off unit   room organization consistent  Taken 9/11/2024 1000 by Lizette Moreira RN  Safety Promotion/Fall Prevention:   room organization consistent   safety round/check completed  Taken 9/11/2024 0815 by Lizette Moreira RN  Safety Promotion/Fall Prevention:   room organization consistent   safety round/check completed     Problem: Syncope  Goal: Absence of Syncopal Symptoms  9/11/2024 1751 by Lizette Moreira RN  Outcome: Met  9/11/2024 1751 by Lizette Moreira RN  Outcome: Ongoing, Progressing  Intervention: Manage Effect of Syncopal Symptoms  Recent Flowsheet Documentation  Taken 9/11/2024 1000 by Lizette Moreira RN  Syncope Management: position changed slowly     Problem: Adult Inpatient Plan of Care  Goal: Plan of Care Review  9/11/2024 1751 by Lizette Moreira RN  Outcome: Met  9/11/2024 1751 by Lizette Moreira RN  Outcome: Ongoing, Progressing  Goal: Patient-Specific Goal (Individualized)  9/11/2024 1751 by Lizette Moreira RN  Outcome: Met  9/11/2024 1751 by  Lizette Moreira RN  Outcome: Ongoing, Progressing  Goal: Absence of Hospital-Acquired Illness or Injury  9/11/2024 1751 by Lizette Moreira RN  Outcome: Met  9/11/2024 1751 by Lizette Moreira RN  Outcome: Ongoing, Progressing  Intervention: Identify and Manage Fall Risk  Recent Flowsheet Documentation  Taken 9/11/2024 1357 by Lizette Moreira RN  Safety Promotion/Fall Prevention: patient off unit  Taken 9/11/2024 1152 by Lizette Moreira RN  Safety Promotion/Fall Prevention:   patient off unit   room organization consistent  Taken 9/11/2024 1000 by Lizette Moreira RN  Safety Promotion/Fall Prevention:   room organization consistent   safety round/check completed  Taken 9/11/2024 0815 by Lizette Moreira RN  Safety Promotion/Fall Prevention:   room organization consistent   safety round/check completed  Intervention: Prevent Skin Injury  Recent Flowsheet Documentation  Taken 9/11/2024 1000 by Lizette Moreira RN  Body Position: position changed independently  Taken 9/11/2024 0815 by Lizette Moreira RN  Body Position: position changed independently  Intervention: Prevent and Manage VTE (Venous Thromboembolism) Risk  Recent Flowsheet Documentation  Taken 9/11/2024 1000 by Lizette Moreira RN  Activity Management: activity encouraged  Taken 9/11/2024 0815 by Lizette Moreira RN  Activity Management: activity encouraged  Intervention: Prevent Infection  Recent Flowsheet Documentation  Taken 9/11/2024 1000 by Lizette Moreira RN  Infection Prevention: single patient room provided  Taken 9/11/2024 0815 by Lizette Moreira RN  Infection Prevention: single patient room provided  Goal: Optimal Comfort and Wellbeing  9/11/2024 1751 by Lizette Moreira RN  Outcome: Met  9/11/2024 1751 by Lizette Moreira RN  Outcome: Ongoing, Progressing  Intervention: Provide Person-Centered Care  Recent Flowsheet Documentation  Taken 9/11/2024 1000 by Lizette Moreira RN  Trust Relationship/Rapport:   care explained    choices provided   questions encouraged  Goal: Readiness for Transition of Care  9/11/2024 1751 by Lizette Moreira, RN  Outcome: Met  9/11/2024 1751 by Lizette Moreira RN  Outcome: Ongoing, Progressing     Problem: Skin Injury Risk Increased  Goal: Skin Health and Integrity  9/11/2024 1751 by Lizette Moreira, RN  Outcome: Met  9/11/2024 1751 by Lizette Moreira, CJ  Outcome: Ongoing, Progressing  Intervention: Optimize Skin Protection  Recent Flowsheet Documentation  Taken 9/11/2024 1000 by Lizette Moreira, RN  Head of Bed (HOB) Positioning: HOB elevated  Taken 9/11/2024 0815 by Lizette Moreira, RN  Head of Bed (HOB) Positioning: HOB elevated

## 2024-09-11 NOTE — PLAN OF CARE
Goal Outcome Evaluation:  Plan of Care Reviewed With: patient           Outcome Evaluation: Pt admitted for chest pain/syncope. Pt A&O x4 and assist x1. Pt to have a Cardiology consult and Echo completed. Pt has been NPO since midnight. Pt had c/o pain on left side (see MAR) for treatment.

## 2024-09-11 NOTE — CONSULTS
[facility]  Cardiology Consult Note    Patient Identification:  Name: Negrito Navarro  Age: 74 y.o.  Sex: male  :  1950  MRN: [id number]             Requesting Physician: Dr Claudio    Reason for Consultation / Chief Complaint: chest pain and syncope    History of Present Illness:   This is a 74-year-old male who is well-known with our service with coronary artery disease s/p CABG, drug-eluting stent to the RCA in 2023, hypertension, hyperlipidemia, COPD on home O2.  He presented to [facility] ER with chest pain and syncope.  Reports he took his normal am meds shortly after started feeling weak, lightheaded and sharp chest pain that radiated to left arm and shoulder. He reports the next thing he knew he had emt in front of him. Upon EMS arrival he was found to be hypotensive with blood pressure 80/50. He reports his pcp increased his hydralazine to 100mg for hypertension control about 4-5 weeks ago and that cause him to be so weak, he thought he was having a stroke and since hasn't taken the 100mg, only taking 50mg.     Chest x-ray in ER enlarged heart, torturous aorta, left hemidiaphragm elevated, mild left bibasilar atelectasis/infiltrate similar to prior exam.  ECG sinus rhythm with no acute ST changes.  Initial high-sensitivity troponin 25, repeat to 7 with delta -18.  .  CMP relatively unremarkable other than glucose 115.  D-dimer was negative.  CBC relatively unremarkable.    He was admitted to [facility] in 2024 with chest pain and acute hypoxic respiratory failure.  He underwent a stress test that showed small lateral wall ischemia with recommendations for medical management.  He was started on isosorbide 30 at that time.    Echo 2024 EF 46 to 50%, grade 1 LV diastolic dysfunction, mild to moderate dilated left atrial cavity, calcification of the aortic valve.  Stress test 2024 myocardial perfusion imaging indicates a small sized  mildly severe ischemia located in the lateral wall.  Cardiac catheterization 4/21/2023 normal left main, % occluded with LIMA to the LAD patent with normal distal flow, SVG to the diagonal 1 and 2 patent with normal distal flow.  Circumflex 100% occluded with SVG to the OM patent with normal distal flow.  RCA midportion under percent occluded reduced to 0% with drug-eluting stent.    Comorbid cardiac risk factors: Coronary artery disease, hypertension, hyperlipidemia    Past Medical History:  Past Medical History:   Diagnosis Date    Benign prostatic hyperplasia 11/10/2016    COPD (chronic obstructive pulmonary disease) 6/10/2021    Coronary artery disease involving coronary bypass graft of native heart without angina pectoris 6/10/2021    Essential hypertension 11/10/2016    Hyperlipidemia     SERENITY (obstructive sleep apnea) 11/10/2016     Past Surgical History:  Past Surgical History:   Procedure Laterality Date    CARDIAC CATHETERIZATION N/A 4/21/2023    Procedure: Left Heart Cath;  Surgeon: Jayne Villalobos MD;  Location: [facility] CATH INVASIVE LOCATION;  Service: Cardiovascular;  Laterality: N/A;    CARDIAC CATHETERIZATION N/A 4/21/2023    Procedure: Coronary angiography;  Surgeon: Jayne Villalobos MD;  Location: [facility] CATH INVASIVE LOCATION;  Service: Cardiovascular;  Laterality: N/A;    CARDIAC CATHETERIZATION N/A 4/21/2023    Procedure: Left ventriculography;  Surgeon: Jayne Villalobos MD;  [facility];  Service: Cardiovascular;  Laterality: N/A;    CARDIAC CATHETERIZATION  4/21/2023    Procedure: Saphenous Vein Graft;  Surgeon: Jayne Villalobos MD;  [facility];  Service: Cardiovascular;;    CARDIAC CATHETERIZATION N/A 4/21/2023    Procedure: Native mammary injection;  Surgeon: Jayne Villalobos MD;  [facility];  Service: Cardiovascular;  Laterality: N/A;    CARDIAC CATHETERIZATION N/A  4/21/2023    Procedure: Percutaneous Coronary Intervention;  Surgeon: Jayne Villalobos MD;  Location:  [facility] CATH INVASIVE LOCATION;  Service: Cardiovascular;  Laterality: N/A;    CARDIAC CATHETERIZATION N/A 4/21/2023    Procedure: Stent BETO coronary;  Surgeon: Jayne Villalobos MD;  Location:  [facility] CATH INVASIVE LOCATION;  Service: Cardiovascular;  Laterality: N/A;    CARDIAC CATHETERIZATION  4/21/2023    Procedure: Percutaneous Manual Thrombectomy;  Surgeon: Jayne Villalobos MD;  Location:  [facility] CATH INVASIVE LOCATION;  Service: Cardiovascular;;      Allergies:  Allergies   Allergen Reactions    Shellfish-Derived Products Unknown - Low Severity     gout  gout       Home Meds:  Medications Prior to Admission   Medication Sig Dispense Refill Last Dose    albuterol sulfate  (90 Base) MCG/ACT inhaler Inhale 2 puffs Every 4 (Four) Hours As Needed for Wheezing.       aspirin 81 MG EC tablet Take 1 tablet by mouth Daily.       atorvastatin (LIPITOR) 40 MG tablet Take 1 tablet by mouth Daily.       budesonide-formoterol (SYMBICORT) 160-4.5 MCG/ACT inhaler Inhale 2 puffs 2 (Two) Times a Day. 10.2 g 1     carvedilol (COREG) 12.5 MG tablet Take 1 tablet by mouth 2 (Two) Times a Day With Meals.       cloNIDine (CATAPRES) 0.2 MG tablet Take 1 tablet by mouth Every 8 (Eight) Hours. 90 tablet 3     clopidogrel (PLAVIX) 75 MG tablet Take 1 tablet by mouth Daily. 90 tablet 3     hydrALAZINE (APRESOLINE) 100 MG tablet Take 1 tablet by mouth 3 (Three) Times a Day.       HYDROcodone-Acetaminophen (XODOL )  MG per tablet Take 1 tablet by mouth Every 8 (Eight) Hours As Needed for Moderate Pain.       lisinopril (PRINIVIL,ZESTRIL) 40 MG tablet Take 1 tablet by mouth Daily. 30 tablet 3     carvedilol (COREG) 25 MG tablet Take 1 tablet by mouth 2 (Two) Times a Day With Meals. (Patient taking differently: Take 0.5 tablets by mouth 2 (Two) Times a Day With Meals.) 60 tablet 3     fluticasone (FLONASE)  50 MCG/ACT nasal spray 2 sprays into the nostril(s) as directed by provider Daily.       hydrALAZINE (APRESOLINE) 50 MG tablet Take 1 tablet by mouth Every 8 (Eight) Hours. 90 tablet 3     isosorbide mononitrate (IMDUR) 30 MG 24 hr tablet Take 1 tablet by mouth Daily. 30 tablet 3     ketoconazole (NIZORAL) 2 % cream Apply  topically to the appropriate area as directed Daily.       montelukast (SINGULAIR) 10 MG tablet Take 1 tablet by mouth Every Night. 30 tablet 0     nitroglycerin (NITROSTAT) 0.4 MG SL tablet Place 1 tablet under the tongue Every 5 (Five) Minutes As Needed for Chest Pain for up to 1 dose. Take no more than 3 doses in 15 minutes. 25 tablet 12     pantoprazole (PROTONIX) 40 MG EC tablet Take 1 tablet by mouth 2 (Two) Times a Day.       spironolactone (ALDACTONE) 50 MG tablet Take 1 tablet by mouth Daily.        Current Meds:   Current Facility-Administered Medications   Medication Dose Route Frequency Provider Last Rate Last Admin    aspirin EC tablet 81 mg  81 mg Oral Daily Casie Segundo APRN        atorvastatin (LIPITOR) tablet 40 mg  40 mg Oral Daily Casie Segundo APRN   40 mg at 09/10/24 1941    sennosides-docusate (PERICOLACE) 8.6-50 MG per tablet 2 tablet  2 tablet Oral BID PRN Maverick Claudio III, PA        And    polyethylene glycol (MIRALAX) packet 17 g  17 g Oral Daily PRN Maverick Claudio III, PA        And    bisacodyl (DULCOLAX) EC tablet 5 mg  5 mg Oral Daily PRN Maverick Claudio III, PA        And    bisacodyl (DULCOLAX) suppository 10 mg  10 mg Rectal Daily PRN Maverick Claudio III, PA        budesonide-formoterol (SYMBICORT) 160-4.5 MCG/ACT inhaler 2 puff  2 puff Inhalation BID - RT Casie Segundo APRN   2 puff at 09/11/24 0741    [Held by provider] carvedilol (COREG) tablet 12.5 mg  12.5 mg Oral BID With Meals Casie Segundo APRN        cloNIDine (CATAPRES) tablet 0.2 mg  0.2 mg Oral Q8H Casie Segundo, APRN   0.2 mg at 09/10/24 1941    clopidogrel  "(PLAVIX) tablet 75 mg  75 mg Oral Daily SanyaJennifermarianna GUAMAN, APRN   75 mg at 09/10/24 1941    hydrALAZINE (APRESOLINE) tablet 50 mg  50 mg Oral Q8H SanyaCasie burger, APRN   50 mg at 09/10/24 2206    HYDROcodone-acetaminophen (NORCO)  MG per tablet 1 tablet  1 tablet Oral Q8H PRN Casie Segundo, APRN   1 tablet at 09/10/24 1951    lisinopril (PRINIVIL,ZESTRIL) tablet 40 mg  40 mg Oral Q24H SanyaCasie burger, APRN   40 mg at 09/10/24 1941    ondansetron ODT (ZOFRAN-ODT) disintegrating tablet 4 mg  4 mg Oral Q6H PRN Maverick Claudio III, PA        Or    ondansetron (ZOFRAN) injection 4 mg  4 mg Intravenous Q6H PRN Maverick Claudio III, PA        sodium chloride 0.9 % flush 10 mL  10 mL Intravenous Q12H Maverick Claudio III, PA   10 mL at 09/10/24 2206    sodium chloride 0.9 % flush 10 mL  10 mL Intravenous PRN Maverick Claudio III, PA        sodium chloride 0.9 % infusion 40 mL  40 mL Intravenous PRN Maverick Claudio III, PA           Social History:   Social History     Tobacco Use    Smoking status: Never    Smokeless tobacco: Never   Substance Use Topics    Alcohol use: Not Currently      Family History:  History reviewed. No pertinent family history.     Review of Systems  Constitutional: No wt loss, fever   Gastrointestinal: No nausea , abdominal pain  Behavioral/Psych: No insomnia or anxiety   Cardiovascular ----positive for chest pain. All other systems reviewed and are negative          /70 (BP Location: Right arm, Patient Position: Standing)   Pulse 72   Temp 97.8 °F (36.6 °C) (Oral)   Resp 18   Ht 172.7 cm (68\")   Wt 95.7 kg (211 lb)   SpO2 93%   BMI 32.08 kg/m²   General appearance: No acute changes   Neck: Trachea midline; NECK, supple, no thyromegaly or lymphadenopathy   Lungs: Normal size and shape, normal breath sounds, equal distribution of air, no rales and rhonchi   CV: S1-S2 regular, no murmurs, no rub, no gallop   Abdomen: Soft, nontender; no masses , no " abnormal abdominal sounds   Extremities: No deformity , normal color , no peripheral edema   Skin: Normal temperature, turgor and texture; no rash, ulcers          Cardiographics  ECG:             Echocardiogram:   4/2024  Interpretation Summary         Left ventricular ejection fraction appears to be 46 - 50%.    Left ventricular diastolic function is consistent with (grade I) impaired relaxation.    The left atrial cavity is mild to moderately dilated.    There is calcification of the aortic valve.    Estimated right ventricular systolic pressure from tricuspid regurgitation is normal (<35 mmHg).     Stress test 4/2024  Interpretation Summary         Findings consistent with a normal ECG stress test.    Impressions are consistent with an intermediate risk study.    Left ventricular ejection fraction is normal (Calculated EF = 60%).    Myocardial perfusion imaging indicates a small-sized, mildly severe area of ischemia located in the lateral wall.    HEMODYNAMIC / ANGIOGRAPHIC DATA:    Left ventricular end diastolic pressure was 15 mmHg.  Left ventriculography revealed an EF around 45% inferior wall akinetic.    The left main is normal left main.  The left anterior descending artery is 100% occluded with the LIMA to the LAD patent with normal distal flow, sequential saphenous vein graft to the diagonal 1 and diagonal 2 patent with normal distal flow.  The left circumflex is 100% occluded with a saphenous vein graft to the OM patent with normal distal flow.  The right coronary artery is midportion 100% occluded reduced to 0% with 3.5/33 Xience stent dilated to 3.6.  Successful stenting of the mid right coronary, with reduction of stenosis from 100% to 0% with 3.5/33 Xience stent.    JESUS ALBERTO FLOW    PRE..0.....       POST...3...    TYPE OF LESION......C.......    RECOMMENDATIONS:  Post-procedure care will focus on prevention of any ischemic events and congestive complications.  Aggressive risk factor modification will  be carried out.  Importance of taking dual antiplatelets for one year  has been explained, risk of stent thrombosis leading to the acute MI, which carries high morbidity and mortality has been explained    Discontinuation or interruptions of these medications should be under the strict guidance of appropriate health professional     Imaging  Chest X-ray:   FINDINGS: The heart is enlarged. Aorta is tortuous. Sternotomy wires are  present. Left hemidiaphragm is elevated, with mild left basilar  atelectasis or infiltrate; this appearance is similar to prior exam. No  large pleural effusion, or pneumothorax. No acute osseous process.     IMPRESSION:  As described.     This report was finalized on 9/10/2024 2:44 PM by Dr. Rigoberto Herrera M.D on Workstation: [id number]  Lab Review   Results from last 7 days   Lab Units 09/10/24  1728 09/10/24  1441   HSTROP T ng/L 7  7 25*         Results from last 7 days   Lab Units 09/11/24  0322   SODIUM mmol/L 135*   POTASSIUM mmol/L 4.3   BUN mg/dL 21   CREATININE mg/dL 1.16   CALCIUM mg/dL 8.8     @[id number]@  Results from last 7 days   Lab Units 09/11/24  0322 09/10/24  1441   WBC 10*3/mm3 9.60 10.70   HEMOGLOBIN g/dL 13.7 14.2   HEMATOCRIT % 43.4 42.5   PLATELETS 10*3/mm3 268 278             Assessment:  Chest pain  Syncope  Coronary artery disease s/p CABG 2016 and BETO to the RCA 4/2023  Hypertension  Hyperlipidemia  CKD  SERENITY  COPD    Recommendations / Plan:   This is a 74-year-old male who is known with our service admitted for chest pain and syncope.  High-sensitivity troponin initially 25, repeat 7 with a delta -18.  D-dimer negative. ECG sinus rhythm with no acute ST changes unchanged from previous. I will check orthostatic vitals. Check lexiscan stress test. Echo has already been ordered.       Janelle Adler, HANNAH  9/11/2024, 08:31 EDT  74 years old male with atypical chest pain with known history of the coronary artery disease and CABG is being treated medically  complaining of the left-sided chest pain appears atypical stress test is negative  MD ERLIN Stewart/Transcription:   Dictated utilizing Dragon dictation

## 2024-09-12 NOTE — OUTREACH NOTE
Prep Survey      Flowsheet Row Responses   [facility] facility patient discharged from? [place]   Is LACE score < 7 ? No   Eligibility Readm Mgmt   Discharge diagnosis Syncope   Does the patient have one of the following disease processes/diagnoses(primary or secondary)? Other   Does the patient have Home health ordered? No   Is there a DME ordered? No   Prep survey completed? Yes            Tammy GUAMAN - Registered Nurse

## 2024-09-13 NOTE — CASE MANAGEMENT/SOCIAL WORK
Case Management Discharge Note      Final Note: Dc home    Provided Post Acute Provider List?: Yes  Delivered To: Patient, Support Person  Method of Delivery: In person    Selected Continued Care - Discharged on 9/11/2024 Admission date: 9/10/2024 - Discharge disposition: Home or Self Care      Destination    No services have been selected for the patient.                Durable Medical Equipment    No services have been selected for the patient.                Dialysis/Infusion    No services have been selected for the patient.                Home Medical Care    No services have been selected for the patient.                Therapy    No services have been selected for the patient.                Community Resources    No services have been selected for the patient.                Community & DME    No services have been selected for the patient.                         Final Discharge Disposition Code: 01 - home or self-care

## 2024-09-16 ENCOUNTER — READMISSION MANAGEMENT (OUTPATIENT)
Dept: CALL CENTER | Facility: HOSPITAL | Age: 74
End: 2024-09-16
Payer: MEDICARE

## 2024-10-01 ENCOUNTER — READMISSION MANAGEMENT (OUTPATIENT)
Dept: CALL CENTER | Facility: HOSPITAL | Age: 74
End: 2024-10-01
Payer: MEDICARE

## 2024-10-01 NOTE — OUTREACH NOTE
Medical Week 3 Survey      Flowsheet Row Responses   [facility] patient discharged from? [place]   Does the patient have one of the following disease processes/diagnoses(primary or secondary)? Other   Week 3 attempt successful? No   Unsuccessful attempts Attempt 1            Eri GARZA - Registered Nurse

## 2025-05-28 ENCOUNTER — HOSPITAL ENCOUNTER (INPATIENT)
Facility: HOSPITAL | Age: 75
LOS: 3 days | Discharge: HOME | DRG: 291 | End: 2025-06-02
Attending: EMERGENCY MEDICINE | Admitting: STUDENT IN AN ORGANIZED HEALTH CARE EDUCATION/TRAINING PROGRAM
Payer: MEDICARE

## 2025-05-28 ENCOUNTER — APPOINTMENT (OUTPATIENT)
Dept: GENERAL RADIOLOGY | Facility: HOSPITAL | Age: 75
DRG: 291 | End: 2025-05-28
Payer: MEDICARE

## 2025-05-28 DIAGNOSIS — Z99.81 OXYGEN DEPENDENT: ICD-10-CM

## 2025-05-28 DIAGNOSIS — I10 HYPERTENSION, UNSPECIFIED TYPE: ICD-10-CM

## 2025-05-28 DIAGNOSIS — I50.9 ACUTE ON CHRONIC CONGESTIVE HEART FAILURE, UNSPECIFIED HEART FAILURE TYPE: Primary | ICD-10-CM

## 2025-05-28 DIAGNOSIS — J81.0 ACUTE PULMONARY EDEMA: ICD-10-CM

## 2025-05-28 DIAGNOSIS — J44.9 CHRONIC OBSTRUCTIVE PULMONARY DISEASE, UNSPECIFIED COPD TYPE: ICD-10-CM

## 2025-05-28 DIAGNOSIS — I16.1 HYPERTENSIVE EMERGENCY: ICD-10-CM

## 2025-05-28 LAB
ALBUMIN SERPL-MCNC: 4 G/DL (ref 3.5–5.2)
ALBUMIN/GLOB SERPL: 1.1 G/DL
ALP SERPL-CCNC: 102 U/L (ref 39–117)
ALT SERPL W P-5'-P-CCNC: 24 U/L (ref 1–41)
ANION GAP SERPL CALCULATED.3IONS-SCNC: 10.8 MMOL/L (ref 5–15)
AST SERPL-CCNC: 20 U/L (ref 1–40)
B PARAPERT DNA SPEC QL NAA+PROBE: NOT DETECTED
B PERT DNA SPEC QL NAA+PROBE: NOT DETECTED
BASOPHILS # BLD AUTO: 0.07 10*3/MM3 (ref 0–0.2)
BASOPHILS NFR BLD AUTO: 0.7 % (ref 0–1.5)
BILIRUB SERPL-MCNC: 0.5 MG/DL (ref 0–1.2)
BUN SERPL-MCNC: 17 MG/DL (ref 8–23)
BUN/CREAT SERPL: 14 (ref 7–25)
C PNEUM DNA NPH QL NAA+NON-PROBE: NOT DETECTED
CALCIUM SPEC-SCNC: 9.2 MG/DL (ref 8.6–10.5)
CHLORIDE SERPL-SCNC: 103 MMOL/L (ref 98–107)
CO2 SERPL-SCNC: 25.2 MMOL/L (ref 22–29)
CREAT SERPL-MCNC: 1.21 MG/DL (ref 0.76–1.27)
DEPRECATED RDW RBC AUTO: 50 FL (ref 37–54)
EGFRCR SERPLBLD CKD-EPI 2021: 62.1 ML/MIN/1.73
EOSINOPHIL # BLD AUTO: 0.41 10*3/MM3 (ref 0–0.4)
EOSINOPHIL NFR BLD AUTO: 3.9 % (ref 0.3–6.2)
ERYTHROCYTE [DISTWIDTH] IN BLOOD BY AUTOMATED COUNT: 14.6 % (ref 12.3–15.4)
FLUAV SUBTYP SPEC NAA+PROBE: NOT DETECTED
FLUBV RNA ISLT QL NAA+PROBE: NOT DETECTED
GLOBULIN UR ELPH-MCNC: 3.7 GM/DL
GLUCOSE SERPL-MCNC: 199 MG/DL (ref 65–99)
HADV DNA SPEC NAA+PROBE: NOT DETECTED
HCOV 229E RNA SPEC QL NAA+PROBE: NOT DETECTED
HCOV HKU1 RNA SPEC QL NAA+PROBE: NOT DETECTED
HCOV NL63 RNA SPEC QL NAA+PROBE: NOT DETECTED
HCOV OC43 RNA SPEC QL NAA+PROBE: NOT DETECTED
HCT VFR BLD AUTO: 47.3 % (ref 37.5–51)
HGB BLD-MCNC: 15.4 G/DL (ref 13–17.7)
HMPV RNA NPH QL NAA+NON-PROBE: NOT DETECTED
HPIV1 RNA ISLT QL NAA+PROBE: NOT DETECTED
HPIV2 RNA SPEC QL NAA+PROBE: NOT DETECTED
HPIV3 RNA NPH QL NAA+PROBE: NOT DETECTED
HPIV4 P GENE NPH QL NAA+PROBE: NOT DETECTED
IMM GRANULOCYTES # BLD AUTO: 0.02 10*3/MM3 (ref 0–0.05)
IMM GRANULOCYTES NFR BLD AUTO: 0.2 % (ref 0–0.5)
LYMPHOCYTES # BLD AUTO: 2.14 10*3/MM3 (ref 0.7–3.1)
LYMPHOCYTES NFR BLD AUTO: 20.5 % (ref 19.6–45.3)
M PNEUMO IGG SER IA-ACNC: NOT DETECTED
MAGNESIUM SERPL-MCNC: 2.1 MG/DL (ref 1.6–2.4)
MCH RBC QN AUTO: 30.6 PG (ref 26.6–33)
MCHC RBC AUTO-ENTMCNC: 32.6 G/DL (ref 31.5–35.7)
MCV RBC AUTO: 94 FL (ref 79–97)
MONOCYTES # BLD AUTO: 0.9 10*3/MM3 (ref 0.1–0.9)
MONOCYTES NFR BLD AUTO: 8.6 % (ref 5–12)
NEUTROPHILS NFR BLD AUTO: 6.92 10*3/MM3 (ref 1.7–7)
NEUTROPHILS NFR BLD AUTO: 66.1 % (ref 42.7–76)
NRBC BLD AUTO-RTO: 0 /100 WBC (ref 0–0.2)
NT-PROBNP SERPL-MCNC: 910 PG/ML (ref 0–1800)
PLATELET # BLD AUTO: 267 10*3/MM3 (ref 140–450)
PMV BLD AUTO: 10.3 FL (ref 6–12)
POTASSIUM SERPL-SCNC: 3.8 MMOL/L (ref 3.5–5.2)
PROCALCITONIN SERPL-MCNC: 0.08 NG/ML (ref 0–0.25)
PROT SERPL-MCNC: 7.7 G/DL (ref 6–8.5)
RBC # BLD AUTO: 5.03 10*6/MM3 (ref 4.14–5.8)
RHINOVIRUS RNA SPEC NAA+PROBE: NOT DETECTED
RSV RNA NPH QL NAA+NON-PROBE: NOT DETECTED
SARS-COV-2 RNA NPH QL NAA+NON-PROBE: NOT DETECTED
SODIUM SERPL-SCNC: 139 MMOL/L (ref 136–145)
TROPONIN T SERPL HS-MCNC: 22 NG/L
WBC NRBC COR # BLD AUTO: 10.46 10*3/MM3 (ref 3.4–10.8)

## 2025-05-28 PROCEDURE — 36415 COLL VENOUS BLD VENIPUNCTURE: CPT

## 2025-05-28 PROCEDURE — 83735 ASSAY OF MAGNESIUM: CPT | Performed by: EMERGENCY MEDICINE

## 2025-05-28 PROCEDURE — 93010 ELECTROCARDIOGRAM REPORT: CPT | Performed by: INTERNAL MEDICINE

## 2025-05-28 PROCEDURE — 83880 ASSAY OF NATRIURETIC PEPTIDE: CPT | Performed by: EMERGENCY MEDICINE

## 2025-05-28 PROCEDURE — 85025 COMPLETE CBC W/AUTO DIFF WBC: CPT | Performed by: EMERGENCY MEDICINE

## 2025-05-28 PROCEDURE — 99285 EMERGENCY DEPT VISIT HI MDM: CPT

## 2025-05-28 PROCEDURE — 93005 ELECTROCARDIOGRAM TRACING: CPT | Performed by: EMERGENCY MEDICINE

## 2025-05-28 PROCEDURE — 84484 ASSAY OF TROPONIN QUANT: CPT | Performed by: EMERGENCY MEDICINE

## 2025-05-28 PROCEDURE — 84145 PROCALCITONIN (PCT): CPT | Performed by: EMERGENCY MEDICINE

## 2025-05-28 PROCEDURE — 0202U NFCT DS 22 TRGT SARS-COV-2: CPT | Performed by: EMERGENCY MEDICINE

## 2025-05-28 PROCEDURE — 80053 COMPREHEN METABOLIC PANEL: CPT | Performed by: EMERGENCY MEDICINE

## 2025-05-28 PROCEDURE — 71045 X-RAY EXAM CHEST 1 VIEW: CPT

## 2025-05-28 RX ORDER — LABETALOL HYDROCHLORIDE 5 MG/ML
20 INJECTION, SOLUTION INTRAVENOUS ONCE
Status: COMPLETED | OUTPATIENT
Start: 2025-05-28 | End: 2025-05-28

## 2025-05-28 RX ADMIN — Medication 20 MG: at 23:28

## 2025-05-29 ENCOUNTER — APPOINTMENT (OUTPATIENT)
Dept: CARDIOLOGY | Facility: HOSPITAL | Age: 75
DRG: 291 | End: 2025-05-29
Payer: MEDICARE

## 2025-05-29 PROBLEM — I50.9 CHF EXACERBATION: Status: ACTIVE | Noted: 2025-05-29

## 2025-05-29 LAB
ALBUMIN SERPL-MCNC: 4.1 G/DL (ref 3.5–5.2)
ALBUMIN/GLOB SERPL: 1.1 G/DL
ALP SERPL-CCNC: 106 U/L (ref 39–117)
ALT SERPL W P-5'-P-CCNC: 23 U/L (ref 1–41)
ANION GAP SERPL CALCULATED.3IONS-SCNC: 12 MMOL/L (ref 5–15)
AORTIC DIMENSIONLESS INDEX: 0.61 (DI)
AST SERPL-CCNC: 21 U/L (ref 1–40)
AV MEAN PRESS GRAD SYS DOP V1V2: 9.4 MMHG
AV VMAX SYS DOP: 218 CM/SEC
BH CV ECHO MEAS - ACS: 1.41 CM
BH CV ECHO MEAS - AO MAX PG: 19 MMHG
BH CV ECHO MEAS - AO ROOT DIAM: 2.42 CM
BH CV ECHO MEAS - AO V2 VTI: 42.4 CM
BH CV ECHO MEAS - AVA(I,D): 1.61 CM2
BH CV ECHO MEAS - EDV(MOD-SP2): 63 ML
BH CV ECHO MEAS - EDV(MOD-SP4): 71 ML
BH CV ECHO MEAS - EF(MOD-SP4): 76.1 %
BH CV ECHO MEAS - ESV(MOD-SP4): 17 ML
BH CV ECHO MEAS - LAT PEAK E' VEL: 5.7 CM/SEC
BH CV ECHO MEAS - LV DIASTOLIC VOL/BSA (35-75): 34 CM2
BH CV ECHO MEAS - LV MAX PG: 4 MMHG
BH CV ECHO MEAS - LV MEAN PG: 2.15 MMHG
BH CV ECHO MEAS - LV SYSTOLIC VOL/BSA (12-30): 8.1 CM2
BH CV ECHO MEAS - LV V1 MAX: 100.6 CM/SEC
BH CV ECHO MEAS - LV V1 VTI: 25.7 CM
BH CV ECHO MEAS - LVOT AREA: 2.7 CM2
BH CV ECHO MEAS - LVOT DIAM: 1.84 CM
BH CV ECHO MEAS - MED PEAK E' VEL: 5.9 CM/SEC
BH CV ECHO MEAS - MV A DUR: 0.12 SEC
BH CV ECHO MEAS - MV A MAX VEL: 89.1 CM/SEC
BH CV ECHO MEAS - MV DEC SLOPE: 223.3 CM/SEC2
BH CV ECHO MEAS - MV DEC TIME: 0.24 SEC
BH CV ECHO MEAS - MV E MAX VEL: 58.7 CM/SEC
BH CV ECHO MEAS - MV E/A: 0.66
BH CV ECHO MEAS - MV MAX PG: 4.1 MMHG
BH CV ECHO MEAS - MV MEAN PG: 2.24 MMHG
BH CV ECHO MEAS - MV P1/2T: 125 MSEC
BH CV ECHO MEAS - MV V2 VTI: 32.3 CM
BH CV ECHO MEAS - MVA(P1/2T): 1.76 CM2
BH CV ECHO MEAS - MVA(VTI): 2.1 CM2
BH CV ECHO MEAS - PA ACC TIME: 0.12 SEC
BH CV ECHO MEAS - PA V2 MAX: 109.1 CM/SEC
BH CV ECHO MEAS - RV MAX PG: 2.6 MMHG
BH CV ECHO MEAS - RV V1 MAX: 80.7 CM/SEC
BH CV ECHO MEAS - RV V1 VTI: 16.9 CM
BH CV ECHO MEAS - SV(LVOT): 68 ML
BH CV ECHO MEAS - SV(MOD-SP4): 54 ML
BH CV ECHO MEAS - SVI(LVOT): 32.5 ML/M2
BH CV ECHO MEAS - SVI(MOD-SP4): 25.8 ML/M2
BH CV ECHO MEAS - TAPSE (>1.6): 2.7 CM
BH CV ECHO MEASUREMENTS AVERAGE E/E' RATIO: 10.12
BH CV XLRA - TDI S': 11.9 CM/SEC
BILIRUB SERPL-MCNC: 0.5 MG/DL (ref 0–1.2)
BUN SERPL-MCNC: 21 MG/DL (ref 8–23)
BUN/CREAT SERPL: 15 (ref 7–25)
CALCIUM SPEC-SCNC: 9.5 MG/DL (ref 8.6–10.5)
CHLORIDE SERPL-SCNC: 103 MMOL/L (ref 98–107)
CO2 SERPL-SCNC: 24 MMOL/L (ref 22–29)
CREAT SERPL-MCNC: 1.4 MG/DL (ref 0.76–1.27)
DEPRECATED RDW RBC AUTO: 47.4 FL (ref 37–54)
EGFRCR SERPLBLD CKD-EPI 2021: 52.1 ML/MIN/1.73
ERYTHROCYTE [DISTWIDTH] IN BLOOD BY AUTOMATED COUNT: 14.1 % (ref 12.3–15.4)
GEN 5 1HR TROPONIN T REFLEX: 27 NG/L
GLOBULIN UR ELPH-MCNC: 3.6 GM/DL
GLUCOSE BLDC GLUCOMTR-MCNC: 123 MG/DL (ref 70–130)
GLUCOSE BLDC GLUCOMTR-MCNC: 141 MG/DL (ref 70–130)
GLUCOSE BLDC GLUCOMTR-MCNC: 159 MG/DL (ref 70–130)
GLUCOSE BLDC GLUCOMTR-MCNC: 174 MG/DL (ref 70–130)
GLUCOSE SERPL-MCNC: 146 MG/DL (ref 65–99)
HBA1C MFR BLD: 6.3 % (ref 4.8–5.6)
HCT VFR BLD AUTO: 47.6 % (ref 37.5–51)
HGB BLD-MCNC: 16.1 G/DL (ref 13–17.7)
MCH RBC QN AUTO: 30.9 PG (ref 26.6–33)
MCHC RBC AUTO-ENTMCNC: 33.8 G/DL (ref 31.5–35.7)
MCV RBC AUTO: 91.4 FL (ref 79–97)
PLATELET # BLD AUTO: 279 10*3/MM3 (ref 140–450)
PMV BLD AUTO: 10.4 FL (ref 6–12)
POTASSIUM SERPL-SCNC: 3.7 MMOL/L (ref 3.5–5.2)
PROT SERPL-MCNC: 7.7 G/DL (ref 6–8.5)
QT INTERVAL: 394 MS
QTC INTERVAL: 493 MS
RBC # BLD AUTO: 5.21 10*6/MM3 (ref 4.14–5.8)
SINUS: 3.3 CM
SODIUM SERPL-SCNC: 139 MMOL/L (ref 136–145)
STJ: 2.9 CM
TROPONIN T % DELTA: 23
TROPONIN T NUMERIC DELTA: 5 NG/L
WBC NRBC COR # BLD AUTO: 12.29 10*3/MM3 (ref 3.4–10.8)

## 2025-05-29 PROCEDURE — 94799 UNLISTED PULMONARY SVC/PX: CPT

## 2025-05-29 PROCEDURE — 80053 COMPREHEN METABOLIC PANEL: CPT | Performed by: NURSE PRACTITIONER

## 2025-05-29 PROCEDURE — 25010000002 FUROSEMIDE PER 20 MG: Performed by: NURSE PRACTITIONER

## 2025-05-29 PROCEDURE — 36415 COLL VENOUS BLD VENIPUNCTURE: CPT | Performed by: NURSE PRACTITIONER

## 2025-05-29 PROCEDURE — 25010000002 HYDRALAZINE PER 20 MG: Performed by: EMERGENCY MEDICINE

## 2025-05-29 PROCEDURE — 25510000001 PERFLUTREN 6.52 MG/ML SUSPENSION 2 ML VIAL

## 2025-05-29 PROCEDURE — 99214 OFFICE O/P EST MOD 30 MIN: CPT | Performed by: INTERNAL MEDICINE

## 2025-05-29 PROCEDURE — 85027 COMPLETE CBC AUTOMATED: CPT | Performed by: NURSE PRACTITIONER

## 2025-05-29 PROCEDURE — 83036 HEMOGLOBIN GLYCOSYLATED A1C: CPT | Performed by: STUDENT IN AN ORGANIZED HEALTH CARE EDUCATION/TRAINING PROGRAM

## 2025-05-29 PROCEDURE — 94640 AIRWAY INHALATION TREATMENT: CPT

## 2025-05-29 PROCEDURE — 82948 REAGENT STRIP/BLOOD GLUCOSE: CPT

## 2025-05-29 PROCEDURE — 93306 TTE W/DOPPLER COMPLETE: CPT | Performed by: INTERNAL MEDICINE

## 2025-05-29 PROCEDURE — 93306 TTE W/DOPPLER COMPLETE: CPT

## 2025-05-29 PROCEDURE — G0378 HOSPITAL OBSERVATION PER HR: HCPCS

## 2025-05-29 PROCEDURE — 63710000001 INSULIN LISPRO (HUMAN) PER 5 UNITS: Performed by: NURSE PRACTITIONER

## 2025-05-29 PROCEDURE — 25010000002 LABETALOL 5 MG/ML SOLUTION: Performed by: INTERNAL MEDICINE

## 2025-05-29 PROCEDURE — 94760 N-INVAS EAR/PLS OXIMETRY 1: CPT

## 2025-05-29 PROCEDURE — 25010000002 FUROSEMIDE PER 20 MG: Performed by: EMERGENCY MEDICINE

## 2025-05-29 PROCEDURE — 94761 N-INVAS EAR/PLS OXIMETRY MLT: CPT

## 2025-05-29 RX ORDER — HYDRALAZINE HYDROCHLORIDE 50 MG/1
50 TABLET, FILM COATED ORAL EVERY 8 HOURS SCHEDULED
Status: DISCONTINUED | OUTPATIENT
Start: 2025-05-29 | End: 2025-06-01

## 2025-05-29 RX ORDER — LIDOCAINE HYDROCHLORIDE 20 MG/ML
JELLY TOPICAL ONCE
Status: COMPLETED | OUTPATIENT
Start: 2025-05-29 | End: 2025-05-29

## 2025-05-29 RX ORDER — FUROSEMIDE 10 MG/ML
80 INJECTION INTRAMUSCULAR; INTRAVENOUS ONCE
Status: COMPLETED | OUTPATIENT
Start: 2025-05-29 | End: 2025-05-29

## 2025-05-29 RX ORDER — BISACODYL 5 MG/1
5 TABLET, DELAYED RELEASE ORAL DAILY PRN
Status: DISCONTINUED | OUTPATIENT
Start: 2025-05-29 | End: 2025-06-02 | Stop reason: HOSPADM

## 2025-05-29 RX ORDER — ONDANSETRON 2 MG/ML
4 INJECTION INTRAMUSCULAR; INTRAVENOUS EVERY 6 HOURS PRN
Status: DISCONTINUED | OUTPATIENT
Start: 2025-05-29 | End: 2025-06-02 | Stop reason: HOSPADM

## 2025-05-29 RX ORDER — HYDROCODONE BITARTRATE AND ACETAMINOPHEN 10; 325 MG/1; MG/1
1 TABLET ORAL EVERY 8 HOURS PRN
Refills: 0 | Status: DISCONTINUED | OUTPATIENT
Start: 2025-05-29 | End: 2025-06-02 | Stop reason: HOSPADM

## 2025-05-29 RX ORDER — ACETAMINOPHEN 650 MG/1
650 SUPPOSITORY RECTAL EVERY 4 HOURS PRN
Status: DISCONTINUED | OUTPATIENT
Start: 2025-05-29 | End: 2025-06-02 | Stop reason: HOSPADM

## 2025-05-29 RX ORDER — LABETALOL HYDROCHLORIDE 5 MG/ML
20 INJECTION, SOLUTION INTRAVENOUS EVERY 4 HOURS PRN
Status: DISCONTINUED | OUTPATIENT
Start: 2025-05-29 | End: 2025-06-02 | Stop reason: HOSPADM

## 2025-05-29 RX ORDER — CLOPIDOGREL BISULFATE 75 MG/1
75 TABLET ORAL DAILY
Status: DISCONTINUED | OUTPATIENT
Start: 2025-05-29 | End: 2025-06-02 | Stop reason: HOSPADM

## 2025-05-29 RX ORDER — BUDESONIDE AND FORMOTEROL FUMARATE DIHYDRATE 160; 4.5 UG/1; UG/1
2 AEROSOL RESPIRATORY (INHALATION)
Status: DISCONTINUED | OUTPATIENT
Start: 2025-05-29 | End: 2025-06-02 | Stop reason: HOSPADM

## 2025-05-29 RX ORDER — IBUPROFEN 600 MG/1
1 TABLET ORAL
Status: DISCONTINUED | OUTPATIENT
Start: 2025-05-29 | End: 2025-06-02 | Stop reason: HOSPADM

## 2025-05-29 RX ORDER — MONTELUKAST SODIUM 10 MG/1
10 TABLET ORAL NIGHTLY
Status: DISCONTINUED | OUTPATIENT
Start: 2025-05-29 | End: 2025-06-02 | Stop reason: HOSPADM

## 2025-05-29 RX ORDER — LISINOPRIL 40 MG/1
40 TABLET ORAL
Status: DISCONTINUED | OUTPATIENT
Start: 2025-05-29 | End: 2025-06-02 | Stop reason: HOSPADM

## 2025-05-29 RX ORDER — SPIRONOLACTONE 25 MG/1
50 TABLET ORAL DAILY
Status: DISCONTINUED | OUTPATIENT
Start: 2025-05-29 | End: 2025-06-02 | Stop reason: HOSPADM

## 2025-05-29 RX ORDER — CARVEDILOL 12.5 MG/1
12.5 TABLET ORAL 2 TIMES DAILY WITH MEALS
Status: DISCONTINUED | OUTPATIENT
Start: 2025-05-29 | End: 2025-06-02 | Stop reason: HOSPADM

## 2025-05-29 RX ORDER — NITROGLYCERIN 0.4 MG/1
0.4 TABLET SUBLINGUAL
Status: DISCONTINUED | OUTPATIENT
Start: 2025-05-29 | End: 2025-06-02 | Stop reason: HOSPADM

## 2025-05-29 RX ORDER — SODIUM CHLORIDE 0.9 % (FLUSH) 0.9 %
10 SYRINGE (ML) INJECTION EVERY 12 HOURS SCHEDULED
Status: DISCONTINUED | OUTPATIENT
Start: 2025-05-29 | End: 2025-06-02 | Stop reason: HOSPADM

## 2025-05-29 RX ORDER — ISOSORBIDE MONONITRATE 30 MG/1
30 TABLET, EXTENDED RELEASE ORAL
Status: DISCONTINUED | OUTPATIENT
Start: 2025-05-29 | End: 2025-06-02 | Stop reason: HOSPADM

## 2025-05-29 RX ORDER — INSULIN LISPRO 100 [IU]/ML
2-7 INJECTION, SOLUTION INTRAVENOUS; SUBCUTANEOUS
Status: DISCONTINUED | OUTPATIENT
Start: 2025-05-29 | End: 2025-06-02 | Stop reason: HOSPADM

## 2025-05-29 RX ORDER — POLYETHYLENE GLYCOL 3350 17 G/17G
17 POWDER, FOR SOLUTION ORAL DAILY PRN
Status: DISCONTINUED | OUTPATIENT
Start: 2025-05-29 | End: 2025-06-02 | Stop reason: HOSPADM

## 2025-05-29 RX ORDER — CLONIDINE HYDROCHLORIDE 0.1 MG/1
0.2 TABLET ORAL EVERY 8 HOURS
Status: DISCONTINUED | OUTPATIENT
Start: 2025-05-29 | End: 2025-05-31

## 2025-05-29 RX ORDER — NICOTINE POLACRILEX 4 MG
15 LOZENGE BUCCAL
Status: DISCONTINUED | OUTPATIENT
Start: 2025-05-29 | End: 2025-06-02 | Stop reason: HOSPADM

## 2025-05-29 RX ORDER — SODIUM CHLORIDE 9 MG/ML
40 INJECTION, SOLUTION INTRAVENOUS AS NEEDED
Status: DISCONTINUED | OUTPATIENT
Start: 2025-05-29 | End: 2025-06-02 | Stop reason: HOSPADM

## 2025-05-29 RX ORDER — BISACODYL 10 MG
10 SUPPOSITORY, RECTAL RECTAL DAILY PRN
Status: DISCONTINUED | OUTPATIENT
Start: 2025-05-29 | End: 2025-06-02 | Stop reason: HOSPADM

## 2025-05-29 RX ORDER — FUROSEMIDE 10 MG/ML
40 INJECTION INTRAMUSCULAR; INTRAVENOUS ONCE
Status: COMPLETED | OUTPATIENT
Start: 2025-05-29 | End: 2025-05-29

## 2025-05-29 RX ORDER — ALBUTEROL SULFATE 0.83 MG/ML
2.5 SOLUTION RESPIRATORY (INHALATION) EVERY 6 HOURS PRN
Status: DISCONTINUED | OUTPATIENT
Start: 2025-05-29 | End: 2025-06-02 | Stop reason: HOSPADM

## 2025-05-29 RX ORDER — FAMOTIDINE 20 MG/1
20 TABLET, FILM COATED ORAL 2 TIMES DAILY PRN
Status: DISCONTINUED | OUTPATIENT
Start: 2025-05-29 | End: 2025-06-02 | Stop reason: HOSPADM

## 2025-05-29 RX ORDER — ASPIRIN 81 MG/1
81 TABLET ORAL DAILY
Status: DISCONTINUED | OUTPATIENT
Start: 2025-05-29 | End: 2025-06-02 | Stop reason: HOSPADM

## 2025-05-29 RX ORDER — SODIUM CHLORIDE 0.9 % (FLUSH) 0.9 %
10 SYRINGE (ML) INJECTION AS NEEDED
Status: DISCONTINUED | OUTPATIENT
Start: 2025-05-29 | End: 2025-06-02 | Stop reason: HOSPADM

## 2025-05-29 RX ORDER — TERAZOSIN 1 MG/1
1 CAPSULE ORAL NIGHTLY
Status: DISCONTINUED | OUTPATIENT
Start: 2025-05-29 | End: 2025-06-02 | Stop reason: HOSPADM

## 2025-05-29 RX ORDER — ACETAMINOPHEN 325 MG/1
650 TABLET ORAL EVERY 4 HOURS PRN
Status: DISCONTINUED | OUTPATIENT
Start: 2025-05-29 | End: 2025-06-02 | Stop reason: HOSPADM

## 2025-05-29 RX ORDER — ACETAMINOPHEN 160 MG/5ML
650 SOLUTION ORAL EVERY 4 HOURS PRN
Status: DISCONTINUED | OUTPATIENT
Start: 2025-05-29 | End: 2025-06-02 | Stop reason: HOSPADM

## 2025-05-29 RX ORDER — AMOXICILLIN 250 MG
2 CAPSULE ORAL 2 TIMES DAILY PRN
Status: DISCONTINUED | OUTPATIENT
Start: 2025-05-29 | End: 2025-06-02 | Stop reason: HOSPADM

## 2025-05-29 RX ORDER — ATORVASTATIN CALCIUM 20 MG/1
40 TABLET, FILM COATED ORAL DAILY
Status: DISCONTINUED | OUTPATIENT
Start: 2025-05-29 | End: 2025-06-02 | Stop reason: HOSPADM

## 2025-05-29 RX ORDER — HYDRALAZINE HYDROCHLORIDE 20 MG/ML
20 INJECTION INTRAMUSCULAR; INTRAVENOUS ONCE
Status: COMPLETED | OUTPATIENT
Start: 2025-05-29 | End: 2025-05-29

## 2025-05-29 RX ORDER — HYDRALAZINE HYDROCHLORIDE 20 MG/ML
10 INJECTION INTRAMUSCULAR; INTRAVENOUS EVERY 6 HOURS PRN
Status: DISCONTINUED | OUTPATIENT
Start: 2025-05-29 | End: 2025-06-02 | Stop reason: HOSPADM

## 2025-05-29 RX ORDER — DEXTROSE MONOHYDRATE 25 G/50ML
25 INJECTION, SOLUTION INTRAVENOUS
Status: DISCONTINUED | OUTPATIENT
Start: 2025-05-29 | End: 2025-06-02 | Stop reason: HOSPADM

## 2025-05-29 RX ADMIN — CLONIDINE HYDROCHLORIDE 0.2 MG: 0.1 TABLET ORAL at 10:23

## 2025-05-29 RX ADMIN — HYDROCODONE BITARTRATE AND ACETAMINOPHEN 1 TABLET: 10; 325 TABLET ORAL at 19:10

## 2025-05-29 RX ADMIN — ACETAMINOPHEN 650 MG: 325 TABLET ORAL at 04:44

## 2025-05-29 RX ADMIN — MONTELUKAST 10 MG: 10 TABLET, FILM COATED ORAL at 20:59

## 2025-05-29 RX ADMIN — INSULIN LISPRO 2 UNITS: 100 INJECTION, SOLUTION INTRAVENOUS; SUBCUTANEOUS at 17:44

## 2025-05-29 RX ADMIN — SPIRONOLACTONE 50 MG: 25 TABLET ORAL at 10:23

## 2025-05-29 RX ADMIN — Medication 10 ML: at 04:46

## 2025-05-29 RX ADMIN — LABETALOL HYDROCHLORIDE 20 MG: 5 INJECTION, SOLUTION INTRAVENOUS at 08:15

## 2025-05-29 RX ADMIN — FUROSEMIDE 40 MG: 10 INJECTION, SOLUTION INTRAMUSCULAR; INTRAVENOUS at 08:28

## 2025-05-29 RX ADMIN — CLOPIDOGREL BISULFATE 75 MG: 75 TABLET, FILM COATED ORAL at 10:23

## 2025-05-29 RX ADMIN — CLONIDINE HYDROCHLORIDE 0.2 MG: 0.1 TABLET ORAL at 17:43

## 2025-05-29 RX ADMIN — LIDOCAINE HYDROCHLORIDE: 20 JELLY TOPICAL at 11:18

## 2025-05-29 RX ADMIN — PERFLUTREN 2 ML: 6.52 INJECTION, SUSPENSION INTRAVENOUS at 14:38

## 2025-05-29 RX ADMIN — LISINOPRIL 40 MG: 40 TABLET ORAL at 10:23

## 2025-05-29 RX ADMIN — FUROSEMIDE 80 MG: 10 INJECTION, SOLUTION INTRAMUSCULAR; INTRAVENOUS at 00:58

## 2025-05-29 RX ADMIN — CARVEDILOL 12.5 MG: 12.5 TABLET, FILM COATED ORAL at 17:43

## 2025-05-29 RX ADMIN — Medication 10 ML: at 21:00

## 2025-05-29 RX ADMIN — CARVEDILOL 12.5 MG: 12.5 TABLET, FILM COATED ORAL at 10:23

## 2025-05-29 RX ADMIN — TERAZOSIN 1 MG: 1 CAPSULE ORAL at 20:59

## 2025-05-29 RX ADMIN — BUDESONIDE AND FORMOTEROL FUMARATE DIHYDRATE 2 PUFF: 160; 4.5 AEROSOL RESPIRATORY (INHALATION) at 20:33

## 2025-05-29 RX ADMIN — HYDROCODONE BITARTRATE AND ACETAMINOPHEN 1 TABLET: 10; 325 TABLET ORAL at 10:23

## 2025-05-29 RX ADMIN — ASPIRIN 81 MG: 81 TABLET, COATED ORAL at 10:23

## 2025-05-29 RX ADMIN — ISOSORBIDE MONONITRATE 30 MG: 30 TABLET, EXTENDED RELEASE ORAL at 10:23

## 2025-05-29 RX ADMIN — Medication 10 ML: at 08:57

## 2025-05-29 RX ADMIN — HYDRALAZINE HYDROCHLORIDE 50 MG: 50 TABLET ORAL at 15:09

## 2025-05-29 RX ADMIN — HYDRALAZINE HYDROCHLORIDE 20 MG: 20 INJECTION INTRAMUSCULAR; INTRAVENOUS at 01:37

## 2025-05-29 RX ADMIN — ATORVASTATIN CALCIUM 40 MG: 20 TABLET, FILM COATED ORAL at 10:23

## 2025-05-29 NOTE — CONSULTS
[facility]  Cardiology Consult Note    Patient Identification:  Name: Negrito Navarro  Age: 74 y.o.  Sex: male  :  1950  MRN: [id number]             Requesting Physician: Danitza YOUNG    Reason for Consultation / Chief Complaint: CHF exacerbation    History of Present Illness:   This is a 74 year old male known to ours service with coronary artery disease s/p CABG and SEB to RCA 2023, hypertension, hyperlipidemia, COPD on home O2. He presented to [facility] ER with shortness of breath for several days and LE swelling. Work up in ER with HS trop 22, repeat 27. . Chest XR cardiomegaly, vascular congestion. He was found to be hypertensive. He was treated with lasix, hydralazine and labetalol.     He was admitted in 2024 with syncope and chest pain. He underwent echo and stress test. Echo normal ef and lv function, mild av stenosis. Stress test 2024 normal myocardial perfusion study with no evidence of ischemia    He was also admitted in 2024 with chest pain and acute respiratory failure. Stress test at that time revealed small lateral wall ischemia with recommendations for medical management at that time.     Admitted in 2023 for chest pain, found to have elevated enzymes and underwent Cardiac cath 2023 normal left main, % occluded with LIMA to the LAD patent with normal distal flow, SVG to the diagonal 1 and 2 patent with normal distal flow. Lcx 100% occluded with SVG to the OM patent with normal distal flow. RCA midportion 100% occluded reduced to 0% with seb.     Comorbid cardiac risk factors:     Past Medical History:  Past Medical History:   Diagnosis Date    Benign prostatic hyperplasia 11/10/2016    COPD (chronic obstructive pulmonary disease) 6/10/2021    Coronary artery disease involving coronary bypass graft of native heart without angina pectoris 6/10/2021    Essential hypertension 11/10/2016    Hyperlipidemia     SERENITY (obstructive sleep  apnea) 11/10/2016     Past Surgical History:  Past Surgical History:   Procedure Laterality Date    CARDIAC CATHETERIZATION N/A 4/21/2023    Procedure: Left Heart Cath;  Surgeon: Jayne Villalobos MD;  [facility];  Service: Cardiovascular;  Laterality: N/A;    CARDIAC CATHETERIZATION N/A 4/21/2023    Procedure: Coronary angiography;  Surgeon: Jayne Villalobos MD;  [facility];  Service: Cardiovascular;  Laterality: N/A;    CARDIAC CATHETERIZATION N/A 4/21/2023    Procedure: Left ventriculography;  Surgeon: Jayne Villalobos MD;  Location:  [facility] CATH INVASIVE LOCATION;  Service: Cardiovascular;  Laterality: N/A;    CARDIAC CATHETERIZATION  4/21/2023    Procedure: Saphenous Vein Graft;  Surgeon: Jayne Villalobos MD;  [facility];  Service: Cardiovascular;;    CARDIAC CATHETERIZATION N/A 4/21/2023    Procedure: Native mammary injection;  Surgeon: Jayne Villalobos MD;  [facility];  Service: Cardiovascular;  Laterality: N/A;    CARDIAC CATHETERIZATION N/A 4/21/2023    Procedure: Percutaneous Coronary Intervention;  Surgeon: Jayne Villalobos MD;  [facility];  Service: Cardiovascular;  Laterality: N/A;    CARDIAC CATHETERIZATION N/A 4/21/2023    Procedure: Stent BETO coronary;  Surgeon: Jayne Villalobos MD;  Location: [facility] CATH INVASIVE LOCATION;  Service: Cardiovascular;  Laterality: N/A;    CARDIAC CATHETERIZATION  4/21/2023    Procedure: Percutaneous Manual Thrombectomy;  Surgeon: Jayne Villalobos MD;  [facility];  Service: Cardiovascular;;      Allergies:  Allergies   Allergen Reactions    Shellfish-Derived Products Unknown - Low Severity     gout  gout       Home Meds:  Medications Prior to Admission   Medication Sig Dispense Refill Last Dose/Taking    albuterol sulfate  (90 Base) MCG/ACT  inhaler Inhale 2 puffs Every 4 (Four) Hours As Needed for Wheezing.       aspirin 81 MG EC tablet Take 1 tablet by mouth Daily.       atorvastatin (LIPITOR) 40 MG tablet Take 1 tablet by mouth Daily.       budesonide-formoterol (SYMBICORT) 160-4.5 MCG/ACT inhaler Inhale 2 puffs 2 (Two) Times a Day. 10.2 g 1     carvedilol (COREG) 12.5 MG tablet Take 1 tablet by mouth 2 (Two) Times a Day With Meals.       cloNIDine (CATAPRES) 0.2 MG tablet Take 1 tablet by mouth Every 8 (Eight) Hours. 90 tablet 3     clopidogrel (PLAVIX) 75 MG tablet Take 1 tablet by mouth Daily. 90 tablet 3     fluticasone (FLONASE) 50 MCG/ACT nasal spray 2 sprays into the nostril(s) as directed by provider Daily.       hydrALAZINE (APRESOLINE) 50 MG tablet Take 1 tablet by mouth Every 8 (Eight) Hours. 90 tablet 3     HYDROcodone-Acetaminophen (XODOL )  MG per tablet Take 1 tablet by mouth Every 8 (Eight) Hours As Needed for Moderate Pain.       isosorbide mononitrate (IMDUR) 30 MG 24 hr tablet Take 1 tablet by mouth Daily. 30 tablet 3     ketoconazole (NIZORAL) 2 % cream Apply  topically to the appropriate area as directed Daily.       lisinopril (PRINIVIL,ZESTRIL) 40 MG tablet Take 1 tablet by mouth Daily. 30 tablet 3     montelukast (SINGULAIR) 10 MG tablet Take 1 tablet by mouth Every Night. 30 tablet 0     nitroglycerin (NITROSTAT) 0.4 MG SL tablet Place 1 tablet under the tongue Every 5 (Five) Minutes As Needed for Chest Pain for up to 1 dose. Take no more than 3 doses in 15 minutes. 25 tablet 12     spironolactone (ALDACTONE) 50 MG tablet Take 1 tablet by mouth Daily.        Current Meds:   [unfilled]  Social History:   Social History     Tobacco Use    Smoking status: Never     Passive exposure: Never    Smokeless tobacco: Never   Substance Use Topics    Alcohol use: Never      Family History:  History reviewed. No pertinent family history.     Review of Systems  Constitutional: No wt loss, fever   Gastrointestinal: No nausea ,  "abdominal pain  Behavioral/Psych: No insomnia or anxiety   Cardiovascular ----positive for shortness of breath. All other systems reviewed and are negative            /81 (BP Location: Left arm, Patient Position: Lying)   Pulse 70   Temp 97.5 °F (36.4 °C) (Oral)   Resp 16   Ht 172.7 cm (68\")   Wt 95.7 kg (211 lb)   SpO2 98%   BMI 32.08 kg/m²   General appearance: No acute changes   Neck: Trachea midline; NECK, supple, no thyromegaly or lymphadenopathy   Lungs: Normal size and shape, normal breath sounds, equal distribution of air, no rales and rhonchi   CV: S1-S2 regular, no murmurs, no rub, no gallop   Abdomen: Soft, nontender; no masses , no abnormal abdominal sounds   Extremities: No deformity , normal color , no peripheral edema   Skin: Normal temperature, turgor and texture; no rash, ulcers        Cardiographics  ECG:     Telemetry:    Echocardiogram:     Imaging  Chest X-ray:   FINDINGS:  There is cardiomegaly. There is some vascular congestion. Patient status  post median sternotomy with coronary artery bypass grafting. Elevation  of the left hemidiaphragm, with chronic scarring is again seen. Blunting  of the left costophrenic angle is unchanged.     IMPRESSION:  Cardiomegaly with suspected vascular congestion.     This report was finalized on 5/28/2025 11:04 PM by Dr. Kimberly Stevens M.D on Workstation: [id number]  Lab Review   Results from last 7 days   Lab Units 05/28/25  2342 05/28/25  2240   HSTROP T ng/L 27* 22*     Results from last 7 days   Lab Units 05/28/25  2240   MAGNESIUM mg/dL 2.1     Results from last 7 days   Lab Units 05/29/25  0622   SODIUM mmol/L 139   POTASSIUM mmol/L 3.7   BUN mg/dL 21.0   CREATININE mg/dL 1.40*   CALCIUM mg/dL 9.5     @[id number]@  Results from last 7 days   Lab Units 05/29/25  0622 05/28/25  2240   WBC 10*3/mm3 12.29* 10.46   HEMOGLOBIN g/dL 16.1 15.4   HEMATOCRIT % 47.6 47.3   PLATELETS 10*3/mm3 279 267             Assessment:  Hypertensive " urgency  Mild aortic stenosis  Coronary artery disease with stent  COPD  CHF  Recommendations / Plan:   74 years old male with CABG stents hypertension hyperlipidemia COPD admitted with shortness of breath for several days bilateral leg edema was found to have hypertensive urgency will be given IV labetalol as needed  Continue diuretics  Check echocardiogram          Jayne Villalobos MD  5/29/2025, 07:48 EDT      EMR Dragon/Transcription:   Dictated utilizing Dragon dictation

## 2025-05-29 NOTE — CONSULTS
FIRST [facility]OLOG[facility] CONSULT      Patient Identification:  NAME:  Negrito Navarro  Age:  74 y.o.   Sex:  male   :  1950   MRN:  [id number]     Chief complaint: Shortness of breath    History of present illness:      Negrito Navarro is a 74 y.o. male with a history of BPH, COPD, CHF, and chronic respiratory failure who presented to the ER on 25 with complaints of acute shortness of breath. Pt diagnosed and admitted to the hospital with acute on chronic CHF. Patient reports lower abdominal discomfort this morning. Bladder scan revealed > 600 mL in bladder per RN. She attempted to straight cath but was unsuccessful. After attempt, she then noticed blood coming from the urethra.     Urology was consulted for evaluation and treatment of urinary retention and hematuria.  Pt has a known history of BPH. Previously seen in our office many years ago for management. He has a reported allergy to Flomax which caused dizziness. He was treated with Rapaflo. He reports urinary hesitancy, nocturia, and slowing of his urine stream for quite some time. No recent changes with the quality of his urination. When asked, he does report gross hematuria on occasion. He denies personal history of renal, bladder or prostate cancer. He does report that his father had been diagnosed with prostate cancer. He does have a smoking history. He denies fever, chills, nausea, vomiting, and flank pain. He denies prior workup for hematuria. He is on Plavix and ASA.     In hospital:  -Afebrile, hypertensive, otherwise VSS, good UOP  -WBC - 12.29 (10.46)  -Creat - 1.40 (1.21)    Asked to see    Past medical history:  Past Medical History:   Diagnosis Date    Benign prostatic hyperplasia 11/10/2016    COPD (chronic obstructive pulmonary disease) 6/10/2021    Coronary artery disease involving coronary bypass graft of native heart without angina pectoris 6/10/2021    Essential hypertension 11/10/2016    Hyperlipidemia     SERENITY (obstructive sleep apnea)  11/10/2016       Past surgical history:  Past Surgical History:   Procedure Laterality Date    CARDIAC CATHETERIZATION N/A 4/21/2023    Procedure: Left Heart Cath;  Surgeon: Jayne Villalobos MD;  Location: [facility] CATH INVASIVE LOCATION;  Service: Cardiovascular;  Laterality: N/A;    CARDIAC CATHETERIZATION N/A 4/21/2023    Procedure: Coronary angiography;  Surgeon: Jayne Villalobos MD;  Location: [facility] CATH [facility]ASIVE LOCATION;  Service: Cardiovascular;  Laterality: N/A;    CARDIAC CATHETERIZATION N/A 4/21/2023    Procedure: Left ventriculography;  Surgeon: Jayne Villalobos MD;  Location: [facility] CATH [facility]VASIVE LOCATION;  Service: Cardiovascular;  Laterality: N/A;    CARDIAC CATHETERIZATION  4/21/2023    Procedure: Saphenous Vein Graft;  Surgeon: Jayne Villalobos MD;  Location: [facility] CATH INVASIVE LOCATION;  Service: Cardiovascular;;    CARDIAC CATHETERIZATION N/A 4/21/2023    Procedure: Native mammary injection;  Surgeon: Jayne Villalobos MD;  Location: [facility] CATH INVASIVE LOCATION;  Service: Cardiovascular;  Laterality: N/A;    CARDIAC CATHETERIZATION N/A 4/21/2023    Procedure: Percutaneous Coronary Intervention;  Surgeon: Jayne Villalobos MD;  Location: [facility] CATH INVASIVE LOCATION;  Service: Cardiovascular;  Laterality: N/A;    CARDIAC CATHETERIZATION N/A 4/21/2023    Procedure: Stent BETO coronary;  Surgeon: Jayne Villalobos MD;  Location: [facility] CATH INVASIVE LOCATION;  Service: Cardiovascular;  Laterality: N/A;    CARDIAC CATHETERIZATION  4/21/2023    Procedure: Percutaneous Manual Thrombectomy;  Surgeon: Jayne Villalobos MD;  Location: [facility] CATH INVASIVE LOCATION;  Service: Cardiovascular;;       Allergies:  Shellfish-derived products    Home medications:  Medications Prior to Admission   Medication Sig Dispense Refill Last Dose/Taking    albuterol sulfate  (90 Base) MCG/ACT inhaler Inhale 2 puffs Every 4 (Four) Hours As Needed for Wheezing.        aspirin 81 MG EC tablet Take 1 tablet by mouth Daily.       atorvastatin (LIPITOR) 40 MG tablet Take 1 tablet by mouth Daily.       budesonide-formoterol (SYMBICORT) 160-4.5 MCG/ACT inhaler Inhale 2 puffs 2 (Two) Times a Day. 10.2 g 1     carvedilol (COREG) 12.5 MG tablet Take 1 tablet by mouth 2 (Two) Times a Day With Meals.       cloNIDine (CATAPRES) 0.2 MG tablet Take 1 tablet by mouth Every 8 (Eight) Hours. 90 tablet 3     clopidogrel (PLAVIX) 75 MG tablet Take 1 tablet by mouth Daily. 90 tablet 3     fluticasone (FLONASE) 50 MCG/ACT nasal spray 2 sprays into the nostril(s) as directed by provider Daily.       hydrALAZINE (APRESOLINE) 50 MG tablet Take 1 tablet by mouth Every 8 (Eight) Hours. 90 tablet 3     HYDROcodone-Acetaminophen (XODOL )  MG per tablet Take 1 tablet by mouth Every 8 (Eight) Hours As Needed for Moderate Pain.       isosorbide mononitrate (IMDUR) 30 MG 24 hr tablet Take 1 tablet by mouth Daily. 30 tablet 3     ketoconazole (NIZORAL) 2 % cream Apply  topically to the appropriate area as directed Daily.       lisinopril (PRINIVIL,ZESTRIL) 40 MG tablet Take 1 tablet by mouth Daily. 30 tablet 3     montelukast (SINGULAIR) 10 MG tablet Take 1 tablet by mouth Every Night. 30 tablet 0     nitroglycerin (NITROSTAT) 0.4 MG SL tablet Place 1 tablet under the tongue Every 5 (Five) Minutes As Needed for Chest Pain for up to 1 dose. Take no more than 3 doses in 15 minutes. 25 tablet 12     spironolactone (ALDACTONE) 50 MG tablet Take 1 tablet by mouth Daily.           Hospital medications:  aspirin, 81 mg, Oral, Daily  atorvastatin, 40 mg, Oral, Daily  budesonide-formoterol, 2 puff, Inhalation, BID - RT  carvedilol, 12.5 mg, Oral, BID With Meals  cloNIDine, 0.2 mg, Oral, Q8H  clopidogrel, 75 mg, Oral, Daily  hydrALAZINE, 50 mg, Oral, Q8H  insulin lispro, 2-7 Units, Subcutaneous, 4x Daily AC & at Bedtime  isosorbide mononitrate, 30 mg, Oral, Q24H  lisinopril, 40 mg, Oral, Q24H  montelukast, 10  mg, Oral, Nightly  sodium chloride, 10 mL, Intravenous, Q12H  spironolactone, 50 mg, Oral, Daily           acetaminophen **OR** acetaminophen **OR** acetaminophen    albuterol    senna-docusate sodium **AND** polyethylene glycol **AND** bisacodyl **AND** bisacodyl    dextrose    dextrose    famotidine    glucagon (human recombinant)    hydrALAZINE    HYDROcodone-acetaminophen    labetalol    melatonin    nitroglycerin    ondansetron    sodium chloride    sodium chloride    Family history:  History reviewed. No pertinent family history.    Social history:  Social History     Tobacco Use    Smoking status: Never     Passive exposure: Never    Smokeless tobacco: Never   Vaping Use    Vaping status: Never Used   Substance Use Topics    Alcohol use: Never    Drug use: Never       Review of systems:      12 point negative except as in HPI    Objective:  TMax 24 hours:   Temp (24hrs), Av.9 °F (36.6 °C), Min:97.5 °F (36.4 °C), Max:98.4 °F (36.9 °C)      Vitals Ranges:   Temp:  [97.5 °F (36.4 °C)-98.4 °F (36.9 °C)] 97.5 °F (36.4 °C)  Heart Rate:  [] 87  Resp:  [18-20] 18  BP: (144-239)/() 236/125    Intake/Output Last 3 shifts:  I/O last 3 completed shifts:  In: -   Out: 600 [Urine:600]     Physical Exam:    General Appearance:    Alert, NAD   Abdomen:     Soft, ND, suprapubic tenderness   :    Blood surrounding urethral meatus, penis normal, testicles normal   Neuro/Psych:   Orientation intact, mood/affect pleasant       Results review:   I reviewed the patient's new clinical results.    Data review:  Lab Results (last 24 hours)       Procedure Component Value Units Date/Time    POC Glucose Once [[id number]]  (Normal) Collected: 25 1109    Specimen: Blood Updated: 25 1111     Glucose 123 mg/dL     POC Glucose Once [[id number]]  (Abnormal) Collected: 25 0852    Specimen: Blood Updated: 25 0854     Glucose 159 mg/dL     Comprehensive Metabolic Panel [[id number]]  (Abnormal) Collected:  05/29/25 0622    Specimen: Blood Updated: 05/29/25 0710     Glucose 146 mg/dL      BUN 21.0 mg/dL      Creatinine 1.40 mg/dL      Sodium 139 mmol/L      Potassium 3.7 mmol/L      Chloride 103 mmol/L      CO2 24.0 mmol/L      Calcium 9.5 mg/dL      Total Protein 7.7 g/dL      Albumin 4.1 g/dL      ALT (SGPT) 23 U/L      AST (SGOT) 21 U/L      Alkaline Phosphatase 106 U/L      Total Bilirubin 0.5 mg/dL      Globulin 3.6 gm/dL      A/G Ratio 1.1 g/dL      BUN/Creatinine Ratio 15.0     Anion Gap 12.0 mmol/L      eGFR 52.1 mL/min/1.73     Narrative:      GFR Categories in Chronic Kidney Disease (CKD)              GFR Category          GFR (mL/min/1.73)    Interpretation  G1                    90 or greater        Normal or high (1)  G2                    60-89                Mild decrease (1)  G3a                   45-59                Mild to moderate decrease  G3b                   30-44                Moderate to severe decrease  G4                    15-29                Severe decrease  G5                    14 or less           Kidney failure    (1)In the absence of evidence of kidney disease, neither GFR category G1 or G2 fulfill the criteria for CKD.    eGFR calculation 2021 CKD-EPI creatinine equation, which does not include race as a factor    CBC (No Diff) [[id number]]  (Abnormal) Collected: 05/29/25 0622    Specimen: Blood Updated: 05/29/25 0651     WBC 12.29 10*3/mm3      RBC 5.21 10*6/mm3      Hemoglobin 16.1 g/dL      Hematocrit 47.6 %      MCV 91.4 fL      MCH 30.9 pg      MCHC 33.8 g/dL      RDW 14.1 %      RDW-SD 47.4 fl      MPV 10.4 fL      Platelets 279 10*3/mm3     High Sensitivity Troponin T 1Hr [[id number]]  (Abnormal) Collected: 05/28/25 2342    Specimen: Blood Updated: 05/29/25 0026     HS Troponin T 27 ng/L      Troponin T Numeric Delta 5 ng/L      Troponin T % Delta 23    Narrative:      High Sensitive Troponin T Reference Range:  <14.0 ng/L- Negative Female for AMI  <22.0 ng/L- Negative Male  for AMI  >=14 - Abnormal Female indicating possible myocardial injury.  >=22 - Abnormal Male indicating possible myocardial injury.   Clinicians would have to utilize clinical acumen, EKG, Troponin, and serial changes to determine if it is an Acute Myocardial Infarction or myocardial injury due to an underlying chronic condition.         Respiratory Panel PCR w/COVID-19(SARS-CoV-2) [facility]/LEW/[facility]/NEEL In-House, NP Swab in UTM/VTM, 2 HR TAT - Swab, Nasopharynx [[id number]]  (Normal) Collected: 05/28/25 2239    Specimen: Swab from Nasopharynx Updated: 05/28/25 2339     ADENOVIRUS, PCR Not Detected     Coronavirus 229E Not Detected     Coronavirus HKU1 Not Detected     Coronavirus NL63 Not Detected     Coronavirus OC43 Not Detected     COVID19 Not Detected     Human Metapneumovirus Not Detected     Human Rhinovirus/Enterovirus Not Detected     Influenza A PCR Not Detected     Influenza B PCR Not Detected     Parainfluenza Virus 1 Not Detected     Parainfluenza Virus 2 Not Detected     Parainfluenza Virus 3 Not Detected     Parainfluenza Virus 4 Not Detected     RSV, PCR Not Detected     Bordetella pertussis pcr Not Detected     Bordetella parapertussis PCR Not Detected     Chlamydophila pneumoniae PCR Not Detected     Mycoplasma pneumo by PCR Not Detected    Narrative:      In the setting of a positive respiratory panel with a viral infection PLUS a negative procalcitonin without other underlying concern for bacterial infection, consider observing off antibiotics or discontinuation of antibiotics and continue supportive care. If the respiratory panel is positive for atypical bacterial infection (Bordetella pertussis, Chlamydophila pneumoniae, or Mycoplasma pneumoniae), consider antibiotic de-escalation to target atypical bacterial infection.    High Sensitivity Troponin T [[id number]]  (Abnormal) Collected: 05/28/25 2240    Specimen: Blood Updated: 05/28/25 2319     HS Troponin T 22 ng/L     Narrative:      High  Sensitive Troponin T Reference Range:  <14.0 ng/L- Negative Female for AMI  <22.0 ng/L- Negative Male for AMI  >=14 - Abnormal Female indicating possible myocardial injury.  >=22 - Abnormal Male indicating possible myocardial injury.   Clinicians would have to utilize clinical acumen, EKG, Troponin, and serial changes to determine if it is an Acute Myocardial Infarction or myocardial injury due to an underlying chronic condition.         BNP [[id number]]  (Normal) Collected: 05/28/25 2240    Specimen: Blood Updated: 05/28/25 2319     proBNP 910.0 pg/mL     Narrative:      This assay is used as an aid in the diagnosis of individuals suspected of having heart failure. It can be used as an aid in the diagnosis of acute decompensated heart failure (ADHF) in patients presenting with signs and symptoms of ADHF to the emergency department (ED). In addition, NT-proBNP of <300 pg/mL indicates ADHF is not likely.    Age Range Result Interpretation  NT-proBNP Concentration (pg/mL:      <50             Positive            >450                   Gray                 300-450                    Negative             <300    50-75           Positive            >900                  Gray                300-900                  Negative            <300      >75             Positive            >1800                  Gray                300-1800                  Negative            <300    Procalcitonin [[id number]]  (Normal) Collected: 05/28/25 2240    Specimen: Blood Updated: 05/28/25 2319     Procalcitonin 0.08 ng/mL     Narrative:      As a Marker for Sepsis (Non-Neonates):    1. <0.5 ng/mL represents a low risk of severe sepsis and/or septic shock.  2. >2 ng/mL represents a high risk of severe sepsis and/or septic shock.    As a Marker for Lower Respiratory Tract Infections that require antibiotic therapy:    PCT on Admission    Antibiotic Therapy       6-12 Hrs later    >0.5                Strongly Recommended  >0.25 - <0.5         "Recommended   0.1 - 0.25          Discouraged              Remeasure/reassess PCT  <0.1                Strongly Discouraged     Remeasure/reassess PCT    As 28 day mortality risk marker: \"Change in Procalcitonin Result\" (>80% or <=80%) if Day 0 (or Day 1) and Day 4 values are available. Refer to http://[email or web address],"[email or web address] [email or web address]    Change in PCT <=80%  A decrease of PCT levels below or equal to 80% defines a positive change in PCT test result representing a higher risk for 28-day all-cause mortality of patients diagnosed with severe sepsis for septic shock.    Change in PCT >80%  A decrease of PCT levels of more than 80% defines a negative change in PCT result representing a lower risk for 28-day all-cause mortality of patients diagnosed with severe sepsis or septic shock.       Magnesium [[id number]]  (Normal) Collected: 05/28/25 2240    Specimen: Blood Updated: 05/28/25 2312     Magnesium 2.1 mg/dL     Comprehensive Metabolic Panel [[id number]]  (Abnormal) Collected: 05/28/25 2240    Specimen: Blood Updated: 05/28/25 2312     Glucose 199 mg/dL      BUN 17.0 mg/dL      Creatinine 1.21 mg/dL      Sodium 139 mmol/L      Potassium 3.8 mmol/L      Chloride 103 mmol/L      CO2 25.2 mmol/L      Calcium 9.2 mg/dL      Total Protein 7.7 g/dL      Albumin 4.0 g/dL      ALT (SGPT) 24 U/L      AST (SGOT) 20 U/L      Alkaline Phosphatase 102 U/L      Total Bilirubin 0.5 mg/dL      Globulin 3.7 gm/dL      A/G Ratio 1.1 g/dL      BUN/Creatinine Ratio 14.0     Anion Gap 10.8 mmol/L      eGFR 62.1 mL/min/1.73     Narrative:      GFR Categories in Chronic Kidney Disease (CKD)              GFR Category          GFR (mL/min/1.73)    Interpretation  G1                    90 or greater        Normal or high (1)  G2                    60-89                Mild decrease (1)  G3a                   45-59                Mild to moderate decrease  G3b                   30-44                Moderate to severe decrease  G4                    " 15-29                Severe decrease  G5                    14 or less           Kidney failure    (1)In the absence of evidence of kidney disease, neither GFR category G1 or G2 fulfill the criteria for CKD.    eGFR calculation 2021 CKD-EPI creatinine equation, which does not include race as a factor    CBC & Differential [[id number]]  (Abnormal) Collected: 05/28/25 2240    Specimen: Blood Updated: 05/28/25 2251    Narrative:      The following orders were created for panel order CBC & Differential.  Procedure                               Abnormality         Status                     ---------                               -----------         ------                     CBC Auto Differential[[id number]]        Abnormal            Final result                 Please view results for these tests on the individual orders.    CBC Auto Differential [[id number]]  (Abnormal) Collected: 05/28/25 2240    Specimen: Blood Updated: 05/28/25 2251     WBC 10.46 10*3/mm3      RBC 5.03 10*6/mm3      Hemoglobin 15.4 g/dL      Hematocrit 47.3 %      MCV 94.0 fL      MCH 30.6 pg      MCHC 32.6 g/dL      RDW 14.6 %      RDW-SD 50.0 fl      MPV 10.3 fL      Platelets 267 10*3/mm3      Neutrophil % 66.1 %      Lymphocyte % 20.5 %      Monocyte % 8.6 %      Eosinophil % 3.9 %      Basophil % 0.7 %      Immature Grans % 0.2 %      Neutrophils, Absolute 6.92 10*3/mm3      Lymphocytes, Absolute 2.14 10*3/mm3      Monocytes, Absolute 0.90 10*3/mm3      Eosinophils, Absolute 0.41 10*3/mm3      Basophils, Absolute 0.07 10*3/mm3      Immature Grans, Absolute 0.02 10*3/mm3      nRBC 0.0 /100 WBC              Imaging:  Imaging Results (Last 24 Hours)       Procedure Component Value Units Date/Time    XR Chest 1 View [[id number]] Collected: 05/28/25 2303     Updated: 05/28/25 2307    Narrative:      SINGLE VIEW OF THE CHEST     HISTORY: Weakness     COMPARISON: September 10, 2024     FINDINGS:  There is cardiomegaly. There is some vascular congestion.  Patient status  post median sternotomy with coronary artery bypass grafting. Elevation  of the left hemidiaphragm, with chronic scarring is again seen. Blunting  of the left costophrenic angle is unchanged.       Impression:      Cardiomegaly with suspected vascular congestion.     This report was finalized on 5/28/2025 11:04 PM by Dr. Kimberly Stevens M.D on Workstation: [id number]                Assessment:     Urinary retention  Gross hematuria  BPH    Procedure:   Patient was instructed on need for urethral catheter for acute urinary retention and verbal consent was obtained.  Perineal hygiene given  Patient was prepped and draped in sterile fashion.  Using sterile technique a 18 f montanez catheter was placed.  Balloon inflated with 10 mL of water  Closed drainage system attached  1000 mL of light pink urine immediately returned to bag  Catheter secured to leg with strap  Patient tolerated well    Plan:   - No acute urologic surgical intervention. Catheter placed. Hematuria could be prostatic bleeding from attempted catheter insertion.   - Start Terazosin 1 mg. Patient had a reported sensitivity to Flomax many years ago. Monitor for any adverse effects. Discontinue if so.   - Continue indwelling catheter for now.   -  Monitor urine output closely, including color, clarity, and clots.   -  Bladder scan PRN if patient reporting any changes including increased suprapubic pain, poorly draining catheter, or worsening blood in the urine.  - Manually irrigate catheter PRN for suspected catheter obstruction s/t clot   -  Ensure adequate hydration with IV or oral intake to help flush the urinary tract.   -  If worsening hematuria or clot formation, may need to consider holding anti-coagulation   -  Full workup for hematuria includes delayed CT imaging, cystoscopy and cytology but this can be done in the outpatient setting.   - Urology will follow    HANNAH Irwin  05/29/25  11:24 EDT    Plan reviewed and  discussed with Dr. Hansen

## 2025-05-29 NOTE — ED NOTES
Nursing report ED to floor  Negrito Navarro  74 y.o.  male    HPI :  HPI  Stated Reason for Visit: SOA  History Obtained From: patient    Chief Complaint  Chief Complaint   Patient presents with    Shortness of Breath       Admitting doctor:   Alex Perkins MD    Admitting diagnosis:   The primary encounter diagnosis was Acute on chronic congestive heart failure, unspecified heart failure type. A diagnosis of Hypertension, unspecified type was also pertinent to this visit.    Code status:   Current Code Status       Date Active Code Status Order ID Comments User Context       5/29/2025 0145 CPR (Attempt to Resuscitate) [id number]  Danitza Pérez, APRN ED        Question Answer    Code Status (Patient has no pulse and is not breathing) CPR (Attempt to Resuscitate)    Medical Interventions (Patient has pulse or is breathing) Full Support                    Allergies:   Shellfish-derived products    Isolation:   No active isolations    Intake and Output    Intake/Output Summary (Last 24 hours) at 5/29/2025 0213  Last data filed at 5/29/2025 0136  Gross per 24 hour   Intake --   Output 600 ml   Net -600 ml       Weight:   There were no vitals filed for this visit.    Most recent vitals:   Vitals:    05/29/25 0134 05/29/25 0137 05/29/25 0201 05/29/25 0211   BP:  (!) 202/113 144/92    Pulse: 79 78 93 90   Resp:       Temp:       TempSrc:       SpO2: 95%  94% 95%       Active LDAs/IV Access:   Lines, Drains & Airways       Active LDAs       Name Placement date Placement time Site Days    Peripheral IV 05/28/25 2241 20 G Right Antecubital 05/28/25 2241  Antecubital  less than 1                    Labs (abnormal labs have a star):   Labs Reviewed   COMPREHENSIVE METABOLIC PANEL - Abnormal; Notable for the following components:       Result Value    Glucose 199 (*)     All other components within normal limits    Narrative:     GFR Categories in Chronic Kidney Disease (CKD)              GFR Category          GFR  (mL/min/1.73)    Interpretation  G1                    90 or greater        Normal or high (1)  G2                    60-89                Mild decrease (1)  G3a                   45-59                Mild to moderate decrease  G3b                   30-44                Moderate to severe decrease  G4                    15-29                Severe decrease  G5                    14 or less           Kidney failure    (1)In the absence of evidence of kidney disease, neither GFR category G1 or G2 fulfill the criteria for CKD.    eGFR calculation 2021 CKD-EPI creatinine equation, which does not include race as a factor   TROPONIN - Abnormal; Notable for the following components:    HS Troponin T 22 (*)     All other components within normal limits    Narrative:     High Sensitive Troponin T Reference Range:  <14.0 ng/L- Negative Female for AMI  <22.0 ng/L- Negative Male for AMI  >=14 - Abnormal Female indicating possible myocardial injury.  >=22 - Abnormal Male indicating possible myocardial injury.   Clinicians would have to utilize clinical acumen, EKG, Troponin, and serial changes to determine if it is an Acute Myocardial Infarction or myocardial injury due to an underlying chronic condition.        CBC WITH AUTO DIFFERENTIAL - Abnormal; Notable for the following components:    Eosinophils, Absolute 0.41 (*)     All other components within normal limits   HIGH SENSITIVITIY TROPONIN T 1HR - Abnormal; Notable for the following components:    HS Troponin T 27 (*)     Troponin T Numeric Delta 5 (*)     Troponin T % Delta 23 (*)     All other components within normal limits    Narrative:     High Sensitive Troponin T Reference Range:  <14.0 ng/L- Negative Female for AMI  <22.0 ng/L- Negative Male for AMI  >=14 - Abnormal Female indicating possible myocardial injury.  >=22 - Abnormal Male indicating possible myocardial injury.   Clinicians would have to utilize clinical acumen, EKG, Troponin, and serial changes to  determine if it is an Acute Myocardial Infarction or myocardial injury due to an underlying chronic condition.        RESPIRATORY PANEL PCR W/ COVID-19 (SARS-COV-2), NP SWAB IN UTM/VTP, 2 HR TAT - Normal    Narrative:     In the setting of a positive respiratory panel with a viral infection PLUS a negative procalcitonin without other underlying concern for bacterial infection, consider observing off antibiotics or discontinuation of antibiotics and continue supportive care. If the respiratory panel is positive for atypical bacterial infection (Bordetella pertussis, Chlamydophila pneumoniae, or Mycoplasma pneumoniae), consider antibiotic de-escalation to target atypical bacterial infection.   BNP (IN-HOUSE) - Normal    Narrative:     This assay is used as an aid in the diagnosis of individuals suspected of having heart failure. It can be used as an aid in the diagnosis of acute decompensated heart failure (ADHF) in patients presenting with signs and symptoms of ADHF to the emergency department (ED). In addition, NT-proBNP of <300 pg/mL indicates ADHF is not likely.    Age Range Result Interpretation  NT-proBNP Concentration (pg/mL:      <50             Positive            >450                   Gray                 300-450                    Negative             <300    50-75           Positive            >900                  Gray                300-900                  Negative            <300      >75             Positive            >1800                  Gray                300-1800                  Negative            <300   MAGNESIUM - Normal   PROCALCITONIN - Normal    Narrative:     As a Marker for Sepsis (Non-Neonates):    1. <0.5 ng/mL represents a low risk of severe sepsis and/or septic shock.  2. >2 ng/mL represents a high risk of severe sepsis and/or septic shock.    As a Marker for Lower Respiratory Tract Infections that require antibiotic therapy:    PCT on Admission    Antibiotic Therapy       6-12  "Hrs later    >0.5                Strongly Recommended  >0.25 - <0.5        Recommended   0.1 - 0.25          Discouraged              Remeasure/reassess PCT  <0.1                Strongly Discouraged     Remeasure/reassess PCT    As 28 day mortality risk marker: \"Change in Procalcitonin Result\" (>80% or <=80%) if Day 0 (or Day 1) and Day 4 values are available. Refer to [email or web address]://[email or web address] [email or web address] [email or web address] [facility] [email or web address]cal[email or web address]    Change in PCT <=80%  A decrease of PCT levels below or equal to 80% defines a positive change in PCT test result representing a higher risk for 28-day all-cause mortality of patients diagnosed with severe sepsis for septic shock.    Change in PCT >80%  A decrease of PCT levels of more than 80% defines a negative change in PCT result representing a lower risk for 28-day all-cause mortality of patients diagnosed with severe sepsis or septic shock.      CBC (NO DIFF)   COMPREHENSIVE METABOLIC PANEL   POCT GLUCOSE FINGERSTICK   POCT GLUCOSE FINGERSTICK   POCT GLUCOSE FINGERSTICK   POCT GLUCOSE FINGERSTICK   CBC AND DIFFERENTIAL    Narrative:     The following orders were created for panel order CBC & Differential.  Procedure                               Abnormality         Status                     ---------                               -----------         ------                     CBC Auto Differential[[id number]]        Abnormal            Final result                 Please view results for these tests on the individual orders.       EKG:   ECG 12 Lead Dyspnea   Preliminary Result   HEART RATE=94  bpm   RR Mguorcht=771  ms   HI Ewjdlhwd=183  ms   P Horizontal Axis=64  deg   P Front Axis=68  deg   QRSD Interval=98  ms   QT Cbdnrwib=795  ms   CDoE=993  ms   QRS Axis=-35  deg   T Wave Axis=65  deg   - ABNORMAL ECG -   Sinus rhythm   Prolonged HI interval   Consider left atrial enlargement   Inferior infarct, old   Date and Time of Study:2025-05-28 22:46:03          Meds given in ED:   Medications   furosemide " (LASIX) injection 40 mg (has no administration in time range)   sodium chloride 0.9 % flush 10 mL (has no administration in time range)   sodium chloride 0.9 % flush 10 mL (has no administration in time range)   sodium chloride 0.9 % infusion 40 mL (has no administration in time range)   acetaminophen (TYLENOL) tablet 650 mg (has no administration in time range)     Or   acetaminophen (TYLENOL) 160 MG/5ML oral solution 650 mg (has no administration in time range)     Or   acetaminophen (TYLENOL) suppository 650 mg (has no administration in time range)   famotidine (PEPCID) tablet 20 mg (has no administration in time range)   sennosides-docusate (PERICOLACE) 8.6-50 MG per tablet 2 tablet (has no administration in time range)     And   polyethylene glycol (MIRALAX) packet 17 g (has no administration in time range)     And   bisacodyl (DULCOLAX) EC tablet 5 mg (has no administration in time range)     And   bisacodyl (DULCOLAX) suppository 10 mg (has no administration in time range)   ondansetron (ZOFRAN) injection 4 mg (has no administration in time range)   melatonin tablet 5 mg (has no administration in time range)   nitroglycerin (NITROSTAT) SL tablet 0.4 mg (has no administration in time range)   dextrose (GLUTOSE) oral gel 15 g (has no administration in time range)   dextrose (D50W) (25 g/50 mL) IV injection 25 g (has no administration in time range)   glucagon (GLUCAGEN) injection 1 mg (has no administration in time range)   insulin lispro (HUMALOG/ADMELOG) injection 2-7 Units (has no administration in time range)   labetalol (NORMODYNE,TRANDATE) injection 20 mg (20 mg Intravenous Given 5/28/25 4238)   furosemide (LASIX) injection 80 mg (80 mg Intravenous Given 5/29/25 0058)   hydrALAZINE (APRESOLINE) injection 20 mg (20 mg Intravenous Given 5/29/25 0137)       Imaging results:  XR Chest 1 View  Result Date: 5/28/2025  Cardiomegaly with suspected vascular congestion.  This report was finalized on 5/28/2025 11:04  PM by Dr. Kimberly Stevens M.D on Workstation: [id number]        Ambulatory status:   - assist x 1    Social issues:   Social History     Socioeconomic History    Marital status: Legally    Tobacco Use    Smoking status: Never    Smokeless tobacco: Never   Vaping Use    Vaping status: Never Used   Substance and Sexual Activity    Alcohol use: Not Currently    Drug use: Never    Sexual activity: Defer       Peripheral Neurovascular  Peripheral Neurovascular (Adult)  Peripheral Neurovascular WDL: .WDL except, capillary refill  Capillary Refill, General: less than/equal to 3 secs  Additional Documentation: Edema (Group)  Edema  Edema: leg, right, leg, left, ankle, right, ankle, left  Leg, Left Edema: 2+ (Mild)  Leg, Right Edema: 2+ (Mild)  Ankle, Left Edema: 2+ (Mild)  Ankle, Right Edema: 2+ (Mild)    Neuro Cognitive  Neuro Cognitive (Adult)  Cognitive/Neuro/Behavioral WDL: WDL, level of consciousness, orientation  Level of Consciousness: Alert  Orientation: oriented x 4    Learning  Learning Assessment  Learning Readiness and Ability: no barriers identified  Education Provided  Person Taught: patient    Respiratory  Respiratory WDL  Respiratory WDL: .WDL except, rhythm/pattern  Rhythm/Pattern, Respiratory: shortness of breath    Abdominal Pain  Abdominal Pain CPG Interventions  Coping Interventions: anticipatory guidance provided, care explained to patient/family prior to performing  Safety Interventions  Safety Precautions/Falls Reduction: assistive device/personal items within reach  All Alarms: alarm(s) activated and audible    Pain Assessments  Pain (Adult)  (0-10) Pain Rating: Rest: 0    NIH Stroke Scale       Gricelda Hernandez RN  05/29/25 02:13 EDT

## 2025-05-29 NOTE — PLAN OF CARE
Goal Outcome Evaluation:  New admission from home.  Case management to follow patient for discharge needs.

## 2025-05-29 NOTE — H&P
Patient Name:  Negrito Navarro  YOB: 1950  MRN:  [id number]  Admit Date:  5/28/2025  Patient Care Team:  Provider, No Known as PCP - General      Subjective   History Present Illness     Chief Complaint   Patient presents with    Shortness of Breath     This is a 74-year-old man with a past medical history of COPD, coronary artery disease, congestive heart failure, chronic respiratory failure on 2.5 L of oxygen via nasal cannula presents the hospital with increased lower extremity swelling and shortness of breath.  He has reportedly been noncompliant with his medications but reports compliance with Lasix and spironolactone.  In the emergency department he was noted to have a normal proBNP with a slight elevation in troponin to 22, followed by 27.  Creatinine was 1.4, which is close to his baseline.  An x-ray was obtained that showed cardiomegaly with suspected vascular congestion.  EKG showed normal sinus rhythm.  He was admitted for further evaluation and management. He was notably hypertensive upon admission.       Personal History     Past Medical History:   Diagnosis Date    Benign prostatic hyperplasia 11/10/2016    COPD (chronic obstructive pulmonary disease) 6/10/2021    Coronary artery disease involving coronary bypass graft of native heart without angina pectoris 6/10/2021    Essential hypertension 11/10/2016    Hyperlipidemia     SERENITY (obstructive sleep apnea) 11/10/2016     Past Surgical History:   Procedure Laterality Date    CARDIAC CATHETERIZATION N/A 4/21/2023    Procedure: Left Heart Cath;  Surgeon: Jayne Villalobos MD;  Location: [facility] CATH INVASIVE LOCATION;  Service: Cardiovascular;  Laterality: N/A;    CARDIAC CATHETERIZATION N/A 4/21/2023    Procedure: Coronary angiography;  Surgeon: Jayne Villalobos MD;  Location: [facility] CATH INVASIVE LOCATION;  Service: Cardiovascular;  Laterality: N/A;    CARDIAC CATHETERIZATION N/A 4/21/2023    Procedure: Left ventriculography;   Surgeon: Jayne Villalobos MD;  Location: [facility] CATH INVASIVE LOCATION;  Service: Cardiovascular;  Laterality: N/A;    CARDIAC CATHETERIZATION  4/21/2023    Procedure: Saphenous Vein Graft;  Surgeon: Jayne Villalobos MD;  Location: [facility] CATH INVASIVE LOCATION;  Service: Cardiovascular;;    CARDIAC CATHETERIZATION N/A 4/21/2023    Procedure: Native mammary injection;  Surgeon: Jayne Villalobos MD;  Location: [facility] CATH INVASIVE LOCATION;  Service: Cardiovascular;  Laterality: N/A;    CARDIAC CATHETERIZATION N/A 4/21/2023    Procedure: Percutaneous Coronary Intervention;  Surgeon: Jayne Villalobos MD;  Location: [facility] CATH INVASIVE LOCATION;  Service: Cardiovascular;  Laterality: N/A;    CARDIAC CATHETERIZATION N/A 4/21/2023    Procedure: Stent BETO coronary;  Surgeon: Jayne Villalobos MD;  Location: [facility] CATH INVASIVE LOCATION;  Service: Cardiovascular;  Laterality: N/A;    CARDIAC CATHETERIZATION  4/21/2023    Procedure: Percutaneous Manual Thrombectomy;  Surgeon: Jayne Villalobos MD;  Location: [facility] CATH INVASIVE LOCATION;  Service: Cardiovascular;;     History reviewed. No pertinent family history.  Social History     Tobacco Use    Smoking status: Never     Passive exposure: Never    Smokeless tobacco: Never   Vaping Use    Vaping status: Never Used   Substance Use Topics    Alcohol use: Never    Drug use: Never     No current facility-administered medications on file prior to encounter.     Current Outpatient Medications on File Prior to Encounter   Medication Sig Dispense Refill    albuterol sulfate  (90 Base) MCG/ACT inhaler Inhale 2 puffs Every 4 (Four) Hours As Needed for Wheezing.      aspirin 81 MG EC tablet Take 1 tablet by mouth Daily.      atorvastatin (LIPITOR) 40 MG tablet Take 1 tablet by mouth Daily.      budesonide-formoterol (SYMBICORT) 160-4.5 MCG/ACT inhaler Inhale 2 puffs 2 (Two) Times a Day. 10.2 g 1    carvedilol (COREG) 12.5 MG tablet Take 1  tablet by mouth 2 (Two) Times a Day With Meals.      cloNIDine (CATAPRES) 0.2 MG tablet Take 1 tablet by mouth Every 8 (Eight) Hours. 90 tablet 3    clopidogrel (PLAVIX) 75 MG tablet Take 1 tablet by mouth Daily. 90 tablet 3    fluticasone (FLONASE) 50 MCG/ACT nasal spray 2 sprays into the nostril(s) as directed by provider Daily.      hydrALAZINE (APRESOLINE) 50 MG tablet Take 1 tablet by mouth Every 8 (Eight) Hours. 90 tablet 3    HYDROcodone-Acetaminophen (XODOL )  MG per tablet Take 1 tablet by mouth Every 8 (Eight) Hours As Needed for Moderate Pain.      isosorbide mononitrate (IMDUR) 30 MG 24 hr tablet Take 1 tablet by mouth Daily. 30 tablet 3    ketoconazole (NIZORAL) 2 % cream Apply  topically to the appropriate area as directed Daily.      lisinopril (PRINIVIL,ZESTRIL) 40 MG tablet Take 1 tablet by mouth Daily. 30 tablet 3    montelukast (SINGULAIR) 10 MG tablet Take 1 tablet by mouth Every Night. 30 tablet 0    nitroglycerin (NITROSTAT) 0.4 MG SL tablet Place 1 tablet under the tongue Every 5 (Five) Minutes As Needed for Chest Pain for up to 1 dose. Take no more than 3 doses in 15 minutes. 25 tablet 12    spironolactone (ALDACTONE) 50 MG tablet Take 1 tablet by mouth Daily.       Allergies   Allergen Reactions    Shellfish-Derived Products Unknown - Low Severity     gout  gout         Objective    Objective     Vital Signs  Temp:  [97.5 °F (36.4 °C)-98.4 °F (36.9 °C)] 97.5 °F (36.4 °C)  Heart Rate:  [] 87  Resp:  [18-20] 18  BP: (144-239)/() 236/125  SpO2:  [89 %-98 %] 96 %  on  Flow (L/min) (Oxygen Therapy):  [2-3] 3;   Device (Oxygen Therapy): nasal cannula  There is no height or weight on file to calculate BMI.    Physical Exam  Constitutional:       General: He is not in acute distress.  HENT:      Head: Normocephalic and atraumatic.   Eyes:      Extraocular Movements: Extraocular movements intact.      Pupils: Pupils are equal, round, and reactive to light.   Cardiovascular:       Rate and Rhythm: Normal rate and regular rhythm.   Pulmonary:      Effort: Pulmonary effort is normal. No respiratory distress.   Abdominal:      General: There is no distension.      Palpations: Abdomen is soft.      Tenderness: There is no abdominal tenderness.   Genitourinary:     Comments: Blood at urethral meatus   Musculoskeletal:      Right lower leg: Edema present.      Left lower leg: Edema present.   Neurological:      Mental Status: He is alert and oriented to person, place, and time.         Results Review:  I reviewed the patient's new clinical results.  I reviewed the patient's new imaging results and agree with the interpretation.  I reviewed the patient's other test results and agree with the interpretation  I personally viewed and interpreted the patient's EKG/Telemetry data  Discussed with ED provider.    Lab Results (last 24 hours)       Procedure Component Value Units Date/Time    Respiratory Panel PCR w/COVID-19(SARS-CoV-2) CATA/RICH/LEW/PAD/COR/NEEL In-House, NP Swab in UTM/VTM, 2 HR TAT - Swab, Nasopharynx [[id number]]  (Normal) Collected: 05/28/25 2239    Specimen: Swab from Nasopharynx Updated: 05/28/25 2339     ADENOVIRUS, PCR Not Detected     Coronavirus 229E Not Detected     Coronavirus HKU1 Not Detected     Coronavirus NL63 Not Detected     Coronavirus OC43 Not Detected     COVID19 Not Detected     Human Metapneumovirus Not Detected     Human Rhinovirus/Enterovirus Not Detected     Influenza A PCR Not Detected     Influenza B PCR Not Detected     Parainfluenza Virus 1 Not Detected     Parainfluenza Virus 2 Not Detected     Parainfluenza Virus 3 Not Detected     Parainfluenza Virus 4 Not Detected     RSV, PCR Not Detected     Bordetella pertussis pcr Not Detected     Bordetella parapertussis PCR Not Detected     Chlamydophila pneumoniae PCR Not Detected     Mycoplasma pneumo by PCR Not Detected    Narrative:      In the setting of a positive respiratory panel with a viral infection PLUS a  negative procalcitonin without other underlying concern for bacterial infection, consider observing off antibiotics or discontinuation of antibiotics and continue supportive care. If the respiratory panel is positive for atypical bacterial infection (Bordetella pertussis, Chlamydophila pneumoniae, or Mycoplasma pneumoniae), consider antibiotic de-escalation to target atypical bacterial infection.    CBC & Differential [[id number]]  (Abnormal) Collected: 05/28/25 2240    Specimen: Blood Updated: 05/28/25 2251    Narrative:      The following orders were created for panel order CBC & Differential.  Procedure                               Abnormality         Status                     ---------                               -----------         ------                     CBC Auto Differential[[id number]]        Abnormal            Final result                 Please view results for these tests on the individual orders.    Comprehensive Metabolic Panel [[id number]]  (Abnormal) Collected: 05/28/25 2240    Specimen: Blood Updated: 05/28/25 2312     Glucose 199 mg/dL      BUN 17.0 mg/dL      Creatinine 1.21 mg/dL      Sodium 139 mmol/L      Potassium 3.8 mmol/L      Chloride 103 mmol/L      CO2 25.2 mmol/L      Calcium 9.2 mg/dL      Total Protein 7.7 g/dL      Albumin 4.0 g/dL      ALT (SGPT) 24 U/L      AST (SGOT) 20 U/L      Alkaline Phosphatase 102 U/L      Total Bilirubin 0.5 mg/dL      Globulin 3.7 gm/dL      A/G Ratio 1.1 g/dL      BUN/Creatinine Ratio 14.0     Anion Gap 10.8 mmol/L      eGFR 62.1 mL/min/1.73     Narrative:      GFR Categories in Chronic Kidney Disease (CKD)              GFR Category          GFR (mL/min/1.73)    Interpretation  G1                    90 or greater        Normal or high (1)  G2                    60-89                Mild decrease (1)  G3a                   45-59                Mild to moderate decrease  G3b                   30-44                Moderate to severe decrease  G4                     15-29                Severe decrease  G5                    14 or less           Kidney failure    (1)In the absence of evidence of kidney disease, neither GFR category G1 or G2 fulfill the criteria for CKD.    eGFR calculation 2021 CKD-EPI creatinine equation, which does not include race as a factor    High Sensitivity Troponin T [[id number]]  (Abnormal) Collected: 05/28/25 2240    Specimen: Blood Updated: 05/28/25 2319     HS Troponin T 22 ng/L     Narrative:      High Sensitive Troponin T Reference Range:  <14.0 ng/L- Negative Female for AMI  <22.0 ng/L- Negative Male for AMI  >=14 - Abnormal Female indicating possible myocardial injury.  >=22 - Abnormal Male indicating possible myocardial injury.   Clinicians would have to utilize clinical acumen, EKG, Troponin, and serial changes to determine if it is an Acute Myocardial Infarction or myocardial injury due to an underlying chronic condition.         BNP [[id number]]  (Normal) Collected: 05/28/25 2240    Specimen: Blood Updated: 05/28/25 2319     proBNP 910.0 pg/mL     Narrative:      This assay is used as an aid in the diagnosis of individuals suspected of having heart failure. It can be used as an aid in the diagnosis of acute decompensated heart failure (ADHF) in patients presenting with signs and symptoms of ADHF to the emergency department (ED). In addition, NT-proBNP of <300 pg/mL indicates ADHF is not likely.    Age Range Result Interpretation  NT-proBNP Concentration (pg/mL:      <50             Positive            >450                   Gray                 300-450                    Negative             <300    50-75           Positive            >900                  Gray                300-900                  Negative            <300      >75             Positive            >1800                  Gray                300-1800                  Negative            <300    Magnesium [[id number]]  (Normal) Collected: 05/28/25 2240    Specimen:  "Blood Updated: 05/28/25 2312     Magnesium 2.1 mg/dL     Procalcitonin [[id number]]  (Normal) Collected: 05/28/25 2240    Specimen: Blood Updated: 05/28/25 2319     Procalcitonin 0.08 ng/mL     Narrative:      As a Marker for Sepsis (Non-Neonates):    1. <0.5 ng/mL represents a low risk of severe sepsis and/or septic shock.  2. >2 ng/mL represents a high risk of severe sepsis and/or septic shock.    As a Marker for Lower Respiratory Tract Infections that require antibiotic therapy:    PCT on Admission    Antibiotic Therapy       6-12 Hrs later    >0.5                Strongly Recommended  >0.25 - <0.5        Recommended   0.1 - 0.25          Discouraged              Remeasure/reassess PCT  <0.1                Strongly Discouraged     Remeasure/reassess PCT    As 28 day mortality risk marker: \"Change in Procalcitonin Result\" (>80% or <=80%) if Day 0 (or Day 1) and Day 4 values are available. Refer to http://www.[email or web address] [facility] [email or web address]    Change in PCT <=80%  A decrease of PCT levels below or equal to 80% defines a positive change in PCT test result representing a higher risk for 28-day all-cause mortality of patients diagnosed with severe sepsis for septic shock.    Change in PCT >80%  A decrease of PCT levels of more than 80% defines a negative change in PCT result representing a lower risk for 28-day all-cause mortality of patients diagnosed with severe sepsis or septic shock.       CBC Auto Differential [[id number]]  (Abnormal) Collected: 05/28/25 2240    Specimen: Blood Updated: 05/28/25 2251     WBC 10.46 10*3/mm3      RBC 5.03 10*6/mm3      Hemoglobin 15.4 g/dL      Hematocrit 47.3 %      MCV 94.0 fL      MCH 30.6 pg      MCHC 32.6 g/dL      RDW 14.6 %      RDW-SD 50.0 fl      MPV 10.3 fL      Platelets 267 10*3/mm3      Neutrophil % 66.1 %      Lymphocyte % 20.5 %      Monocyte % 8.6 %      Eosinophil % 3.9 %      Basophil % 0.7 %      Immature Grans % 0.2 %      Neutrophils, Absolute 6.92 10*3/mm3      " Lymphocytes, Absolute 2.14 10*3/mm3      Monocytes, Absolute 0.90 10*3/mm3      Eosinophils, Absolute 0.41 10*3/mm3      Basophils, Absolute 0.07 10*3/mm3      Immature Grans, Absolute 0.02 10*3/mm3      nRBC 0.0 /100 WBC     High Sensitivity Troponin T 1Hr [[id number]]  (Abnormal) Collected: 05/28/25 2342    Specimen: Blood Updated: 05/29/25 0026     HS Troponin T 27 ng/L      Troponin T Numeric Delta 5 ng/L      Troponin T % Delta 23    Narrative:      High Sensitive Troponin T Reference Range:  <14.0 ng/L- Negative Female for AMI  <22.0 ng/L- Negative Male for AMI  >=14 - Abnormal Female indicating possible myocardial injury.  >=22 - Abnormal Male indicating possible myocardial injury.   Clinicians would have to utilize clinical acumen, EKG, Troponin, and serial changes to determine if it is an Acute Myocardial Infarction or myocardial injury due to an underlying chronic condition.         CBC (No Diff) [[id number]]  (Abnormal) Collected: 05/29/25 0622    Specimen: Blood Updated: 05/29/25 0651     WBC 12.29 10*3/mm3      RBC 5.21 10*6/mm3      Hemoglobin 16.1 g/dL      Hematocrit 47.6 %      MCV 91.4 fL      MCH 30.9 pg      MCHC 33.8 g/dL      RDW 14.1 %      RDW-SD 47.4 fl      MPV 10.4 fL      Platelets 279 10*3/mm3     Comprehensive Metabolic Panel [[id number]]  (Abnormal) Collected: 05/29/25 0622    Specimen: Blood Updated: 05/29/25 0710     Glucose 146 mg/dL      BUN 21.0 mg/dL      Creatinine 1.40 mg/dL      Sodium 139 mmol/L      Potassium 3.7 mmol/L      Chloride 103 mmol/L      CO2 24.0 mmol/L      Calcium 9.5 mg/dL      Total Protein 7.7 g/dL      Albumin 4.1 g/dL      ALT (SGPT) 23 U/L      AST (SGOT) 21 U/L      Alkaline Phosphatase 106 U/L      Total Bilirubin 0.5 mg/dL      Globulin 3.6 gm/dL      A/G Ratio 1.1 g/dL      BUN/Creatinine Ratio 15.0     Anion Gap 12.0 mmol/L      eGFR 52.1 mL/min/1.73     Narrative:      GFR Categories in Chronic Kidney Disease (CKD)              GFR Category           GFR (mL/min/1.73)    Interpretation  G1                    90 or greater        Normal or high (1)  G2                    60-89                Mild decrease (1)  G3a                   45-59                Mild to moderate decrease  G3b                   30-44                Moderate to severe decrease  G4                    15-29                Severe decrease  G5                    14 or less           Kidney failure    (1)In the absence of evidence of kidney disease, neither GFR category G1 or G2 fulfill the criteria for CKD.    eGFR calculation 2021 CKD-EPI creatinine equation, which does not include race as a factor    POC Glucose Once [[id number]]  (Abnormal) Collected: 05/29/25 0852    Specimen: Blood Updated: 05/29/25 0854     Glucose 159 mg/dL     POC Glucose Once [[id number]]  (Normal) Collected: 05/29/25 1109    Specimen: Blood Updated: 05/29/25 1111     Glucose 123 mg/dL             Results for orders placed or performed during the hospital encounter of 04/17/24   Blood Culture - Blood, Arm, Right    Specimen: Arm, Right; Blood   Result Value Ref Range    Blood Culture No growth at 5 days        Imaging Results (Last 24 Hours)       Procedure Component Value Units Date/Time    XR Chest 1 View [[id number]] Collected: 05/28/25 2303     Updated: 05/28/25 2307    Narrative:      SINGLE VIEW OF THE CHEST     HISTORY: Weakness     COMPARISON: September 10, 2024     FINDINGS:  There is cardiomegaly. There is some vascular congestion. Patient status  post median sternotomy with coronary artery bypass grafting. Elevation  of the left hemidiaphragm, with chronic scarring is again seen. Blunting  of the left costophrenic angle is unchanged.       Impression:      Cardiomegaly with suspected vascular congestion.     This report was finalized on 5/28/2025 11:04 PM by Dr. Kimberly Stevens M.D on Workstation: [id number]               Results for orders placed during the hospital encounter of 09/10/24    Adult  Transthoracic Echo Complete w/ Color, Spectral and Contrast if necessary per protocol    Interpretation Summary    Left ventricular ejection fraction appears to be 56 - 60%.    Left ventricular diastolic function was normal.    Mild aortic valve stenosis is present.      ECG 12 Lead Dyspnea   Final Result   HEART RATE=94  bpm   RR Ddjofucn=114  ms   SD Nghphzjv=009  ms   P Horizontal Axis=64  deg   P Front Axis=68  deg   QRSD Interval=98  ms   QT Varkceep=198  ms   XJzC=325  ms   QRS Axis=-35  deg   T Wave Axis=65  deg   - ABNORMAL ECG -   Sinus rhythm   Prolonged SD interval - new   Consider  left atrial enlargement   Inferior  infarct, old   When compared with ECG of 10-Sep-2024 15:13:55,   Significant rate increase   Electronically Signed By: Samaria Gallardo ([facility]) 2025-05-29 10:39:45   Date and Time of Study:2025-05-28 22:46:03      Telemetry Scan   Final Result                 Assessment/Plan     Active Hospital Problems    Diagnosis  POA    **CHF exacerbation [I50.9]  Yes    Chronic HFrEF (heart failure with reduced ejection fraction) [I50.22]  Yes    Type 2 diabetes mellitus [E11.9]  Yes    CAD (coronary artery disease) [I25.10]  Yes    COPD (chronic obstructive pulmonary disease) [J44.9]  Yes    Essential hypertension [I10]  Yes    SERENITY (obstructive sleep apnea) [G47.33]  Yes      Resolved Hospital Problems   No resolved problems to display.     Hypertensive emergency  Congestive heart failure  Coronary artery disease s/p CABG, PCI  Cardiology has been consulted.  He has been administered IV Lasix.  Resume Aldactone, lisinopril, Imdur, hydralazine, aspirin, Plavix, Coreg, clonidine, atorvastatin  Repeat echocardiogram  Strict intake and output    Intake/Output Summary (Last 24 hours) at 5/29/2025 1121  Last data filed at 5/29/2025 0136  Gross per 24 hour   Intake --   Output 600 ml   Net -600 ml     T2DM  Repeat A1c. Currently on [facility]    Chronic Kidney disease  Monitor Scr closely with diuresis. Scr currently  approximately around baseline     Urinary retention  Difficult montanez placement. Urology will be consulted. Montanez catheter need for acute urinary retention and strict I/O      I discussed the patient's findings and my recommendations with patient and nursing staff.    VTE Prophylaxis - SCDs.  Code Status - Full code.       Medhat Moyer MD  [facility]  05/29/25  11:15 EDT

## 2025-05-29 NOTE — ED PROVIDER NOTES
EMERGENCY DEPARTMENT ENCOUNTER  Room Number:  15/15  PCP: Jasson, No Known  Independent Historians: Patient  Date of encounter:  5/29/2025  Patient Care Team:  Provider, No Known as PCP - General     HPI:  Chief Complaint: had concerns including Shortness of Breath.     A complete HPI/ROS/PMH/PSH/SH/FH are unobtainable due to: None    Chronic or social conditions impacting patient care (Social Determinants of Health): None    Context: Negrito Navarro is a 74 y.o. male with a medical history of hyperlipidemia, hypertension, COPD, coronary disease, chronic respiratory failure, CHF who presents to the ED c/o acute shortness of breath.  Patient has had several days of progressive lower extremity swelling and shortness of breath.  He is on Lasix and spironolactone and has been compliant with these.  He states he is intermittently compliant with his other medications but he has been compliant with his diuretics.  He denies any chest pain.  He states that his baseline orthopnea has worsened from 2 pillows to 3 pillows.  He uses 2-2 and half liters supplemental oxygen baseline is increased to 3.  No fever or cough.    Review of prior external notes (non-ED) -and- Review of prior external test results outside of this encounter:  Echocardiogram from 9/11/2024 reviewed.  Ejection fraction of 56 to 60%.  Normal diastolic function.    PAST MEDICAL HISTORY  Active Ambulatory Problems     Diagnosis Date Noted    Hyperlipidemia 06/09/2021    Benign prostatic hyperplasia 11/10/2016    Chronic constipation 07/06/2018    Essential hypertension 11/10/2016    SERENITY (obstructive sleep apnea) 11/10/2016    Oxygen dependent 07/06/2018    COPD (chronic obstructive pulmonary disease) 06/10/2021    CAD (coronary artery disease) 06/10/2021    Acute coronary syndrome with high troponin 04/20/2023    Precordial chest pain 04/20/2023    History of coronary angioplasty with insertion of stent 04/20/2023    Hx of CABG 04/20/2023    CAD S/P  percutaneous coronary angioplasty 05/18/2023    Long term (current) use of antithrombotics/antiplatelets 05/18/2023    Acute hypoxic respiratory failure 04/17/2024    Viral pneumonia 04/24/2024    Reactive airway disease 04/24/2024    Chronic systolic (congestive) heart failure 04/24/2024    Chronic kidney failure 04/24/2024    Leukocytosis 04/24/2024    Type 2 diabetes mellitus 04/24/2024    Chronic HFrEF (heart failure with reduced ejection fraction) 04/24/2024    Syncope 09/10/2024     Resolved Ambulatory Problems     Diagnosis Date Noted    Acute on chronic congestive heart failure 06/09/2021    Hypokalemia 06/09/2021    Hypertensive urgency 06/10/2021    Acute on chronic diastolic heart failure 06/11/2021    Acute kidney failure 04/24/2024     Past Medical History:   Diagnosis Date    Coronary artery disease involving coronary bypass graft of native heart without angina pectoris 6/10/2021       PAST SURGICAL HISTORY  Past Surgical History:   Procedure Laterality Date    CARDIAC CATHETERIZATION N/A 4/21/2023    Procedure: Left Heart Cath;  Surgeon: Jayne Villalobos MD;  Location: [facility] CATH INVASIVE LOCATION;  Service: Cardiovascular;  Laterality: N/A;    CARDIAC CATHETERIZATION N/A 4/21/2023    Procedure: Coronary angiography;  Surgeon: Jayne Villalobos MD;  Location: [facility] CATH INVASIVE LOCATION;  Service: Cardiovascular;  Laterality: N/A;    CARDIAC CATHETERIZATION N/A 4/21/2023    Procedure: Left ventriculography;  Surgeon: Jayne Villalobos MD;  Location: [facility] CATH INVASIVE LOCATION;  Service: Cardiovascular;  Laterality: N/A;    CARDIAC CATHETERIZATION  4/21/2023    Procedure: Saphenous Vein Graft;  Surgeon: Jayne Villalobos MD;  Location: [facility] CATH INVASIVE LOCATION;  Service: Cardiovascular;;    CARDIAC CATHETERIZATION N/A 4/21/2023    Procedure: Native mammary injection;  Surgeon: Jayne Villalobos MD;  Location: [facility] CATH INVASIVE LOCATION;  Service: Cardiovascular;   Laterality: N/A;    CARDIAC CATHETERIZATION N/A 4/21/2023    Procedure: Percutaneous Coronary Intervention;  Surgeon: Jayne Villalobso MD;  Location: [facility] CATH INVASIVE LOCATION;  Service: Cardiovascular;  Laterality: N/A;    CARDIAC CATHETERIZATION N/A 4/21/2023    Procedure: Stent BETO coronary;  Surgeon: Jayne Villalobos MD;  Location: [facility] CATH INVASIVE LOCATION;  Service: Cardiovascular;  Laterality: N/A;    CARDIAC CATHETERIZATION  4/21/2023    Procedure: Percutaneous Manual Thrombectomy;  Surgeon: Jayne Villalobos MD;  Location: [facility] CATH INVASIVE LOCATION;  Service: Cardiovascular;;       FAMILY HISTORY  No family history on file.    SOCIAL HISTORY  Social History     Socioeconomic History    Marital status: Legally    Tobacco Use    Smoking status: Never    Smokeless tobacco: Never   Vaping Use    Vaping status: Never Used   Substance and Sexual Activity    Alcohol use: Not Currently    Drug use: Never    Sexual activity: Defer       ALLERGIES  Shellfish-derived products    REVIEW OF SYSTEMS  Review of Systems  Included in HPI  All systems reviewed and negative except for those discussed in HPI.    PHYSICAL EXAM    I have reviewed the triage vital signs and nursing notes.    ED Triage Vitals   Temp Heart Rate Resp BP SpO2   05/28/25 2224 05/28/25 2224 05/28/25 2224 05/28/25 2225 05/28/25 2224   97.9 °F (36.6 °C) 105 20 (!) 239/131 (!) 89 %      Temp src Heart Rate Source Patient Position BP Location FiO2 (%)   05/28/25 2224 -- -- -- --   Oral           Physical Exam  Vitals and nursing note reviewed.   Constitutional:       General: He is not in acute distress.     Appearance: Normal appearance. He is not ill-appearing, toxic-appearing or diaphoretic.   HENT:      Head: Normocephalic and atraumatic.      Nose: Nose normal.      Mouth/Throat:      Mouth: Mucous membranes are moist.   Eyes:      Extraocular Movements: Extraocular movements intact.      Conjunctiva/sclera:  Conjunctivae normal.      Pupils: Pupils are equal, round, and reactive to light.   Cardiovascular:      Rate and Rhythm: Normal rate and regular rhythm.      Pulses: Normal pulses.      Heart sounds: Normal heart sounds. No murmur heard.     No friction rub. No gallop.   Pulmonary:      Effort: Pulmonary effort is normal. No respiratory distress.      Breath sounds: Normal breath sounds. No stridor. No wheezing, rhonchi or rales.      Comments: On 3 L nasal cannula  Abdominal:      General: Abdomen is flat. There is no distension.      Palpations: Abdomen is soft.      Tenderness: There is no abdominal tenderness. There is no guarding or rebound.   Musculoskeletal:      Cervical back: Normal range of motion and neck supple.      Right lower leg: Edema present.      Left lower leg: Edema present.      Comments: 1+ pitting edema to the knee bilaterally   Skin:     General: Skin is warm and dry.      Capillary Refill: Capillary refill takes less than 2 seconds.   Neurological:      General: No focal deficit present.      Mental Status: He is alert and oriented to person, place, and time. Mental status is at baseline.   Psychiatric:         Mood and Affect: Mood normal.         Behavior: Behavior normal.         Thought Content: Thought content normal.         Judgment: Judgment normal.         LAB RESULTS  Recent Results (from the past 24 hours)   Respiratory Panel PCR w/COVID-19(SARS-CoV-2) [facility]/R[facility]/LEW/PAD/COR/NEEL In-House, NP Swab in [facility], 2 HR TAT - Swab, Nasopharynx    Collection Time: 05/28/25 10:39 PM    Specimen: Nasopharynx; Swab   Result Value Ref Range    ADENOVIRUS, PCR Not Detected Not Detected    Coronavirus 229E Not Detected Not Detected    Coronavirus HKU1 Not Detected Not Detected    Coronavirus NL63 Not Detected Not Detected    Coronavirus OC43 Not Detected Not Detected    COVID19 Not Detected Not Detected - Ref. Range    Human Metapneumovirus Not Detected Not Detected    Human  Rhinovirus/Enterovirus Not Detected Not Detected    Influenza A PCR Not Detected Not Detected    Influenza B PCR Not Detected Not Detected    Parainfluenza Virus 1 Not Detected Not Detected    Parainfluenza Virus 2 Not Detected Not Detected    Parainfluenza Virus 3 Not Detected Not Detected    Parainfluenza Virus 4 Not Detected Not Detected    RSV, PCR Not Detected Not Detected    Bordetella pertussis pcr Not Detected Not Detected    Bordetella parapertussis PCR Not Detected Not Detected    Chlamydophila pneumoniae PCR Not Detected Not Detected    Mycoplasma pneumo by PCR Not Detected Not Detected   Comprehensive Metabolic Panel    Collection Time: 05/28/25 10:40 PM    Specimen: Blood   Result Value Ref Range    Glucose 199 (H) 65 - 99 mg/dL    BUN 17.0 8.0 - 23.0 mg/dL    Creatinine 1.21 0.76 - 1.27 mg/dL    Sodium 139 136 - 145 mmol/L    Potassium 3.8 3.5 - 5.2 mmol/L    Chloride 103 98 - 107 mmol/L    CO2 25.2 22.0 - 29.0 mmol/L    Calcium 9.2 8.6 - 10.5 mg/dL    Total Protein 7.7 6.0 - 8.5 g/dL    Albumin 4.0 3.5 - 5.2 g/dL    ALT (SGPT) 24 1 - 41 U/L    AST (SGOT) 20 1 - 40 U/L    Alkaline Phosphatase 102 39 - 117 U/L    Total Bilirubin 0.5 0.0 - 1.2 mg/dL    Globulin 3.7 gm/dL    A/G Ratio 1.1 g/dL    BUN/Creatinine Ratio 14.0 7.0 - 25.0    Anion Gap 10.8 5.0 - 15.0 mmol/L    eGFR 62.1 >60.0 mL/min/1.73   High Sensitivity Troponin T    Collection Time: 05/28/25 10:40 PM    Specimen: Blood   Result Value Ref Range    HS Troponin T 22 (H) <22 ng/L   BNP    Collection Time: 05/28/25 10:40 PM    Specimen: Blood   Result Value Ref Range    proBNP 910.0 0.0 - 1,800.0 pg/mL   Magnesium    Collection Time: 05/28/25 10:40 PM    Specimen: Blood   Result Value Ref Range    Magnesium 2.1 1.6 - 2.4 mg/dL   Procalcitonin    Collection Time: 05/28/25 10:40 PM    Specimen: Blood   Result Value Ref Range    Procalcitonin 0.08 0.00 - 0.25 ng/mL   CBC Auto Differential    Collection Time: 05/28/25 10:40 PM    Specimen: Blood    Result Value Ref Range    WBC 10.46 3.40 - 10.80 10*3/mm3    RBC 5.03 4.14 - 5.80 10*6/mm3    Hemoglobin 15.4 13.0 - 17.7 g/dL    Hematocrit 47.3 37.5 - 51.0 %    MCV 94.0 79.0 - 97.0 fL    MCH 30.6 26.6 - 33.0 pg    MCHC 32.6 31.5 - 35.7 g/dL    RDW 14.6 12.3 - 15.4 %    RDW-SD 50.0 37.0 - 54.0 fl    MPV 10.3 6.0 - 12.0 fL    Platelets 267 140 - 450 10*3/mm3    Neutrophil % 66.1 42.7 - 76.0 %    Lymphocyte % 20.5 19.6 - 45.3 %    Monocyte % 8.6 5.0 - 12.0 %    Eosinophil % 3.9 0.3 - 6.2 %    Basophil % 0.7 0.0 - 1.5 %    Immature Grans % 0.2 0.0 - 0.5 %    Neutrophils, Absolute 6.92 1.70 - 7.00 10*3/mm3    Lymphocytes, Absolute 2.14 0.70 - 3.10 10*3/mm3    Monocytes, Absolute 0.90 0.10 - 0.90 10*3/mm3    Eosinophils, Absolute 0.41 (H) 0.00 - 0.40 10*3/mm3    Basophils, Absolute 0.07 0.00 - 0.20 10*3/mm3    Immature Grans, Absolute 0.02 0.00 - 0.05 10*3/mm3    nRBC 0.0 0.0 - 0.2 /100 WBC   ECG 12 Lead Dyspnea    Collection Time: 05/28/25 10:46 PM   Result Value Ref Range    QT Interval 394 ms    QTC Interval 493 ms   High Sensitivity Troponin T 1Hr    Collection Time: 05/28/25 11:42 PM    Specimen: Blood   Result Value Ref Range    HS Troponin T 27 (H) <22 ng/L    Troponin T Numeric Delta 5 (C) Abnormal if >/=3 ng/L    Troponin T % Delta 23 (C) Abnormal if >/= 20%       RADIOLOGY  XR Chest 1 View  Result Date: 5/28/2025  SINGLE VIEW OF THE CHEST  HISTORY: Weakness  COMPARISON: September 10, 2024  FINDINGS: There is cardiomegaly. There is some vascular congestion. Patient status post median sternotomy with coronary artery bypass grafting. Elevation of the left hemidiaphragm, with chronic scarring is again seen. Blunting of the left costophrenic angle is unchanged.      Cardiomegaly with suspected vascular congestion.  This report was finalized on 5/28/2025 11:04 PM by Dr. Kimberly Stevens M.D on Workstation: [id number]        MEDICATIONS GIVEN IN ER  Medications   hydrALAZINE (APRESOLINE) injection 20 mg (has no  administration in time range)   labetalol (NORMODYNE,TRANDATE) injection 20 mg (20 mg Intravenous Given 5/28/25 2328)   furosemide (LASIX) injection 80 mg (80 mg Intravenous Given 5/29/25 0058)       ORDERS PLACED DURING THIS VISIT:  Orders Placed This Encounter   Procedures    Respiratory Panel PCR w/COVID-19(SARS-CoV-2) [facility]/LEW/PAD/COR/NEEL In-House, NP Swab in UTM/VTM, 2 HR TAT - Swab, Nasopharynx    XR Chest 1 View    Comprehensive Metabolic Panel    High Sensitivity Troponin T    BNP    Magnesium    Procalcitonin    CBC Auto Differential    High Sensitivity Troponin T 1Hr    Continuous Pulse Oximetry    [facility] (on-call MD unless specified) Details    ECG 12 Lead Dyspnea    Initiate Observation Status    CBC & Differential       OUTPATIENT MEDICATION MANAGEMENT:  Current Facility-Administered Medications Ordered in Epic   Medication Dose Route Frequency Provider Last Rate Last Admin    hydrALAZINE (APRESOLINE) injection 20 mg  20 mg Intravenous Once Andre Anderson MD         Current Outpatient Medications Ordered in Epic   Medication Sig Dispense Refill    albuterol sulfate  (90 Base) MCG/ACT inhaler Inhale 2 puffs Every 4 (Four) Hours As Needed for Wheezing.      aspirin 81 MG EC tablet Take 1 tablet by mouth Daily.      atorvastatin (LIPITOR) 40 MG tablet Take 1 tablet by mouth Daily.      budesonide-formoterol (SYMBICORT) 160-4.5 MCG/ACT inhaler Inhale 2 puffs 2 (Two) Times a Day. 10.2 g 1    carvedilol (COREG) 12.5 MG tablet Take 1 tablet by mouth 2 (Two) Times a Day With Meals.      cloNIDine (CATAPRES) 0.2 MG tablet Take 1 tablet by mouth Every 8 (Eight) Hours. 90 tablet 3    clopidogrel (PLAVIX) 75 MG tablet Take 1 tablet by mouth Daily. 90 tablet 3    fluticasone (FLONASE) 50 MCG/ACT nasal spray 2 sprays into the nostril(s) as directed by provider Daily.      hydrALAZINE (APRESOLINE) 50 MG tablet Take 1 tablet by mouth Every 8 (Eight) Hours. 90 tablet 3    HYDROcodone-Acetaminophen (XODOL  )  MG per tablet Take 1 tablet by mouth Every 8 (Eight) Hours As Needed for Moderate Pain.      isosorbide mononitrate (IMDUR) 30 MG 24 hr tablet Take 1 tablet by mouth Daily. 30 tablet 3    ketoconazole (NIZORAL) 2 % cream Apply  topically to the appropriate area as directed Daily.      lisinopril (PRINIVIL,ZESTRIL) 40 MG tablet Take 1 tablet by mouth Daily. 30 tablet 3    montelukast (SINGULAIR) 10 MG tablet Take 1 tablet by mouth Every Night. 30 tablet 0    nitroglycerin (NITROSTAT) 0.4 MG SL tablet Place 1 tablet under the tongue Every 5 (Five) Minutes As Needed for Chest Pain for up to 1 dose. Take no more than 3 doses in 15 minutes. 25 tablet 12    spironolactone (ALDACTONE) 50 MG tablet Take 1 tablet by mouth Daily.         PROCEDURES  Procedures    PROGRESS, DATA ANALYSIS, CONSULTS, AND MEDICAL DECISION MAKING  All labs have been independently interpreted by me.  All radiology studies have been reviewed by me. All EKG's have been independently viewed and interpreted by me.  Discussion below represents my analysis of pertinent findings related to patient's condition, differential diagnosis, treatment plan and final disposition.    Differential diagnosis includes but is not limited to CHF exacerbation, pneumonia, ACS, hypertensive emergency.    Clinical Scores:                   ED Course as of 05/29/25 0129   Wed May 28, 2025   2306 EKG interpreted by myself  Time: 2246  Rate: 94  Rhythm: NSR  No ST elevation or depression  Normal intervals  Normal morphology [AB]   2306 WBC: 10.46 [AB]   2306 Hemoglobin: 15.4 [AB]   2306 XR Chest 1 View  My independent interpretation of the imaging study is cardiomegaly, no pneumothorax [AB]   Thu May 29, 2025   0041 HS Troponin T(!): 22 [AB]   0041 HS Troponin T(!): 27 [AB]   0041 Glucose(!): 199 [AB]   0041 Creatinine: 1.21 [AB]   0041 WBC: 10.46 [AB]   0041 Hemoglobin: 15.4 [AB]   0041 Respiratory Panel PCR w/COVID-19(SARS-CoV-2) [facility]/R[facility]/LEW/PAD/COR/NEEL  In-House, NP Swab in UTM/VTM, 2 HR TAT - Swab, Nasopharynx [AB]   0041 Magnesium: 2.1 [AB]   0041 proBNP: 910.0 [AB]   0107 MDM: Patient presents with progressive lower extremity swelling, orthopnea and shortness of breath.  Increased oxygen requirement to 3 L.  Lab work reassuring.  Patient has clinical evidence of CHF exacerbation with significant symptomatology.  Given 80 mg IV Lasix to initiate diuresis.  Admit for further treatment. [AB]   0126 Phone call with Danitza with [facility].  Discussed the patient, relevant history, exam, diagnostics, ED findings/progress, and concerns. They agree to admit the patient to Dr. Samuel. Care assumed by the admitting physician at this time.   [AB]      ED Course User Index  [AB] Andre Anderson MD             AS OF 01:29 EDT VITALS:    BP - (!) 188/96  HR - 76  TEMP - 97.9 °F (36.6 °C) (Oral)  O2 SATS - 97%    COMPLEXITY OF CARE  The patient requires admission.      DIAGNOSIS  Final diagnoses:   Acute on chronic congestive heart failure, unspecified heart failure type   Hypertension, unspecified type         DISPOSITION  ED Disposition       ED Disposition   Decision to Admit    Condition   --    Comment   Level of Care: Telemetry [5]   Diagnosis: CHF exacerbation [428307]   Admitting Physician: GIAN SAMUEL [[id number]]   Attending Physician: GIAN SAMUEL [[id number]]   Bed Request Comments: patient's wife is admitted to 4s (410) if patient can be placed nearby   Is patient appropriate for Inpatient Observation Unit?: No [0]                  Please note that portions of this document were completed with a voice recognition program.    Note Disclaimer: At [facility], we believe that sharing information builds trust and better relationships. You are receiving this note because you recently visited [facility]. It is possible you will see health information before a provider has talked with you about it. This kind of information can be easy to misunderstand. To help you  fully understand what it means for your health, we urge you to discuss this note with your provider.         Andre Anderson MD  05/29/25 0129

## 2025-05-29 NOTE — ED TRIAGE NOTES
Pt to ED with c/o visiting wife upstairs, staff reports pt's lips turning blue and difficulty breathing. Pt wears 2.5L of oxygen chrinocially at home but not wearing any now. Pt 89% on room air when arrived to triage. States he does not have portable oxygen and only has a big tank that he uses at home.

## 2025-05-30 LAB
ANION GAP SERPL CALCULATED.3IONS-SCNC: 10.1 MMOL/L (ref 5–15)
BACTERIA UR QL AUTO: ABNORMAL /HPF
BILIRUB UR QL STRIP: NEGATIVE
BUN SERPL-MCNC: 35 MG/DL (ref 8–23)
BUN/CREAT SERPL: 14.4 (ref 7–25)
CALCIUM SPEC-SCNC: 8.8 MG/DL (ref 8.6–10.5)
CHLORIDE SERPL-SCNC: 103 MMOL/L (ref 98–107)
CHLORIDE UR-SCNC: <20 MMOL/L
CLARITY UR: ABNORMAL
CO2 SERPL-SCNC: 25.9 MMOL/L (ref 22–29)
COLOR UR: ABNORMAL
CREAT SERPL-MCNC: 2.43 MG/DL (ref 0.76–1.27)
CREAT UR-MCNC: 230.2 MG/DL
DEPRECATED RDW RBC AUTO: 46.1 FL (ref 37–54)
EGFRCR SERPLBLD CKD-EPI 2021: 26.9 ML/MIN/1.73
ERYTHROCYTE [DISTWIDTH] IN BLOOD BY AUTOMATED COUNT: 14.1 % (ref 12.3–15.4)
GLUCOSE BLDC GLUCOMTR-MCNC: 104 MG/DL (ref 70–130)
GLUCOSE BLDC GLUCOMTR-MCNC: 111 MG/DL (ref 70–130)
GLUCOSE BLDC GLUCOMTR-MCNC: 118 MG/DL (ref 70–130)
GLUCOSE BLDC GLUCOMTR-MCNC: 127 MG/DL (ref 70–130)
GLUCOSE SERPL-MCNC: 116 MG/DL (ref 65–99)
GLUCOSE UR STRIP-MCNC: NEGATIVE MG/DL
HCT VFR BLD AUTO: 42 % (ref 37.5–51)
HGB BLD-MCNC: 14.3 G/DL (ref 13–17.7)
HGB UR QL STRIP.AUTO: ABNORMAL
HYALINE CASTS UR QL AUTO: ABNORMAL /LPF
KETONES UR QL STRIP: NEGATIVE
LEUKOCYTE ESTERASE UR QL STRIP.AUTO: ABNORMAL
MCH RBC QN AUTO: 30.8 PG (ref 26.6–33)
MCHC RBC AUTO-ENTMCNC: 34 G/DL (ref 31.5–35.7)
MCV RBC AUTO: 90.3 FL (ref 79–97)
NITRITE UR QL STRIP: NEGATIVE
PH UR STRIP.AUTO: <=5 [PH] (ref 5–8)
PLATELET # BLD AUTO: 259 10*3/MM3 (ref 140–450)
PMV BLD AUTO: 10.4 FL (ref 6–12)
POTASSIUM SERPL-SCNC: 4.3 MMOL/L (ref 3.5–5.2)
PROT ?TM UR-MCNC: 293.5 MG/DL
PROT UR QL STRIP: ABNORMAL
PROT/CREAT UR: 1275 MG/G CREA (ref 0–200)
RBC # BLD AUTO: 4.65 10*6/MM3 (ref 4.14–5.8)
RBC # UR STRIP: ABNORMAL /HPF
REF LAB TEST METHOD: ABNORMAL
SODIUM SERPL-SCNC: 139 MMOL/L (ref 136–145)
SODIUM UR-SCNC: 40 MMOL/L
SP GR UR STRIP: 1.02 (ref 1–1.03)
SQUAMOUS #/AREA URNS HPF: ABNORMAL /HPF
UROBILINOGEN UR QL STRIP: ABNORMAL
WBC # UR STRIP: ABNORMAL /HPF
WBC NRBC COR # BLD AUTO: 11.09 10*3/MM3 (ref 3.4–10.8)

## 2025-05-30 PROCEDURE — 84300 ASSAY OF URINE SODIUM: CPT | Performed by: INTERNAL MEDICINE

## 2025-05-30 PROCEDURE — 80048 BASIC METABOLIC PNL TOTAL CA: CPT | Performed by: STUDENT IN AN ORGANIZED HEALTH CARE EDUCATION/TRAINING PROGRAM

## 2025-05-30 PROCEDURE — 81001 URINALYSIS AUTO W/SCOPE: CPT | Performed by: INTERNAL MEDICINE

## 2025-05-30 PROCEDURE — 99232 SBSQ HOSP IP/OBS MODERATE 35: CPT | Performed by: NURSE PRACTITIONER

## 2025-05-30 PROCEDURE — 85027 COMPLETE CBC AUTOMATED: CPT | Performed by: STUDENT IN AN ORGANIZED HEALTH CARE EDUCATION/TRAINING PROGRAM

## 2025-05-30 PROCEDURE — 25810000003 SODIUM CHLORIDE 0.9 % SOLUTION: Performed by: INTERNAL MEDICINE

## 2025-05-30 PROCEDURE — 84156 ASSAY OF PROTEIN URINE: CPT | Performed by: STUDENT IN AN ORGANIZED HEALTH CARE EDUCATION/TRAINING PROGRAM

## 2025-05-30 PROCEDURE — 94799 UNLISTED PULMONARY SVC/PX: CPT

## 2025-05-30 PROCEDURE — 94664 DEMO&/EVAL PT USE INHALER: CPT

## 2025-05-30 PROCEDURE — 82436 ASSAY OF URINE CHLORIDE: CPT | Performed by: INTERNAL MEDICINE

## 2025-05-30 PROCEDURE — 82948 REAGENT STRIP/BLOOD GLUCOSE: CPT

## 2025-05-30 PROCEDURE — 82570 ASSAY OF URINE CREATININE: CPT | Performed by: STUDENT IN AN ORGANIZED HEALTH CARE EDUCATION/TRAINING PROGRAM

## 2025-05-30 RX ORDER — SODIUM CHLORIDE 9 MG/ML
100 INJECTION, SOLUTION INTRAVENOUS CONTINUOUS
Status: DISCONTINUED | OUTPATIENT
Start: 2025-05-30 | End: 2025-05-31

## 2025-05-30 RX ADMIN — ASPIRIN 81 MG: 81 TABLET, COATED ORAL at 10:00

## 2025-05-30 RX ADMIN — HYDROCODONE BITARTRATE AND ACETAMINOPHEN 1 TABLET: 10; 325 TABLET ORAL at 04:16

## 2025-05-30 RX ADMIN — TERAZOSIN 1 MG: 1 CAPSULE ORAL at 20:28

## 2025-05-30 RX ADMIN — ATORVASTATIN CALCIUM 40 MG: 20 TABLET, FILM COATED ORAL at 10:00

## 2025-05-30 RX ADMIN — SODIUM CHLORIDE 100 ML/HR: 9 INJECTION, SOLUTION INTRAVENOUS at 15:51

## 2025-05-30 RX ADMIN — Medication 10 ML: at 10:01

## 2025-05-30 RX ADMIN — MONTELUKAST 10 MG: 10 TABLET, FILM COATED ORAL at 20:28

## 2025-05-30 RX ADMIN — CARVEDILOL 12.5 MG: 12.5 TABLET, FILM COATED ORAL at 10:01

## 2025-05-30 RX ADMIN — CLONIDINE HYDROCHLORIDE 0.2 MG: 0.1 TABLET ORAL at 10:00

## 2025-05-30 RX ADMIN — Medication 10 ML: at 20:29

## 2025-05-30 RX ADMIN — BUDESONIDE AND FORMOTEROL FUMARATE DIHYDRATE 2 PUFF: 160; 4.5 AEROSOL RESPIRATORY (INHALATION) at 19:32

## 2025-05-30 RX ADMIN — CLOPIDOGREL BISULFATE 75 MG: 75 TABLET, FILM COATED ORAL at 10:00

## 2025-05-30 RX ADMIN — CARVEDILOL 12.5 MG: 12.5 TABLET, FILM COATED ORAL at 18:04

## 2025-05-30 RX ADMIN — ISOSORBIDE MONONITRATE 30 MG: 30 TABLET, EXTENDED RELEASE ORAL at 10:03

## 2025-05-30 RX ADMIN — BUDESONIDE AND FORMOTEROL FUMARATE DIHYDRATE 2 PUFF: 160; 4.5 AEROSOL RESPIRATORY (INHALATION) at 08:27

## 2025-05-30 NOTE — CONSULTS
"                                                                                                [facility]                                                                                             Nephrology Initial Consult Note    Patient Identification:  Name: Negrito Navarro MRN: [id number]  Age: 74 y.o. : 1950  Sex: male  Date:2025    Requesting Physician: As per consult order.  Reason for Consultation: BAO  Information from:patient/ family/ chart      History of Present Illness: This is a 74 y.o. year old male  who appears to have some mild underlying chronic kidney disease, baseline creatinine 1.2  He presented to the hospital with hypertensive emergency, acute CHF and urine retention  Urology has been consulted, Herrera placed  Creatinine up to 2.4 today.  About 1.5 L urine output over the last 24 hours  He feels well today denies complaints states he is eating and drinking well with no nausea vomiting shortness of breath chest pain fevers or chills.  Urine in the Herrera bag is very dark and concentrated.  His initial blood pressure was 208/120, decreased and then went back up to 236/125 then shortly after that was 100s over 60s and remains at that level today.  He is on spironolactone and lisinopril at home in addition to hydralazine, carvedilol and Catapres.  He says that he does a \"pretty good job\" taking his medications as prescribed.        The following medical history and medications personally reviewed by me:    Problem List:     CHF exacerbation    Essential hypertension    SERENITY (obstructive sleep apnea)    COPD (chronic obstructive pulmonary disease)    CAD (coronary artery disease)    Chronic kidney failure    Type 2 diabetes mellitus    Chronic HFrEF (heart failure with reduced ejection fraction)      Past Medical History:  Past Medical History:   Diagnosis Date    Benign prostatic hyperplasia 11/10/2016    COPD (chronic obstructive pulmonary disease) 6/10/2021    Coronary " artery disease involving coronary bypass graft of native heart without angina pectoris 6/10/2021    Essential hypertension 11/10/2016    Hyperlipidemia     SERENITY (obstructive sleep apnea) 11/10/2016       Past Surgical History:  Past Surgical History:   Procedure Laterality Date    CARDIAC CATHETERIZATION N/A 4/21/2023    Procedure: Left Heart Cath;  Surgeon: Jayne Villalobos MD;  Location:  [facility] CATH [facility]VASIVE [facility];  Service: Cardiovascular;  Laterality: N/A;    CARDIAC CATHETERIZATION N/A 4/21/2023    Procedure: Coronary angiography;  Surgeon: Jayne Villalobos MD;  Location:  [facility];  Service: Cardiovascular;  Laterality: N/A;    CARDIAC CATHETERIZATION N/A 4/21/2023    Procedure: Left ventriculography;  Surgeon: Jayne Villalobos MD;  Location:  [facility] C[facility]VASIVE [facility];  Service: Cardiovascular;  Laterality: N/A;    CARDIAC CATHETERIZATION  4/21/2023    Procedure: Saphenous Vein Graft;  Surgeon: Jayne Villalobos MD;  [facility];  Service: Cardiovascular;;    CARDIAC CATHETERIZATION N/A 4/21/2023    Procedure: Native mammary injection;  Surgeon: Jayne Villalobos MD;  [facility];  Service: Cardiovascular;  Laterality: N/A;    CARDIAC CATHETERIZATION N/A 4/21/2023    Procedure: Percutaneous Coronary Intervention;  Surgeon: Jayne Villalobos MD;  [facility];  Service: Cardiovascular;  Laterality: N/A;    CARDIAC CATHETERIZATION N/A 4/21/2023    Procedure: Stent BETO coronary;  Surgeon: Jayne Villalobos MD;  [facility];  Service: Cardiovascular;  Laterality: N/A;    CARDIAC CATHETERIZATION  4/21/2023    Procedure: Percutaneous Manual Thrombectomy;  Surgeon: Jayne Villalobos MD;  [facility];  Service: Cardiovascular;;        Home Meds:   Medications Prior to Admission   Medication Sig Dispense Refill  Last Dose/Taking    albuterol sulfate  (90 Base) MCG/ACT inhaler Inhale 2 puffs Every 4 (Four) Hours As Needed for Wheezing.       aspirin 81 MG EC tablet Take 1 tablet by mouth Daily.       atorvastatin (LIPITOR) 40 MG tablet Take 1 tablet by mouth Daily.       budesonide-formoterol (SYMBICORT) 160-4.5 MCG/ACT inhaler Inhale 2 puffs 2 (Two) Times a Day. 10.2 g 1     carvedilol (COREG) 12.5 MG tablet Take 1 tablet by mouth 2 (Two) Times a Day With Meals.       cloNIDine (CATAPRES) 0.2 MG tablet Take 1 tablet by mouth Every 8 (Eight) Hours. 90 tablet 3     clopidogrel (PLAVIX) 75 MG tablet Take 1 tablet by mouth Daily. 90 tablet 3     fluticasone (FLONASE) 50 MCG/ACT nasal spray 2 sprays into the nostril(s) as directed by provider Daily.       hydrALAZINE (APRESOLINE) 50 MG tablet Take 1 tablet by mouth Every 8 (Eight) Hours. 90 tablet 3     HYDROcodone-Acetaminophen (XODOL )  MG per tablet Take 1 tablet by mouth Every 8 (Eight) Hours As Needed for Moderate Pain.       isosorbide mononitrate (IMDUR) 30 MG 24 hr tablet Take 1 tablet by mouth Daily. 30 tablet 3     ketoconazole (NIZORAL) 2 % cream Apply  topically to the appropriate area as directed Daily.       lisinopril (PRINIVIL,ZESTRIL) 40 MG tablet Take 1 tablet by mouth Daily. 30 tablet 3     montelukast (SINGULAIR) 10 MG tablet Take 1 tablet by mouth Every Night. 30 tablet 0     nitroglycerin (NITROSTAT) 0.4 MG SL tablet Place 1 tablet under the tongue Every 5 (Five) Minutes As Needed for Chest Pain for up to 1 dose. Take no more than 3 doses in 15 minutes. 25 tablet 12     spironolactone (ALDACTONE) 50 MG tablet Take 1 tablet by mouth Daily.          Current Meds:   Current Facility-Administered Medications   Medication Dose Route Frequency Provider Last Rate Last Admin    acetaminophen (TYLENOL) tablet 650 mg  650 mg Oral Q4H PRN Danitza Pérez APRN   650 mg at 05/29/25 0444    Or    acetaminophen (TYLENOL) 160 MG/5ML oral solution  650 mg  650 mg Oral Q4H PRN Danitza Pérez APRN        Or    acetaminophen (TYLENOL) suppository 650 mg  650 mg Rectal Q4H PRN Danitza Pérez APRN        albuterol (PROVENTIL) nebulizer solution 0.083% 2.5 mg/3mL  2.5 mg Nebulization Q6H PRN Medhat Moyer MD        aspirin EC tablet 81 mg  81 mg Oral Daily Medhat Moyer MD   81 mg at 05/30/25 1000    atorvastatin (LIPITOR) tablet 40 mg  40 mg Oral Daily Medhat Moyer MD   40 mg at 05/30/25 1000    sennosides-docusate (PERICOLACE) 8.6-50 MG per tablet 2 tablet  2 tablet Oral BID PRN Danitza Pérez APRN        And    polyethylene glycol (MIRALAX) packet 17 g  17 g Oral Daily PRN Danitza Pérez APRN        And    bisacodyl (DULCOLAX) EC tablet 5 mg  5 mg Oral Daily PRN Danitza Pérez APRN        And    bisacodyl (DULCOLAX) suppository 10 mg  10 mg Rectal Daily PRN Danitza Pérez APRN        budesonide-formoterol (SYMBICORT) 160-4.5 MCG/ACT inhaler 2 puff  2 puff Inhalation BID - RT Medhat Moyer MD   2 puff at 05/30/25 0827    carvedilol (COREG) tablet 12.5 mg  12.5 mg Oral BID With Meals Medhat Moyer MD   12.5 mg at 05/30/25 1001    cloNIDine (CATAPRES) tablet 0.2 mg  0.2 mg Oral Q8H Medhat Moyer MD   0.2 mg at 05/30/25 1000    clopidogrel (PLAVIX) tablet 75 mg  75 mg Oral Daily Medhat Moyer MD   75 mg at 05/30/25 1000    dextrose (D50W) (25 g/50 mL) IV injection 25 g  25 g Intravenous Q15 Min PRN Danitza Pérez APRN        dextrose (GLUTOSE) oral gel 15 g  15 g Oral Q15 Min PRN Danitza Pérez APRN        famotidine (PEPCID) tablet 20 mg  20 mg Oral BID PRN Danitza Pérez APRN        glucagon (GLUCAGEN) injection 1 mg  1 mg Intramuscular Q15 Min PRN Danitza Pérez APRN        hydrALAZINE (APRESOLINE) injection 10 mg  10 mg Intravenous Q6H PRN Janelle Adler APRN        hydrALAZINE (APRESOLINE) tablet 50 mg  50 mg Oral Q8H Medhat Moyer MD   50 mg at 05/29/25 1509    HYDROcodone-acetaminophen (NORCO)   MG per tablet 1 tablet  1 tablet Oral Q8H PRN Medhat Moyer MD   1 tablet at 05/30/25 0416    insulin lispro (HUMALOG/ADMELOG) injection 2-7 Units  2-7 Units Subcutaneous 4x Daily AC & at Bedtime Danitza Pérez APRN   2 Units at 05/29/25 1744    isosorbide mononitrate (IMDUR) 24 hr tablet 30 mg  30 mg Oral Q24H Medhat Moyer MD   30 mg at 05/30/25 1003    labetalol (NORMODYNE,TRANDATE) injection 20 mg  20 mg Intravenous Q4H PRN Jayne Villalobos MD   20 mg at 05/29/25 0815    [Held by provider] lisinopril (PRINIVIL,ZESTRIL) tablet 40 mg  40 mg Oral Q24H Medhat Moyer MD   40 mg at 05/29/25 1023    melatonin tablet 5 mg  5 mg Oral Nightly PRN Danitza Pérez APRN        montelukast (SINGULAIR) tablet 10 mg  10 mg Oral Nightly Medhat Moyer MD   10 mg at 05/29/25 2059    nitroglycerin (NITROSTAT) SL tablet 0.4 mg  0.4 mg Sublingual Q5 Min PRN Danitza Pérez APRN        ondansetron (ZOFRAN) injection 4 mg  4 mg Intravenous Q6H PRN Danitza Pérez APRN        sodium chloride 0.9 % flush 10 mL  10 mL Intravenous Q12H Danitza Pérez APRN   10 mL at 05/30/25 1001    sodium chloride 0.9 % flush 10 mL  10 mL Intravenous PRN Danitza Pérez APRN   10 mL at 05/29/25 0446    sodium chloride 0.9 % infusion 40 mL  40 mL Intravenous PRN Danitza Pérez APRN        [Held by provider] spironolactone (ALDACTONE) tablet 50 mg  50 mg Oral Daily Medhat Moyer MD   50 mg at 05/29/25 1023    terazosin (HYTRIN) capsule 1 mg  1 mg Oral Nightly Gricelda Pfeiffer APRN   1 mg at 05/29/25 2059       Allergies:  Allergies   Allergen Reactions    Shellfish-Derived Products Unknown - Low Severity     gout  gout         Social History:   Social History     Socioeconomic History    Marital status: Legally    Tobacco Use    Smoking status: Never     Passive exposure: Never    Smokeless tobacco: Never   Vaping Use    Vaping status: Never Used   Substance and Sexual Activity    Alcohol use: Never  "   Drug use: Never    Sexual activity: Never     Birth control/protection: None        Family History:  History reviewed. No pertinent family history.     Review of Systems: as per HPI, in addition:    General:      No weakness / fatigue,                       No fevers / chills                       no weight loss  HEENT;       no dysphagia   Neck:           No swelling  Respiratory: no cough / congestion/wheezing                      No shortness of air   CV:              No chest pain                       No palpitations  Abdomen/GI: no nausea / vomiting                      No diarrhea, no constipation                      No abdominal pain  :             no dysuria  Endocrine:   No heat or cold intolerance  Skin:           Denies rashes or skin lesions   Vascular:   No edema  Musculoskeletal: No current joint pain or deformities      Physical Exam:  Vitals:   Temp (24hrs), Av.5 °F (36.4 °C), Min:97.3 °F (36.3 °C), Max:97.5 °F (36.4 °C)    /64 (BP Location: Left arm, Patient Position: Lying)   Pulse 66   Temp 97.5 °F (36.4 °C) (Oral)   Resp 18   Ht 172.7 cm (68\")   Wt 101 kg (222 lb 10.6 oz)   SpO2 98%   BMI 33.86 kg/m²   Intake/Output:     Intake/Output Summary (Last 24 hours) at 2025 1212  Last data filed at 2025 0932  Gross per 24 hour   Intake --   Output 1500 ml   Net -1500 ml        Wt Readings from Last 1 Encounters:   25 0619 101 kg (222 lb 10.6 oz)   25 1440 95.7 kg (211 lb)       Exam:    General Appearance:  Awake, alert, no acute distress  Chronically ill no-appearing   Head and Face:  Normocephalic, atraumatic   Eyes:  No icterus   ENMT: Moist mucosa   Neck: Supple  no jugular venous distention   Pulmonary:  Respiratory effort: Normal  Auscultation of lungs: Clear bilaterally  No wheezes or rhonchi  Good air movement, good expansion   Chest wall:  No tenderness or deformity   Cardiovascular:  Auscultation of the heart: Normal rhythm, no murmurs  No edema of " bilateral lower extremities   Abdomen:  Abdomen: soft, nontender, normal bowel sounds    Musculoskeletal: No joint swelling or gross deformities    Skin: Skin inspection and palpation: No rash   Lymphatic: No focal lymphadenopathy   Psychiatric: Judgement and insight: normal  Orientation to person place and time: normal  Mood and affect: normal       DATA:  Radiology and Labs:  The following labs independently reviewed by me, additional AM labs ordered  Old records independently reviewed showing history of hypertension, CKD baseline creatinine 1.2  The following radiologic studies independently viewed by me, findings echo shows normal EF normal diastolic function mild AV stenosis  Interval notes, chart personally reviewed by me.  I have reviewed and summarized old records as detailed above  Plan of care discussed with patient himself at bedside  New problems include BAO on CKD      Risk/ complexity of medical care/ medical decision making: High risk, new BAO management, difficult blood pressure control  Chronic illness with severe exacerbation or progression: BAO on CKD      Labs:   Recent Results (from the past 24 hours)   Adult Transthoracic Echo Complete W/ Cont if Necessary Per Protocol    Collection Time: 05/29/25  2:38 PM   Result Value Ref Range    LV Sys Vol (BSA corrected) 8.1 cm2    LV Lu Vol (BSA corrected) 34.0 cm2    LVOT area 2.7 cm2    LVOT diam 1.84 cm    EDV(MOD-sp2) 63.0 ml    EDV(MOD-sp4) 71.0 ml    ESV(MOD-sp4) 17.0 ml    SV(MOD-sp4) 54.0 ml    SVi(MOD-SP4) 25.8 ml/m2    SVi (LVOT) 32.5 ml/m2    EF(MOD-sp4) 76.1 %    MV E max kimo 58.7 cm/sec    MV A max kimo 89.1 cm/sec    MV dec time 0.24 sec    MV E/A 0.66     MV A dur 0.12 sec    Med Peak E' Kimo 5.9 cm/sec    Lat Peak E' Kimo 5.7 cm/sec    Avg E/e' ratio 10.12     SV(LVOT) 68.0 ml    TAPSE (>1.6) 2.7 cm    RV S' 11.9 cm/sec    LV V1 max 100.6 cm/sec    LV V1 max PG 4.0 mmHg    LV V1 mean PG 2.15 mmHg    LV V1 VTI 25.7 cm    Ao pk kimo 218.0  cm/sec    Ao max PG 19.0 mmHg    Ao mean PG 9.4 mmHg    Ao V2 VTI 42.4 cm    DAVID(I,D) 1.61 cm2    Dimensionless Index 0.61 (DI)    MV max PG 4.1 mmHg    MV mean PG 2.24 mmHg    MV V2 VTI 32.3 cm    MV P1/2t 125.0 msec    MVA(P1/2t) 1.76 cm2    MVA(VTI) 2.10 cm2    MV dec slope 223.3 cm/sec2    RV V1 max PG 2.6 mmHg    RV V1 max 80.7 cm/sec    RV V1 VTI 16.9 cm    PA V2 max 109.1 cm/sec    PA acc time 0.12 sec    Ao root diam 2.42 cm    ACS 1.41 cm    Sinus 3.3 cm    STJ 2.9 cm   POC Glucose Once    Collection Time: 05/29/25  3:57 PM    Specimen: Blood   Result Value Ref Range    Glucose 174 (H) 70 - 130 mg/dL   POC Glucose Once    Collection Time: 05/29/25  9:06 PM    Specimen: Blood   Result Value Ref Range    Glucose 141 (H) 70 - 130 mg/dL   Basic Metabolic Panel    Collection Time: 05/30/25  5:49 AM    Specimen: Hand, Right; Blood   Result Value Ref Range    Glucose 116 (H) 65 - 99 mg/dL    BUN 35.0 (H) 8.0 - 23.0 mg/dL    Creatinine 2.43 (H) 0.76 - 1.27 mg/dL    Sodium 139 136 - 145 mmol/L    Potassium 4.3 3.5 - 5.2 mmol/L    Chloride 103 98 - 107 mmol/L    CO2 25.9 22.0 - 29.0 mmol/L    Calcium 8.8 8.6 - 10.5 mg/dL    BUN/Creatinine Ratio 14.4 7.0 - 25.0    Anion Gap 10.1 5.0 - 15.0 mmol/L    eGFR 26.9 (L) >60.0 mL/min/1.73   CBC (No Diff)    Collection Time: 05/30/25  5:49 AM    Specimen: Hand, Right; Blood   Result Value Ref Range    WBC 11.09 (H) 3.40 - 10.80 10*3/mm3    RBC 4.65 4.14 - 5.80 10*6/mm3    Hemoglobin 14.3 13.0 - 17.7 g/dL    Hematocrit 42.0 37.5 - 51.0 %    MCV 90.3 79.0 - 97.0 fL    MCH 30.8 26.6 - 33.0 pg    MCHC 34.0 31.5 - 35.7 g/dL    RDW 14.1 12.3 - 15.4 %    RDW-SD 46.1 37.0 - 54.0 fl    MPV 10.4 6.0 - 12.0 fL    Platelets 259 140 - 450 10*3/mm3   POC Glucose Once    Collection Time: 05/30/25  7:13 AM    Specimen: Blood   Result Value Ref Range    Glucose 127 70 - 130 mg/dL   POC Glucose Once    Collection Time: 05/30/25 11:31 AM    Specimen: Blood   Result Value Ref Range    Glucose 111  70 - 130 mg/dL       Radiology:  Imaging Results (Last 24 Hours)       ** No results found for the last 24 hours. **                 ASSESSMENT:   New BAO.  I suspect this is due to decreased renal perfusion from a some labile blood pressures and fairly rapid drop in his blood pressure from 236 systolic down to 102.  He remains in the low 100s today, looks euvolemic on exam.  Received 1 dose of spironolactone and lisinopril yesterday but currently on hold.  Hypertensive urgency    CHF exacerbation, versus vascular congestion from hypertensive emergency.  Echo showed a normal EF normal diastolic function    Essential hypertension, labile control since admission    SERENITY (obstructive sleep apnea)    COPD (chronic obstructive pulmonary disease)    CAD (coronary artery disease), post CABG    Chronic kidney failure, stage IIIa, likely from hypertension primarily    Type 2 diabetes mellitus, good control        DISCUSSION/PLAN:   BAO likely due to BP flux with decreased renal perfusion, possible ATN  Will gently hydrate today with normal saline 1 L  His echo looked fairly normal so I think he can tolerate some IV hydration well  Continue to hold lisinopril and spironolactone   will also hold hydralazine to Allow for some permissive hypertension to help increase renal perfusion  No need for renal ultrasound.  Low likelihood for any upper tract obstruction.  Herrera remains in place  Suspect poor compliance with his prescribed antihypertensive meds  Check urine studies    Continue to monitor electrolytes and volume closely, avoid IV contrast and nephrotoxic medications     I appreciate the consult request.  Please send me a secure chat message with any nonurgent questions regarding patient care.  For any urgent patient care issues please call my office number below.      Carlos Rodriguez MD  [facility]  Office phone number: [phone]  Answering service phone number:  [phone]      5/30/2025        Dictation via Dragon dictation software

## 2025-05-30 NOTE — DISCHARGE PLACEMENT REQUEST
"Wilbur Navarro (74 y.o. Male)       Date of Birth   1950    Social Security Number       Address   [address] [place] [id number]    Home Phone   [phone]    MRN   [id number]       M[employer]on   Episcopal    Marital Status   Legally                             Admission Date   5/28/2025    Admission Type   Emergency    Admitting Provider   Medhat Moyer MD    Attending Provider   Peter Dalton MD    Department, Room/Bed   [address], P588/1       Discharge Date       Discharge Disposition       Discharge Destination                                 Attending Provider: Peter Dalton MD    Allergies: Shellfish-derived Products    Isolation: None   Infection: None   Code Status: CPR    Ht: 172.7 cm (68\")   Wt: 101 kg (222 lb 10.6 oz)    Admission Cmt: None   Principal Problem: CHF exacerbation [I50.9]                   Active Insurance as of 5/28/2025       Primary Coverage       Payor Plan Insurance Group Employer/Plan Group    [facility] [employer] MEDICARE ADVANTAGE SNP PPO        Payor Plan Address Payor Plan Phone Number Payor Plan Fax Number Effective Dates    [address] [address]   4/1/2025 - None Entered    [facility] [address]         Subscriber Name Subscriber Birth Date Member ID       WILBUR NAVARRO 1950 [id number]               Secondary Coverage       Payor Plan Insurance Group Employer/Plan Group    [place]        Payor Plan Address Payor Plan Phone Number Payor Plan Fax Number Effective Dates    [address] [phone]  6/9/2021 - None Entered    [facility] [id number]         Subscriber Name Subscriber Birth Date Member ID       WILBUR NAVARRO 1950 [id number]                     Emergency Contacts        (Rel.) Home Phone Work Phone Mobile Phone    JOAQUIM SERRATO (Daughter) -- -- [phone]    Drea Navarro (Daughter) -- -- [phone]                "

## 2025-05-30 NOTE — CASE MANAGEMENT/SOCIAL WORK
Discharge Planning Assessment  [facility]     Patient Name: Negrito Navarro  MRN: [id number]  Today's Date: 5/30/2025    Admit Date: 5/28/2025    Plan: Plans discharge to his daughter Myra's house ([address] [place] [address]).   Discharge Needs Assessment       Row Name 05/30/25 1021       Living Environment    People in Home child(mike), adult    Name(s) of People in Home Myra Martin/daughter [phone]    Current Living Arrangements home    Potentially Unsafe Housing Conditions none    In the past 12 months has the electric, gas, oil, or water company threatened to shut off services in your home? No    Primary Care Provided by self    Family Caregiver if Needed child(mike), adult    Family Caregiver Names Myra Martin/daughter [phone]  Drea Navarro/daughter [phone]    Quality of Family Relationships helpful;involved;supportive    Able to Return to Prior Arrangements yes       Resource/Environmental Concerns    Resource/Environmental Concerns none    Transportation Concerns none       Transportation Needs    In the past 12 months, has lack of transportation kept you from medical appointments or from getting medications? no    In the past 12 months, has lack of transportation kept you from meetings, work, or from getting things needed for daily living? No       Food Insecurity    Within the past 12 months, you worried that your food would run out before you got the money to buy more. Never true    Within the past 12 months, the food you bought just didn't last and you didn't have money to get more. Never true       Transition Planning    Patient/Family Anticipates Transition to home with family    Patient/Family Anticipated Services at Transition     Transportation Anticipated family or friend will provide       Discharge Needs Assessment    Readmission Within the Last 30 Days no previous admission in last 30 days    Equipment Currently Used at Home oxygen    Do you want help  finding or keeping work or a job? I do not need or want help    Do you want help with school or training? For example, starting or completing job training or getting a high school diploma, GED or equivalent No    Anticipated Changes Related to Illness none    Equipment Needed After Discharge oxygen;rollator    Provided Post Acute Provider List? N/A    Provided Post Acute Provider Quality & Resource List? N/A    Patient's Choice of Community Agency(s) No skilled needs noted                   Discharge Plan       Row Name 05/30/25 1028       Plan    Plan Plans discharge to his daughter Myra's house ([address] [place] [address]).    Patient/Family in Agreement with Plan yes    Plan Comments Spoke with patient at bedside to complete initial assessment. Confirmed information on facesheet. PCP: Gaby Yu MD and Pharmacy: [facility] Patient is currently staying with his daughter Myra in a single level house with one VERONN ([address] [place] [address]). Denies difficulty affording medications or transportation concerns. Patient is IADLs and occasionally uses stright can for mobility. States he wears home O2 per [facility]. Says he needs a portable tank and requested order for rollator. Referral placed to [facility]. No additional needs noted. Family to transport.....[facility]                  Continued Care and Services - Admitted Since 5/28/2025       Durable Medical Equipment       Service Provider Request Status Services Address Phone Fax Patient Preferred    [facility] Pending - Request Sent -- [id number], [facility] [phone] --                  Expected Discharge Date and Time       Expected Discharge Date Expected Discharge Time    Jun 1, 2025            Demographic Summary    No documentation.                  Functional Status    No documentation.                  Psychosocial    No documentation.                  Abuse/Neglect     No documentation.                  Legal    No documentation.                  Substance Abuse    No documentation.                  Patient Forms    No documentation.                     Nelli Wilson RN

## 2025-05-30 NOTE — SIGNIFICANT NOTE
05/30/25 0902   OTHER   Discipline physical therapist   Therapy Assessment/Plan (PT)   Criteria for Skilled Interventions Met (PT) no problems identified which require skilled intervention  (We received a PT eval but I noticed in adult pcs, he is up sba 22' and with ampac score of 22. In adult pt profile, no mobility issues PTA. Currently with this info, I don't see any acute PT needs and we will sign off.  Confirmed with RN)

## 2025-05-30 NOTE — PROGRESS NOTES
"  [facility] Progress Note    Chief Complaint: None    Doing well through the night.  No issues with his catheter.  No spasms.  No clotting.  Urine is slightly pink-tinged E flux    Review of Systems:    The following systems were reviewed and negative;  respiratory, cardiovascular, and gastrointestinal          Vital Signs  /65 (BP Location: Left arm, Patient Position: Lying)   Pulse 63   Temp 97.5 °F (36.4 °C) (Oral)   Resp 18   Ht 172.7 cm (68\")   Wt 101 kg (222 lb 10.6 oz)   SpO2 97%   BMI 33.86 kg/m²     Physical Exam:     General Appearance:    Alert, cooperative, NAD   HEENT:    No trauma, pupils reactive, hearing intact   Back:     No CVA tenderness   Lungs:     Respirations unlabored, no wheezing    Heart:    RRR, intact peripheral pulses   Abdomen:   Soft benign no CVA tenderness   :  External genitalia normal.  Herrera catheter anchored   Extremities:   No edema, no deformity   Lymphatic:   No neck or groin LAD   Skin:   No bleeding, bruising or rashes   Neuro/Psych:   Orientation intact, mood/affect pleasant, no focal findings        Results Review:     I reviewed the patient's new clinical results.  Lab Results (last 24 hours)       Procedure Component Value Units Date/Time    Basic Metabolic Panel [[id number]]  (Abnormal) Collected: 05/30/25 0549    Specimen: Blood from Hand, Right Updated: 05/30/25 0702     Glucose 116 mg/dL      BUN 35.0 mg/dL      Creatinine 2.43 mg/dL      Sodium 139 mmol/L      Potassium 4.3 mmol/L      Comment: Slight hemolysis detected by analyzer. Result may be falsely elevated.        Chloride 103 mmol/L      CO2 25.9 mmol/L      Calcium 8.8 mg/dL      BUN/Creatinine Ratio 14.4     Anion Gap 10.1 mmol/L      eGFR 26.9 mL/min/1.73     Narrative:      GFR Categories in Chronic Kidney Disease (CKD)              GFR Category          GFR (mL/min/1.73)    Interpretation  G1                    90 or greater        Normal or high (1)  G2                    60-89        "         Mild decrease (1)  G3a                   45-59                Mild to moderate decrease  G3b                   30-44                Moderate to severe decrease  G4                    15-29                Severe decrease  G5                    14 or less           Kidney failure    (1)In the absence of evidence of kidney disease, neither GFR category G1 or G2 fulfill the criteria for CKD.    eGFR calculation 2021 CKD-EPI creatinine equation, which does not include race as a factor    CBC (No Diff) [[id number]]  (Abnormal) Collected: 05/30/25 0549    Specimen: Blood from Hand, Right Updated: 05/30/25 0626     WBC 11.09 10*3/mm3      RBC 4.65 10*6/mm3      Hemoglobin 14.3 g/dL      Hematocrit 42.0 %      MCV 90.3 fL      MCH 30.8 pg      MCHC 34.0 g/dL      RDW 14.1 %      RDW-SD 46.1 fl      MPV 10.4 fL      Platelets 259 10*3/mm3     POC Glucose Once [[id number]]  (Abnormal) Collected: 05/29/25 2106    Specimen: Blood Updated: 05/29/25 2107     Glucose 141 mg/dL     POC Glucose Once [[id number]]  (Abnormal) Collected: 05/29/25 1557    Specimen: Blood Updated: 05/29/25 1558     Glucose 174 mg/dL     Hemoglobin A1c [[id number]]  (Abnormal) Collected: 05/29/25 0622    Specimen: Blood Updated: 05/29/25 1147     Hemoglobin A1C 6.30 %     Narrative:      Hemoglobin A1C Ranges:    Increased Risk for Diabetes  5.7% to 6.4%  Diabetes                     >= 6.5%  Diabetic Goal                < 7.0%    POC Glucose Once [[id number]]  (Normal) Collected: 05/29/25 1109    Specimen: Blood Updated: 05/29/25 1111     Glucose 123 mg/dL     POC Glucose Once [[id number]]  (Abnormal) Collected: 05/29/25 0852    Specimen: Blood Updated: 05/29/25 0854     Glucose 159 mg/dL           Imaging Results (Last 24 Hours)       ** No results found for the last 24 hours. **            Medication Review:   I have personally reviewed    Current Facility-Administered Medications:     acetaminophen (TYLENOL) tablet 650 mg, 650 mg, Oral, Q4H PRN, 650  mg at 05/29/25 0444 **OR** acetaminophen (TYLENOL) 160 MG/5ML oral solution 650 mg, 650 mg, Oral, Q4H PRN **OR** acetaminophen (TYLENOL) suppository 650 mg, 650 mg, Rectal, Q4H PRN, Danitza Pérez, APRN    albuterol (PROVENTIL) nebulizer solution 0.083% 2.5 mg/3mL, 2.5 mg, Nebulization, Q6H PRN, Medhat Moyer MD    aspirin EC tablet 81 mg, 81 mg, Oral, Daily, Medhat Moyer MD, 81 mg at 05/29/25 1023    atorvastatin (LIPITOR) tablet 40 mg, 40 mg, Oral, Daily, Medhat Moyer MD, 40 mg at 05/29/25 1023    sennosides-docusate (PERICOLACE) 8.6-50 MG per tablet 2 tablet, 2 tablet, Oral, BID PRN **AND** polyethylene glycol (MIRALAX) packet 17 g, 17 g, Oral, Daily PRN **AND** bisacodyl (DULCOLAX) EC tablet 5 mg, 5 mg, Oral, Daily PRN **AND** bisacodyl (DULCOLAX) suppository 10 mg, 10 mg, Rectal, Daily PRN, Danitza Pérez, APRN    budesonide-formoterol (SYMBICORT) 160-4.5 MCG/ACT inhaler 2 puff, 2 puff, Inhalation, BID - RT, Medhat Moyer MD, 2 puff at 05/29/25 2033    carvedilol (COREG) tablet 12.5 mg, 12.5 mg, Oral, BID With Meals, Medhat Moyer MD, 12.5 mg at 05/29/25 1743    cloNIDine (CATAPRES) tablet 0.2 mg, 0.2 mg, Oral, Q8H, Medhat Moyer MD, 0.2 mg at 05/29/25 1743    clopidogrel (PLAVIX) tablet 75 mg, 75 mg, Oral, Daily, Medhat Moyer MD, 75 mg at 05/29/25 1023    dextrose (D50W) (25 g/50 mL) IV injection 25 g, 25 g, Intravenous, Q15 Min PRN, Danitza Pérez APRN    dextrose (GLUTOSE) oral gel 15 g, 15 g, Oral, Q15 Min PRN, Danitza Pérez APRN    famotidine (PEPCID) tablet 20 mg, 20 mg, Oral, BID PRN, Danitza Pérez APRN    glucagon (GLUCAGEN) injection 1 mg, 1 mg, Intramuscular, Q15 Min PRN, Danitza Pérez APRN    hydrALAZINE (APRESOLINE) injection 10 mg, 10 mg, Intravenous, Q6H PRN, Janelle Adler APRN    hydrALAZINE (APRESOLINE) tablet 50 mg, 50 mg, Oral, Q8H, Medhat Moyer MD, 50 mg at 05/29/25 1509    HYDROcodone-acetaminophen (NORCO)  MG per tablet 1 tablet, 1 tablet,  Oral, Q8H PRN, Medhat Moyer MD, 1 tablet at 05/30/25 0416    insulin lispro (HUMALOG/ADMELOG) injection 2-7 Units, 2-7 Units, Subcutaneous, 4x Daily AC & at Bedtime, Danitza Pérez APRN, 2 Units at 05/29/25 1744    isosorbide mononitrate (IMDUR) 24 hr tablet 30 mg, 30 mg, Oral, Q24H, Medhat Moyer MD, 30 mg at 05/29/25 1023    labetalol (NORMODYNE,TRANDATE) injection 20 mg, 20 mg, Intravenous, Q4H PRN, Jayne Villalobos MD, 20 mg at 05/29/25 0815    lisinopril (PRINIVIL,ZESTRIL) tablet 40 mg, 40 mg, Oral, Q24H, Medhat Moyer MD, 40 mg at 05/29/25 1023    melatonin tablet 5 mg, 5 mg, Oral, Nightly PRN, Danitza Pérez APRN    montelukast (SINGULAIR) tablet 10 mg, 10 mg, Oral, Nightly, Medhat Moyer MD, 10 mg at 05/29/25 2059    nitroglycerin (NITROSTAT) SL tablet 0.4 mg, 0.4 mg, Sublingual, Q5 Min PRN, Danitza Pérez APRN    ondansetron (ZOFRAN) injection 4 mg, 4 mg, Intravenous, Q6H PRN, Danitza Pérez APRN    sodium chloride 0.9 % flush 10 mL, 10 mL, Intravenous, Q12H, Danitza Pérez APRN, 10 mL at 05/29/25 2100    sodium chloride 0.9 % flush 10 mL, 10 mL, Intravenous, PRN, Danitza Pérez APRN, 10 mL at 05/29/25 0446    sodium chloride 0.9 % infusion 40 mL, 40 mL, Intravenous, PRN, Danitza Pérez APRN    spironolactone (ALDACTONE) tablet 50 mg, 50 mg, Oral, Daily, Medhat Moyer MD, 50 mg at 05/29/25 1023    terazosin (HYTRIN) capsule 1 mg, 1 mg, Oral, Nightly, Gricelda Pfeiffer APRN, 1 mg at 05/29/25 2059    Allergies:    Shellfish-derived products    Assessment:    Active Problems:    CHF exacerbation    Essential hypertension    SERENITY (obstructive sleep apnea)    COPD (chronic obstructive pulmonary disease)    CAD (coronary artery disease)    Chronic kidney failure    Type 2 diabetes mellitus    Chronic HFrEF (heart failure with reduced ejection fraction)       Gross hematuria resolved  Urinary retention secondary to multiple factors suspect some chronic  component    Plan:    Herrera to stay and given high volume of bladder decompression.  Plan to send home with a catheter to follow-up in our office for outpatient workup.  Will continue to check on him if he remains in house for the next couple days      Roderick Talbert MD    5/30/2025  07:13 EDT

## 2025-05-30 NOTE — PLAN OF CARE
Goal Outcome Evaluation:  Plan of care reviewed with patient / family.    No change.

## 2025-05-30 NOTE — PAYOR COMM NOTE
"Wilbur Navarro (74 y.o. Male)    PLEASE SEE ATTACHED FOR INPT AUTH  OBS 5/29  IP 5/30    REF#[id number]    THANK YOU   MEHDI MONTOYA RN/ DEPT   [facility]   PH: [phone]   FAX:  DEPT [phone]     Date of Birth   1950    Social Security Number       Address   [address] [place] [address]    Home Phone   [phone]    MRN   [id number]       T[employer]   [employer]    Marital Status   Legally                             Admission Date   5/28/2025    Admission Type   Emergency    Admitting Provider   Medhat Moyer MD    Attending Provider   Peter Dalton MD    Department, Room/Bed   [address], 88/1       Discharge Date       Discharge Disposition       Discharge Destination                                 Attending Provider: Peter Dalton MD    Allergies: Shellfish-derived Products    Isolation: None   Infection: None   Code Status: CPR    Ht: 172.7 cm (68\")   Wt: 101 kg (222 lb 10.6 oz)    Admission Cmt: None   Principal Problem: CHF exacerbation [I50.9]                   Active Insurance as of 5/28/2025       Primary Coverage       Payor Plan Insurance Group Employer/Plan Group    [facility] WELLCARE MEDICARE ADVANTAGE SNP PPO        Payor Plan Address Payor Plan Phone Number Payor Plan Fax Number Effective Dates    [address] [address]   4/1/2025 - None Entered    [facility] [id number]         Subscriber Name Subscriber Birth Date Member ID       WILBUR NAVARRO 1950 [id number]               Secondary Coverage       Payor Plan Insurance Group Employer/Plan Group    [place]        Payor Plan Address Payor Plan Phone Number Payor Plan Fax Number Effective Dates    [address] [phone]  6/9/2021 - None Entered    [facility] [place] [address]         Subscriber Name Subscriber Birth Date Member ID       WILBUR NAVARRO 1950 [id number]                     Emergency Contacts       "  (Rel.) Home Phone Work Phone Mobile Phone    JOAQUIM SERRATO (Daughter) -- -- [phone]    Drea Navarro (Daughter) -- -- [phone]              [place]: [facility] [id number]  Tax ID [id number]     History & Physical        Medhat Moyer MD at 05/29/25 1115              Patient Name:  Negrito Navarro  YOB: 1950  MRN:  [id number]  Admit Date:  5/28/2025  Patient Care Team:  Provider, No Known as PCP - General      Subjective  History Present Illness     Chief Complaint   Patient presents with    Shortness of Breath     This is a 74-year-old man with a past medical history of COPD, coronary artery disease, congestive heart failure, chronic respiratory failure on 2.5 L of oxygen via nasal cannula presents the hospital with increased lower extremity swelling and shortness of breath.  He has reportedly been noncompliant with his medications but reports compliance with Lasix and spironolactone.  In the emergency department he was noted to have a normal proBNP with a slight elevation in troponin to 22, followed by 27.  Creatinine was 1.4, which is close to his baseline.  An x-ray was obtained that showed cardiomegaly with suspected vascular congestion.  EKG showed normal sinus rhythm.  He was admitted for further evaluation and management. He was notably hypertensive upon admission.       Personal History     Past Medical History:   Diagnosis Date    Benign prostatic hyperplasia 11/10/2016    COPD (chronic obstructive pulmonary disease) 6/10/2021    Coronary artery disease involving coronary bypass graft of native heart without angina pectoris 6/10/2021    Essential hypertension 11/10/2016    Hyperlipidemia     SERENITY (obstructive sleep apnea) 11/10/2016     Past Surgical History:   Procedure Laterality Date    CARDIAC CATHETERIZATION N/A 4/21/2023    Procedure: Left Heart Cath;  Surgeon: Jayne Villalobos MD;  Location: [facility] CATH INVASIVE LOCATION;  Service: Cardiovascular;  Laterality:  N/A;    CARDIAC CATHETERIZATION N/A 4/21/2023    Procedure: Coronary angiography;  Surgeon: Jayne Villalobos MD;  Location:  [facility] CATH INVASIVE LOCATION;  Service: Cardiovascular;  Laterality: N/A;    CARDIAC CATHETERIZATION N/A 4/21/2023    Procedure: Left ventriculography;  Surgeon: Jayne Villalobos MD;  Location:  [facility] CATH INVASIVE LOCATION;  Service: Cardiovascular;  Laterality: N/A;    CARDIAC CATHETERIZATION  4/21/2023    Procedure: Saphenous Vein Graft;  Surgeon: Jayne Villalobos MD;  Location:  [facility] CATH INVASIVE LOCATION;  Service: Cardiovascular;;    CARDIAC CATHETERIZATION N/A 4/21/2023    Procedure: Native mammary injection;  Surgeon: Jayne Villalobos MD;  Location:  [facility] CATH INVASIVE LOCATION;  Service: Cardiovascular;  Laterality: N/A;    CARDIAC CATHETERIZATION N/A 4/21/2023    Procedure: Percutaneous Coronary Intervention;  Surgeon: Jayne Villalobos MD;  [facility];  Service: Cardiovascular;  Laterality: N/A;    CARDIAC CATHETERIZATION N/A 4/21/2023    Procedure: Stent BETO coronary;  Surgeon: Jayne Villalobos MD;  [facility];  Service: Cardiovascular;  Laterality: N/A;    CARDIAC CATHETERIZATION  4/21/2023    Procedure: Percutaneous Manual Thrombectomy;  Surgeon: Jayne Villalobos MD;  [facility];  Service: Cardiovascular;;     History reviewed. No pertinent family history.  Social History     Tobacco Use    Smoking status: Never     Passive exposure: Never    Smokeless tobacco: Never   Vaping Use    Vaping status: Never Used   Substance Use Topics    Alcohol use: Never    Drug use: Never     No current facility-administered medications on file prior to encounter.     Current Outpatient Medications on File Prior to Encounter   Medication Sig Dispense Refill    albuterol sulfate  (90 Base) MCG/ACT inhaler Inhale 2 puffs Every 4 (Four) Hours As Needed for  Wheezing.      aspirin 81 MG EC tablet Take 1 tablet by mouth Daily.      atorvastatin (LIPITOR) 40 MG tablet Take 1 tablet by mouth Daily.      budesonide-formoterol (SYMBICORT) 160-4.5 MCG/ACT inhaler Inhale 2 puffs 2 (Two) Times a Day. 10.2 g 1    carvedilol (COREG) 12.5 MG tablet Take 1 tablet by mouth 2 (Two) Times a Day With Meals.      cloNIDine (CATAPRES) 0.2 MG tablet Take 1 tablet by mouth Every 8 (Eight) Hours. 90 tablet 3    clopidogrel (PLAVIX) 75 MG tablet Take 1 tablet by mouth Daily. 90 tablet 3    fluticasone (FLONASE) 50 MCG/ACT nasal spray 2 sprays into the nostril(s) as directed by provider Daily.      hydrALAZINE (APRESOLINE) 50 MG tablet Take 1 tablet by mouth Every 8 (Eight) Hours. 90 tablet 3    HYDROcodone-Acetaminophen (XODOL )  MG per tablet Take 1 tablet by mouth Every 8 (Eight) Hours As Needed for Moderate Pain.      isosorbide mononitrate (IMDUR) 30 MG 24 hr tablet Take 1 tablet by mouth Daily. 30 tablet 3    ketoconazole (NIZORAL) 2 % cream Apply  topically to the appropriate area as directed Daily.      lisinopril (PRINIVIL,ZESTRIL) 40 MG tablet Take 1 tablet by mouth Daily. 30 tablet 3    montelukast (SINGULAIR) 10 MG tablet Take 1 tablet by mouth Every Night. 30 tablet 0    nitroglycerin (NITROSTAT) 0.4 MG SL tablet Place 1 tablet under the tongue Every 5 (Five) Minutes As Needed for Chest Pain for up to 1 dose. Take no more than 3 doses in 15 minutes. 25 tablet 12    spironolactone (ALDACTONE) 50 MG tablet Take 1 tablet by mouth Daily.       Allergies   Allergen Reactions    Shellfish-Derived Products Unknown - Low Severity     gout  gout         Objective   Objective     Vital Signs  Temp:  [97.5 °F (36.4 °C)-98.4 °F (36.9 °C)] 97.5 °F (36.4 °C)  Heart Rate:  [] 87  Resp:  [18-20] 18  BP: (144-239)/() 236/125  SpO2:  [89 %-98 %] 96 %  on  Flow (L/min) (Oxygen Therapy):  [2-3] 3;   Device (Oxygen Therapy): nasal cannula  There is no height or weight on file  to calculate BMI.    Physical Exam  Constitutional:       General: He is not in acute distress.  HENT:      Head: Normocephalic and atraumatic.   Eyes:      Extraocular Movements: Extraocular movements intact.      Pupils: Pupils are equal, round, and reactive to light.   Cardiovascular:      Rate and Rhythm: Normal rate and regular rhythm.   Pulmonary:      Effort: Pulmonary effort is normal. No respiratory distress.   Abdominal:      General: There is no distension.      Palpations: Abdomen is soft.      Tenderness: There is no abdominal tenderness.   Genitourinary:     Comments: Blood at urethral meatus   Musculoskeletal:      Right lower leg: Edema present.      Left lower leg: Edema present.   Neurological:      Mental Status: He is alert and oriented to person, place, and time.         Results Review:  I reviewed the patient's new clinical results.  I reviewed the patient's new imaging results and agree with the interpretation.  I reviewed the patient's other test results and agree with the interpretation  I personally viewed and interpreted the patient's EKG/Telemetry data  Discussed with ED provider.    Lab Results (last 24 hours)       Procedure Component Value Units Date/Time    Respiratory Panel PCR w/COVID-19(SARS-CoV-2) [facility] In-House, NP Swab in UTM/VTM, 2 HR TAT - Swab, Nasopharynx [[id number]]  (Normal) Collected: 05/28/25 2239    Specimen: Swab from Nasopharynx Updated: 05/28/25 2339     ADENOVIRUS, PCR Not Detected     Coronavirus 229E Not Detected     Coronavirus HKU1 Not Detected     Coronavirus NL63 Not Detected     Coronavirus OC43 Not Detected     COVID19 Not Detected     Human Metapneumovirus Not Detected     Human Rhinovirus/Enterovirus Not Detected     Influenza A PCR Not Detected     Influenza B PCR Not Detected     Parainfluenza Virus 1 Not Detected     Parainfluenza Virus 2 Not Detected     Parainfluenza Virus 3 Not Detected     Parainfluenza Virus 4 Not Detected      RSV, PCR Not Detected     Bordetella pertussis pcr Not Detected     Bordetella parapertussis PCR Not Detected     Chlamydophila pneumoniae PCR Not Detected     Mycoplasma pneumo by PCR Not Detected    Narrative:      In the setting of a positive respiratory panel with a viral infection PLUS a negative procalcitonin without other underlying concern for bacterial infection, consider observing off antibiotics or discontinuation of antibiotics and continue supportive care. If the respiratory panel is positive for atypical bacterial infection (Bordetella pertussis, Chlamydophila pneumoniae, or Mycoplasma pneumoniae), consider antibiotic de-escalation to target atypical bacterial infection.    CBC & Differential [[id number]]  (Abnormal) Collected: 05/28/25 2240    Specimen: Blood Updated: 05/28/25 2251    Narrative:      The following orders were created for panel order CBC & Differential.  Procedure                               Abnormality         Status                     ---------                               -----------         ------                     CBC Auto Differential[[id number]]        Abnormal            Final result                 Please view results for these tests on the individual orders.    Comprehensive Metabolic Panel [[id number]]  (Abnormal) Collected: 05/28/25 2240    Specimen: Blood Updated: 05/28/25 2312     Glucose 199 mg/dL      BUN 17.0 mg/dL      Creatinine 1.21 mg/dL      Sodium 139 mmol/L      Potassium 3.8 mmol/L      Chloride 103 mmol/L      CO2 25.2 mmol/L      Calcium 9.2 mg/dL      Total Protein 7.7 g/dL      Albumin 4.0 g/dL      ALT (SGPT) 24 U/L      AST (SGOT) 20 U/L      Alkaline Phosphatase 102 U/L      Total Bilirubin 0.5 mg/dL      Globulin 3.7 gm/dL      A/G Ratio 1.1 g/dL      BUN/Creatinine Ratio 14.0     Anion Gap 10.8 mmol/L      eGFR 62.1 mL/min/1.73     Narrative:      GFR Categories in Chronic Kidney Disease (CKD)              GFR Category          GFR (mL/min/1.73)     Interpretation  G1                    90 or greater        Normal or high (1)  G2                    60-89                Mild decrease (1)  G3a                   45-59                Mild to moderate decrease  G3b                   30-44                Moderate to severe decrease  G4                    15-29                Severe decrease  G5                    14 or less           Kidney failure    (1)In the absence of evidence of kidney disease, neither GFR category G1 or G2 fulfill the criteria for CKD.    eGFR calculation 2021 CKD-EPI creatinine equation, which does not include race as a factor    High Sensitivity Troponin T [[id number]]  (Abnormal) Collected: 05/28/25 2240    Specimen: Blood Updated: 05/28/25 2319     HS Troponin T 22 ng/L     Narrative:      High Sensitive Troponin T Reference Range:  <14.0 ng/L- Negative Female for AMI  <22.0 ng/L- Negative Male for AMI  >=14 - Abnormal Female indicating possible myocardial injury.  >=22 - Abnormal Male indicating possible myocardial injury.   Clinicians would have to utilize clinical acumen, EKG, Troponin, and serial changes to determine if it is an Acute Myocardial Infarction or myocardial injury due to an underlying chronic condition.         BNP [[id number]]  (Normal) Collected: 05/28/25 2240    Specimen: Blood Updated: 05/28/25 2319     proBNP 910.0 pg/mL     Narrative:      This assay is used as an aid in the diagnosis of individuals suspected of having heart failure. It can be used as an aid in the diagnosis of acute decompensated heart failure (ADHF) in patients presenting with signs and symptoms of ADHF to the emergency department (ED). In addition, NT-proBNP of <300 pg/mL indicates ADHF is not likely.    Age Range Result Interpretation  NT-proBNP Concentration (pg/mL:      <50             Positive            >450                   Gray                 300-450                    Negative             <300    50-75           Positive            >900      "             March                300-900                  Negative            <300      >75             Positive            >1800                  Gray                300-1800                  Negative            <300    Magnesium [[id number]]  (Normal) Collected: 05/28/25 2240    Specimen: Blood Updated: 05/28/25 2312     Magnesium 2.1 mg/dL     Procalcitonin [[id number]]  (Normal) Collected: 05/28/25 2240    Specimen: Blood Updated: 05/28/25 2319     Procalcitonin 0.08 ng/mL     Narrative:      As a Marker for Sepsis (Non-Neonates):    1. <0.5 ng/mL represents a low risk of severe sepsis and/or septic shock.  2. >2 ng/mL represents a high risk of severe sepsis and/or septic shock.    As a Marker for Lower Respiratory Tract Infections that require antibiotic therapy:    PCT on Admission    Antibiotic Therapy       6-12 Hrs later    >0.5                Strongly Recommended  >0.25 - <0.5        Recommended   0.1 - 0.25          Discouraged              Remeasure/reassess PCT  <0.1                Strongly Discouraged     Remeasure/reassess PCT    As 28 day mortality risk marker: \"Change in Procalcitonin Result\" (>80% or <=80%) if Day 0 (or Day 1) and Day 4 values are available. Refer to http://[email or web address] [email or web address]-pct-[email or web address]    Change in PCT <=80%  A decrease of PCT levels below or equal to 80% defines a positive change in PCT test result representing a higher risk for 28-day all-cause mortality of patients diagnosed with severe sepsis for septic shock.    Change in PCT >80%  A decrease of PCT levels of more than 80% defines a negative change in PCT result representing a lower risk for 28-day all-cause mortality of patients diagnosed with severe sepsis or septic shock.       CBC Auto Differential [[id number]]  (Abnormal) Collected: 05/28/25 2240    Specimen: Blood Updated: 05/28/25 2251     WBC 10.46 10*3/mm3      RBC 5.03 10*6/mm3      Hemoglobin 15.4 g/dL      Hematocrit 47.3 %      MCV 94.0 fL      MCH 30.6 pg      MCHC " 32.6 g/dL      RDW 14.6 %      RDW-SD 50.0 fl      MPV 10.3 fL      Platelets 267 10*3/mm3      Neutrophil % 66.1 %      Lymphocyte % 20.5 %      Monocyte % 8.6 %      Eosinophil % 3.9 %      Basophil % 0.7 %      Immature Grans % 0.2 %      Neutrophils, Absolute 6.92 10*3/mm3      Lymphocytes, Absolute 2.14 10*3/mm3      Monocytes, Absolute 0.90 10*3/mm3      Eosinophils, Absolute 0.41 10*3/mm3      Basophils, Absolute 0.07 10*3/mm3      Immature Grans, Absolute 0.02 10*3/mm3      nRBC 0.0 /100 WBC     High Sensitivity Troponin T 1Hr [[id number]]  (Abnormal) Collected: 05/28/25 2342    Specimen: Blood Updated: 05/29/25 0026     HS Troponin T 27 ng/L      Troponin T Numeric Delta 5 ng/L      Troponin T % Delta 23    Narrative:      High Sensitive Troponin T Reference Range:  <14.0 ng/L- Negative Female for AMI  <22.0 ng/L- Negative Male for AMI  >=14 - Abnormal Female indicating possible myocardial injury.  >=22 - Abnormal Male indicating possible myocardial injury.   Clinicians would have to utilize clinical acumen, EKG, Troponin, and serial changes to determine if it is an Acute Myocardial Infarction or myocardial injury due to an underlying chronic condition.         CBC (No Diff) [[id number]]  (Abnormal) Collected: 05/29/25 0622    Specimen: Blood Updated: 05/29/25 0651     WBC 12.29 10*3/mm3      RBC 5.21 10*6/mm3      Hemoglobin 16.1 g/dL      Hematocrit 47.6 %      MCV 91.4 fL      MCH 30.9 pg      MCHC 33.8 g/dL      RDW 14.1 %      RDW-SD 47.4 fl      MPV 10.4 fL      Platelets 279 10*3/mm3     Comprehensive Metabolic Panel [[id number]]  (Abnormal) Collected: 05/29/25 0622    Specimen: Blood Updated: 05/29/25 0710     Glucose 146 mg/dL      BUN 21.0 mg/dL      Creatinine 1.40 mg/dL      Sodium 139 mmol/L      Potassium 3.7 mmol/L      Chloride 103 mmol/L      CO2 24.0 mmol/L      Calcium 9.5 mg/dL      Total Protein 7.7 g/dL      Albumin 4.1 g/dL      ALT (SGPT) 23 U/L      AST (SGOT) 21 U/L      Alkaline  Phosphatase 106 U/L      Total Bilirubin 0.5 mg/dL      Globulin 3.6 gm/dL      A/G Ratio 1.1 g/dL      BUN/Creatinine Ratio 15.0     Anion Gap 12.0 mmol/L      eGFR 52.1 mL/min/1.73     Narrative:      GFR Categories in Chronic Kidney Disease (CKD)              GFR Category          GFR (mL/min/1.73)    Interpretation  G1                    90 or greater        Normal or high (1)  G2                    60-89                Mild decrease (1)  G3a                   45-59                Mild to moderate decrease  G3b                   30-44                Moderate to severe decrease  G4                    15-29                Severe decrease  G5                    14 or less           Kidney failure    (1)In the absence of evidence of kidney disease, neither GFR category G1 or G2 fulfill the criteria for CKD.    eGFR calculation 2021 CKD-EPI creatinine equation, which does not include race as a factor    POC Glucose Once [[id number]]  (Abnormal) Collected: 05/29/25 0852    Specimen: Blood Updated: 05/29/25 0854     Glucose 159 mg/dL     POC Glucose Once [[id number]]  (Normal) Collected: 05/29/25 1109    Specimen: Blood Updated: 05/29/25 1111     Glucose 123 mg/dL             Results for orders placed or performed during the hospital encounter of 04/17/24   Blood Culture - Blood, Arm, Right    Specimen: Arm, Right; Blood   Result Value Ref Range    Blood Culture No growth at 5 days        Imaging Results (Last 24 Hours)       Procedure Component Value Units Date/Time    XR Chest 1 View [[id number]] Collected: 05/28/25 2303     Updated: 05/28/25 2307    Narrative:      SINGLE VIEW OF THE CHEST     HISTORY: Weakness     COMPARISON: September 10, 2024     FINDINGS:  There is cardiomegaly. There is some vascular congestion. Patient status  post median sternotomy with coronary artery bypass grafting. Elevation  of the left hemidiaphragm, with chronic scarring is again seen. Blunting  of the left costophrenic angle is  unchanged.       Impression:      Cardiomegaly with suspected vascular congestion.     This report was finalized on 5/28/2025 11:04 PM by Dr. Kimberly Stevens M.D on Workstation: [id number]               Results for orders placed during the hospital encounter of 09/10/24    Adult Transthoracic Echo Complete w/ Color, Spectral and Contrast if necessary per protocol    Interpretation Summary    Left ventricular ejection fraction appears to be 56 - 60%.    Left ventricular diastolic function was normal.    Mild aortic valve stenosis is present.      ECG 12 Lead Dyspnea   Final Result   HEART RATE=94  bpm   RR Nktzueau=204  ms   AL Trgisgwo=932  ms   P Horizontal Axis=64  deg   P Front Axis=68  deg   QRSD Interval=98  ms   QT Kvyvkcgf=511  ms   HQgC=313  ms   QRS Axis=-35  deg   T Wave Axis=65  deg   - ABNORMAL ECG -   Sinus rhythm   Prolonged AL interval - new   Consider  left atrial enlargement   Inferior  infarct, old   When compared with ECG of 10-Sep-2024 15:13:55,   Significant rate increase   Electronically Signed By: Samaria Gallardo ([facility]) 2025-05-29 10:39:45   Date and Time of Study:2025-05-28 22:46:03      Telemetry Scan   Final Result                 Assessment/Plan     Active Hospital Problems    Diagnosis  POA    **CHF exacerbation [I50.9]  Yes    Chronic HFrEF (heart failure with reduced ejection fraction) [I50.22]  Yes    Type 2 diabetes mellitus [E11.9]  Yes    CAD (coronary artery disease) [I25.10]  Yes    COPD (chronic obstructive pulmonary disease) [J44.9]  Yes    Essential hypertension [I10]  Yes    SERENITY (obstructive sleep apnea) [G47.33]  Yes      Resolved Hospital Problems   No resolved problems to display.     Hypertensive emergency  Congestive heart failure  Coronary artery disease s/p CABG, PCI  Cardiology has been consulted.  He has been administered IV Lasix.  Resume Aldactone, lisinopril, Imdur, hydralazine, aspirin, Plavix, Coreg, clonidine, atorvastatin  Repeat echocardiogram  Strict  intake and output    Intake/Output Summary (Last 24 hours) at 5/29/2025 1121  Last data filed at 5/29/2025 0136  Gross per 24 hour   Intake --   Output 600 ml   Net -600 ml     T2DM  Repeat A1c. Currently on SSI    Chronic Kidney disease  Monitor Scr closely with diuresis. Scr currently approximately around baseline     Urinary retention  Difficult montanez placement. Urology will be consulted. Montanez catheter need for acute urinary retention and strict I/O      I discussed the patient's findings and my recommendations with patient and nursing staff.    VTE Prophylaxis - SCDs.  Code Status - Full code.       Medhat Moyer MD  [facility]  05/29/25  11:15 EDT      Electronically signed by Medhat Moyer MD at 05/29/25 1125          Emergency Department Notes        Gricelda Hernandez RN at 05/29/25 0213          Nursing report ED to floor  Negrito Navarro  74 y.o.  male    HPI :  HPI  Stated Reason for Visit: SOA  History Obtained From: patient    Chief Complaint  Chief Complaint   Patient presents with    Shortness of Breath       Admitting doctor:   Alex Perkins MD    Admitting diagnosis:   The primary encounter diagnosis was Acute on chronic congestive heart failure, unspecified heart failure type. A diagnosis of Hypertension, unspecified type was also pertinent to this visit.    Code status:   Current Code Status       Date Active Code Status Order ID Comments User Context       5/29/2025 0145 CPR (Attempt to Resuscitate) [id number]  Danitza Pérez, [facility]        Question Answer    Code Status (Patient has no pulse and is not breathing) CPR (Attempt to Resuscitate)    Medical Interventions (Patient has pulse or is breathing) Full Support                    Allergies:   Shellfish-derived products    Isolation:   No active isolations    Intake and Output    Intake/Output Summary (Last 24 hours) at 5/29/2025 0213  Last data filed at 5/29/2025 0136  Gross per 24 hour   Intake --   Output 600 ml    Net -600 ml       Weight:   There were no vitals filed for this visit.    Most recent vitals:   Vitals:    05/29/25 0134 05/29/25 0137 05/29/25 0201 05/29/25 0211   BP:  (!) 202/113 144/92    Pulse: 79 78 93 90   Resp:       Temp:       TempSrc:       SpO2: 95%  94% 95%       Active LDAs/IV Access:   Lines, Drains & Airways       Active LDAs       Name Placement date Placement time Site Days    Peripheral IV 05/28/25 2241 20 G Right Antecubital 05/28/25 2241  Antecubital  less than 1                    Labs (abnormal labs have a star):   Labs Reviewed   COMPREHENSIVE METABOLIC PANEL - Abnormal; Notable for the following components:       Result Value    Glucose 199 (*)     All other components within normal limits    Narrative:     GFR Categories in Chronic Kidney Disease (CKD)              GFR Category          GFR (mL/min/1.73)    Interpretation  G1                    90 or greater        Normal or high (1)  G2                    60-89                Mild decrease (1)  G3a                   45-59                Mild to moderate decrease  G3b                   30-44                Moderate to severe decrease  G4                    15-29                Severe decrease  G5                    14 or less           Kidney failure    (1)In the absence of evidence of kidney disease, neither GFR category G1 or G2 fulfill the criteria for CKD.    eGFR calculation 2021 CKD-EPI creatinine equation, which does not include race as a factor   TROPONIN - Abnormal; Notable for the following components:    HS Troponin T 22 (*)     All other components within normal limits    Narrative:     High Sensitive Troponin T Reference Range:  <14.0 ng/L- Negative Female for AMI  <22.0 ng/L- Negative Male for AMI  >=14 - Abnormal Female indicating possible myocardial injury.  >=22 - Abnormal Male indicating possible myocardial injury.   Clinicians would have to utilize clinical acumen, EKG, Troponin, and serial changes to determine if it  is an Acute Myocardial Infarction or myocardial injury due to an underlying chronic condition.        CBC WITH AUTO DIFFERENTIAL - Abnormal; Notable for the following components:    Eosinophils, Absolute 0.41 (*)     All other components within normal limits   HIGH SENSITIVITIY TROPONIN T 1HR - Abnormal; Notable for the following components:    HS Troponin T 27 (*)     Troponin T Numeric Delta 5 (*)     Troponin T % Delta 23 (*)     All other components within normal limits    Narrative:     High Sensitive Troponin T Reference Range:  <14.0 ng/L- Negative Female for AMI  <22.0 ng/L- Negative Male for AMI  >=14 - Abnormal Female indicating possible myocardial injury.  >=22 - Abnormal Male indicating possible myocardial injury.   Clinicians would have to utilize clinical acumen, EKG, Troponin, and serial changes to determine if it is an Acute Myocardial Infarction or myocardial injury due to an underlying chronic condition.        RESPIRATORY PANEL PCR W/ COVID-19 (SARS-COV-2), NP SWAB IN UTM/VTP, 2 HR TAT - Normal    Narrative:     In the setting of a positive respiratory panel with a viral infection PLUS a negative procalcitonin without other underlying concern for bacterial infection, consider observing off antibiotics or discontinuation of antibiotics and continue supportive care. If the respiratory panel is positive for atypical bacterial infection (Bordetella pertussis, Chlamydophila pneumoniae, or Mycoplasma pneumoniae), consider antibiotic de-escalation to target atypical bacterial infection.   BNP (IN-HOUSE) - Normal    Narrative:     This assay is used as an aid in the diagnosis of individuals suspected of having heart failure. It can be used as an aid in the diagnosis of acute decompensated heart failure (ADHF) in patients presenting with signs and symptoms of ADHF to the emergency department (ED). In addition, NT-proBNP of <300 pg/mL indicates ADHF is not likely.    Age Range Result Interpretation   "NT-proBNP Concentration (pg/mL:      <50             Positive            >450                   Gray                 300-450                    Negative             <300    50-75           Positive            >900                  Gray                300-900                  Negative            <300      >75             Positive            >1800                  Gray                300-1800                  Negative            <300   MAGNESIUM - Normal   PROCALCITONIN - Normal    Narrative:     As a Marker for Sepsis (Non-Neonates):    1. <0.5 ng/mL represents a low risk of severe sepsis and/or septic shock.  2. >2 ng/mL represents a high risk of severe sepsis and/or septic shock.    As a Marker for Lower Respiratory Tract Infections that require antibiotic therapy:    PCT on Admission    Antibiotic Therapy       6-12 Hrs later    >0.5                Strongly Recommended  >0.25 - <0.5        Recommended   0.1 - 0.25          Discouraged              Remeasure/reassess PCT  <0.1                Strongly Discouraged     Remeasure/reassess PCT    As 28 day mortality risk marker: \"Change in Procalcitonin Result\" (>80% or <=80%) if Day 0 (or Day 1) and Day 4 values are available. Refer to [email or web address]    Change in PCT <=80%  A decrease of PCT levels below or equal to 80% defines a positive change in PCT test result representing a higher risk for 28-day all-cause mortality of patients diagnosed with severe sepsis for septic shock.    Change in PCT >80%  A decrease of PCT levels of more than 80% defines a negative change in PCT result representing a lower risk for 28-day all-cause mortality of patients diagnosed with severe sepsis or septic shock.      CBC (NO DIFF)   COMPREHENSIVE METABOLIC PANEL   POCT GLUCOSE FINGERSTICK   POCT GLUCOSE FINGERSTICK   POCT GLUCOSE FINGERSTICK   POCT GLUCOSE FINGERSTICK   CBC AND DIFFERENTIAL    Narrative:     The following orders were created for panel order CBC & " Differential.  Procedure                               Abnormality         Status                     ---------                               -----------         ------                     CBC Auto Differential[[id number]]        Abnormal            Final result                 Please view results for these tests on the individual orders.       EKG:   ECG 12 Lead Dyspnea   Preliminary Result   HEART RATE=94  bpm   RR Qsteaayn=533  ms   DC Chsebfus=513  ms   P Horizontal Axis=64  deg   P Front Axis=68  deg   QRSD Interval=98  ms   QT Mfbilqak=946  ms   YIhO=191  ms   QRS Axis=-35  deg   T Wave Axis=65  deg   - ABNORMAL ECG -   Sinus rhythm   Prolonged DC interval   Consider left atrial enlargement   Inferior infarct, old   Date and Time of Study:2025-05-28 22:46:03          Meds given in ED:   Medications   furosemide (LASIX) injection 40 mg (has no administration in time range)   sodium chloride 0.9 % flush 10 mL (has no administration in time range)   sodium chloride 0.9 % flush 10 mL (has no administration in time range)   sodium chloride 0.9 % infusion 40 mL (has no administration in time range)   acetaminophen (TYLENOL) tablet 650 mg (has no administration in time range)     Or   acetaminophen (TYLENOL) 160 MG/5ML oral solution 650 mg (has no administration in time range)     Or   acetaminophen (TYLENOL) suppository 650 mg (has no administration in time range)   famotidine (PEPCID) tablet 20 mg (has no administration in time range)   sennosides-docusate (PERICOLACE) 8.6-50 MG per tablet 2 tablet (has no administration in time range)     And   polyethylene glycol (MIRALAX) packet 17 g (has no administration in time range)     And   bisacodyl (DULCOLAX) EC tablet 5 mg (has no administration in time range)     And   bisacodyl (DULCOLAX) suppository 10 mg (has no administration in time range)   ondansetron (ZOFRAN) injection 4 mg (has no administration in time range)   melatonin tablet 5 mg (has no administration in  time range)   nitroglycerin (NITROSTAT) SL tablet 0.4 mg (has no administration in time range)   dextrose (GLUTOSE) oral gel 15 g (has no administration in time range)   dextrose (D50W) (25 g/50 mL) IV injection 25 g (has no administration in time range)   glucagon (GLUCAGEN) injection 1 mg (has no administration in time range)   insulin lispro (HUMALOG/ADMELOG) injection 2-7 Units (has no administration in time range)   labetalol (NORMODYNE,TRANDATE) injection 20 mg (20 mg Intravenous Given 5/28/25 7340)   furosemide (LASIX) injection 80 mg (80 mg Intravenous Given 5/29/25 0058)   hydrALAZINE (APRESOLINE) injection 20 mg (20 mg Intravenous Given 5/29/25 0137)       Imaging results:  XR Chest 1 View  Result Date: 5/28/2025  Cardiomegaly with suspected vascular congestion.  This report was finalized on 5/28/2025 11:04 PM by Dr. Kimberly Stevens M.D on Workstation: [id number]        Ambulatory status:   - assist x 1    Social issues:   Social History     Socioeconomic History    Marital status: Legally    Tobacco Use    Smoking status: Never    Smokeless tobacco: Never   Vaping Use    Vaping status: Never Used   Substance and Sexual Activity    Alcohol use: Not Currently    Drug use: Never    Sexual activity: Defer       Peripheral Neurovascular  Peripheral Neurovascular (Adult)  Peripheral Neurovascular WDL: .WDL except, capillary refill  Capillary Refill, General: less than/equal to 3 secs  Additional Documentation: Edema (Group)  Edema  Edema: leg, right, leg, left, ankle, right, ankle, left  Leg, Left Edema: 2+ (Mild)  Leg, Right Edema: 2+ (Mild)  Ankle, Left Edema: 2+ (Mild)  Ankle, Right Edema: 2+ (Mild)    Neuro Cognitive  Neuro Cognitive (Adult)  Cognitive/Neuro/Behavioral WDL: WDL, level of consciousness, orientation  Level of Consciousness: Alert  Orientation: oriented x 4    Learning  Learning Assessment  Learning Readiness and Ability: no barriers identified  Education Provided  Person Taught:  patient    Respiratory  Respiratory WDL  Respiratory WDL: .WDL except, rhythm/pattern  Rhythm/Pattern, Respiratory: shortness of breath    Abdominal Pain  Abdominal Pain CPG Interventions  Coping Interventions: anticipatory guidance provided, care explained to patient/family prior to performing  Safety Interventions  Safety Precautions/Falls Reduction: assistive device/personal items within reach  All Alarms: alarm(s) activated and audible    Pain Assessments  Pain (Adult)  (0-10) Pain Rating: Rest: 0    NIH Stroke Scale       Gricelda Hernandez RN  05/29/25 02:13 EDT     Electronically signed by Gricelda Hernandez RN at 05/29/25 0213       Andre Anderson MD at 05/28/25 8183           EMERGENCY DEPARTMENT ENCOUNTER  Room Number:  15/15  PCP: Provider, No Known  Independent Historians: Patient  Date of encounter:  5/29/2025  Patient Care Team:  Provider, No Known as PCP - General     HPI:  Chief Complaint: had concerns including Shortness of Breath.     A complete HPI/ROS/PMH/PSH/SH/FH are unobtainable due to: None    Chronic or social conditions impacting patient care (Social Determinants of Health): None    Context: Negrito Navarro is a 74 y.o. male with a medical history of hyperlipidemia, hypertension, COPD, coronary disease, chronic respiratory failure, CHF who presents to the ED c/o acute shortness of breath.  Patient has had several days of progressive lower extremity swelling and shortness of breath.  He is on Lasix and spironolactone and has been compliant with these.  He states he is intermittently compliant with his other medications but he has been compliant with his diuretics.  He denies any chest pain.  He states that his baseline orthopnea has worsened from 2 pillows to 3 pillows.  He uses 2-2 and half liters supplemental oxygen baseline is increased to 3.  No fever or cough.    Review of prior external notes (non-ED) -and- Review of prior external test results outside of this encounter:  Echocardiogram from  9/11/2024 reviewed.  Ejection fraction of 56 to 60%.  Normal diastolic function.    PAST MEDICAL HISTORY  Active Ambulatory Problems     Diagnosis Date Noted    Hyperlipidemia 06/09/2021    Benign prostatic hyperplasia 11/10/2016    Chronic constipation 07/06/2018    Essential hypertension 11/10/2016    SERENITY (obstructive sleep apnea) 11/10/2016    Oxygen dependent 07/06/2018    COPD (chronic obstructive pulmonary disease) 06/10/2021    CAD (coronary artery disease) 06/10/2021    Acute coronary syndrome with high troponin 04/20/2023    Precordial chest pain 04/20/2023    History of coronary angioplasty with insertion of stent 04/20/2023    Hx of CABG 04/20/2023    CAD S/P percutaneous coronary angioplasty 05/18/2023    Long term (current) use of antithrombotics/antiplatelets 05/18/2023    Acute hypoxic respiratory failure 04/17/2024    Viral pneumonia 04/24/2024    Reactive airway disease 04/24/2024    Chronic systolic (congestive) heart failure 04/24/2024    Chronic kidney failure 04/24/2024    Leukocytosis 04/24/2024    Type 2 diabetes mellitus 04/24/2024    Chronic HFrEF (heart failure with reduced ejection fraction) 04/24/2024    Syncope 09/10/2024     Resolved Ambulatory Problems     Diagnosis Date Noted    Acute on chronic congestive heart failure 06/09/2021    Hypokalemia 06/09/2021    Hypertensive urgency 06/10/2021    Acute on chronic diastolic heart failure 06/11/2021    Acute kidney failure 04/24/2024     Past Medical History:   Diagnosis Date    Coronary artery disease involving coronary bypass graft of native heart without angina pectoris 6/10/2021       PAST SURGICAL HISTORY  Past Surgical History:   Procedure Laterality Date    CARDIAC CATHETERIZATION N/A 4/21/2023    Procedure: Left Heart Cath;  Surgeon: Jayne Villalobos MD;  Location: [facility] CATH INVASIVE LOCATION;  Service: Cardiovascular;  Laterality: N/A;    CARDIAC CATHETERIZATION N/A 4/21/2023    Procedure: Coronary angiography;  Surgeon:  Jayne Villalobos MD;  Location: [facility] CATH INVASIVE LOCATION;  Service: Cardiovascular;  Laterality: N/A;    CARDIAC CATHETERIZATION N/A 4/21/2023    Procedure: Left ventriculography;  Surgeon: Jayne Villalobos MD;  Location: [facility] CATH INVASIVE LOCATION;  Service: Cardiovascular;  Laterality: N/A;    CARDIAC CATHETERIZATION  4/21/2023    Procedure: Saphenous Vein Graft;  Surgeon: Jayne Villalobos MD;  Location: [facility] CATH INVASIVE LOCATION;  Service: Cardiovascular;;    CARDIAC CATHETERIZATION N/A 4/21/2023    Procedure: Native mammary injection;  Surgeon: Jayne Villalobos MD;  Location: [facility] CATH INVASIVE LOCATION;  Service: Cardiovascular;  Laterality: N/A;    CARDIAC CATHETERIZATION N/A 4/21/2023    Procedure: Percutaneous Coronary Intervention;  Surgeon: Jayne Villalobos MD;  Location: [facility] CATH INVASIVE LOCATION;  Service: Cardiovascular;  Laterality: N/A;    CARDIAC CATHETERIZATION N/A 4/21/2023    Procedure: Stent BETO coronary;  Surgeon: Jayne Villalobos MD;  Location: [facility] CATH INVASIVE LOCATION;  Service: Cardiovascular;  Laterality: N/A;    CARDIAC CATHETERIZATION  4/21/2023    Procedure: Percutaneous Manual Thrombectomy;  Surgeon: Jayne Villalobos MD;  Location: [facility] CATH INVASIVE LOCATION;  Service: Cardiovascular;;       FAMILY HISTORY  No family history on file.    SOCIAL HISTORY  Social History     Socioeconomic History    Marital status: Legally    Tobacco Use    Smoking status: Never    Smokeless tobacco: Never   Vaping Use    Vaping status: Never Used   Substance and Sexual Activity    Alcohol use: Not Currently    Drug use: Never    Sexual activity: Defer       ALLERGIES  Shellfish-derived products    REVIEW OF SYSTEMS  Review of Systems  Included in HPI  All systems reviewed and negative except for those discussed in HPI.    PHYSICAL EXAM    I have reviewed the triage vital signs and nursing notes.    ED Triage Vitals   Temp Heart  Rate Resp BP SpO2   05/28/25 2224 05/28/25 2224 05/28/25 2224 05/28/25 2225 05/28/25 2224   97.9 °F (36.6 °C) 105 20 (!) 239/131 (!) 89 %      Temp src Heart Rate Source Patient Position BP Location FiO2 (%)   05/28/25 2224 -- -- -- --   Oral           Physical Exam  Vitals and nursing note reviewed.   Constitutional:       General: He is not in acute distress.     Appearance: Normal appearance. He is not ill-appearing, toxic-appearing or diaphoretic.   HENT:      Head: Normocephalic and atraumatic.      Nose: Nose normal.      Mouth/Throat:      Mouth: Mucous membranes are moist.   Eyes:      Extraocular Movements: Extraocular movements intact.      Conjunctiva/sclera: Conjunctivae normal.      Pupils: Pupils are equal, round, and reactive to light.   Cardiovascular:      Rate and Rhythm: Normal rate and regular rhythm.      Pulses: Normal pulses.      Heart sounds: Normal heart sounds. No murmur heard.     No friction rub. No gallop.   Pulmonary:      Effort: Pulmonary effort is normal. No respiratory distress.      Breath sounds: Normal breath sounds. No stridor. No wheezing, rhonchi or rales.      Comments: On 3 L nasal cannula  Abdominal:      General: Abdomen is flat. There is no distension.      Palpations: Abdomen is soft.      Tenderness: There is no abdominal tenderness. There is no guarding or rebound.   Musculoskeletal:      Cervical back: Normal range of motion and neck supple.      Right lower leg: Edema present.      Left lower leg: Edema present.      Comments: 1+ pitting edema to the knee bilaterally   Skin:     General: Skin is warm and dry.      Capillary Refill: Capillary refill takes less than 2 seconds.   Neurological:      General: No focal deficit present.      Mental Status: He is alert and oriented to person, place, and time. Mental status is at baseline.   Psychiatric:         Mood and Affect: Mood normal.         Behavior: Behavior normal.         Thought Content: Thought content normal.          Judgment: Judgment normal.         LAB RESULTS  Recent Results (from the past 24 hours)   Respiratory Panel PCR w/COVID-19(SARS-CoV-2) CATA/RICH/LEW/PAD/COR/NEEL In-House, NP Swab in UTM/VTM, 2 HR TAT - Swab, Nasopharynx    Collection Time: 05/28/25 10:39 PM    Specimen: Nasopharynx; Swab   Result Value Ref Range    ADENOVIRUS, PCR Not Detected Not Detected    Coronavirus 229E Not Detected Not Detected    Coronavirus HKU1 Not Detected Not Detected    Coronavirus NL63 Not Detected Not Detected    Coronavirus OC43 Not Detected Not Detected    COVID19 Not Detected Not Detected - Ref. Range    Human Metapneumovirus Not Detected Not Detected    Human Rhinovirus/Enterovirus Not Detected Not Detected    Influenza A PCR Not Detected Not Detected    Influenza B PCR Not Detected Not Detected    Parainfluenza Virus 1 Not Detected Not Detected    Parainfluenza Virus 2 Not Detected Not Detected    Parainfluenza Virus 3 Not Detected Not Detected    Parainfluenza Virus 4 Not Detected Not Detected    RSV, PCR Not Detected Not Detected    Bordetella pertussis pcr Not Detected Not Detected    Bordetella parapertussis PCR Not Detected Not Detected    Chlamydophila pneumoniae PCR Not Detected Not Detected    Mycoplasma pneumo by PCR Not Detected Not Detected   Comprehensive Metabolic Panel    Collection Time: 05/28/25 10:40 PM    Specimen: Blood   Result Value Ref Range    Glucose 199 (H) 65 - 99 mg/dL    BUN 17.0 8.0 - 23.0 mg/dL    Creatinine 1.21 0.76 - 1.27 mg/dL    Sodium 139 136 - 145 mmol/L    Potassium 3.8 3.5 - 5.2 mmol/L    Chloride 103 98 - 107 mmol/L    CO2 25.2 22.0 - 29.0 mmol/L    Calcium 9.2 8.6 - 10.5 mg/dL    Total Protein 7.7 6.0 - 8.5 g/dL    Albumin 4.0 3.5 - 5.2 g/dL    ALT (SGPT) 24 1 - 41 U/L    AST (SGOT) 20 1 - 40 U/L    Alkaline Phosphatase 102 39 - 117 U/L    Total Bilirubin 0.5 0.0 - 1.2 mg/dL    Globulin 3.7 gm/dL    A/G Ratio 1.1 g/dL    BUN/Creatinine Ratio 14.0 7.0 - 25.0    Anion Gap 10.8 5.0 -  15.0 mmol/L    eGFR 62.1 >60.0 mL/min/1.73   High Sensitivity Troponin T    Collection Time: 05/28/25 10:40 PM    Specimen: Blood   Result Value Ref Range    HS Troponin T 22 (H) <22 ng/L   BNP    Collection Time: 05/28/25 10:40 PM    Specimen: Blood   Result Value Ref Range    proBNP 910.0 0.0 - 1,800.0 pg/mL   Magnesium    Collection Time: 05/28/25 10:40 PM    Specimen: Blood   Result Value Ref Range    Magnesium 2.1 1.6 - 2.4 mg/dL   Procalcitonin    Collection Time: 05/28/25 10:40 PM    Specimen: Blood   Result Value Ref Range    Procalcitonin 0.08 0.00 - 0.25 ng/mL   CBC Auto Differential    Collection Time: 05/28/25 10:40 PM    Specimen: Blood   Result Value Ref Range    WBC 10.46 3.40 - 10.80 10*3/mm3    RBC 5.03 4.14 - 5.80 10*6/mm3    Hemoglobin 15.4 13.0 - 17.7 g/dL    Hematocrit 47.3 37.5 - 51.0 %    MCV 94.0 79.0 - 97.0 fL    MCH 30.6 26.6 - 33.0 pg    MCHC 32.6 31.5 - 35.7 g/dL    RDW 14.6 12.3 - 15.4 %    RDW-SD 50.0 37.0 - 54.0 fl    MPV 10.3 6.0 - 12.0 fL    Platelets 267 140 - 450 10*3/mm3    Neutrophil % 66.1 42.7 - 76.0 %    Lymphocyte % 20.5 19.6 - 45.3 %    Monocyte % 8.6 5.0 - 12.0 %    Eosinophil % 3.9 0.3 - 6.2 %    Basophil % 0.7 0.0 - 1.5 %    Immature Grans % 0.2 0.0 - 0.5 %    Neutrophils, Absolute 6.92 1.70 - 7.00 10*3/mm3    Lymphocytes, Absolute 2.14 0.70 - 3.10 10*3/mm3    Monocytes, Absolute 0.90 0.10 - 0.90 10*3/mm3    Eosinophils, Absolute 0.41 (H) 0.00 - 0.40 10*3/mm3    Basophils, Absolute 0.07 0.00 - 0.20 10*3/mm3    Immature Grans, Absolute 0.02 0.00 - 0.05 10*3/mm3    nRBC 0.0 0.0 - 0.2 /100 WBC   ECG 12 Lead Dyspnea    Collection Time: 05/28/25 10:46 PM   Result Value Ref Range    QT Interval 394 ms    QTC Interval 493 ms   High Sensitivity Troponin T 1Hr    Collection Time: 05/28/25 11:42 PM    Specimen: Blood   Result Value Ref Range    HS Troponin T 27 (H) <22 ng/L    Troponin T Numeric Delta 5 (C) Abnormal if >/=3 ng/L    Troponin T % Delta 23 (C) Abnormal if >/= 20%        RADIOLOGY  XR Chest 1 View  Result Date: 5/28/2025  SINGLE VIEW OF THE CHEST  HISTORY: Weakness  COMPARISON: September 10, 2024  FINDINGS: There is cardiomegaly. There is some vascular congestion. Patient status post median sternotomy with coronary artery bypass grafting. Elevation of the left hemidiaphragm, with chronic scarring is again seen. Blunting of the left costophrenic angle is unchanged.      Cardiomegaly with suspected vascular congestion.  This report was finalized on 5/28/2025 11:04 PM by Dr. Kimberly Stevens M.D on Workstation: [id number]        MEDICATIONS GIVEN IN ER  Medications   hydrALAZINE (APRESOLINE) injection 20 mg (has no administration in time range)   labetalol (NORMODYNE,TRANDATE) injection 20 mg (20 mg Intravenous Given 5/28/25 2328)   furosemide (LASIX) injection 80 mg (80 mg Intravenous Given 5/29/25 0058)       ORDERS PLACED DURING THIS VISIT:  Orders Placed This Encounter   Procedures    Respiratory Panel PCR w/COVID-19(SARS-CoV-2) CATA/RICH/LEW/PAD/COR/NEEL In-House, NP Swab in UTM/VTM, 2 HR TAT - Swab, Nasopharynx    XR Chest 1 View    Comprehensive Metabolic Panel    High Sensitivity Troponin T    BNP    Magnesium    Procalcitonin    CBC Auto Differential    High Sensitivity Troponin T 1Hr    Continuous Pulse Oximetry    [facility] (on-call MD unless specified) Details    ECG 12 Lead Dyspnea    Initiate Observation Status    CBC & Differential       OUTPATIENT MEDICATION MANAGEMENT:  Current Facility-Administered Medications Ordered in Epic   Medication Dose Route Frequency Provider Last Rate Last Admin    hydrALAZINE (APRESOLINE) injection 20 mg  20 mg Intravenous Once Andre Anderson MD         Current Outpatient Medications Ordered in Epic   Medication Sig Dispense Refill    albuterol sulfate  (90 Base) MCG/ACT inhaler Inhale 2 puffs Every 4 (Four) Hours As Needed for Wheezing.      aspirin 81 MG EC tablet Take 1 tablet by mouth Daily.      atorvastatin (LIPITOR) 40 MG  tablet Take 1 tablet by mouth Daily.      budesonide-formoterol (SYMBICORT) 160-4.5 MCG/ACT inhaler Inhale 2 puffs 2 (Two) Times a Day. 10.2 g 1    carvedilol (COREG) 12.5 MG tablet Take 1 tablet by mouth 2 (Two) Times a Day With Meals.      cloNIDine (CATAPRES) 0.2 MG tablet Take 1 tablet by mouth Every 8 (Eight) Hours. 90 tablet 3    clopidogrel (PLAVIX) 75 MG tablet Take 1 tablet by mouth Daily. 90 tablet 3    fluticasone (FLONASE) 50 MCG/ACT nasal spray 2 sprays into the nostril(s) as directed by provider Daily.      hydrALAZINE (APRESOLINE) 50 MG tablet Take 1 tablet by mouth Every 8 (Eight) Hours. 90 tablet 3    HYDROcodone-Acetaminophen (XODOL )  MG per tablet Take 1 tablet by mouth Every 8 (Eight) Hours As Needed for Moderate Pain.      isosorbide mononitrate (IMDUR) 30 MG 24 hr tablet Take 1 tablet by mouth Daily. 30 tablet 3    ketoconazole (NIZORAL) 2 % cream Apply  topically to the appropriate area as directed Daily.      lisinopril (PRINIVIL,ZESTRIL) 40 MG tablet Take 1 tablet by mouth Daily. 30 tablet 3    montelukast (SINGULAIR) 10 MG tablet Take 1 tablet by mouth Every Night. 30 tablet 0    nitroglycerin (NITROSTAT) 0.4 MG SL tablet Place 1 tablet under the tongue Every 5 (Five) Minutes As Needed for Chest Pain for up to 1 dose. Take no more than 3 doses in 15 minutes. 25 tablet 12    spironolactone (ALDACTONE) 50 MG tablet Take 1 tablet by mouth Daily.         PROCEDURES  Procedures    PROGRESS, DATA ANALYSIS, CONSULTS, AND MEDICAL DECISION MAKING  All labs have been independently interpreted by me.  All radiology studies have been reviewed by me. All EKG's have been independently viewed and interpreted by me.  Discussion below represents my analysis of pertinent findings related to patient's condition, differential diagnosis, treatment plan and final disposition.    Differential diagnosis includes but is not limited to CHF exacerbation, pneumonia, ACS, hypertensive  emergency.    Clinical Scores:                   ED Course as of 05/29/25 0129   Wed May 28, 2025   2306 EKG interpreted by myself  Time: 2246  Rate: 94  Rhythm: NSR  No ST elevation or depression  Normal intervals  Normal morphology [AB]   2306 WBC: 10.46 [AB]   2306 Hemoglobin: 15.4 [AB]   2306 XR Chest 1 View  My independent interpretation of the imaging study is cardiomegaly, no pneumothorax [AB]   Thu May 29, 2025   0041 HS Troponin T(!): 22 [AB]   0041 HS Troponin T(!): 27 [AB]   0041 Glucose(!): 199 [AB]   0041 Creatinine: 1.21 [AB]   0041 WBC: 10.46 [AB]   0041 Hemoglobin: 15.4 [AB]   0041 Respiratory Panel PCR w/COVID-19(SARS-CoV-2) CATA/RICH/LEW/PAD/COR/NEEL In-House, NP Swab in UTM/VTM, 2 HR TAT - Swab, Nasopharynx [AB]   0041 Magnesium: 2.1 [AB]   0041 proBNP: 910.0 [AB]   0107 MDM: Patient presents with progressive lower extremity swelling, orthopnea and shortness of breath.  Increased oxygen requirement to 3 L.  Lab work reassuring.  Patient has clinical evidence of CHF exacerbation with significant symptomatology.  Given 80 mg IV Lasix to initiate diuresis.  Admit for further treatment. [AB]   0126 Phone call with Danitza with [facility].  Discussed the patient, relevant history, exam, diagnostics, ED findings/progress, and concerns. They agree to admit the patient to Dr. Samuel. Care assumed by the admitting physician at this time.   [AB]      ED Course User Index  [AB] Andre Anderson MD             AS OF 01:29 EDT VITALS:    BP - (!) 188/96  HR - 76  TEMP - 97.9 °F (36.6 °C) (Oral)  O2 SATS - 97%    COMPLEXITY OF CARE  The patient requires admission.      DIAGNOSIS  Final diagnoses:   Acute on chronic congestive heart failure, unspecified heart failure type   Hypertension, unspecified type         DISPOSITION  ED Disposition       ED Disposition   Decision to Admit    Condition   --    Comment   Level of Care: Telemetry [5]   Diagnosis: CHF exacerbation [323626]   Admitting Physician: GIAN SAMUEL  [[id number]]   Attending Physician: GIAN SAMUEL [[id number]]   Bed Request Comments: patient's wife is admitted to 4s (410) if patient can be placed nearby   Is patient appropriate for Inpatient Observation Unit?: No [0]                  Please note that portions of this document were completed with a voice recognition program.    Note Disclaimer: At [facility], we believe that sharing information builds trust and better relationships. You are receiving this note because you recently visited [facility]. It is possible you will see health information before a provider has talked with you about it. This kind of information can be easy to misunderstand. To help you fully understand what it means for your health, we urge you to discuss this note with your provider.         Andre Anderson MD  05/29/25 0129      Electronically signed by Andre Anderson MD at 05/29/25 0129       Emerald Shoemaker, RN at 05/28/25 2222          Pt to ED with c/o visiting wife upstairs, staff reports pt's lips turning blue and difficulty breathing. Pt wears 2.5L of oxygen chrinocially at home but not wearing any now. Pt 89% on room air when arrived to triage. States he does not have portable oxygen and only has a big tank that he uses at home.     Electronically signed by Emerald Shoemaker, RN at 05/28/25 2222       Medication Administration Report for Negrito Navarro as of 5/30/25 through 5/30/25     Legend:    Given Hold Not Given Due Canceled Entry Other Actions    Time Time (Time) Time Time-Action         Discontinued     Completed     Future     MAR Hold     Linked             Medications 05/30/25      acetaminophen (TYLENOL) tablet 650 mg  Dose: 650 mg  Freq: Every 4 Hours PRN Route: PO  PRN Reason: Mild Pain  Start: 05/29/25 0143     Admin Instructions:   If given for fever, use fever parameter: fever greater than 100.4 °F  Based on patient request - if ordered for moderate or severe pain, provider allows for administration  of a medication prescribed for a lower pain scale.    Do not exceed 4 grams of acetaminophen in a 24 hr period. Max dose of 2gm for AST/ALT greater than 120 units/L.    If given for pain, use the following pain scale:   Mild Pain = Pain Score of 1-3, CPOT 1-2  Moderate Pain = Pain Score of 4-6, CPOT 3-4  Severe Pain = Pain Score of 7-10, CPOT 5-8         Or   acetaminophen (TYLENOL) 160 MG/5ML oral solution 650 mg  Dose: 650 mg  Freq: Every 4 Hours PRN Route: PO  PRN Reason: Mild Pain  Start: 05/29/25 0143     Admin Instructions:   If given for fever, use fever parameter: fever greater than 100.4 °F  Based on patient request - if ordered for moderate or severe pain, provider allows for administration of a medication prescribed for a lower pain scale.    Do not exceed 4 grams of acetaminophen in a 24 hr period. Max dose of 2gm for AST/ALT greater than 120 units/L.    If given for pain, use the following pain scale:   Mild Pain = Pain Score of 1-3, CPOT 1-2  Moderate Pain = Pain Score of 4-6, CPOT 3-4  Severe Pain = Pain Score of 7-10, CPOT 5-8         Or   acetaminophen (TYLENOL) suppository 650 mg  Dose: 650 mg  Freq: Every 4 Hours PRN Route: RE  PRN Reason: Mild Pain  Start: 05/29/25 0143     Admin Instructions:   If given for fever, use fever parameter: fever greater than 100.4 °F  Based on patient request - if ordered for moderate or severe pain, provider allows for administration of a medication prescribed for a lower pain scale.    Do not exceed 4 grams of acetaminophen in a 24 hr period. Max dose of 2gm for AST/ALT greater than 120 units/L.    If given for pain, use the following pain scale:   Mild Pain = Pain Score of 1-3, CPOT 1-2  Moderate Pain = Pain Score of 4-6, CPOT 3-4  Severe Pain = Pain Score of 7-10, CPOT 5-8         albuterol (PROVENTIL) nebulizer solution 0.083% 2.5 mg/3mL  Dose: 2.5 mg  Freq: Every 6 Hours PRN Route: NEBULIZATION  PRN Reasons: Shortness of Air,Wheezing  Start: 05/29/25 0857          aspirin EC tablet 81 mg  Dose: 81 mg  Freq: Daily Route: PO  Start: 05/29/25 0945     Admin Instructions:   Do not crush or chew the capsules or tablets. The drug may not work as designed if the capsule or tablet is crushed or chewed. Swallow whole.  Do not exceed 4 grams of aspirin in a 24 hr period.    If given for pain, use the following pain scale:   Mild Pain = Pain Score of 1-3, CPOT 1-2  Moderate Pain = Pain Score of 4-6, CPOT 3-4  Severe Pain = Pain Score of 7-10, CPOT 5-8      1000-Given                   atorvastatin (LIPITOR) tablet 40 mg  Dose: 40 mg  Freq: Daily Route: PO  Start: 05/29/25 0945     Admin Instructions:   Avoid grapefruit juice.      1000-Given                    sennosides-docusate (PERICOLACE) 8.6-50 MG per tablet 2 tablet  Dose: 2 tablet  Freq: 2 Times Daily PRN Route: PO  PRN Reason: Constipation  Start: 05/29/25 0143     Admin Instructions:   Start bowel management regimen if patient has not had a bowel movement after 12 hours.         And   polyethylene glycol (MIRALAX) packet 17 g  Dose: 17 g  Freq: Daily PRN Route: PO  PRN Reason: Constipation  PRN Comment: Use if senna-docusate is ineffective  Start: 05/29/25 0143     Admin Instructions:   Use if no bowel movement after 12 hours. Mix in 6-8 ounces of water.  Use 4-8 ounces of water, tea, or juice for each 17 gram dose.         And   bisacodyl (DULCOLAX) EC tablet 5 mg  Dose: 5 mg  Freq: Daily PRN Route: PO  PRN Reason: Constipation  PRN Comment: Use if polyethylene glycol is ineffective  Start: 05/29/25 0143     Admin Instructions:   Use if no bowel movement after 12 hours.  Swallow whole. Do not crush, split, or chew tablet.         And   bisacodyl (DULCOLAX) suppository 10 mg  Dose: 10 mg  Freq: Daily PRN Route: RE  PRN Reason: Constipation  PRN Comment: Use if bisacodyl oral is ineffective  Start: 05/29/25 0143     Admin Instructions:   Use if no bowel movement after 12 hours.  Hold for diarrhea         budesonide-formoterol  (SYMBICORT) 160-4.5 MCG/ACT inhaler 2 puff  Dose: 2 puff  Freq: 2 Times Daily - RT Route: IN  Start: 05/29/25 0945     Admin Instructions:   Include Respiratory Treatment Education   Shake well.  Rinse mouth after use, do not swallow water.  Send aerosols to pharmacy in ziplock bag for proper disposal.      0827-Given     2130                  carvedilol (COREG) tablet 12.5 mg  Dose: 12.5 mg  Freq: 2 Times Daily With Meals Route: PO  Start: 05/29/25 0945     Admin Instructions:   Hold for SBP less than 100, DBP less than 60, or heart rate less than 50. If a dose is held, please contact the provider.  Give with food.      1001-Given     1800                  cloNIDine (CATAPRES) tablet 0.2 mg  Dose: 0.2 mg  Freq: Every 8 Hours Route: PO  Start: 05/29/25 0945     Admin Instructions:   Hold for SBP less than 120, DBP less than 80, or heart rate less than 50. If a dose is held, please contact the provider.  Caution: Look alike/sound alike drug alert.      (0145)-Not Given     1000-Given     1745                 clopidogrel (PLAVIX) tablet 75 mg  Dose: 75 mg  Freq: Daily Route: PO  Start: 05/29/25 0945      1000-Given                   dextrose (D50W) (25 g/50 mL) IV injection 25 g  Dose: 25 g  Freq: Every 15 Minutes PRN Route: IV  PRN Reason: Low Blood Sugar  PRN Comment: Blood Sugar Less Than 70  Start: 05/29/25 0144     Admin Instructions:   Blood sugar less than 70; patient has IV access - Unresponsive, NPO or Unable To Safely Swallow         dextrose (GLUTOSE) oral gel 15 g  Dose: 15 g  Freq: Every 15 Minutes PRN Route: PO  PRN Reason: Low Blood Sugar  PRN Comment: Blood sugar less than 70  Start: 05/29/25 0144     Admin Instructions:   BS<70, Patient Alert, Is not NPO, Can safely swallow.         famotidine (PEPCID) tablet 20 mg  Dose: 20 mg  Freq: 2 Times Daily PRN Route: PO  PRN Reasons: Heartburn,Indigestion  Start: 05/29/25 0143         glucagon (GLUCAGEN) injection 1 mg  Dose: 1 mg  Freq: Every 15 Minutes PRN  Route: IM  PRN Reason: Low Blood Sugar  PRN Comment: Blood Glucose Less Than 70  Start: 05/29/25 0144     Admin Instructions:   Blood Glucose Less Than 70 - Patient Without IV Access - Unresponsive, NPO or Unable To Safely Swallow  Reconstitute powder for injection by adding 1 mL of -supplied sterile diluent or sterile water for injection to a vial containing 1 mg of the drug, to provide solutions containing 1 mg/mL. Shake vial gently to dissolve.         hydrALAZINE (APRESOLINE) injection 10 mg  Dose: 10 mg  Freq: Every 6 Hours PRN Route: IV  PRN Reason: High Blood Pressure  PRN Comment: Bp >170systolic  Start: 05/29/25 1035     Admin Instructions:   Hold for SBP less than 100, DBP less than 60.  Caution: Look alike/sound alike drug alert         hydrALAZINE (APRESOLINE) tablet 50 mg  Dose: 50 mg  Freq: Every 8 Hours Scheduled Route: PO  Start: 05/29/25 1400     Admin Instructions:   Hold for SBP less than 100, DBP less than 60.  Caution: Look alike/sound alike drug alert      (0727)-Not Given [C]     1220-Held by provider     1400-Held by provider     2200-Held by provider                HYDROcodone-acetaminophen (NORCO)  MG per tablet 1 tablet  Dose: 1 tablet  Freq: Every 8 Hours PRN Route: PO  PRN Reason: Moderate Pain  Start: 05/29/25 0856     Admin Instructions:   [VICKIE]    Do not exceed 4 grams of acetaminophen in a 24 hr period. Max dose of 2gm for AST/ALT greater than 120 units/L        If given for pain, use the following pain scale:   Mild Pain = Pain Score of 1-3, CPOT 1-2  Moderate Pain = Pain Score of 4-6, CPOT 3-4  Severe Pain = Pain Score of 7-10, CPOT 5-8      5756-Given                   insulin lispro (HUMALOG/ADMELOG) injection 2-7 Units  Dose: 2-7 Units  Freq: 4 Times Daily Before Meals & Nightly Route: SC  Start: 05/29/25 0730     Admin Instructions:   Correction Insulin - Low Dose - Total Insulin Dose Less Than 40 units/day (Lean, Elderly or Renal Patients)    Blood Glucose  150-199 mg/dL - 2 units  Blood Glucose 200-249 mg/dL - 3 units  Blood Glucose 250-299 mg/dL - 4 units  Blood Glucose 300-349 mg/dL - 5 units  Blood Glucose 350-400 mg/dL - 6 units  Blood Glucose Greater Than 400 mg/dL - 7 units & Call Provider   Caution: Look alike/sound alike drug alert      (2303)-Not Given     (1156)-Not Given     1730 2100                isosorbide mononitrate (IMDUR) 24 hr tablet 30 mg  Dose: 30 mg  Freq: Every 24 Hours Scheduled Route: PO  Start: 05/29/25 0945     Admin Instructions:   Do not crush or chew the capsules or tablets. The drug may not work as designed if the capsule or tablet is crushed or chewed. Swallow whole.      1003-Given                   labetalol (NORMODYNE,TRANDATE) injection 20 mg  Dose: 20 mg  Freq: Every 4 Hours PRN Route: IV  PRN Reason: High Blood Pressure  Start: 05/29/25 0810     Admin Instructions:   As needed for BP >160/100    Give IV Push over 2 minutes.         lisinopril (PRINIVIL,ZESTRIL) tablet 40 mg  Dose: 40 mg  Freq: Every 24 Hours Scheduled Route: PO  Start: 05/29/25 0945     Admin Instructions:   Hold for SBP less than 100, DBP less than 60.      0754-Held by provider     0900-Held by provider                  melatonin tablet 5 mg  Dose: 5 mg  Freq: Nightly PRN Route: PO  PRN Reason: Sleep  Start: 05/29/25 0144         montelukast (SINGULAIR) tablet 10 mg  Dose: 10 mg  Freq: Nightly Route: PO  Start: 05/29/25 2100 2100                   nitroglycerin (NITROSTAT) SL tablet 0.4 mg  Dose: 0.4 mg  Freq: Every 5 Minutes PRN Route: SL  PRN Reason: Chest Pain  Start: 05/29/25 0144     Admin Instructions:   If Pain Unrelieved After 3 Doses Notify MD  May administer up to 3 doses per episode. Hold if SBP less than 100.         ondansetron (ZOFRAN) injection 4 mg  Dose: 4 mg  Freq: Every 6 Hours PRN Route: IV  PRN Reasons: Nausea,Vomiting  Start: 05/29/25 0143     Admin Instructions:   If BOTH ondansetron (ZOFRAN) and promethazine (PHENERGAN) are  ordered use ondansetron first and THEN promethazine IF ondansetron is ineffective.         sodium chloride 0.9 % flush 10 mL  Dose: 10 mL  Freq: As Needed Route: IV  PRN Reason: Line Care  Start: 05/29/25 0143         sodium chloride 0.9 % flush 10 mL  Dose: 10 mL  Freq: Every 12 Hours Scheduled Route: IV  Start: 05/29/25 0201      1001-Given     2100                  sodium chloride 0.9 % infusion  Rate: 100 mL/hr Dose: 100 mL/hr  Freq: Continuous Route: IV  Start: 05/30/25 1315 End: 05/31/25 0150      1551-New Bag                   sodium chloride 0.9 % infusion 40 mL  Dose: 40 mL  Freq: As Needed Route: IV  PRN Reason: Line Care  Start: 05/29/25 0143     Admin Instructions:   Following administration of an IV intermittent medication, flush line with 40mL NS at 100mL/hr.         spironolactone (ALDACTONE) tablet 50 mg  Dose: 50 mg  Freq: Daily Route: PO  Start: 05/29/25 0945     Admin Instructions:   Hold for SBP less than 100, DBP less than 60.  Group 1 (Yellow) Hazardous Drug - See Handling Guide      0754-Held by provider     0900-Held by provider                  terazosin (HYTRIN) capsule 1 mg  Dose: 1 mg  Freq: Nightly Route: PO  Start: 05/29/25 2100     Admin Instructions:   Hold for SBP less than 100, DBP less than 60.      2100                   Completed Medications  Medications 05/30/25      furosemide (LASIX) injection 40 mg  Dose: 40 mg  Freq: Once Route: IV  Start: 05/29/25 0800 End: 05/29/25 0828         furosemide (LASIX) injection 80 mg  Dose: 80 mg  Freq: Once Route: IV  Start: 05/29/25 0057 End: 05/29/25 0058         hydrALAZINE (APRESOLINE) injection 20 mg  Dose: 20 mg  Freq: Once Route: IV  Start: 05/29/25 0143 End: 05/29/25 0137     Admin Instructions:   Hold for SBP less than 100, DBP less than 60.  Caution: Look alike/sound alike drug alert         labetalol (NORMODYNE,TRANDATE) injection 20 mg  Dose: 20 mg  Freq: Once Route: IV  Start: 05/28/25 2340 End: 05/28/25 1666     Admin  Instructions:   As ordered  Give IV Push over 2 minutes.         Lidocaine HCl gel (XYLOCAINE) urethral/mucosal syringe  Freq: Once Route: UR  Start: 25 1100 End: 25 1118     Admin Instructions:   Apply to urethra. Based on patient request - if ordered for moderate or severe pain, provider allows for administration of a medication prescribed for a lower pain scale.         perflutren (DEFINITY) 8.476 mg in Sodium Chloride (PF) 0.9 % 10 mL injection  Dose: 2 mL  Freq: Once in Imaging Route: IV  Start: 25 1530 End: 25 1438     Admin Instructions:   Mix 1.3 mL of mechanically activated Definity with 8.7 mL of normal saline. A total of 2 mL of the resulting Definity solution was administered.                        Physician Progress Notes (last 24 hours)        Rosa Isela Walker APRN at 25 1001          [facility]  Cardiology Progress Note    Patient Identification:  Name: Negrito Navarro  Age: 74 y.o.  Sex: male  :  1950  MRN: [id number]                 Follow Up / Chief Complaint: Follow-up for CHF exacerbation    Interval History: Resting in bed.  No chest pain and improvement with shortness of breath.         Objective:    Past Medical History:  Past Medical History:   Diagnosis Date    Benign prostatic hyperplasia 11/10/2016    COPD (chronic obstructive pulmonary disease) 6/10/2021    Coronary artery disease involving coronary bypass graft of native heart without angina pectoris 6/10/2021    Essential hypertension 11/10/2016    Hyperlipidemia     SERENITY (obstructive sleep apnea) 11/10/2016     Past Surgical History:  Past Surgical History:   Procedure Laterality Date    CARDIAC CATHETERIZATION N/A 2023    Procedure: Left Heart Cath;  Surgeon: Jayne Villalobos MD;  Location: [facility] CATH INVASIVE LOCATION;  Service: Cardiovascular;  Laterality: N/A;    CARDIAC CATHETERIZATION N/A 2023    Procedure: Coronary angiography;  Surgeon: Griselda  MD Jayne;  [facility];  Service: Cardiovascular;  Laterality: N/A;    CARDIAC CATHETERIZATION N/A 4/21/2023    Procedure: Left ventriculography;  Surgeon: Jayne Villalobos MD;  [facility];  Service: Cardiovascular;  Laterality: N/A;    CARDIAC CATHETERIZATION  4/21/2023    Procedure: Saphenous Vein Graft;  Surgeon: Jayne Villalobos MD;  [facility];  Service: Cardiovascular;;    CARDIAC CATHETERIZATION N/A 4/21/2023    Procedure: Native mammary injection;  Surgeon: Jayne Villalobos MD;  [facility];  Service: Cardiovascular;  Laterality: N/A;    CARDIAC CATHETERIZATION N/A 4/21/2023    Procedure: Percutaneous Coronary Intervention;  Surgeon: Jayne Villalobos MD;  Location: [facility] CATH INVASIVE LOCATION;  Service: Cardiovascular;  Laterality: N/A;    CARDIAC CATHETERIZATION N/A 4/21/2023    Procedure: Stent BETO coronary;  Surgeon: Jayne Villalobos MD;  Location: [facility] CATH INVASIVE LOCATION;  Service: Cardiovascular;  Laterality: N/A;    CARDIAC CATHETERIZATION  4/21/2023    Procedure: Percutaneous Manual Thrombectomy;  Surgeon: Jayne Villalobos MD;  Location: [facility] CATH INVASIVE LOCATION;  Service: Cardiovascular;;        Social History:   Social History     Tobacco Use    Smoking status: Never     Passive exposure: Never    Smokeless tobacco: Never   Substance Use Topics    Alcohol use: Never      Family History:  History reviewed. No pertinent family history.       Allergies:  Allergies   Allergen Reactions    Shellfish-Derived Products Unknown - Low Severity     gout  gout       Scheduled Meds:  aspirin, 81 mg, Daily  atorvastatin, 40 mg, Daily  budesonide-formoterol, 2 puff, BID - RT  carvedilol, 12.5 mg, BID With Meals  cloNIDine, 0.2 mg, Q8H  clopidogrel, 75 mg, Daily  hydrALAZINE, 50 mg, Q8H  insulin lispro, 2-7 Units, 4x Daily AC & at Bedtime  isosorbide  mononitrate, 30 mg, Q24H  [Held by provider] lisinopril, 40 mg, Q24H  montelukast, 10 mg, Nightly  sodium chloride, 10 mL, Q12H  [Held by provider] spironolactone, 50 mg, Daily  terazosin, 1 mg, Nightly            INTAKE AND OUTPUT:    Intake/Output Summary (Last 24 hours) at 5/30/2025 1001  Last data filed at 5/30/2025 0932  Gross per 24 hour   Intake --   Output 1500 ml   Net -1500 ml       Review of Systems:   GI: Nausea or vomiting  Cardiac: No chest pain  Pulmonary: Improvement with shortness of breath.    Constitutional:  Temp:  [97.3 °F (36.3 °C)-97.5 °F (36.4 °C)] 97.3 °F (36.3 °C)  Heart Rate:  [63-70] 65  Resp:  [16-18] 18  BP: ()/(60-84) 102/65      Physical Exam:  General:  Alert, cooperative, appears in no acute distress  Respiratory: Clear to auscultation.  Normal respiratory effort and rate.  Cardiovascular: S1S2 Regular rate and rhythm. No murmur, rub or gallop.   Gastrointestinal: soft, non tender. Bowel sounds present.   Extremities: VILLARREAL x4. No pretibial pitting edema. Adequate musculoskeletal strength.   Neuro: AAO x3 CN II-XII grossly intact        Cardiographics    ECG: Sinus rhythm first-degree    Echocardiogram:         Interpretation Summary         Left ventricular ejection fraction appears to be 61 - 65%.    Left ventricular diastolic function was normal.    Mild aortic valve stenosis is present.    Systolic anterior motion of the anterior mitral leaflet is present.     Lab Review   Results from last 7 days   Lab Units 05/28/25  2342 05/28/25  2240   HSTROP T ng/L 27* 22*     Results from last 7 days   Lab Units 05/28/25  2240   MAGNESIUM mg/dL 2.1     Results from last 7 days   Lab Units 05/30/25  0549   SODIUM mmol/L 139   POTASSIUM mmol/L 4.3   BUN mg/dL 35.0*   CREATININE mg/dL 2.43*   CALCIUM mg/dL 8.8     @[id number]@  Results from last 7 days   Lab Units 05/30/25  0549 05/29/25  0622 05/28/25  2240   WBC 10*3/mm3 11.09* 12.29* 10.46   HEMOGLOBIN g/dL 14.3 16.1 15.4  "  HEMATOCRIT % 42.0 47.6 47.3   PLATELETS 10*3/mm3 259 279 267             Assessment/plan:  -Hypertensive urgency: Now 102/65.Labetalol as needed as needed for blood pressure greater than 160/100.  -Mild aortic stenosis  -Coronary artery disease with stent 2023: Continue aspirin 81 mg daily, Plavix 75 mg daily and carvedilol 12.5 mg daily.   Hold parameters on carvedilol.  -COPD  -history of CHF: Echo completed EF 61 to 65%.  Mild aortic valve stenosis.  LV diastolic function normal.  Systolic anterior motion of anterior mitral leaflet is present.  Creatinine 2.43.  Euvolemic on exam. Agree with holding lisinopril and spironolactone at this time.  Nephrology following        )2025  HANNAH Bauman      [employer]Transcription:   \"Dictated utilizing Dragon dictation\".       Electronically signed by Rosa Isela Walker APRN at 25 [phone]       Peter Dalton MD at 25 [phone]              Name: Negrito Navarro ADMIT: 2025   : 1950  PCP: Provider, No Known    MRN: [id number] LOS: 0 days   AGE/SEX: 74 y.o. male  ROOM: [facility]     Subjective   Subjective   Patient lying in bed.  Just woke up.  Blood pressure better controlled.  No fevers or chills.  No sputum production.  Patient feels like he has little bit of lower extremity swelling.  Continues to have some shortness of breath.    Review of Systems  As above    Objective   Objective   Vital Signs  Temp:  [97.3 °F (36.3 °C)-97.5 °F (36.4 °C)] 97.3 °F (36.3 °C)  Heart Rate:  [63-87] 65  Resp:  [16-18] 18  BP: ()/() 102/65  SpO2:  [96 %-98 %] 98 %  on  Flow (L/min) (Oxygen Therapy):  [2-3] 2;   Device (Oxygen Therapy): nasal cannula  Body mass index is 33.86 kg/m².  Physical Exam  Vitals and nursing note reviewed.   Constitutional:       General: He is not in acute distress.     Appearance: He is ill-appearing.   Cardiovascular:      Rate and Rhythm: Normal rate and regular rhythm.   Pulmonary:      Effort: Pulmonary effort " is normal. No respiratory distress.      Comments: Diminished breath sounds bilaterally  Abdominal:      General: Abdomen is flat. There is no distension.      Tenderness: There is no abdominal tenderness.   Musculoskeletal:         General: No swelling or deformity.      Right lower leg: Edema present.      Left lower leg: Edema present.   Skin:     General: Skin is warm and dry.   Neurological:      General: No focal deficit present.      Mental Status: He is alert. Mental status is at baseline.         Results Review     I reviewed the patient's new clinical results.  Results from last 7 days   Lab Units 05/30/25  0549 05/29/25  0622 05/28/25  2240   WBC 10*3/mm3 11.09* 12.29* 10.46   HEMOGLOBIN g/dL 14.3 16.1 15.4   PLATELETS 10*3/mm3 259 279 267     Results from last 7 days   Lab Units 05/30/25  0549 05/29/25  0622 05/28/25  2240   SODIUM mmol/L 139 139 139   POTASSIUM mmol/L 4.3 3.7 3.8   CHLORIDE mmol/L 103 103 103   CO2 mmol/L 25.9 24.0 25.2   BUN mg/dL 35.0* 21.0 17.0   CREATININE mg/dL 2.43* 1.40* 1.21   GLUCOSE mg/dL 116* 146* 199*   Estimated Creatinine Clearance: 29.8 mL/min (A) (by C-G formula based on SCr of 2.43 mg/dL (H)).  Results from last 7 days   Lab Units 05/29/25  0622 05/28/25  2240   ALBUMIN g/dL 4.1 4.0   BILIRUBIN mg/dL 0.5 0.5   ALK PHOS U/L 106 102   AST (SGOT) U/L 21 20   ALT (SGPT) U/L 23 24     Results from last 7 days   Lab Units 05/30/25  0549 05/29/25  0622 05/28/25  2240   CALCIUM mg/dL 8.8 9.5 9.2   ALBUMIN g/dL  --  4.1 4.0   MAGNESIUM mg/dL  --   --  2.1     Results from last 7 days   Lab Units 05/28/25  2240   PROCALCITONIN ng/mL 0.08     COVID19   Date Value Ref Range Status   05/28/2025 Not Detected Not Detected - Ref. Range Final   04/17/2024 Not Detected Not Detected - Ref. Range Final     Hemoglobin A1C   Date/Time Value Ref Range Status   05/29/2025 0622 6.30 (H) 4.80 - 5.60 % Final     Glucose   Date/Time Value Ref Range Status   05/30/2025 0713 127 70 - 130 mg/dL Final    05/29/2025 2106 141 (H) 70 - 130 mg/dL Final   05/29/2025 1557 174 (H) 70 - 130 mg/dL Final   05/29/2025 1109 123 70 - 130 mg/dL Final   05/29/2025 0852 159 (H) 70 - 130 mg/dL Final       Adult Transthoracic Echo Complete W/ Cont if Necessary Per Protocol    Left ventricular ejection fraction appears to be 61 - 65%.    Left ventricular diastolic function was normal.    Mild aortic valve stenosis is present.    Systolic anterior motion of the anterior mitral leaflet is present.    I reviewed the patient's daily medications.  Scheduled Medications  aspirin, 81 mg, Oral, Daily  atorvastatin, 40 mg, Oral, Daily  budesonide-formoterol, 2 puff, Inhalation, BID - RT  carvedilol, 12.5 mg, Oral, BID With Meals  cloNIDine, 0.2 mg, Oral, Q8H  clopidogrel, 75 mg, Oral, Daily  hydrALAZINE, 50 mg, Oral, Q8H  insulin lispro, 2-7 Units, Subcutaneous, 4x Daily AC & at Bedtime  isosorbide mononitrate, 30 mg, Oral, Q24H  [Held by provider] lisinopril, 40 mg, Oral, Q24H  montelukast, 10 mg, Oral, Nightly  sodium chloride, 10 mL, Intravenous, Q12H  [Held by provider] spironolactone, 50 mg, Oral, Daily  terazosin, 1 mg, Oral, Nightly    Infusions   Diet  Diet: Cardiac, Diabetic; Healthy Heart (2-3 Na+); Consistent Carbohydrate; Fluid Consistency: Thin (IDDSI 0)        I have personally reviewed:  []  Laboratory   []  Microbiology   []  Radiology   []  EKG/Telemetry   []  Cardiology/Vascular   []  Pathology   []  Records     Assessment/Plan     Active Hospital Problems    Diagnosis  POA    **CHF exacerbation [I50.9]  Yes    Chronic HFrEF (heart failure with reduced ejection fraction) [I50.22]  Yes    Type 2 diabetes mellitus [E11.9]  Yes    Chronic kidney failure [N18.9]  Yes    CAD (coronary artery disease) [I25.10]  Yes    COPD (chronic obstructive pulmonary disease) [J44.9]  Yes    Essential hypertension [I10]  Yes    SERENITY (obstructive sleep apnea) [G47.33]  Yes      Resolved Hospital Problems   No resolved problems to display.        74 y.o. male admitted with CHF exacerbation.    Hypertensive emergency  Acute systolic congestive heart failure with recovered ejection fraction  Coronary artery disease s/p CABG, PCI  Hypoxemia  Heart failure exacerbation versus flash pulmonary edema from hypertension  Cardiology has been consulted.  Echo pending, previous echo with normal EF and diastolic dysfunction  IV Lasix held with BAO, consult nephrology  Continue home Imdur, hydralazine, aspirin, Plavix, Coreg, clonidine, atorvastatin  Repeat echocardiogram  Hold lisinopril and Aldactone with BAO  Wean oxygen as able    BAO on chronic Kidney disease  Likely multifactorial with uncontrolled blood pressure on admission, IV Lasix, urinary retention of 1 L  Nephrology consulted, holding lisinopril and Aldactone as above.  Hold further IV Lasix for now.    T2DM  A1c 6.3%. currently on SSI     Acute urinary retention  Had some light pink hematuria suspected from traumatic Herrera, urine color improved today  Urology following, was able to place Herrera with 1 L out, urology recommends keeping Herrera into outpatient follow-up    SCDs for DVT prophylaxis.  Full code.  Discussed with patient and nursing staff.  Anticipate discharge home in 2-3 days.    Expected Discharge Date: 6/1/2025; Expected Discharge Time:       Peter Dalton MD  [facility]  05/30/25  09:30 EDT            Electronically signed by Peter Dalton MD at 05/30/25 0959       Roderick Talbert MD at 05/30/25 0789            First Urology Progress Note    Chief Complaint: None    Doing well through the night.  No issues with his catheter.  No spasms.  No clotting.  Urine is slightly pink-tinged E flux    Review of Systems:    The following systems were reviewed and negative;  respiratory, cardiovascular, and gastrointestinal    Objective      Vital Signs  /65 (BP Location: Left arm, Patient Position: Lying)   Pulse 63   Temp 97.5 °F (36.4 °C) (Oral)   Resp 18   Ht  "172.7 cm (68\")   Wt 101 kg (222 lb 10.6 oz)   SpO2 97%   BMI 33.86 kg/m²     Physical Exam:     General Appearance:    Alert, cooperative, NAD   HEENT:    No trauma, pupils reactive, hearing intact   Back:     No CVA tenderness   Lungs:     Respirations unlabored, no wheezing    Heart:    RRR, intact peripheral pulses   Abdomen:   Soft benign no CVA tenderness   :  External genitalia normal.  Herrera catheter anchored   Extremities:   No edema, no deformity   Lymphatic:   No neck or groin LAD   Skin:   No bleeding, bruising or rashes   Neuro/Psych:   Orientation intact, mood/affect pleasant, no focal findings        Results Review:     I reviewed the patient's new clinical results.  Lab Results (last 24 hours)       Procedure Component Value Units Date/Time    Basic Metabolic Panel [[id number]]  (Abnormal) Collected: 05/30/25 0549    Specimen: Blood from Hand, Right Updated: 05/30/25 0702     Glucose 116 mg/dL      BUN 35.0 mg/dL      Creatinine 2.43 mg/dL      Sodium 139 mmol/L      Potassium 4.3 mmol/L      Comment: Slight hemolysis detected by analyzer. Result may be falsely elevated.        Chloride 103 mmol/L      CO2 25.9 mmol/L      Calcium 8.8 mg/dL      BUN/Creatinine Ratio 14.4     Anion Gap 10.1 mmol/L      eGFR 26.9 mL/min/1.73     Narrative:      GFR Categories in Chronic Kidney Disease (CKD)              GFR Category          GFR (mL/min/1.73)    Interpretation  G1                    90 or greater        Normal or high (1)  G2                    60-89                Mild decrease (1)  G3a                   45-59                Mild to moderate decrease  G3b                   30-44                Moderate to severe decrease  G4                    15-29                Severe decrease  G5                    14 or less           Kidney failure    (1)In the absence of evidence of kidney disease, neither GFR category G1 or G2 fulfill the criteria for CKD.    eGFR calculation 2021 CKD-EPI creatinine " equation, which does not include race as a factor    CBC (No Diff) [[id number]]  (Abnormal) Collected: 05/30/25 0549    Specimen: Blood from Hand, Right Updated: 05/30/25 0626     WBC 11.09 10*3/mm3      RBC 4.65 10*6/mm3      Hemoglobin 14.3 g/dL      Hematocrit 42.0 %      MCV 90.3 fL      MCH 30.8 pg      MCHC 34.0 g/dL      RDW 14.1 %      RDW-SD 46.1 fl      MPV 10.4 fL      Platelets 259 10*3/mm3     POC Glucose Once [[id number]]  (Abnormal) Collected: 05/29/25 2106    Specimen: Blood Updated: 05/29/25 2107     Glucose 141 mg/dL     POC Glucose Once [[id number]]  (Abnormal) Collected: 05/29/25 1557    Specimen: Blood Updated: 05/29/25 1558     Glucose 174 mg/dL     Hemoglobin A1c [[id number]]  (Abnormal) Collected: 05/29/25 0622    Specimen: Blood Updated: 05/29/25 1147     Hemoglobin A1C 6.30 %     Narrative:      Hemoglobin A1C Ranges:    Increased Risk for Diabetes  5.7% to 6.4%  Diabetes                     >= 6.5%  Diabetic Goal                < 7.0%    POC Glucose Once [[id number]]  (Normal) Collected: 05/29/25 1109    Specimen: Blood Updated: 05/29/25 1111     Glucose 123 mg/dL     POC Glucose Once [[id number]]  (Abnormal) Collected: 05/29/25 0852    Specimen: Blood Updated: 05/29/25 0854     Glucose 159 mg/dL           Imaging Results (Last 24 Hours)       ** No results found for the last 24 hours. **            Medication Review:   I have personally reviewed    Current Facility-Administered Medications:     acetaminophen (TYLENOL) tablet 650 mg, 650 mg, Oral, Q4H PRN, 650 mg at 05/29/25 0444 **OR** acetaminophen (TYLENOL) 160 MG/5ML oral solution 650 mg, 650 mg, Oral, Q4H PRN **OR** acetaminophen (TYLENOL) suppository 650 mg, 650 mg, Rectal, Q4H PRN, Danitza Pérez APRN    albuterol (PROVENTIL) nebulizer solution 0.083% 2.5 mg/3mL, 2.5 mg, Nebulization, Q6H PRN, Medhat Moyer MD    aspirin EC tablet 81 mg, 81 mg, Oral, Daily, Medhat Moyer MD, 81 mg at 05/29/25 1023    atorvastatin (LIPITOR) tablet 40  mg, 40 mg, Oral, Daily, Medhat Moyer MD, 40 mg at 05/29/25 1023    sennosides-docusate (PERICOLACE) 8.6-50 MG per tablet 2 tablet, 2 tablet, Oral, BID PRN **AND** polyethylene glycol (MIRALAX) packet 17 g, 17 g, Oral, Daily PRN **AND** bisacodyl (DULCOLAX) EC tablet 5 mg, 5 mg, Oral, Daily PRN **AND** bisacodyl (DULCOLAX) suppository 10 mg, 10 mg, Rectal, Daily PRN, Danitza Pérez APRN    budesonide-formoterol (SYMBICORT) 160-4.5 MCG/ACT inhaler 2 puff, 2 puff, Inhalation, BID - RT, Medhat Moyer MD, 2 puff at 05/29/25 2033    carvedilol (COREG) tablet 12.5 mg, 12.5 mg, Oral, BID With Meals, Medhat Moyer MD, 12.5 mg at 05/29/25 1743    cloNIDine (CATAPRES) tablet 0.2 mg, 0.2 mg, Oral, Q8H, Medhat Moyer MD, 0.2 mg at 05/29/25 1743    clopidogrel (PLAVIX) tablet 75 mg, 75 mg, Oral, Daily, Medhat Moyer MD, 75 mg at 05/29/25 1023    dextrose (D50W) (25 g/50 mL) IV injection 25 g, 25 g, Intravenous, Q15 Min PRN, Danitza Pérez APRN    dextrose (GLUTOSE) oral gel 15 g, 15 g, Oral, Q15 Min PRN, Danitza Pérez APRN    famotidine (PEPCID) tablet 20 mg, 20 mg, Oral, BID PRN, Danitza Pérez APRN    glucagon (GLUCAGEN) injection 1 mg, 1 mg, Intramuscular, Q15 Min PRN, Danitza Pérez APRN    hydrALAZINE (APRESOLINE) injection 10 mg, 10 mg, Intravenous, Q6H PRN, Janelle Adler APRN    hydrALAZINE (APRESOLINE) tablet 50 mg, 50 mg, Oral, Q8H, Medhat Moyer MD, 50 mg at 05/29/25 1509    HYDROcodone-acetaminophen (NORCO)  MG per tablet 1 tablet, 1 tablet, Oral, Q8H PRN, Medhat Moyer MD, 1 tablet at 05/30/25 0416    insulin lispro (HUMALOG/ADMELOG) injection 2-7 Units, 2-7 Units, Subcutaneous, 4x Daily AC & at Bedtime, Danitza Pérez, APRN, 2 Units at 05/29/25 1744    isosorbide mononitrate (IMDUR) 24 hr tablet 30 mg, 30 mg, Oral, Q24H, Medhat Moyer MD, 30 mg at 05/29/25 1023    labetalol (NORMODYNE,TRANDATE) injection 20 mg, 20 mg, Intravenous, Q4H PRN, Jayne Villalobos MD, 20  mg at 05/29/25 0815    lisinopril (PRINIVIL,ZESTRIL) tablet 40 mg, 40 mg, Oral, Q24H, Medhat Moyer MD, 40 mg at 05/29/25 1023    melatonin tablet 5 mg, 5 mg, Oral, Nightly PRN, Danitza Pérez APRN    montelukast (SINGULAIR) tablet 10 mg, 10 mg, Oral, Nightly, Medhat Moyer MD, 10 mg at 05/29/25 2059    nitroglycerin (NITROSTAT) SL tablet 0.4 mg, 0.4 mg, Sublingual, Q5 Min PRN, Danitza Pérez APRN    ondansetron (ZOFRAN) injection 4 mg, 4 mg, Intravenous, Q6H PRN, Danitza Pérez APRN    sodium chloride 0.9 % flush 10 mL, 10 mL, Intravenous, Q12H, Danitza Pérez APRN, 10 mL at 05/29/25 2100    sodium chloride 0.9 % flush 10 mL, 10 mL, Intravenous, PRN, Danitza Pérez APRN, 10 mL at 05/29/25 0446    sodium chloride 0.9 % infusion 40 mL, 40 mL, Intravenous, PRN, Danitza Pérez APRN    spironolactone (ALDACTONE) tablet 50 mg, 50 mg, Oral, Daily, Medhat Moyer MD, 50 mg at 05/29/25 1023    terazosin (HYTRIN) capsule 1 mg, 1 mg, Oral, Nightly, Gricelda Pfeiffer APRN, 1 mg at 05/29/25 2059    Allergies:    Shellfish-derived products    Assessment:    Active Problems:    CHF exacerbation    Essential hypertension    SERENITY (obstructive sleep apnea)    COPD (chronic obstructive pulmonary disease)    CAD (coronary artery disease)    Chronic kidney failure    Type 2 diabetes mellitus    Chronic HFrEF (heart failure with reduced ejection fraction)       Gross hematuria resolved  Urinary retention secondary to multiple factors suspect some chronic component    Plan:    Herrera to stay and given high volume of bladder decompression.  Plan to send home with a catheter to follow-up in our office for outpatient workup.  Will continue to check on him if he remains in house for the next couple days      Roderick Talbert MD    5/30/2025  07:13 EDT    Electronically signed by Roderick Talbert MD at 05/30/25 0714

## 2025-05-30 NOTE — PROGRESS NOTES
[facility]  Cardiology Progress Note    Patient Identification:  Name: Negrito Navarro  Age: 74 y.o.  Sex: male  :  1950  MRN: [id number]                 Follow Up / Chief Complaint: Follow-up for CHF exacerbation    Interval History: Resting in bed.  No chest pain and improvement with shortness of breath.         Objective:    Past Medical History:  Past Medical History:   Diagnosis Date    Benign prostatic hyperplasia 11/10/2016    COPD (chronic obstructive pulmonary disease) 6/10/2021    Coronary artery disease involving coronary bypass graft of native heart without angina pectoris 6/10/2021    Essential hypertension 11/10/2016    Hyperlipidemia     SERENITY (obstructive sleep apnea) 11/10/2016     Past Surgical History:  Past Surgical History:   Procedure Laterality Date    CARDIAC CATHETERIZATION N/A 2023    Procedure: Left Heart Cath;  Surgeon: Jayne Villalobos MD;  Location: [facility] CATH INVASIVE LOCATION;  Service: Cardiovascular;  Laterality: N/A;    CARDIAC CATHETERIZATION N/A 2023    Procedure: Coronary angiography;  Surgeon: Jayne Villalobos MD;  Location: [facility] CATH INVASIVE LOCATION;  Service: Cardiovascular;  Laterality: N/A;    CARDIAC CATHETERIZATION N/A 2023    Procedure: Left ventriculography;  Surgeon: Jayne Villalobos MD;  Location: [facility] CATH INVASIVE LOCATION;  Service: Cardiovascular;  Laterality: N/A;    CARDIAC CATHETERIZATION  2023    Procedure: Saphenous Vein Graft;  Surgeon: Jayne Villalobos MD;  Location: [facility] CATH INVASIVE LOCATION;  Service: Cardiovascular;;    CARDIAC CATHETERIZATION N/A 2023    Procedure: Native mammary injection;  Surgeon: Jayne Villalobos MD;  Location: [facility] CATH INVASIVE LOCATION;  Service: Cardiovascular;  Laterality: N/A;    CARDIAC CATHETERIZATION N/A 2023    Procedure: Percutaneous Coronary Intervention;  Surgeon: Jayne Villalobos MD;  Location: [facility] CATH INVASIVE  LOCATION;  Service: Cardiovascular;  Laterality: N/A;    CARDIAC CATHETERIZATION N/A 4/21/2023    Procedure: Stent BETO coronary;  Surgeon: Jayne Villalobos MD;  Location:  [facility] CATH INVASIVE LOCATION;  Service: Cardiovascular;  Laterality: N/A;    CARDIAC CATHETERIZATION  4/21/2023    Procedure: Percutaneous Manual Thrombectomy;  Surgeon: Jayne Villalobos MD;  Location:  [facility] CATH INVASIVE LOCATION;  Service: Cardiovascular;;        Social History:   Social History     Tobacco Use    Smoking status: Never     Passive exposure: Never    Smokeless tobacco: Never   Substance Use Topics    Alcohol use: Never      Family History:  History reviewed. No pertinent family history.       Allergies:  Allergies   Allergen Reactions    Shellfish-Derived Products Unknown - Low Severity     gout  gout       Scheduled Meds:  aspirin, 81 mg, Daily  atorvastatin, 40 mg, Daily  budesonide-formoterol, 2 puff, BID - RT  carvedilol, 12.5 mg, BID With Meals  cloNIDine, 0.2 mg, Q8H  clopidogrel, 75 mg, Daily  hydrALAZINE, 50 mg, Q8H  insulin lispro, 2-7 Units, 4x Daily AC & at Bedtime  isosorbide mononitrate, 30 mg, Q24H  [Held by provider] lisinopril, 40 mg, Q24H  montelukast, 10 mg, Nightly  sodium chloride, 10 mL, Q12H  [Held by provider] spironolactone, 50 mg, Daily  terazosin, 1 mg, Nightly            INTAKE AND OUTPUT:    Intake/Output Summary (Last 24 hours) at 5/30/2025 1001  Last data filed at 5/30/2025 0932  Gross per 24 hour   Intake --   Output 1500 ml   Net -1500 ml       Review of Systems:   GI: Nausea or vomiting  Cardiac: No chest pain  Pulmonary: Improvement with shortness of breath.    Constitutional:  Temp:  [97.3 °F (36.3 °C)-97.5 °F (36.4 °C)] 97.3 °F (36.3 °C)  Heart Rate:  [63-70] 65  Resp:  [16-18] 18  BP: ()/(60-84) 102/65      Physical Exam:  General:  Alert, cooperative, appears in no acute distress  Respiratory: Clear to auscultation.  Normal respiratory effort and rate.  Cardiovascular: S1S2  "Regular rate and rhythm. No murmur, rub or gallop.   Gastrointestinal: soft, non tender. Bowel sounds present.   Extremities: VILLARREAL x4. No pretibial pitting edema. Adequate musculoskeletal strength.   Neuro: AAO x3 CN II-XII grossly intact        Cardiographics    ECG: Sinus rhythm first-degree    Echocardiogram:         Interpretation Summary         Left ventricular ejection fraction appears to be 61 - 65%.    Left ventricular diastolic function was normal.    Mild aortic valve stenosis is present.    Systolic anterior motion of the anterior mitral leaflet is present.     Lab Review   Results from last 7 days   Lab Units 05/28/25  2342 05/28/25  2240   HSTROP T ng/L 27* 22*     Results from last 7 days   Lab Units 05/28/25  2240   MAGNESIUM mg/dL 2.1     Results from last 7 days   Lab Units 05/30/25  0549   SODIUM mmol/L 139   POTASSIUM mmol/L 4.3   BUN mg/dL 35.0*   CREATININE mg/dL 2.43*   CALCIUM mg/dL 8.8     @[id number]@  Results from last 7 days   Lab Units 05/30/25  0549 05/29/25  0622 05/28/25  2240   WBC 10*3/mm3 11.09* 12.29* 10.46   HEMOGLOBIN g/dL 14.3 16.1 15.4   HEMATOCRIT % 42.0 47.6 47.3   PLATELETS 10*3/mm3 259 279 267             Assessment/plan:  -Hypertensive urgency: Now 102/65.Labetalol as needed as needed for blood pressure greater than 160/100.  -Mild aortic stenosis  -Coronary artery disease with stent 4/21/2023: Continue aspirin 81 mg daily, Plavix 75 mg daily and carvedilol 12.5 mg daily.   Hold parameters on carvedilol.  -COPD  -history of CHF: Echo completed EF 61 to 65%.  Mild aortic valve stenosis.  LV diastolic function normal.  Systolic anterior motion of anterior mitral leaflet is present.  Creatinine 2.43.  Euvolemic on exam. Agree with holding lisinopril and spironolactone at this time.  Nephrology following        )5/30/2025  HANNAH Bauman/Transcription:   \"Dictated utilizing Dragon dictation\".     "

## 2025-05-30 NOTE — PROGRESS NOTES
Name: Negrito Navarro ADMIT: 2025   : 1950  PCP: Provider, No Known    MRN: [id number] LOS: 0 days   AGE/SEX: 74 y.o. male  ROOM: [facility]     Subjective   Subjective   Patient lying in bed.  Just woke up.  Blood pressure better controlled.  No fevers or chills.  No sputum production.  Patient feels like he has little bit of lower extremity swelling.  Continues to have some shortness of breath.    Review of Systems  As above     Objective   Objective   Vital Signs  Temp:  [97.3 °F (36.3 °C)-97.5 °F (36.4 °C)] 97.3 °F (36.3 °C)  Heart Rate:  [63-87] 65  Resp:  [16-18] 18  BP: ()/() 102/65  SpO2:  [96 %-98 %] 98 %  on  Flow (L/min) (Oxygen Therapy):  [2-3] 2;   Device (Oxygen Therapy): nasal cannula  Body mass index is 33.86 kg/m².  Physical Exam  Vitals and nursing note reviewed.   Constitutional:       General: He is not in acute distress.     Appearance: He is ill-appearing.   Cardiovascular:      Rate and Rhythm: Normal rate and regular rhythm.   Pulmonary:      Effort: Pulmonary effort is normal. No respiratory distress.      Comments: Diminished breath sounds bilaterally  Abdominal:      General: Abdomen is flat. There is no distension.      Tenderness: There is no abdominal tenderness.   Musculoskeletal:         General: No swelling or deformity.      Right lower leg: Edema present.      Left lower leg: Edema present.   Skin:     General: Skin is warm and dry.   Neurological:      General: No focal deficit present.      Mental Status: He is alert. Mental status is at baseline.         Results Review     I reviewed the patient's new clinical results.  Results from last 7 days   Lab Units 25  0549 25  0622 25  2240   WBC 10*3/mm3 11.09* 12.29* 10.46   HEMOGLOBIN g/dL 14.3 16.1 15.4   PLATELETS 10*3/mm3 259 279 267     Results from last 7 days   Lab Units 25  0549 25  0622 25  2240   SODIUM mmol/L 139 139 139   POTASSIUM mmol/L 4.3 3.7 3.8   CHLORIDE  mmol/L 103 103 103   CO2 mmol/L 25.9 24.0 25.2   BUN mg/dL 35.0* 21.0 17.0   CREATININE mg/dL 2.43* 1.40* 1.21   GLUCOSE mg/dL 116* 146* 199*   Estimated Creatinine Clearance: 29.8 mL/min (A) (by C-G formula based on SCr of 2.43 mg/dL (H)).  Results from last 7 days   Lab Units 05/29/25  0622 05/28/25  2240   ALBUMIN g/dL 4.1 4.0   BILIRUBIN mg/dL 0.5 0.5   ALK PHOS U/L 106 102   AST (SGOT) U/L 21 20   ALT (SGPT) U/L 23 24     Results from last 7 days   Lab Units 05/30/25  0549 05/29/25  0622 05/28/25  2240   CALCIUM mg/dL 8.8 9.5 9.2   ALBUMIN g/dL  --  4.1 4.0   MAGNESIUM mg/dL  --   --  2.1     Results from last 7 days   Lab Units 05/28/25  2240   PROCALCITONIN ng/mL 0.08     COVID19   Date Value Ref Range Status   05/28/2025 Not Detected Not Detected - Ref. Range Final   04/17/2024 Not Detected Not Detected - Ref. Range Final     Hemoglobin A1C   Date/Time Value Ref Range Status   05/29/2025 0622 6.30 (H) 4.80 - 5.60 % Final     Glucose   Date/Time Value Ref Range Status   05/30/2025 0713 127 70 - 130 mg/dL Final   05/29/2025 2106 141 (H) 70 - 130 mg/dL Final   05/29/2025 1557 174 (H) 70 - 130 mg/dL Final   05/29/2025 1109 123 70 - 130 mg/dL Final   05/29/2025 0852 159 (H) 70 - 130 mg/dL Final       Adult Transthoracic Echo Complete W/ Cont if Necessary Per Protocol    Left ventricular ejection fraction appears to be 61 - 65%.    Left ventricular diastolic function was normal.    Mild aortic valve stenosis is present.    Systolic anterior motion of the anterior mitral leaflet is present.    I reviewed the patient's daily medications.  Scheduled Medications  aspirin, 81 mg, Oral, Daily  atorvastatin, 40 mg, Oral, Daily  budesonide-formoterol, 2 puff, Inhalation, BID - RT  carvedilol, 12.5 mg, Oral, BID With Meals  cloNIDine, 0.2 mg, Oral, Q8H  clopidogrel, 75 mg, Oral, Daily  hydrALAZINE, 50 mg, Oral, Q8H  insulin lispro, 2-7 Units, Subcutaneous, 4x Daily AC & at Bedtime  isosorbide mononitrate, 30 mg, Oral,  Q24H  [Held by provider] lisinopril, 40 mg, Oral, Q24H  montelukast, 10 mg, Oral, Nightly  sodium chloride, 10 mL, Intravenous, Q12H  [Held by provider] spironolactone, 50 mg, Oral, Daily  terazosin, 1 mg, Oral, Nightly    Infusions   Diet  Diet: Cardiac, Diabetic; Healthy Heart (2-3 Na+); Consistent Carbohydrate; Fluid Consistency: Thin (IDDSI 0)         I have personally reviewed:  []  Laboratory   []  Microbiology   []  Radiology   []  EKG/Telemetry   []  Cardiology/Vascular   []  Pathology   []  Records     Assessment/Plan     Active Hospital Problems    Diagnosis  POA    **CHF exacerbation [I50.9]  Yes    Chronic HFrEF (heart failure with reduced ejection fraction) [I50.22]  Yes    Type 2 diabetes mellitus [E11.9]  Yes    Chronic kidney failure [N18.9]  Yes    CAD (coronary artery disease) [I25.10]  Yes    COPD (chronic obstructive pulmonary disease) [J44.9]  Yes    Essential hypertension [I10]  Yes    SERENITY (obstructive sleep apnea) [G47.33]  Yes      Resolved Hospital Problems   No resolved problems to display.       74 y.o. male admitted with CHF exacerbation.    Hypertensive emergency  Acute systolic congestive heart failure with recovered ejection fraction  Coronary artery disease s/p CABG, PCI  Hypoxemia  Heart failure exacerbation versus flash pulmonary edema from hypertension  Cardiology has been consulted.  Echo pending, previous echo with normal EF and diastolic dysfunction  IV Lasix held with BAO, consult nephrology  Continue home Imdur, hydralazine, aspirin, Plavix, Coreg, clonidine, atorvastatin  Repeat echocardiogram  Hold lisinopril and Aldactone with BAO  Wean oxygen as able    BAO on chronic Kidney disease  Likely multifactorial with uncontrolled blood pressure on admission, IV Lasix, urinary retention of 1 L  Nephrology consulted, holding lisinopril and Aldactone as above.  Hold further IV Lasix for now.    T2DM  A1c 6.3%. currently on SSI     Acute urinary retention  Had some light pink  hematuria suspected from traumatic Herrera, urine color improved today  Urology following, was able to place Herrera with 1 L out, urology recommends keeping Herrera into outpatient follow-up    SCDs for DVT prophylaxis.  Full code.  Discussed with patient and nursing staff.  Anticipate discharge home in 2-3 days.    Expected Discharge Date: 6/1/2025; Expected Discharge Time:       Peter Dalton MD  [facility]  05/30/25  09:30 EDT

## 2025-05-31 LAB
ALBUMIN SERPL-MCNC: 3.2 G/DL (ref 3.5–5.2)
ANION GAP SERPL CALCULATED.3IONS-SCNC: 10 MMOL/L (ref 5–15)
BUN SERPL-MCNC: 37 MG/DL (ref 8–23)
BUN/CREAT SERPL: 16.7 (ref 7–25)
CALCIUM SPEC-SCNC: 8.4 MG/DL (ref 8.6–10.5)
CHLORIDE SERPL-SCNC: 103 MMOL/L (ref 98–107)
CO2 SERPL-SCNC: 26 MMOL/L (ref 22–29)
CREAT SERPL-MCNC: 2.21 MG/DL (ref 0.76–1.27)
EGFRCR SERPLBLD CKD-EPI 2021: 30.1 ML/MIN/1.73
GLUCOSE BLDC GLUCOMTR-MCNC: 106 MG/DL (ref 70–130)
GLUCOSE BLDC GLUCOMTR-MCNC: 123 MG/DL (ref 70–130)
GLUCOSE BLDC GLUCOMTR-MCNC: 125 MG/DL (ref 70–130)
GLUCOSE BLDC GLUCOMTR-MCNC: 144 MG/DL (ref 70–130)
GLUCOSE SERPL-MCNC: 117 MG/DL (ref 65–99)
PHOSPHATE SERPL-MCNC: 3.8 MG/DL (ref 2.5–4.5)
POTASSIUM SERPL-SCNC: 3.9 MMOL/L (ref 3.5–5.2)
SODIUM SERPL-SCNC: 139 MMOL/L (ref 136–145)
URATE SERPL-MCNC: 10.8 MG/DL (ref 3.4–7)

## 2025-05-31 PROCEDURE — 94761 N-INVAS EAR/PLS OXIMETRY MLT: CPT

## 2025-05-31 PROCEDURE — 84550 ASSAY OF BLOOD/URIC ACID: CPT | Performed by: INTERNAL MEDICINE

## 2025-05-31 PROCEDURE — 94799 UNLISTED PULMONARY SVC/PX: CPT

## 2025-05-31 PROCEDURE — 94664 DEMO&/EVAL PT USE INHALER: CPT

## 2025-05-31 PROCEDURE — 94760 N-INVAS EAR/PLS OXIMETRY 1: CPT

## 2025-05-31 PROCEDURE — 80069 RENAL FUNCTION PANEL: CPT | Performed by: INTERNAL MEDICINE

## 2025-05-31 PROCEDURE — 99232 SBSQ HOSP IP/OBS MODERATE 35: CPT | Performed by: NURSE PRACTITIONER

## 2025-05-31 PROCEDURE — 82948 REAGENT STRIP/BLOOD GLUCOSE: CPT

## 2025-05-31 RX ORDER — CLONIDINE HYDROCHLORIDE 0.1 MG/1
0.2 TABLET ORAL 2 TIMES DAILY
Status: DISCONTINUED | OUTPATIENT
Start: 2025-05-31 | End: 2025-06-02 | Stop reason: HOSPADM

## 2025-05-31 RX ADMIN — CLONIDINE HYDROCHLORIDE 0.2 MG: 0.1 TABLET ORAL at 20:07

## 2025-05-31 RX ADMIN — ASPIRIN 81 MG: 81 TABLET, COATED ORAL at 09:01

## 2025-05-31 RX ADMIN — BUDESONIDE AND FORMOTEROL FUMARATE DIHYDRATE 2 PUFF: 160; 4.5 AEROSOL RESPIRATORY (INHALATION) at 09:37

## 2025-05-31 RX ADMIN — CARVEDILOL 12.5 MG: 12.5 TABLET, FILM COATED ORAL at 09:00

## 2025-05-31 RX ADMIN — ISOSORBIDE MONONITRATE 30 MG: 30 TABLET, EXTENDED RELEASE ORAL at 09:01

## 2025-05-31 RX ADMIN — BUDESONIDE AND FORMOTEROL FUMARATE DIHYDRATE 2 PUFF: 160; 4.5 AEROSOL RESPIRATORY (INHALATION) at 19:32

## 2025-05-31 RX ADMIN — Medication 10 ML: at 09:02

## 2025-05-31 RX ADMIN — CLONIDINE HYDROCHLORIDE 0.2 MG: 0.1 TABLET ORAL at 09:01

## 2025-05-31 RX ADMIN — TERAZOSIN 1 MG: 1 CAPSULE ORAL at 20:08

## 2025-05-31 RX ADMIN — CARVEDILOL 12.5 MG: 12.5 TABLET, FILM COATED ORAL at 17:10

## 2025-05-31 RX ADMIN — CLOPIDOGREL BISULFATE 75 MG: 75 TABLET, FILM COATED ORAL at 09:01

## 2025-05-31 RX ADMIN — MONTELUKAST 10 MG: 10 TABLET, FILM COATED ORAL at 20:08

## 2025-05-31 RX ADMIN — HYDROCODONE BITARTRATE AND ACETAMINOPHEN 1 TABLET: 10; 325 TABLET ORAL at 20:07

## 2025-05-31 RX ADMIN — ATORVASTATIN CALCIUM 40 MG: 20 TABLET, FILM COATED ORAL at 09:01

## 2025-05-31 RX ADMIN — Medication 10 ML: at 20:08

## 2025-05-31 NOTE — PLAN OF CARE
Problem: Fall Injury Risk  Goal: Absence of Fall and Fall-Related Injury  Outcome: Progressing  Intervention: Identify and Manage Contributors  Recent Flowsheet Documentation  Taken 5/31/2025 0357 by Tosha Gallego RN  Medication Review/Management: medications reviewed  Self-Care Promotion: independence encouraged  Taken 5/31/2025 0202 by Tosha Gallego RN  Medication Review/Management: medications reviewed  Self-Care Promotion: independence encouraged  Taken 5/31/2025 0005 by Tosha Gallego RN  Self-Care Promotion: independence encouraged  Taken 5/30/2025 2205 by Tosha Gallego RN  Medication Review/Management: medications reviewed  Self-Care Promotion: independence encouraged  Taken 5/30/2025 2029 by Tosha Gallego RN  Medication Review/Management: medications reviewed  Self-Care Promotion: independence encouraged  Intervention: Promote Injury-Free Environment  Recent Flowsheet Documentation  Taken 5/31/2025 0357 by Tosha Gallego RN  Safety Promotion/Fall Prevention:   safety round/check completed   lighting adjusted  Taken 5/31/2025 0202 by Tosha Gallego RN  Safety Promotion/Fall Prevention:   activity supervised   clutter free environment maintained   fall prevention program maintained   nonskid shoes/slippers when out of bed   safety round/check completed  Taken 5/30/2025 2205 by Tosha Gallego RN  Safety Promotion/Fall Prevention:   activity supervised   clutter free environment maintained   fall prevention program maintained   lighting adjusted   nonskid shoes/slippers when out of bed   safety round/check completed  Taken 5/30/2025 2029 by Tosha Gallego RN  Safety Promotion/Fall Prevention:   activity supervised   clutter free environment maintained   lighting adjusted   fall prevention program maintained   nonskid shoes/slippers when out of bed   safety round/check completed     Problem: Adult Inpatient Plan of Care  Goal: Optimal Comfort and Wellbeing  Intervention: Provide Person-Centered Care  Recent  Flowsheet Documentation  Taken 5/31/2025 0005 by Tosha Gallego RN  Trust Relationship/Rapport:   care explained   questions answered   questions encouraged  Taken 5/30/2025 2029 by Tosha Gallego RN  Trust Relationship/Rapport:   care explained   questions encouraged   questions answered     Problem: Heart Failure  Goal: Optimal Coping  Intervention: Support Psychosocial Response  Recent Flowsheet Documentation  Taken 5/31/2025 0005 by Tosha Gallego RN  Family/Support System Care: self-care encouraged  Taken 5/30/2025 2029 by Tosha Gallego RN  Family/Support System Care: self-care encouraged  Goal: Optimal Functional Ability  Intervention: Optimize Functional Ability  Recent Flowsheet Documentation  Taken 5/31/2025 0357 by Tosha Gallego RN  Activity Management: bedrest  Self-Care Promotion: independence encouraged  Taken 5/31/2025 0202 by Tosha Gallego RN  Activity Management: bedrest  Self-Care Promotion: independence encouraged  Taken 5/31/2025 0005 by Tosha Gallego RN  Activity Management: bedrest  Self-Care Promotion: independence encouraged  Taken 5/30/2025 2205 by Tosha Gallego RN  Activity Management: bedrest  Self-Care Promotion: independence encouraged  Taken 5/30/2025 2029 by Tosha Gallego RN  Activity Management: bedrest  Self-Care Promotion: independence encouraged     Problem: Adult Inpatient Plan of Care  Goal: Absence of Hospital-Acquired Illness or Injury  Outcome: Progressing  Intervention: Identify and Manage Fall Risk  Recent Flowsheet Documentation  Taken 5/31/2025 0357 by Tosha Gallego RN  Safety Promotion/Fall Prevention:   safety round/check completed   lighting adjusted  Taken 5/31/2025 0202 by Tosha Gallego RN  Safety Promotion/Fall Prevention:   activity supervised   clutter free environment maintained   fall prevention program maintained   nonskid shoes/slippers when out of bed   safety round/check completed  Taken 5/30/2025 2205 by Tosha Gallego RN  Safety Promotion/Fall Prevention:    activity supervised   clutter free environment maintained   fall prevention program maintained   lighting adjusted   nonskid shoes/slippers when out of bed   safety round/check completed  Taken 5/30/2025 2029 by Tosha Gallego RN  Safety Promotion/Fall Prevention:   activity supervised   clutter free environment maintained   lighting adjusted   fall prevention program maintained   nonskid shoes/slippers when out of bed   safety round/check completed  Intervention: Prevent Skin Injury  Recent Flowsheet Documentation  Taken 5/31/2025 0357 by Tosha Gallego RN  Body Position: position changed independently  Taken 5/31/2025 0202 by Tosha Gallego RN  Body Position: position changed independently  Taken 5/31/2025 0005 by Tosha Gallego RN  Body Position: position changed independently  Skin Protection: incontinence pads utilized  Taken 5/30/2025 2205 by Tosha Gallego RN  Body Position: position changed independently  Taken 5/30/2025 2029 by Tosha Gallego RN  Body Position: position changed independently  Skin Protection: incontinence pads utilized  Intervention: Prevent Infection  Recent Flowsheet Documentation  Taken 5/31/2025 0357 by Tosha Gallego RN  Infection Prevention:   single patient room provided   rest/sleep promoted  Taken 5/31/2025 0202 by Tosha Gallego RN  Infection Prevention:   environmental surveillance performed   rest/sleep promoted  Taken 5/31/2025 0005 by Tosha Gallego RN  Infection Prevention:   rest/sleep promoted   single patient room provided  Taken 5/30/2025 2205 by Tosha Gallego RN  Infection Prevention:   single patient room provided   rest/sleep promoted  Taken 5/30/2025 2029 by Tosha Gallego RN  Infection Prevention:   single patient room provided   rest/sleep promoted   Goal Outcome Evaluation:

## 2025-05-31 NOTE — PROGRESS NOTES
"   LOS: 1 day     Chief Complaint/ Reason for encounter: BAO    Subjective   05/31/25 : He is feeling better today, complaining of headache which he attributes to his glasses being broken  No shortness of breath or chest pain  Good appetite no nausea or vomiting  Clonidine held for low blood pressure      Medical history reviewed:  History of Present Illness    Subjective    History taken from: Chart and patient/family as able    Vital Signs  Temp:  [97.5 °F (36.4 °C)-98.8 °F (37.1 °C)] 98.6 °F (37 °C)  Heart Rate:  [62-70] 66  Resp:  [18-19] 19  BP: (103-140)/(63-97) 140/97       Wt Readings from Last 1 Encounters:   05/31/25 0620 102 kg (224 lb 3.3 oz)   05/30/25 0619 101 kg (222 lb 10.6 oz)   05/29/25 1440 95.7 kg (211 lb)       Objective:  Vital signs: (most recent): Blood pressure 140/97, pulse 66, temperature 98.6 °F (37 °C), temperature source Oral, resp. rate 19, height 172.7 cm (68\"), weight 102 kg (224 lb 3.3 oz), SpO2 96%.                Objective:  General Appearance:  Comfortable, well-appearing, no acute distress   HEENT: Mucous membranes moist, atraumatic  Lungs:  Normal effort and normal respiratory rate.  Breath sounds clear to auscultation: No rhonchi/Rales.  No  respiratory distress.   Heart:  S1, S2 normal.   Abdomen: Abdomen is soft, nontender/nondistended  Extremities: No edema of bilateral lower extremities  Skin:  Warm and dry       Results Review:    Intake/Output:     Intake/Output Summary (Last 24 hours) at 5/31/2025 1111  Last data filed at 5/31/2025 0620  Gross per 24 hour   Intake --   Output 1100 ml   Net -1100 ml         DATA:  Radiology and Labs:  The following labs independently reviewed by me. Additional labs ordered for tomorrow a.m.  Interval notes, chart personally reviewed by me.   Old records independently reviewed showing baseline creatinine around 1.2  The following radiologic studies independently viewed by me, findings echo with normal EF normal diastolic function  New " problems include BAO, likely ATN  Discussed with patient himself at bedside    Risk/ complexity of medical care/ medical decision making high risk, BAO on CKD    Labs:   Recent Results (from the past 24 hours)   POC Glucose Once    Collection Time: 05/30/25 11:31 AM    Specimen: Blood   Result Value Ref Range    Glucose 111 70 - 130 mg/dL   Urinalysis With Microscopic If Indicated (No Culture) - Urine, Clean Catch    Collection Time: 05/30/25  1:14 PM    Specimen: Urine, Clean Catch   Result Value Ref Range    Color, UA Rosalind (A) Yellow, Straw    Appearance, UA Turbid (A) Clear    pH, UA <=5.0 5.0 - 8.0    Specific Gravity, UA 1.016 1.005 - 1.030    Glucose, UA Negative Negative    Ketones, UA Negative Negative    Bilirubin, UA Negative Negative    Blood, UA Large (3+) (A) Negative    Protein,  mg/dL (2+) (A) Negative    Leuk Esterase, UA Moderate (2+) (A) Negative    Nitrite, UA Negative Negative    Urobilinogen, UA 1.0 E.U./dL 0.2 - 1.0 E.U./dL   Urinalysis, Microscopic Only - Urine, Clean Catch    Collection Time: 05/30/25  1:14 PM    Specimen: Urine, Clean Catch   Result Value Ref Range    RBC, UA Too Numerous to Count (A) None Seen, 0-2 /HPF    WBC, UA 21-50 (A) None Seen, 0-2 /HPF    Bacteria, UA None Seen None Seen /HPF    Squamous Epithelial Cells, UA 13-20 (A) None Seen, 0-2 /HPF    Hyaline Casts, UA 21-30 None Seen /LPF    Methodology Automated Microscopy    POC Glucose Once    Collection Time: 05/30/25  3:44 PM    Specimen: Blood   Result Value Ref Range    Glucose 118 70 - 130 mg/dL   POC Glucose Once    Collection Time: 05/30/25  7:45 PM    Specimen: Blood   Result Value Ref Range    Glucose 104 70 - 130 mg/dL   POC Glucose Once    Collection Time: 05/31/25  7:06 AM    Specimen: Blood   Result Value Ref Range    Glucose 106 70 - 130 mg/dL   Uric Acid    Collection Time: 05/31/25  7:29 AM    Specimen: Hand, Right; Blood   Result Value Ref Range    Uric Acid 10.8 (H) 3.4 - 7.0 mg/dL   Renal Function  Panel    Collection Time: 05/31/25  7:29 AM    Specimen: Hand, Right; Blood   Result Value Ref Range    Glucose 117 (H) 65 - 99 mg/dL    BUN 37.0 (H) 8.0 - 23.0 mg/dL    Creatinine 2.21 (H) 0.76 - 1.27 mg/dL    Sodium 139 136 - 145 mmol/L    Potassium 3.9 3.5 - 5.2 mmol/L    Chloride 103 98 - 107 mmol/L    CO2 26.0 22.0 - 29.0 mmol/L    Calcium 8.4 (L) 8.6 - 10.5 mg/dL    Albumin 3.2 (L) 3.5 - 5.2 g/dL    Phosphorus 3.8 2.5 - 4.5 mg/dL    Anion Gap 10.0 5.0 - 15.0 mmol/L    BUN/Creatinine Ratio 16.7 7.0 - 25.0    eGFR 30.1 (L) >60.0 mL/min/1.73       Radiology:  Pertinent radiology studies were reviewed as described above      Medications have been reviewed separately in chart review medication tab      ASSESSMENT:  BAO.  I suspect this is due to decreased renal perfusion from a some labile blood pressures and fairly rapid drop in his blood pressure from 236 systolic down to 102.  Waste products slightly better today with good urine output.  UA abnormal but the plentiful squamous cells, likely from a cath specimen  Hypertensive urgency, stabilizing  Proteinuria, around 1 g likely hypertensive renal disease    CHF exacerbation, versus vascular congestion from hypertensive emergency.  Echo showed a normal EF normal diastolic function, euvolemic on exam    Essential hypertension, labile control since admission    SERENITY (obstructive sleep apnea)    COPD (chronic obstructive pulmonary disease)    CAD (coronary artery disease), post CABG    Chronic kidney failure, stage IIIa, likely from hypertension primarily    Type 2 diabetes mellitus, good control           DISCUSSION/PLAN:   His renal function is slightly better today  Urine output remains adequate and he is euvolemic on exam  Blood pressure is running a little better today, clonidine held when his systolic was in the 100s  Will try to keep blood pressure around 140-150 systolic, allow some permissive hypertension for increased renal perfusion  Continue to hold  lisinopril and spironolactone  Change clonidine to twice daily dosing and hold hydralazine for now  No need for additional IV fluids today  Continue to hold diuretics    Suspect poor compliance with his prescribed antihypertensive meds  Check urine studies     Continue to monitor electrolytes and volume closely, avoid IV contrast and nephrotoxic medications     Carlos Rodriguez MD  [facility]   Office phone number: [phone]  Answering service phone number: [phone]    05/31/25  11:11 EDT    Dictation performed using Dragon dictation software

## 2025-05-31 NOTE — PROGRESS NOTES
"    Patient Name: Negrito Navarro  :1950  74 y.o.      Patient Care Team:  Gaby Yu MD as PCP - General (Internal Medicine)    Chief Complaint: follow up CHF    Interval History: resting in bed. Wants to go home.        Objective   Vital Signs  Temp:  [97.7 °F (36.5 °C)-98.8 °F (37.1 °C)] 98.4 °F (36.9 °C)  Heart Rate:  [62-70] 63  Resp:  [18-19] 18  BP: (103-140)/(54-97) 107/54    Intake/Output Summary (Last 24 hours) at 2025 1248  Last data filed at 2025 0620  Gross per 24 hour   Intake --   Output 1100 ml   Net -1100 ml     Flowsheet Rows      Flowsheet Row First Filed Value   Admission Height 172.7 cm (68\") Documented at 2025 1440   Admission Weight 95.7 kg (211 lb) Documented at 2025 1440            Physical Exam:   General Appearance:    Alert, cooperative, in no acute distress   Lungs:     Clear to auscultation.  Normal respiratory effort and rate.      Heart:    Regular rhythm and normal rate, normal S1 and S2, no murmurs, gallops or rubs.     Chest Wall:    No abnormalities observed   Abdomen:     Soft, nontender, positive bowel sounds.     Extremities:   no cyanosis, clubbing or edema.  No marked joint deformities.  Adequate musculoskeletal strength.       Results Review:    Results from last 7 days   Lab Units 25  0729   SODIUM mmol/L 139   POTASSIUM mmol/L 3.9   CHLORIDE mmol/L 103   CO2 mmol/L 26.0   BUN mg/dL 37.0*   CREATININE mg/dL 2.21*   GLUCOSE mg/dL 117*   CALCIUM mg/dL 8.4*     Results from last 7 days   Lab Units 25  2342 25  2240   HSTROP T ng/L 27* 22*     Results from last 7 days   Lab Units 25  0549   WBC 10*3/mm3 11.09*   HEMOGLOBIN g/dL 14.3   HEMATOCRIT % 42.0   PLATELETS 10*3/mm3 259         Results from last 7 days   Lab Units 25  2240   MAGNESIUM mg/dL 2.1                   Medication Review:   aspirin, 81 mg, Oral, Daily  atorvastatin, 40 mg, Oral, Daily  budesonide-formoterol, 2 puff, Inhalation, BID - RT  carvedilol, " 12.5 mg, Oral, BID With Meals  cloNIDine, 0.2 mg, Oral, BID  clopidogrel, 75 mg, Oral, Daily  [Held by provider] hydrALAZINE, 50 mg, Oral, Q8H  insulin lispro, 2-7 Units, Subcutaneous, 4x Daily AC & at Bedtime  isosorbide mononitrate, 30 mg, Oral, Q24H  [Held by provider] lisinopril, 40 mg, Oral, Q24H  montelukast, 10 mg, Oral, Nightly  sodium chloride, 10 mL, Intravenous, Q12H  [Held by provider] spironolactone, 50 mg, Oral, Daily  terazosin, 1 mg, Oral, Nightly              Assessment & Plan   Hypertensive urgency , possible flash pulmonary edema.   Acute on chronic heart failure with preserved LVEF , diuretics on hold due to #5  Coronary artery disease status post prior CABG then percutaneous intervention / BETO to mid RCA 2023.   Mild aortic valve stenosis   Acute on chronic kidney disease , likely dramatic swing in blood pressure. Nephrology is following and allowing some permissive hypertension. Multiple medications on hold now.     Diuretics and antiHTNs per nephrology.   He denies angina. Echo with preserved LVEF and no regional wall motion abnormalities.     HANNAH Khanna  [facility]  05/31/25  12:48 EDT

## 2025-05-31 NOTE — PROGRESS NOTES
Name: Negrito Navarro ADMIT: 2025   : 1950  PCP: Gaby Yu MD    MRN: [id number] LOS: 1 days   AGE/SEX: 74 y.o. male  ROOM: [facility]     Subjective   Subjective   Patient lying in bed.   Blood pressure better controlled.  No fevers or chills.  No sputum production.  Patient feels like he has little bit of lower extremity swelling.  Continues to have some shortness of breath.    Review of Systems  As above     Objective   Objective   Vital Signs  Temp:  [97.5 °F (36.4 °C)-98.8 °F (37.1 °C)] 98.6 °F (37 °C)  Heart Rate:  [62-70] 66  Resp:  [18-19] 19  BP: (103-140)/(63-97) 140/97  SpO2:  [94 %-100 %] 96 %  on  Flow (L/min) (Oxygen Therapy):  [2] 2;   Device (Oxygen Therapy): nasal cannula  Body mass index is 34.09 kg/m².  Physical Exam  Vitals and nursing note reviewed.   Constitutional:       General: He is not in acute distress.     Appearance: He is ill-appearing.   Cardiovascular:      Rate and Rhythm: Normal rate and regular rhythm.   Pulmonary:      Effort: Pulmonary effort is normal. No respiratory distress.      Comments: Diminished breath sounds bilaterally  Abdominal:      General: Abdomen is flat. There is no distension.      Tenderness: There is no abdominal tenderness.   Musculoskeletal:         General: No swelling or deformity.      Right lower leg: Edema present.      Left lower leg: Edema present.   Skin:     General: Skin is warm and dry.   Neurological:      General: No focal deficit present.      Mental Status: He is alert. Mental status is at baseline.         Results Review     I reviewed the patient's new clinical results.  Results from last 7 days   Lab Units 25  0549 25  0622 25  2240   WBC 10*3/mm3 11.09* 12.29* 10.46   HEMOGLOBIN g/dL 14.3 16.1 15.4   PLATELETS 10*3/mm3 259 279 267     Results from last 7 days   Lab Units 25  0729 25  0549 25  0622 25  2240   SODIUM mmol/L 139 139 139 139   POTASSIUM mmol/L 3.9 4.3 3.7 3.8    CHLORIDE mmol/L 103 103 103 103   CO2 mmol/L 26.0 25.9 24.0 25.2   BUN mg/dL 37.0* 35.0* 21.0 17.0   CREATININE mg/dL 2.21* 2.43* 1.40* 1.21   GLUCOSE mg/dL 117* 116* 146* 199*   Estimated Creatinine Clearance: 32.9 mL/min (A) (by C-G formula based on SCr of 2.21 mg/dL (H)).  Results from last 7 days   Lab Units 05/31/25  0729 05/29/25  0622 05/28/25  2240   ALBUMIN g/dL 3.2* 4.1 4.0   BILIRUBIN mg/dL  --  0.5 0.5   ALK PHOS U/L  --  106 102   AST (SGOT) U/L  --  21 20   ALT (SGPT) U/L  --  23 24     Results from last 7 days   Lab Units 05/31/25  0729 05/30/25  0549 05/29/25  0622 05/28/25  2240   CALCIUM mg/dL 8.4* 8.8 9.5 9.2   ALBUMIN g/dL 3.2*  --  4.1 4.0   MAGNESIUM mg/dL  --   --   --  2.1   PHOSPHORUS mg/dL 3.8  --   --   --      Results from last 7 days   Lab Units 05/28/25  2240   PROCALCITONIN ng/mL 0.08     COVID19   Date Value Ref Range Status   05/28/2025 Not Detected Not Detected - Ref. Range Final   04/17/2024 Not Detected Not Detected - Ref. Range Final     Hemoglobin A1C   Date/Time Value Ref Range Status   05/29/2025 0622 6.30 (H) 4.80 - 5.60 % Final     Glucose   Date/Time Value Ref Range Status   05/31/2025 0706 106 70 - 130 mg/dL Final   05/30/2025 1945 104 70 - 130 mg/dL Final   05/30/2025 1544 118 70 - 130 mg/dL Final   05/30/2025 1131 111 70 - 130 mg/dL Final   05/30/2025 0713 127 70 - 130 mg/dL Final   05/29/2025 2106 141 (H) 70 - 130 mg/dL Final   05/29/2025 1557 174 (H) 70 - 130 mg/dL Final       Adult Transthoracic Echo Complete W/ Cont if Necessary Per Protocol    Left ventricular ejection fraction appears to be 61 - 65%.    Left ventricular diastolic function was normal.    Mild aortic valve stenosis is present.    Systolic anterior motion of the anterior mitral leaflet is present.    I reviewed the patient's daily medications.  Scheduled Medications  aspirin, 81 mg, Oral, Daily  atorvastatin, 40 mg, Oral, Daily  budesonide-formoterol, 2 puff, Inhalation, BID - RT  carvedilol, 12.5  mg, Oral, BID With Meals  cloNIDine, 0.2 mg, Oral, Q8H  clopidogrel, 75 mg, Oral, Daily  [Held by provider] hydrALAZINE, 50 mg, Oral, Q8H  insulin lispro, 2-7 Units, Subcutaneous, 4x Daily AC & at Bedtime  isosorbide mononitrate, 30 mg, Oral, Q24H  [Held by provider] lisinopril, 40 mg, Oral, Q24H  montelukast, 10 mg, Oral, Nightly  sodium chloride, 10 mL, Intravenous, Q12H  [Held by provider] spironolactone, 50 mg, Oral, Daily  terazosin, 1 mg, Oral, Nightly    Infusions   Diet  Diet: Cardiac, Diabetic; Healthy Heart (2-3 Na+); Consistent Carbohydrate; Fluid Consistency: Thin (IDDSI 0)         I have personally reviewed:  [x]  Laboratory   []  Microbiology   []  Radiology   [x]  EKG/Telemetry   [x]  Cardiology/Vascular   []  Pathology   []  Records     Assessment/Plan     Active Hospital Problems    Diagnosis  POA    **CHF exacerbation [I50.9]  Yes    Chronic HFrEF (heart failure with reduced ejection fraction) [I50.22]  Yes    Type 2 diabetes mellitus [E11.9]  Yes    Chronic kidney failure [N18.9]  Yes    CAD (coronary artery disease) [I25.10]  Yes    COPD (chronic obstructive pulmonary disease) [J44.9]  Yes    Essential hypertension [I10]  Yes    SERENITY (obstructive sleep apnea) [G47.33]  Yes      Resolved Hospital Problems   No resolved problems to display.       74 y.o. male admitted with CHF exacerbation.    Hypertensive emergency  Acute systolic congestive heart failure with recovered ejection fraction  Coronary artery disease s/p CABG, PCI  Hypoxemia  Heart failure exacerbation versus flash pulmonary edema from hypertension  Cardiology has been consulted. ECHO with normal EF and normal diastolic function  IV Lasix held with BAO  Continue home Imdur, aspirin, Plavix, Coreg, clonidine, atorvastatin  Hold lisinopril and Aldactone with BAO. Hydralazine held by nephrology  Wean oxygen as able    BAO on chronic Kidney disease  Likely multifactorial with uncontrolled blood pressure on admission, IV Lasix, urinary  retention of 1 L  Nephrology consulted, gentle hydration with 1 L normal saline yesterday.  - Creatinine mildly improved today    T2DM  A1c 6.3%. currently on SSI     Acute urinary retention  Had some light pink hematuria suspected from traumatic Herrera  Urology following, was able to place Herrera with 1 L out, urology recommends keeping Herrera into outpatient follow-up    Nocturnal hypoxia  - Patient reports he wears oxygen at night, daughter says that he does not always wear it.    SCDs for DVT prophylaxis.  Full code.  Discussed with patient, family, and nursing staff.  Anticipate discharge home in 2-3 days.    Expected Discharge Date: 6/2/2025; Expected Discharge Time:       Peter Dalton MD  [facility]  05/31/25  09:40 EDT

## 2025-05-31 NOTE — PLAN OF CARE
Problem: Adult Inpatient Plan of Care  Goal: Plan of Care Review  Outcome: Progressing  Flowsheets (Taken 5/30/2025 2003)  Progress: no change  Outcome Evaluation: Pt AOx4, VSS, denies pain, remained on 2L nasal cannula throughout shift. Up x1 assist with walker to bathroom. Montanez in place d/t retention, montanez care provided. Urine noted to be concentrated and bloody. Strict I's and O's. Will continue to monitor.  Plan of Care Reviewed With: patient  Goal: Patient-Specific Goal (Individualized)  Outcome: Progressing  Goal: Absence of Hospital-Acquired Illness or Injury  Outcome: Progressing  Intervention: Identify and Manage Fall Risk  Recent Flowsheet Documentation  Taken 5/30/2025 1813 by Gina Cao, RN  Safety Promotion/Fall Prevention:   safety round/check completed   room organization consistent   nonskid shoes/slippers when out of bed   fall prevention program maintained   clutter free environment maintained   assistive device/personal items within reach  Taken 5/30/2025 1200 by Gina Cao, RN  Safety Promotion/Fall Prevention:   safety round/check completed   room organization consistent   nonskid shoes/slippers when out of bed   fall prevention program maintained   assistive device/personal items within reach   clutter free environment maintained  Taken 5/30/2025 1036 by Gina Cao, RN  Safety Promotion/Fall Prevention:   safety round/check completed   room organization consistent   nonskid shoes/slippers when out of bed   fall prevention program maintained   clutter free environment maintained   assistive device/personal items within reach  Taken 5/30/2025 0827 by Gina Cao, RN  Safety Promotion/Fall Prevention:   safety round/check completed   room organization consistent   nonskid shoes/slippers when out of bed   fall prevention program maintained   clutter free environment maintained   assistive device/personal items within reach  Intervention: Prevent Skin Injury  Recent  Flowsheet Documentation  Taken 5/30/2025 0827 by Gina Cao RN  Skin Protection:   incontinence pads utilized   transparent dressing maintained  Intervention: Prevent Infection  Recent Flowsheet Documentation  Taken 5/30/2025 1813 by Gina Cao RN  Infection Prevention:   single patient room provided   rest/sleep promoted   environmental surveillance performed  Taken 5/30/2025 1200 by Gina Cao RN  Infection Prevention:   single patient room provided   rest/sleep promoted   environmental surveillance performed  Taken 5/30/2025 1036 by Gina Cao RN  Infection Prevention:   single patient room provided   rest/sleep promoted   environmental surveillance performed  Taken 5/30/2025 0827 by Gina Cao RN  Infection Prevention: single patient room provided  Goal: Optimal Comfort and Wellbeing  Outcome: Progressing  Intervention: Monitor Pain and Promote Comfort  Recent Flowsheet Documentation  Taken 5/30/2025 0827 by Gina Cao RN  Pain Management Interventions:   pillow support provided   position adjusted  Intervention: Provide Person-Centered Care  Recent Flowsheet Documentation  Taken 5/30/2025 0827 by Gina Cao RN  Trust Relationship/Rapport:   care explained   questions encouraged   questions answered   thoughts/feelings acknowledged  Goal: Readiness for Transition of Care  Outcome: Progressing     Problem: Fall Injury Risk  Goal: Absence of Fall and Fall-Related Injury  Outcome: Progressing  Intervention: Identify and Manage Contributors  Recent Flowsheet Documentation  Taken 5/30/2025 1813 by Gina Cao RN  Medication Review/Management: medications reviewed  Taken 5/30/2025 1200 by Gina Cao RN  Medication Review/Management: medications reviewed  Taken 5/30/2025 1036 by Gina Cao RN  Medication Review/Management: medications reviewed  Taken 5/30/2025 0827 by Gina Cao RN  Medication Review/Management: medications  reviewed  Intervention: Promote Injury-Free Environment  Recent Flowsheet Documentation  Taken 5/30/2025 1813 by Gina Cao, RN  Safety Promotion/Fall Prevention:   safety round/check completed   room organization consistent   nonskid shoes/slippers when out of bed   fall prevention program maintained   clutter free environment maintained   assistive device/personal items within reach  Taken 5/30/2025 1200 by Gina Cao, RN  Safety Promotion/Fall Prevention:   safety round/check completed   room organization consistent   nonskid shoes/slippers when out of bed   fall prevention program maintained   assistive device/personal items within reach   clutter free environment maintained  Taken 5/30/2025 1036 by Gina Cao RN  Safety Promotion/Fall Prevention:   safety round/check completed   room organization consistent   nonskid shoes/slippers when out of bed   fall prevention program maintained   clutter free environment maintained   assistive device/personal items within reach  Taken 5/30/2025 0827 by Gina Cao RN  Safety Promotion/Fall Prevention:   safety round/check completed   room organization consistent   nonskid shoes/slippers when out of bed   fall prevention program maintained   clutter free environment maintained   assistive device/personal items within reach   Goal Outcome Evaluation:  Plan of Care Reviewed With: patient        Progress: no change  Outcome Evaluation: Pt AOx4, VSS, denies pain, remained on 2L nasal cannula throughout shift. Up x1 assist with walker to bathroom. Montanez in place d/t retention, montanez care provided. Urine noted to be concentrated and bloody. Strict I's and O's. Will continue to monitor.

## 2025-06-01 LAB
ALBUMIN SERPL-MCNC: 3.3 G/DL (ref 3.5–5.2)
ANION GAP SERPL CALCULATED.3IONS-SCNC: 8.2 MMOL/L (ref 5–15)
BUN SERPL-MCNC: 32 MG/DL (ref 8–23)
BUN/CREAT SERPL: 17.8 (ref 7–25)
CALCIUM SPEC-SCNC: 8.8 MG/DL (ref 8.6–10.5)
CHLORIDE SERPL-SCNC: 103 MMOL/L (ref 98–107)
CO2 SERPL-SCNC: 27.8 MMOL/L (ref 22–29)
CREAT SERPL-MCNC: 1.8 MG/DL (ref 0.76–1.27)
EGFRCR SERPLBLD CKD-EPI 2021: 38.5 ML/MIN/1.73
GLUCOSE BLDC GLUCOMTR-MCNC: 104 MG/DL (ref 70–130)
GLUCOSE BLDC GLUCOMTR-MCNC: 116 MG/DL (ref 70–130)
GLUCOSE BLDC GLUCOMTR-MCNC: 119 MG/DL (ref 70–130)
GLUCOSE BLDC GLUCOMTR-MCNC: 99 MG/DL (ref 70–130)
GLUCOSE SERPL-MCNC: 110 MG/DL (ref 65–99)
PHOSPHATE SERPL-MCNC: 3.7 MG/DL (ref 2.5–4.5)
POTASSIUM SERPL-SCNC: 4.2 MMOL/L (ref 3.5–5.2)
SODIUM SERPL-SCNC: 139 MMOL/L (ref 136–145)

## 2025-06-01 PROCEDURE — 94761 N-INVAS EAR/PLS OXIMETRY MLT: CPT

## 2025-06-01 PROCEDURE — 94664 DEMO&/EVAL PT USE INHALER: CPT

## 2025-06-01 PROCEDURE — 94799 UNLISTED PULMONARY SVC/PX: CPT

## 2025-06-01 PROCEDURE — 82948 REAGENT STRIP/BLOOD GLUCOSE: CPT

## 2025-06-01 PROCEDURE — 99232 SBSQ HOSP IP/OBS MODERATE 35: CPT | Performed by: NURSE PRACTITIONER

## 2025-06-01 PROCEDURE — 80069 RENAL FUNCTION PANEL: CPT | Performed by: INTERNAL MEDICINE

## 2025-06-01 RX ORDER — HYDRALAZINE HYDROCHLORIDE 25 MG/1
25 TABLET, FILM COATED ORAL EVERY 8 HOURS SCHEDULED
Status: DISCONTINUED | OUTPATIENT
Start: 2025-06-01 | End: 2025-06-02 | Stop reason: HOSPADM

## 2025-06-01 RX ADMIN — TERAZOSIN 1 MG: 1 CAPSULE ORAL at 20:42

## 2025-06-01 RX ADMIN — HYDROCODONE BITARTRATE AND ACETAMINOPHEN 1 TABLET: 10; 325 TABLET ORAL at 20:41

## 2025-06-01 RX ADMIN — CARVEDILOL 12.5 MG: 12.5 TABLET, FILM COATED ORAL at 18:34

## 2025-06-01 RX ADMIN — CLONIDINE HYDROCHLORIDE 0.2 MG: 0.1 TABLET ORAL at 08:46

## 2025-06-01 RX ADMIN — BUDESONIDE AND FORMOTEROL FUMARATE DIHYDRATE 2 PUFF: 160; 4.5 AEROSOL RESPIRATORY (INHALATION) at 07:34

## 2025-06-01 RX ADMIN — ATORVASTATIN CALCIUM 40 MG: 20 TABLET, FILM COATED ORAL at 08:47

## 2025-06-01 RX ADMIN — HYDROCODONE BITARTRATE AND ACETAMINOPHEN 1 TABLET: 10; 325 TABLET ORAL at 08:47

## 2025-06-01 RX ADMIN — BUDESONIDE AND FORMOTEROL FUMARATE DIHYDRATE 2 PUFF: 160; 4.5 AEROSOL RESPIRATORY (INHALATION) at 20:13

## 2025-06-01 RX ADMIN — CLOPIDOGREL BISULFATE 75 MG: 75 TABLET, FILM COATED ORAL at 08:47

## 2025-06-01 RX ADMIN — ACETAMINOPHEN 650 MG: 325 TABLET ORAL at 08:47

## 2025-06-01 RX ADMIN — ISOSORBIDE MONONITRATE 30 MG: 30 TABLET, EXTENDED RELEASE ORAL at 08:47

## 2025-06-01 RX ADMIN — ASPIRIN 81 MG: 81 TABLET, COATED ORAL at 08:46

## 2025-06-01 RX ADMIN — SENNOSIDES AND DOCUSATE SODIUM 2 TABLET: 50; 8.6 TABLET ORAL at 08:46

## 2025-06-01 RX ADMIN — CLONIDINE HYDROCHLORIDE 0.2 MG: 0.1 TABLET ORAL at 20:41

## 2025-06-01 RX ADMIN — HYDRALAZINE HYDROCHLORIDE 25 MG: 25 TABLET ORAL at 21:30

## 2025-06-01 RX ADMIN — CARVEDILOL 12.5 MG: 12.5 TABLET, FILM COATED ORAL at 08:47

## 2025-06-01 RX ADMIN — Medication 10 ML: at 20:42

## 2025-06-01 RX ADMIN — MONTELUKAST 10 MG: 10 TABLET, FILM COATED ORAL at 20:42

## 2025-06-01 NOTE — PROGRESS NOTES
Name: Negrito Navarro ADMIT: 2025   : 1950  PCP: Gaby Yu MD    MRN: [id number] LOS: 2 days   AGE/SEX: 74 y.o. male  ROOM: [facility]     Subjective   Subjective   Patient lying in bed.   Does admit to me today that he does not really take any of his home medications. Eager to leave the hospital.    Review of Systems  As above     Objective   Objective   Vital Signs  Temp:  [97.3 °F (36.3 °C)-98.2 °F (36.8 °C)] 97.7 °F (36.5 °C)  Heart Rate:  [61-75] 61  Resp:  [16-18] 16  BP: (121-164)/(72-92) 164/92  SpO2:  [94 %-100 %] 94 %  on   ;   Device (Oxygen Therapy): room air  Body mass index is 34.09 kg/m².  Physical Exam  Vitals and nursing note reviewed.   Constitutional:       General: He is not in acute distress.     Appearance: He is ill-appearing.   Cardiovascular:      Rate and Rhythm: Normal rate and regular rhythm.   Pulmonary:      Effort: Pulmonary effort is normal. No respiratory distress.      Comments: Diminished breath sounds bilaterally  Abdominal:      General: Abdomen is flat. There is no distension.      Tenderness: There is no abdominal tenderness.   Musculoskeletal:         General: No swelling or deformity.      Right lower leg: Edema present.      Left lower leg: Edema present.   Skin:     General: Skin is warm and dry.   Neurological:      General: No focal deficit present.      Mental Status: He is alert. Mental status is at baseline.         Results Review     I reviewed the patient's new clinical results.  Results from last 7 days   Lab Units 25  0549 25  0622 25  2240   WBC 10*3/mm3 11.09* 12.29* 10.46   HEMOGLOBIN g/dL 14.3 16.1 15.4   PLATELETS 10*3/mm3 259 279 267     Results from last 7 days   Lab Units 25  0546 25  0729 25  0549 25  0622   SODIUM mmol/L 139 139 139 139   POTASSIUM mmol/L 4.2 3.9 4.3 3.7   CHLORIDE mmol/L 103 103 103 103   CO2 mmol/L 27.8 26.0 25.9 24.0   BUN mg/dL 32.0* 37.0* 35.0* 21.0   CREATININE mg/dL 1.80*  2.21* 2.43* 1.40*   GLUCOSE mg/dL 110* 117* 116* 146*   Estimated Creatinine Clearance: 40.4 mL/min (A) (by C-G formula based on SCr of 1.8 mg/dL (H)).  Results from last 7 days   Lab Units 06/01/25  0546 05/31/25  0729 05/29/25  0622 05/28/25  2240   ALBUMIN g/dL 3.3* 3.2* 4.1 4.0   BILIRUBIN mg/dL  --   --  0.5 0.5   ALK PHOS U/L  --   --  106 102   AST (SGOT) U/L  --   --  21 20   ALT (SGPT) U/L  --   --  23 24     Results from last 7 days   Lab Units 06/01/25  0546 05/31/25  0729 05/30/25  0549 05/29/25 0622 05/28/25  2240   CALCIUM mg/dL 8.8 8.4* 8.8 9.5 9.2   ALBUMIN g/dL 3.3* 3.2*  --  4.1 4.0   MAGNESIUM mg/dL  --   --   --   --  2.1   PHOSPHORUS mg/dL 3.7 3.8  --   --   --      Results from last 7 days   Lab Units 05/28/25  2240   PROCALCITONIN ng/mL 0.08     COVID19   Date Value Ref Range Status   05/28/2025 Not Detected Not Detected - Ref. Range Final   04/17/2024 Not Detected Not Detected - Ref. Range Final     Glucose   Date/Time Value Ref Range Status   06/01/2025 1122 104 70 - 130 mg/dL Final   06/01/2025 0731 99 70 - 130 mg/dL Final   05/31/2025 1959 123 70 - 130 mg/dL Final   05/31/2025 1644 144 (H) 70 - 130 mg/dL Final   05/31/2025 1114 125 70 - 130 mg/dL Final   05/31/2025 0706 106 70 - 130 mg/dL Final   05/30/2025 1945 104 70 - 130 mg/dL Final       Adult Transthoracic Echo Complete W/ Cont if Necessary Per Protocol    Left ventricular ejection fraction appears to be 61 - 65%.    Left ventricular diastolic function was normal.    Mild aortic valve stenosis is present.    Systolic anterior motion of the anterior mitral leaflet is present.    I reviewed the patient's daily medications.  Scheduled Medications  aspirin, 81 mg, Oral, Daily  atorvastatin, 40 mg, Oral, Daily  budesonide-formoterol, 2 puff, Inhalation, BID - RT  carvedilol, 12.5 mg, Oral, BID With Meals  cloNIDine, 0.2 mg, Oral, BID  clopidogrel, 75 mg, Oral, Daily  hydrALAZINE, 25 mg, Oral, Q8H  insulin lispro, 2-7 Units, Subcutaneous,  4x Daily AC & at Bedtime  isosorbide mononitrate, 30 mg, Oral, Q24H  [Held by provider] lisinopril, 40 mg, Oral, Q24H  montelukast, 10 mg, Oral, Nightly  sodium chloride, 10 mL, Intravenous, Q12H  [Held by provider] spironolactone, 50 mg, Oral, Daily  terazosin, 1 mg, Oral, Nightly    Infusions   Diet  Diet: Cardiac, Diabetic; Healthy Heart (2-3 Na+); Consistent Carbohydrate; Fluid Consistency: Thin (IDDSI 0)         I have personally reviewed:  [x]  Laboratory   []  Microbiology   []  Radiology   [x]  EKG/Telemetry   [x]  Cardiology/Vascular   []  Pathology   []  Records     Assessment/Plan     Active Hospital Problems    Diagnosis  POA    **CHF exacerbation [I50.9]  Yes    Chronic HFrEF (heart failure with reduced ejection fraction) [I50.22]  Yes    Type 2 diabetes mellitus [E11.9]  Yes    Chronic kidney failure [N18.9]  Yes    CAD (coronary artery disease) [I25.10]  Yes    COPD (chronic obstructive pulmonary disease) [J44.9]  Yes    Essential hypertension [I10]  Yes    SERNEITY (obstructive sleep apnea) [G47.33]  Yes      Resolved Hospital Problems   No resolved problems to display.       74 y.o. male admitted with CHF exacerbation.    Hypertensive emergency  Acute systolic congestive heart failure with recovered ejection fraction  Coronary artery disease s/p CABG, PCI  Hypoxemia  Heart failure exacerbation versus flash pulmonary edema from hypertension  Cardiology has been consulted. ECHO with normal EF and normal diastolic function  IV Lasix held with BAO  Continue home Imdur, aspirin, Plavix, Coreg, clonidine, atorvastatin  Hold lisinopril and Aldactone with BAO.  Hydralazine restarted by nephrology  Patient admits that he does not really take his blood pressure medications at home    BAO on chronic Kidney disease  Likely multifactorial with uncontrolled blood pressure on admission, IV Lasix, urinary retention of 1 L  -Creatinine improved, can likely discharge tomorrow if creatinine continues to  improve    T2DM  A1c 6.3%. currently on SSI     Acute urinary retention  Had some light pink hematuria suspected from traumatic Herrera  Urology following, was able to place Herrera with 1 L out, urology recommends keeping Herrera into outpatient follow-up    Nocturnal hypoxia  - Patient reports he wears oxygen at night, daughter says that he does not always wear it.    SCDs for DVT prophylaxis.  Full code.  Discussed with patient, family, and nursing staff.  Anticipate discharge home tomorrow.    Expected Discharge Date: 6/2/2025; Expected Discharge Time:       Peter Dalton MD  [facility]  06/01/25  13:15 EDT

## 2025-06-01 NOTE — PLAN OF CARE
Problem: Fall Injury Risk  Goal: Absence of Fall and Fall-Related Injury  Outcome: Met  Intervention: Identify and Manage Contributors  Recent Flowsheet Documentation  Taken 6/1/2025 0600 by Tosha Gallego RN  Medication Review/Management: medications reviewed  Self-Care Promotion: independence encouraged  Taken 6/1/2025 0400 by Tosha Gallego RN  Medication Review/Management: medications reviewed  Self-Care Promotion: independence encouraged  Taken 6/1/2025 0200 by Tosha Gallego RN  Medication Review/Management: medications reviewed  Self-Care Promotion: independence encouraged  Taken 6/1/2025 0010 by Tosha Gallego RN  Medication Review/Management: medications reviewed  Self-Care Promotion: independence encouraged  Taken 5/31/2025 2205 by Tosha Gallego RN  Medication Review/Management: medications reviewed  Self-Care Promotion: independence encouraged  Taken 5/31/2025 2005 by Tosha Gallego RN  Medication Review/Management: medications reviewed  Self-Care Promotion: independence encouraged  Intervention: Promote Injury-Free Environment  Recent Flowsheet Documentation  Taken 6/1/2025 0600 by Tosha Gallego RN  Safety Promotion/Fall Prevention: fall prevention program maintained  Taken 6/1/2025 0400 by Tosha Gallego RN  Safety Promotion/Fall Prevention:   safety round/check completed   nonskid shoes/slippers when out of bed   fall prevention program maintained   clutter free environment maintained  Taken 6/1/2025 0200 by Tosha Gallego RN  Safety Promotion/Fall Prevention:   activity supervised   clutter free environment maintained   fall prevention program maintained   nonskid shoes/slippers when out of bed   safety round/check completed  Taken 6/1/2025 0010 by Tosha Gallego RN  Safety Promotion/Fall Prevention: fall prevention program maintained  Taken 5/31/2025 2205 by Tosha Gallego RN  Safety Promotion/Fall Prevention:   fall prevention program maintained   clutter free environment maintained  Taken 5/31/2025 2005  by Gallego, Tosha, RN  Safety Promotion/Fall Prevention:   activity supervised   clutter free environment maintained   fall prevention program maintained   lighting adjusted   nonskid shoes/slippers when out of bed   safety round/check completed     Problem: Adult Inpatient Plan of Care  Goal: Absence of Hospital-Acquired Illness or Injury  Intervention: Identify and Manage Fall Risk  Recent Flowsheet Documentation  Taken 6/1/2025 0600 by Tosha Gallego RN  Safety Promotion/Fall Prevention: fall prevention program maintained  Taken 6/1/2025 0400 by Tosha Gallego RN  Safety Promotion/Fall Prevention:   safety round/check completed   nonskid shoes/slippers when out of bed   fall prevention program maintained   clutter free environment maintained  Taken 6/1/2025 0200 by Tosha Gallego RN  Safety Promotion/Fall Prevention:   activity supervised   clutter free environment maintained   fall prevention program maintained   nonskid shoes/slippers when out of bed   safety round/check completed  Taken 6/1/2025 0010 by Tosha Gallego RN  Safety Promotion/Fall Prevention: fall prevention program maintained  Taken 5/31/2025 2205 by Tosha Gallego RN  Safety Promotion/Fall Prevention:   fall prevention program maintained   clutter free environment maintained  Taken 5/31/2025 2005 by Tosha Gallego RN  Safety Promotion/Fall Prevention:   activity supervised   clutter free environment maintained   fall prevention program maintained   lighting adjusted   nonskid shoes/slippers when out of bed   safety round/check completed  Intervention: Prevent Skin Injury  Recent Flowsheet Documentation  Taken 6/1/2025 0600 by Tosha Gallego RN  Body Position:   turned   weight shifting  Taken 6/1/2025 0400 by Tosha Gallego RN  Body Position:   turned   legs elevated  Taken 6/1/2025 0200 by Tosha Gallego RN  Body Position:   turned   weight shifting  Taken 6/1/2025 0010 by Tosha Gallego RN  Body Position:   turned   weight shifting  Taken 5/31/2025 2205  by Tosha Gallego RN  Body Position:   turned   weight shifting  Taken 5/31/2025 2005 by Tosha Gallego RN  Body Position:   turned   weight shifting  Skin Protection: pectin skin barriers applied  Intervention: Prevent Infection  Recent Flowsheet Documentation  Taken 6/1/2025 0600 by Tosha Gallego RN  Infection Prevention:   rest/sleep promoted   single patient room provided  Taken 6/1/2025 0400 by Tosha Gallego RN  Infection Prevention:   rest/sleep promoted   single patient room provided  Taken 6/1/2025 0200 by Tosha Gallego RN  Infection Prevention:   rest/sleep promoted   single patient room provided  Taken 6/1/2025 0010 by Tosha Gallego RN  Infection Prevention:   single patient room provided   rest/sleep promoted  Taken 5/31/2025 2205 by Tosha Gallego RN  Infection Prevention:   single patient room provided   rest/sleep promoted  Taken 5/31/2025 2005 by Tosha Gallego RN  Infection Prevention: single patient room provided     Problem: Adult Inpatient Plan of Care  Goal: Absence of Hospital-Acquired Illness or Injury  Intervention: Prevent Skin Injury  Recent Flowsheet Documentation  Taken 6/1/2025 0600 by Tosha Gallego RN  Body Position:   turned   weight shifting  Taken 6/1/2025 0400 by Tosha Gallego RN  Body Position:   turned   legs elevated  Taken 6/1/2025 0200 by Tosha Gallego RN  Body Position:   turned   weight shifting  Taken 6/1/2025 0010 by Tosha Gallego RN  Body Position:   turned   weight shifting  Taken 5/31/2025 2205 by Tosha Gallego RN  Body Position:   turned   weight shifting  Taken 5/31/2025 2005 by Tosha Gallego RN  Body Position:   turned   weight shifting  Skin Protection: pectin skin barriers applied     Problem: Adult Inpatient Plan of Care  Goal: Optimal Comfort and Wellbeing  Intervention: Monitor Pain and Promote Comfort  Recent Flowsheet Documentation  Taken 5/31/2025 2005 by Tosha Gallego RN  Pain Management Interventions:   pain medication given   pillow support  provided  Intervention: Provide Person-Centered Care  Recent Flowsheet Documentation  Taken 6/1/2025 0010 by oTsha Gallego, RN  Trust Relationship/Rapport:   care explained   questions answered   questions encouraged  Taken 5/31/2025 2005 by Tosha Gallego, RN  Trust Relationship/Rapport:   care explained   questions answered   questions encouraged   Goal Outcome Evaluation:

## 2025-06-01 NOTE — PROGRESS NOTES
"    Patient Name: Negrito Navarro  :1950  74 y.o.      Patient Care Team:  Gaby Yu MD as PCP - General (Internal Medicine)    Chief Complaint: follow up CHF    Interval History: waste products improving.        Objective   Vital Signs  Temp:  [97.3 °F (36.3 °C)-98.2 °F (36.8 °C)] 97.7 °F (36.5 °C)  Heart Rate:  [61-75] 61  Resp:  [16-18] 16  BP: (121-164)/(72-92) 164/92    Intake/Output Summary (Last 24 hours) at 2025 1206  Last data filed at 2025 0455  Gross per 24 hour   Intake 480 ml   Output 1350 ml   Net -870 ml     Flowsheet Rows      Flowsheet Row First Filed Value   Admission Height 172.7 cm (68\") Documented at 2025 1440   Admission Weight 95.7 kg (211 lb) Documented at 2025 1440            Physical Exam:   General Appearance:    Alert, cooperative, in no acute distress   Lungs:     Clear to auscultation.  Normal respiratory effort and rate.      Heart:    Regular rhythm and normal rate, normal S1 and S2, no murmurs, gallops or rubs.     Chest Wall:    No abnormalities observed   Abdomen:     Soft, nontender, positive bowel sounds.     Extremities:   no cyanosis, clubbing or edema.  No marked joint deformities.  Adequate musculoskeletal strength.       Results Review:    Results from last 7 days   Lab Units 25  0546   SODIUM mmol/L 139   POTASSIUM mmol/L 4.2   CHLORIDE mmol/L 103   CO2 mmol/L 27.8   BUN mg/dL 32.0*   CREATININE mg/dL 1.80*   GLUCOSE mg/dL 110*   CALCIUM mg/dL 8.8     Results from last 7 days   Lab Units 25  2342 25  2240   HSTROP T ng/L 27* 22*     Results from last 7 days   Lab Units 25  0549   WBC 10*3/mm3 11.09*   HEMOGLOBIN g/dL 14.3   HEMATOCRIT % 42.0   PLATELETS 10*3/mm3 259         Results from last 7 days   Lab Units 25  2240   MAGNESIUM mg/dL 2.1                   Medication Review:   aspirin, 81 mg, Oral, Daily  atorvastatin, 40 mg, Oral, Daily  budesonide-formoterol, 2 puff, Inhalation, BID - RT  carvedilol, 12.5 mg, " Oral, BID With Meals  cloNIDine, 0.2 mg, Oral, BID  clopidogrel, 75 mg, Oral, Daily  [Held by provider] hydrALAZINE, 50 mg, Oral, Q8H  insulin lispro, 2-7 Units, Subcutaneous, 4x Daily AC & at Bedtime  isosorbide mononitrate, 30 mg, Oral, Q24H  [Held by provider] lisinopril, 40 mg, Oral, Q24H  montelukast, 10 mg, Oral, Nightly  sodium chloride, 10 mL, Intravenous, Q12H  [Held by provider] spironolactone, 50 mg, Oral, Daily  terazosin, 1 mg, Oral, Nightly              Assessment & Plan   Hypertensive urgency , possible flash pulmonary edema.   Acute on chronic heart failure with preserved LVEF , diuretics on hold due to #5  Coronary artery disease status post prior CABG then percutaneous intervention / BETO to mid RCA 2023.   Mild aortic valve stenosis   Acute on chronic kidney disease , likely dramatic swing in blood pressure. Nephrology is following and allowing some permissive hypertension. Multiple medications on hold now.  Low dose hydralazine resumed today.     Diuretics and antiHTNs per nephrology.  Waste products improving.   He denies angina. Echo with preserved LVEF and no regional wall motion abnormalities. May be able to dc tomorrow if renal function and BP stable.     HANNAH Khanna  [facility]  06/01/25  12:06 EDT

## 2025-06-01 NOTE — PROGRESS NOTES
"  [facility] Progress Note    Chief Complaint: None    Doing well through the night.    Urine clear    Review of Systems:    The following systems were reviewed and negative;  respiratory, cardiovascular, and gastrointestinal          Vital Signs  /87   Pulse 67   Temp 98.2 °F (36.8 °C) (Oral)   Resp 16   Ht 172.7 cm (68\")   Wt 102 kg (224 lb 3.3 oz)   SpO2 100%   BMI 34.09 kg/m²     Physical Exam:     General Appearance:    Alert, cooperative, NAD   HEENT:    No trauma, pupils reactive, hearing intact   Back:     No CVA tenderness   Lungs:     Respirations unlabored, no wheezing    Heart:    RRR, intact peripheral pulses   Abdomen:   Soft benign no CVA tenderness   :  External genitalia normal.  Herrera catheter anchored   Extremities:   No edema, no deformity   Lymphatic:   No neck or groin LAD   Skin:   No bleeding, bruising or rashes   Neuro/Psych:   Orientation intact, mood/affect pleasant, no focal findings        Results Review:     I reviewed the patient's new clinical results.  Lab Results (last 24 hours)       Procedure Component Value Units Date/Time    POC Glucose Once [[id number]]  (Normal) Collected: 05/31/25 1959    Specimen: Blood Updated: 05/31/25 2000     Glucose 123 mg/dL     POC Glucose Once [[id number]]  (Abnormal) Collected: 05/31/25 1644    Specimen: Blood Updated: 05/31/25 1646     Glucose 144 mg/dL     POC Glucose Once [[id number]]  (Normal) Collected: 05/31/25 1114    Specimen: Blood Updated: 05/31/25 1121     Glucose 125 mg/dL     Uric Acid [[id number]]  (Abnormal) Collected: 05/31/25 0729    Specimen: Blood from Hand, Right Updated: 05/31/25 0820     Uric Acid 10.8 mg/dL     Renal Function Panel [[id number]]  (Abnormal) Collected: 05/31/25 0729    Specimen: Blood from Hand, Right Updated: 05/31/25 0820     Glucose 117 mg/dL      BUN 37.0 mg/dL      Creatinine 2.21 mg/dL      Sodium 139 mmol/L      Potassium 3.9 mmol/L      Chloride 103 mmol/L      CO2 26.0 mmol/L      Calcium " 8.4 mg/dL      Albumin 3.2 g/dL      Phosphorus 3.8 mg/dL      Anion Gap 10.0 mmol/L      BUN/Creatinine Ratio 16.7     eGFR 30.1 mL/min/1.73     Narrative:      GFR Categories in Chronic Kidney Disease (CKD)              GFR Category          GFR (mL/min/1.73)    Interpretation  G1                    90 or greater        Normal or high (1)  G2                    60-89                Mild decrease (1)  G3a                   45-59                Mild to moderate decrease  G3b                   30-44                Moderate to severe decrease  G4                    15-29                Severe decrease  G5                    14 or less           Kidney failure    (1)In the absence of evidence of kidney disease, neither GFR category G1 or G2 fulfill the criteria for CKD.    eGFR calculation 2021 CKD-EPI creatinine equation, which does not include race as a factor    POC Glucose Once [[id number]]  (Normal) Collected: 05/31/25 0706    Specimen: Blood Updated: 05/31/25 0708     Glucose 106 mg/dL           Imaging Results (Last 24 Hours)       ** No results found for the last 24 hours. **            Medication Review:   I have personally reviewed    Current Facility-Administered Medications:     acetaminophen (TYLENOL) tablet 650 mg, 650 mg, Oral, Q4H PRN, 650 mg at 05/29/25 0444 **OR** acetaminophen (TYLENOL) 160 MG/5ML oral solution 650 mg, 650 mg, Oral, Q4H PRN **OR** acetaminophen (TYLENOL) suppository 650 mg, 650 mg, Rectal, Q4H PRN, Danitza Pérez, HANNAH    albuterol (PROVENTIL) nebulizer solution 0.083% 2.5 mg/3mL, 2.5 mg, Nebulization, Q6H PRN, Medhat Moyer MD    aspirin EC tablet 81 mg, 81 mg, Oral, Daily, Medhat Moyer MD, 81 mg at 05/31/25 0901    atorvastatin (LIPITOR) tablet 40 mg, 40 mg, Oral, Daily, Medhat Moyer MD, 40 mg at 05/31/25 0901    sennosides-docusate (PERICOLACE) 8.6-50 MG per tablet 2 tablet, 2 tablet, Oral, BID PRN **AND** polyethylene glycol (MIRALAX) packet 17 g, 17 g, Oral, Daily PRN  **AND** bisacodyl (DULCOLAX) EC tablet 5 mg, 5 mg, Oral, Daily PRN **AND** bisacodyl (DULCOLAX) suppository 10 mg, 10 mg, Rectal, Daily PRN, Danitza Pérez APRN    budesonide-formoterol (SYMBICORT) 160-4.5 MCG/ACT inhaler 2 puff, 2 puff, Inhalation, BID - RT, Medhat Moyer MD, 2 puff at 05/31/25 1932    carvedilol (COREG) tablet 12.5 mg, 12.5 mg, Oral, BID With Meals, Medhat Moyer MD, 12.5 mg at 05/31/25 1710    cloNIDine (CATAPRES) tablet 0.2 mg, 0.2 mg, Oral, BID, Carlos Rodriguez MD, 0.2 mg at 05/31/25 2007    clopidogrel (PLAVIX) tablet 75 mg, 75 mg, Oral, Daily, Medhat Moyer MD, 75 mg at 05/31/25 0901    dextrose (D50W) (25 g/50 mL) IV injection 25 g, 25 g, Intravenous, Q15 Min PRN, Danitza Pérez APRN    dextrose (GLUTOSE) oral gel 15 g, 15 g, Oral, Q15 Min PRN, Danitza Pérez APRN    famotidine (PEPCID) tablet 20 mg, 20 mg, Oral, BID PRN, Danitza Pérez APRN    glucagon (GLUCAGEN) injection 1 mg, 1 mg, Intramuscular, Q15 Min PRN, Danitza Pérez APRN    hydrALAZINE (APRESOLINE) injection 10 mg, 10 mg, Intravenous, Q6H PRN, Janelle Adler APRN    [Held by provider] hydrALAZINE (APRESOLINE) tablet 50 mg, 50 mg, Oral, Q8H, Medhat Moyer MD, 50 mg at 05/29/25 1509    HYDROcodone-acetaminophen (NORCO)  MG per tablet 1 tablet, 1 tablet, Oral, Q8H PRN, Medhat Moyer MD, 1 tablet at 05/31/25 2007    insulin lispro (HUMALOG/ADMELOG) injection 2-7 Units, 2-7 Units, Subcutaneous, 4x Daily AC & at Bedtime, Danitza Pérez, APRN, 2 Units at 05/29/25 1744    isosorbide mononitrate (IMDUR) 24 hr tablet 30 mg, 30 mg, Oral, Q24H, Medhat Moyer MD, 30 mg at 05/31/25 0901    labetalol (NORMODYNE,TRANDATE) injection 20 mg, 20 mg, Intravenous, Q4H PRN, Jayne Villalobos MD, 20 mg at 05/29/25 0815    [Held by provider] lisinopril (PRINIVIL,ZESTRIL) tablet 40 mg, 40 mg, Oral, Q24H, Medhat Moyer MD, 40 mg at 05/29/25 1023    melatonin tablet 5 mg, 5 mg, Oral, Nightly PRN,  Danitza Pérez APRN    montelukast (SINGULAIR) tablet 10 mg, 10 mg, Oral, Nightly, Medhat Moyer MD, 10 mg at 05/31/25 2008    nitroglycerin (NITROSTAT) SL tablet 0.4 mg, 0.4 mg, Sublingual, Q5 Min PRN, Danitza Pérez APRN    ondansetron (ZOFRAN) injection 4 mg, 4 mg, Intravenous, Q6H PRN, Danitza Pérez APRN    sodium chloride 0.9 % flush 10 mL, 10 mL, Intravenous, Q12H, Danitza Pérez APRN, 10 mL at 05/31/25 2008    sodium chloride 0.9 % flush 10 mL, 10 mL, Intravenous, PRN, Danitza Pérez APRN, 10 mL at 05/29/25 0446    sodium chloride 0.9 % infusion 40 mL, 40 mL, Intravenous, PRN, Danitza Pérez APRN    [Held by provider] spironolactone (ALDACTONE) tablet 50 mg, 50 mg, Oral, Daily, Medhat Moyer MD, 50 mg at 05/29/25 1023    terazosin (HYTRIN) capsule 1 mg, 1 mg, Oral, Nightly, Gricelda Pfeiffer APRN, 1 mg at 05/31/25 2008    Allergies:    Shellfish-derived products    Assessment:    Active Problems:    CHF exacerbation    Essential hypertension    SERENITY (obstructive sleep apnea)    COPD (chronic obstructive pulmonary disease)    CAD (coronary artery disease)    Chronic kidney failure    Type 2 diabetes mellitus    Chronic HFrEF (heart failure with reduced ejection fraction)       Gross hematuria resolved  Urinary retention secondary to multiple factors suspect some chronic component    Plan:    Herrera to stay in given high volume of bladder decompression.  Plan to send home with a catheter to follow-up in our office for outpatient workup  Will sign off, ok for dc from my standpoint  Please call or page with questions      Elvin Davidson MD    5/31/2025  22:08 EDT

## 2025-06-01 NOTE — PROGRESS NOTES
"   LOS: 2 days     Chief Complaint/ Reason for encounter: BAO    Subjective   05/31/25 : He is feeling better today, complaining of headache which he attributes to his glasses being broken  No shortness of breath or chest pain  Good appetite no nausea or vomiting  Clonidine held for low blood pressure    6/1: He feels better denies complaints  BP 160s  Good appetite no nausea vomiting shortness of breath chest pain fevers chills or edema    Medical history reviewed:  History of Present Illness    Subjective    History taken from: Chart and patient/family as able    Vital Signs  Temp:  [97.3 °F (36.3 °C)-98.2 °F (36.8 °C)] 97.7 °F (36.5 °C)  Heart Rate:  [61-75] 61  Resp:  [16-18] 16  BP: (121-164)/(72-92) 164/92       Wt Readings from Last 1 Encounters:   05/31/25 0620 102 kg (224 lb 3.3 oz)   05/30/25 0619 101 kg (222 lb 10.6 oz)   05/29/25 1440 95.7 kg (211 lb)       Objective:  Vital signs: (most recent): Blood pressure 164/92, pulse 61, temperature 97.7 °F (36.5 °C), temperature source Oral, resp. rate 16, height 172.7 cm (68\"), weight 102 kg (224 lb 3.3 oz), SpO2 94%.                Objective:  General Appearance:  Comfortable, well-appearing, no acute distress   HEENT: Mucous membranes moist, atraumatic  Lungs:  Normal effort and normal respiratory rate.  Breath sounds clear to auscultation: No rhonchi/Rales.  No  respiratory distress.   Heart:  S1, S2 normal.   Abdomen: Abdomen is soft, nontender/nondistended  Extremities: No edema of bilateral lower extremities  Skin:  Warm and dry       Results Review:    Intake/Output:     Intake/Output Summary (Last 24 hours) at 6/1/2025 1243  Last data filed at 6/1/2025 0455  Gross per 24 hour   Intake 480 ml   Output 1350 ml   Net -870 ml         DATA:  Radiology and Labs:  The following labs independently reviewed by me. Additional labs ordered for tomorrow a.m.  Interval notes, chart personally reviewed by me.   Old records independently reviewed showing baseline " creatinine around 1.2  The following radiologic studies independently viewed by me, findings echo with normal EF normal diastolic function  Discussed with patient himself at bedside    Risk/ complexity of medical care/ medical decision making moderate risk: BAO on CKD      Labs:   Recent Results (from the past 24 hours)   POC Glucose Once    Collection Time: 05/31/25  4:44 PM    Specimen: Blood   Result Value Ref Range    Glucose 144 (H) 70 - 130 mg/dL   POC Glucose Once    Collection Time: 05/31/25  7:59 PM    Specimen: Blood   Result Value Ref Range    Glucose 123 70 - 130 mg/dL   Renal Function Panel    Collection Time: 06/01/25  5:46 AM    Specimen: Blood   Result Value Ref Range    Glucose 110 (H) 65 - 99 mg/dL    BUN 32.0 (H) 8.0 - 23.0 mg/dL    Creatinine 1.80 (H) 0.76 - 1.27 mg/dL    Sodium 139 136 - 145 mmol/L    Potassium 4.2 3.5 - 5.2 mmol/L    Chloride 103 98 - 107 mmol/L    CO2 27.8 22.0 - 29.0 mmol/L    Calcium 8.8 8.6 - 10.5 mg/dL    Albumin 3.3 (L) 3.5 - 5.2 g/dL    Phosphorus 3.7 2.5 - 4.5 mg/dL    Anion Gap 8.2 5.0 - 15.0 mmol/L    BUN/Creatinine Ratio 17.8 7.0 - 25.0    eGFR 38.5 (L) >60.0 mL/min/1.73   POC Glucose Once    Collection Time: 06/01/25  7:31 AM    Specimen: Blood   Result Value Ref Range    Glucose 99 70 - 130 mg/dL   POC Glucose Once    Collection Time: 06/01/25 11:22 AM    Specimen: Blood   Result Value Ref Range    Glucose 104 70 - 130 mg/dL       Radiology:  Pertinent radiology studies were reviewed as described above      Medications have been reviewed separately in chart review medication tab      ASSESSMENT:  BAO.  Prerenal, due to decreased renal perfusion from  labile blood pressures and fairly rapid drop in his blood pressure from 236 systolic down to 102.  Waste products improving, good urine output.  UA abnormal but the plentiful squamous cells, likely from a cath specimen makes a vasculitis unlikely.  Hypertensive urgency, stabilizing  Proteinuria, around 1 g likely  hypertensive renal disease    CHF exacerbation, versus vascular congestion from hypertensive emergency.  Echo showed a normal EF normal diastolic function, euvolemic on exam    Essential hypertension, BP slightly high but stable    SERENITY (obstructive sleep apnea)    COPD (chronic obstructive pulmonary disease)    CAD (coronary artery disease), post CABG    Chronic kidney failure, stage IIIa, likely from hypertension primarily    Type 2 diabetes mellitus, good control           DISCUSSION/PLAN:   His renal function continues to improve with normalizing blood pressures  Urine output remains adequate and he is euvolemic on exam  Recent BPs in the 160s  Will resume low-dose oral hydralazine    Will try to keep blood pressure around 140-150 systolic, allow some permissive hypertension for increased renal perfusion  Continue to hold lisinopril and spironolactone at this time  Baseline creatinine looks to be around 1.2  Changed clonidine to twice daily dosing   Continue to hold diuretics    Suspect poor compliance with his prescribed antihypertensive meds prior to admission  Discharge planning: Okay for tomorrow if renal function continues to improve and blood pressure reasonably well-controlled  Would benefit from follow-up in our office after discharge      Carlos Rodriguez MD  [facility]   Office phone number: [phone]  Answering service phone number: [phone]    06/01/25  12:43 EDT    Dictation performed using Dragon dictation software

## 2025-06-02 ENCOUNTER — READMISSION MANAGEMENT (OUTPATIENT)
Dept: CALL CENTER | Facility: HOSPITAL | Age: 75
End: 2025-06-02
Payer: MEDICARE

## 2025-06-02 VITALS
BODY MASS INDEX: 34.58 KG/M2 | WEIGHT: 228.18 LBS | TEMPERATURE: 97.3 F | OXYGEN SATURATION: 95 % | HEART RATE: 66 BPM | DIASTOLIC BLOOD PRESSURE: 90 MMHG | RESPIRATION RATE: 18 BRPM | HEIGHT: 68 IN | SYSTOLIC BLOOD PRESSURE: 150 MMHG

## 2025-06-02 LAB
ALBUMIN SERPL-MCNC: 3.6 G/DL (ref 3.5–5.2)
ANION GAP SERPL CALCULATED.3IONS-SCNC: 7.3 MMOL/L (ref 5–15)
BUN SERPL-MCNC: 29 MG/DL (ref 8–23)
BUN/CREAT SERPL: 19.1 (ref 7–25)
CALCIUM SPEC-SCNC: 9.2 MG/DL (ref 8.6–10.5)
CHLORIDE SERPL-SCNC: 101 MMOL/L (ref 98–107)
CO2 SERPL-SCNC: 29.7 MMOL/L (ref 22–29)
CREAT SERPL-MCNC: 1.52 MG/DL (ref 0.76–1.27)
EGFRCR SERPLBLD CKD-EPI 2021: 47.2 ML/MIN/1.73
GEN 5 1HR TROPONIN T REFLEX: 28 NG/L
GLUCOSE BLDC GLUCOMTR-MCNC: 113 MG/DL (ref 70–130)
GLUCOSE BLDC GLUCOMTR-MCNC: 144 MG/DL (ref 70–130)
GLUCOSE BLDC GLUCOMTR-MCNC: 96 MG/DL (ref 70–130)
GLUCOSE SERPL-MCNC: 108 MG/DL (ref 65–99)
PHOSPHATE SERPL-MCNC: 3.4 MG/DL (ref 2.5–4.5)
POTASSIUM SERPL-SCNC: 4.7 MMOL/L (ref 3.5–5.2)
QT INTERVAL: 452 MS
QTC INTERVAL: 471 MS
SODIUM SERPL-SCNC: 138 MMOL/L (ref 136–145)
TROPONIN T % DELTA: -3
TROPONIN T NUMERIC DELTA: -1 NG/L
TROPONIN T SERPL HS-MCNC: 29 NG/L

## 2025-06-02 PROCEDURE — 94618 PULMONARY STRESS TESTING: CPT

## 2025-06-02 PROCEDURE — 84484 ASSAY OF TROPONIN QUANT: CPT | Performed by: STUDENT IN AN ORGANIZED HEALTH CARE EDUCATION/TRAINING PROGRAM

## 2025-06-02 PROCEDURE — 80069 RENAL FUNCTION PANEL: CPT | Performed by: INTERNAL MEDICINE

## 2025-06-02 PROCEDURE — 94799 UNLISTED PULMONARY SVC/PX: CPT

## 2025-06-02 PROCEDURE — 94664 DEMO&/EVAL PT USE INHALER: CPT

## 2025-06-02 PROCEDURE — 99232 SBSQ HOSP IP/OBS MODERATE 35: CPT | Performed by: NURSE PRACTITIONER

## 2025-06-02 PROCEDURE — 97162 PT EVAL MOD COMPLEX 30 MIN: CPT | Performed by: PHYSICAL THERAPIST

## 2025-06-02 PROCEDURE — 93005 ELECTROCARDIOGRAM TRACING: CPT | Performed by: STUDENT IN AN ORGANIZED HEALTH CARE EDUCATION/TRAINING PROGRAM

## 2025-06-02 PROCEDURE — 93010 ELECTROCARDIOGRAM REPORT: CPT | Performed by: INTERNAL MEDICINE

## 2025-06-02 PROCEDURE — 94761 N-INVAS EAR/PLS OXIMETRY MLT: CPT

## 2025-06-02 PROCEDURE — 82948 REAGENT STRIP/BLOOD GLUCOSE: CPT

## 2025-06-02 PROCEDURE — 97530 THERAPEUTIC ACTIVITIES: CPT | Performed by: PHYSICAL THERAPIST

## 2025-06-02 RX ORDER — TERAZOSIN 1 MG/1
1 CAPSULE ORAL NIGHTLY
Qty: 30 CAPSULE | Refills: 0 | Status: SHIPPED | OUTPATIENT
Start: 2025-06-02 | End: 2025-07-02

## 2025-06-02 RX ORDER — HYDRALAZINE HYDROCHLORIDE 25 MG/1
25 TABLET, FILM COATED ORAL EVERY 8 HOURS SCHEDULED
Qty: 90 TABLET | Refills: 0 | Status: SHIPPED | OUTPATIENT
Start: 2025-06-02 | End: 2025-06-02

## 2025-06-02 RX ORDER — HYDRALAZINE HYDROCHLORIDE 25 MG/1
25 TABLET, FILM COATED ORAL EVERY 8 HOURS SCHEDULED
Qty: 90 TABLET | Refills: 0 | Status: SHIPPED | OUTPATIENT
Start: 2025-06-02 | End: 2025-07-02

## 2025-06-02 RX ADMIN — Medication 10 ML: at 08:35

## 2025-06-02 RX ADMIN — CLOPIDOGREL BISULFATE 75 MG: 75 TABLET, FILM COATED ORAL at 08:32

## 2025-06-02 RX ADMIN — BUDESONIDE AND FORMOTEROL FUMARATE DIHYDRATE 2 PUFF: 160; 4.5 AEROSOL RESPIRATORY (INHALATION) at 11:12

## 2025-06-02 RX ADMIN — HYDRALAZINE HYDROCHLORIDE 25 MG: 25 TABLET ORAL at 05:41

## 2025-06-02 RX ADMIN — ISOSORBIDE MONONITRATE 30 MG: 30 TABLET, EXTENDED RELEASE ORAL at 08:31

## 2025-06-02 RX ADMIN — HYDROCODONE BITARTRATE AND ACETAMINOPHEN 1 TABLET: 10; 325 TABLET ORAL at 10:53

## 2025-06-02 RX ADMIN — ATORVASTATIN CALCIUM 40 MG: 20 TABLET, FILM COATED ORAL at 08:32

## 2025-06-02 RX ADMIN — CLONIDINE HYDROCHLORIDE 0.2 MG: 0.1 TABLET ORAL at 08:31

## 2025-06-02 RX ADMIN — ASPIRIN 81 MG: 81 TABLET, COATED ORAL at 08:31

## 2025-06-02 RX ADMIN — CARVEDILOL 12.5 MG: 12.5 TABLET, FILM COATED ORAL at 08:32

## 2025-06-02 RX ADMIN — HYDRALAZINE HYDROCHLORIDE 25 MG: 25 TABLET ORAL at 14:22

## 2025-06-02 RX ADMIN — CARVEDILOL 12.5 MG: 12.5 TABLET, FILM COATED ORAL at 17:41

## 2025-06-02 RX ADMIN — NITROGLYCERIN 0.4 MG: 0.4 TABLET SUBLINGUAL at 04:51

## 2025-06-02 NOTE — PROGRESS NOTES
[facility]  Cardiology Progress Note    Patient Identification:  Name: Negrito Navarro  Age: 74 y.o.  Sex: male  :  1950  MRN: [id number]                 Follow Up / Chief Complaint: Follow-up for CHF exacerbation    Interval History: Resting in bed.  No chest pain or shortness of breath.       Objective:    Past Medical History:  Past Medical History:   Diagnosis Date    Benign prostatic hyperplasia 11/10/2016    COPD (chronic obstructive pulmonary disease) 6/10/2021    Coronary artery disease involving coronary bypass graft of native heart without angina pectoris 6/10/2021    Essential hypertension 11/10/2016    Hyperlipidemia     SERENITY (obstructive sleep apnea) 11/10/2016     Past Surgical History:  Past Surgical History:   Procedure Laterality Date    CARDIAC CATHETERIZATION N/A 2023    Procedure: Left Heart Cath;  Surgeon: Jayne Villalobos MD;  Location: [facility] CATH INVASIVE LOC[facility];  Service: Cardiovascular;  Laterality: N/A;    CARDIAC CATHETERIZATION N/A 2023    Procedure: Coronary angiography;  Surgeon: Jayne Villalobos MD;  Location: [facility] CATH INVASIVE LOCATION;  Service: Cardiovascular;  Laterality: N/A;    CARDIAC CATHETERIZATION N/A 2023    Procedure: Left ventriculography;  Surgeon: Jayne Villalobos MD;  Location: [facility] C[facility];  Service: Cardiovascular;  Laterality: N/A;    CARDIAC CATHETERIZATION  2023    Procedure: Saphenous Vein Graft;  Surgeon: Jayne Villalobos MD;  Location: [facility] C[facility] LOC[facility];  Service: Cardiovascular;;    CARDIAC CATHETERIZATION N/A 2023    Procedure: Native mammary injection;  Surgeon: Jayne Villalobos MD;  [facility]: [facility];  Service: Cardiovascular;  Laterality: N/A;    CARDIAC CATHETERIZATION N/A 2023    Procedure: Percutaneous Coronary Intervention;  Surgeon: Jayne Villalobos MD;  Location: [facility];  Service:  Cardiovascular;  Laterality: N/A;    CARDIAC CATHETERIZATION N/A 4/21/2023    Procedure: Stent BETO coronary;  Surgeon: Jayne Villalobos MD;  Location:  [facility] CATH INVASIVE LOCATION;  Service: Cardiovascular;  Laterality: N/A;    CARDIAC CATHETERIZATION  4/21/2023    Procedure: Percutaneous Manual Thrombectomy;  Surgeon: Jayne Villalobos MD;  Location:  [facility] CATH INVASIVE LOCATION;  Service: Cardiovascular;;        Social History:   Social History     Tobacco Use    Smoking status: Never     Passive exposure: Never    Smokeless tobacco: Never   Substance Use Topics    Alcohol use: Never      Family History:  History reviewed. No pertinent family history.       Allergies:  Allergies   Allergen Reactions    Shellfish-Derived Products Unknown - Low Severity     gout  gout       Scheduled Meds:  aspirin, 81 mg, Daily  atorvastatin, 40 mg, Daily  budesonide-formoterol, 2 puff, BID - RT  carvedilol, 12.5 mg, BID With Meals  cloNIDine, 0.2 mg, BID  clopidogrel, 75 mg, Daily  hydrALAZINE, 25 mg, Q8H  insulin lispro, 2-7 Units, 4x Daily AC & at Bedtime  isosorbide mononitrate, 30 mg, Q24H  [Held by provider] lisinopril, 40 mg, Q24H  montelukast, 10 mg, Nightly  sodium chloride, 10 mL, Q12H  [Held by provider] spironolactone, 50 mg, Daily  terazosin, 1 mg, Nightly            INTAKE AND OUTPUT:    Intake/Output Summary (Last 24 hours) at 6/2/2025 1142  Last data filed at 6/2/2025 0837  Gross per 24 hour   Intake --   Output 2250 ml   Net -2250 ml       Review of Systems:   GI: no n/v  Cardiac: no cp  Pulmonary: no shob    Constitutional:  Temp:  [97.3 °F (36.3 °C)-98.1 °F (36.7 °C)] 97.5 °F (36.4 °C)  Heart Rate:  [] 60  Resp:  [16-20] 20  BP: (128-189)/() 131/75      Physical Exam:  General:  Alert, cooperative, appears in no acute distress  Respiratory: Clear to auscultation.  Normal respiratory effort and rate.  Cardiovascular: S1S2 Regular rate and rhythm. No murmur, rub or gallop.  "  Gastrointestinal: soft, non tender. Bowel sounds present.   Extremities: VILLARREAL x4. No pretibial pitting edema. Adequate musculoskeletal strength.   Neuro: AAO x3 CN II-XII grossly intact          Cardiographics      ECG:     Echocardiogram:     Lab Review   Results from last 7 days   Lab Units 06/02/25  0848 06/02/25  0607 05/28/25  2342   HSTROP T ng/L 28* 29* 27*     Results from last 7 days   Lab Units 05/28/25  2240   MAGNESIUM mg/dL 2.1     Results from last 7 days   Lab Units 06/02/25  0607   SODIUM mmol/L 138   POTASSIUM mmol/L 4.7   BUN mg/dL 29.0*   CREATININE mg/dL 1.52*   CALCIUM mg/dL 9.2     @[id number]@  Results from last 7 days   Lab Units 05/30/25  0549 05/29/25  0622 05/28/25  2240   WBC 10*3/mm3 11.09* 12.29* 10.46   HEMOGLOBIN g/dL 14.3 16.1 15.4   HEMATOCRIT % 42.0 47.6 47.3   PLATELETS 10*3/mm3 259 279 267         Intake/Output Summary (Last 24 hours) at 6/2/2025 1145  Last data filed at 6/2/2025 0837  Gross per 24 hour   Intake --   Output 2250 ml   Net -2250 ml           Assessment/Plan:  1.  Hypertensive urgency: with possible flash pulmonary edema.  Discussed compliance of medication and he states he will do better.  2.  Acute on chronic heart failure with preserved LV EF: Diuretics on hold.  3.  CAD with history of CABG and percutaneous intervention/BETO to mid RCA 2023  4.  Mild aortic valve stenosis   6.  Acute on chronic CKD: Creatinine improved.  Nephrology following.    Okay from our perspective to be discharged.  I will have the office staff call with an appointment.      )6/2/2025  HANNAH Bauman/Transcription:   \"Dictated utilizing Dragon dictation\".     "

## 2025-06-02 NOTE — PROGRESS NOTES
Exercise Oximetry    Patient Name:Negrito Navarro   MRN: [id number]   Date: 06/02/25             ROOM AIR BASELINE   SpO2% 94   Heart Rate 73   Blood Pressure      EXERCISE ON ROOM AIR SpO2% EXERCISE ON O2 @  LPM SpO2%   1 MINUTE 91 1 MINUTE    2 MINUTES 90 2 MINUTES    3 MINUTES 88 3 MINUTES    4 MINUTES  4 MINUTES                 2 89   5 MINUTES  5 MINUTES                  87   6 MINUTES  6 MINUTES                 4 91              Distance Walked  to exit door and back to room Distance Walked   Dyspnea (Dee Scale)  Dyspnea (Dee Scale)   Fatigue (Dee Scale)   Fatigue (Dee Scale)   SpO2% Post Exercise  93 SpO2% Post Exercise   HR Post Exercise  86 HR Post Exercise   Time to Recovery   Time to Recovery     Comments: found pt on room air. Walked pt to end of hallway (to exit door) and back to room. Deep breathing was encouraged and pt required multiple stops during walk to catch breath. Pt required 4L of oxygen to maintain spo2 greater than 88%. Returned pt to room on room air.

## 2025-06-02 NOTE — PLAN OF CARE
Goal Outcome Evaluation:  Plan of Care Reviewed With: patient           Outcome Evaluation: Patient admitted with CHF exacerbation.  Per chart patient lives at home with his daughter in a single-story house and one-step to enter.  He reports he occasionally uses a cane but often walks without an assistive device.  Patient was up in the chair and agreeable to therapy.  He was on room air throughout the session.  Patient stood from the chair with CGA.  He ambulated with min assist initially and was slightly unsteady.  Patient reports he ambulates better in shoes, but was ambulating and nonskid socks during eval.  However, gait quality improved and patient ambulated with contact-guard assist.  He required 2 standing rest breaks secondary to SOA.  O2 saturation dropped to 89% briefly, but remained above 92% for most of the session.  Patient presents with generalized weakness, limited endurance, unsteady gait and functional mobility likely slightly below baseline.  Patient plans to DC back home with his daughter.  Patient is safe to do so but would recommend use of cane or rolling walker for increased stability and recommend  PT to ensure return to baseline.  PT will follow while admitted.  Also encourage patient to ambulate with nursing staff multiple times per day in addition to PT.    Anticipated Discharge Disposition (PT): home with assist, home with home health

## 2025-06-02 NOTE — PLAN OF CARE
Iv removed. Paperwork given to patient. Family arriving at 1800. All follow up appointments/phone numbers on paperwork. Medications reviewed. DME delivered to patient at bedside.

## 2025-06-02 NOTE — DISCHARGE SUMMARY
Patient Name: Negrito Navarro  : 1950  MRN: [id number]    Date of Admission: 2025  Date of Discharge:  2025  Primary Care Physician: Gaby Yu MD      Chief Complaint:   Shortness of Breath      Discharge Diagnoses     Active Hospital Problems    Diagnosis  POA    **CHF exacerbation [I50.9]  Yes    Chronic HFrEF (heart failure with reduced ejection fraction) [I50.22]  Yes    Type 2 diabetes mellitus [E11.9]  Yes    Chronic kidney failure [N18.9]  Yes    CAD (coronary artery disease) [I25.10]  Yes    COPD (chronic obstructive pulmonary disease) [J44.9]  Yes    Essential hypertension [I10]  Yes    SERENITY (obstructive sleep apnea) [G47.33]  Yes      Resolved Hospital Problems   No resolved problems to display.        Hospital Course     Mr. Navarro is a 74 y.o. male with a history of CKD, diabetes type 2, nocturnal hypoxemia, coronary artery disease, chronic heart failure with recovered ejection fracture presenting with shortness of breath found to have acute pulmonary edema from either heart failure exacerbation versus more likely a flash pulmonary edema from hypertensive emergency.  Patient was given IV Lasix once and then gotten BAO renal was consulted multifactorial BAO from uncontrolled blood pressure, IV Lasix, urinary retention.  Urology was consulted and a Herrera was placed with 1 L out.  Urology recommends keeping Herrera in place until outpatient follow-up.  Terazosin started for BPH.    His hydralazine was decreased to 25 mg and his Aldactone and lisinopril were discontinued.  Patient reports that he essentially was not taking any blood pressure medications at home with any type of regularity    Patient reports nocturnal oxygen that he wears at night and sometimes during the day when he is short of breath.  Walking oximetry reviewed with desaturations into the 80s with exertion.  Plan to send home on oxygen and plan for home health at discharge.    At the time of discharge patient was told  to take all medications as prescribed, keep all follow-up appointments, and call their doctor or return to the hospital with any worsening or concerning symptoms.    Day of Discharge     Subjective:  Patient sitting up comfortably in bed.  Had some shortness of breath this morning that has improved with oxygen.  No nausea or vomiting.  Eager to leave the hospital.  Creatinine improving.        Physical Exam:  Temp:  [97.3 °F (36.3 °C)-98.1 °F (36.7 °C)] 97.5 °F (36.4 °C)  Heart Rate:  [] 60  Resp:  [16-20] 20  BP: (128-189)/() 131/75  Body mass index is 34.69 kg/m².  Physical Exam  Vitals and nursing note reviewed.   Constitutional:       General: He is not in acute distress.  Cardiovascular:      Rate and Rhythm: Normal rate and regular rhythm.   Pulmonary:      Effort: Pulmonary effort is normal. No respiratory distress.   Abdominal:      General: Abdomen is flat. There is no distension.      Tenderness: There is no abdominal tenderness.   Musculoskeletal:         General: No swelling or deformity.   Skin:     General: Skin is warm and dry.   Neurological:      General: No focal deficit present.      Mental Status: He is alert. Mental status is at baseline.         Consultants     Consult Orders (all) (From admission, onward)       Start     Ordered    05/30/25 0756  Inpatient Nephrology Consult  Once        Specialty:  Nephrology  Provider:  Carlos Rodriguez MD    05/30/25 0755    05/29/25 0943  Inpatient Urology Consult  Once        Specialty:  Urology  Provider:  Ricardo Holbrook MD    05/29/25 0943    05/29/25 0702  Inpatient Cardiology Consult  IN AM        Specialty:  Cardiology  Provider:  Silvio Gotti MD    05/29/25 0145    05/29/25 0107  [facility] (on-call MD unless specified) Details  Once        Specialty:  Hospitalist  Provider:  (Not yet assigned)    05/29/25 0106                  Procedures     Imaging Results (All)       Procedure Component Value Units Date/Time    XR Chest 1  View [[id number]] Collected: 05/28/25 2303     Updated: 05/28/25 2307    Narrative:      SINGLE VIEW OF THE CHEST     HISTORY: Weakness     COMPARISON: September 10, 2024     FINDINGS:  There is cardiomegaly. There is some vascular congestion. Patient status  post median sternotomy with coronary artery bypass grafting. Elevation  of the left hemidiaphragm, with chronic scarring is again seen. Blunting  of the left costophrenic angle is unchanged.       Impression:      Cardiomegaly with suspected vascular congestion.     This report was finalized on 5/28/2025 11:04 PM by Dr. Kimberly Stevens M.D on Workstation: [id number]               Pertinent Labs     Results from last 7 days   Lab Units 05/30/25  0549 05/29/25  0622 05/28/25  2240   WBC 10*3/mm3 11.09* 12.29* 10.46   HEMOGLOBIN g/dL 14.3 16.1 15.4   PLATELETS 10*3/mm3 259 279 267     Results from last 7 days   Lab Units 06/02/25  0607 06/01/25  0546 05/31/25  0729 05/30/25  0549   SODIUM mmol/L 138 139 139 139   POTASSIUM mmol/L 4.7 4.2 3.9 4.3   CHLORIDE mmol/L 101 103 103 103   CO2 mmol/L 29.7* 27.8 26.0 25.9   BUN mg/dL 29.0* 32.0* 37.0* 35.0*   CREATININE mg/dL 1.52* 1.80* 2.21* 2.43*   GLUCOSE mg/dL 108* 110* 117* 116*   Estimated Creatinine Clearance: 48.3 mL/min (A) (by C-G formula based on SCr of 1.52 mg/dL (H)).  Results from last 7 days   Lab Units 06/02/25  0607 06/01/25  0546 05/31/25  0729 05/29/25  0622 05/28/25  2240   ALBUMIN g/dL 3.6 3.3* 3.2* 4.1 4.0   BILIRUBIN mg/dL  --   --   --  0.5 0.5   ALK PHOS U/L  --   --   --  106 102   AST (SGOT) U/L  --   --   --  21 20   ALT (SGPT) U/L  --   --   --  23 24     Results from last 7 days   Lab Units 06/02/25  0607 06/01/25  0546 05/31/25  0729 05/30/25  0549 05/29/25  0622 05/28/25  2240   CALCIUM mg/dL 9.2 8.8 8.4* 8.8 9.5 9.2   ALBUMIN g/dL 3.6 3.3* 3.2*  --  4.1 4.0   MAGNESIUM mg/dL  --   --   --   --   --  2.1   PHOSPHORUS mg/dL 3.4 3.7 3.8  --   --   --        Results from last 7 days   Lab  "Units 06/02/25  0848 06/02/25  0607 05/28/25  2342 05/28/25  2240   HSTROP T ng/L 28* 29* 27* 22*   PROBNP pg/mL  --   --   --  910.0     Results from last 7 days   Lab Units 05/31/25  0729 05/30/25  1049   SODIUM UR mmol/L  --  40   CREATININE UR mg/dL  --  230.2   CHLORIDE UR mmol/L  --  <20   PROTEIN TOTAL URINE mg/dL  --  293.5   URIC ACID mg/dL 10.8*  --    PROT/CREAT RATIO UR mg/G Crea  --  1,275.0*         Invalid input(s): \"LDLCALC\"        Test Results Pending at Discharge       Discharge Details        Discharge Medications        New Medications        Instructions Start Date   terazosin 1 MG capsule  Commonly known as: HYTRIN   1 mg, Oral, Nightly             Changes to Medications        Instructions Start Date   hydrALAZINE 25 MG tablet  Commonly known as: APRESOLINE  What changed:   medication strength  how much to take   25 mg, Oral, Every 8 Hours Scheduled             Continue These Medications        Instructions Start Date   albuterol sulfate  (90 Base) MCG/ACT inhaler  Commonly known as: PROVENTIL HFA;VENTOLIN HFA;PROAIR HFA   2 puffs, Inhalation, Every 4 Hours PRN      aspirin 81 MG EC tablet   81 mg, Oral, Daily      atorvastatin 40 MG tablet  Commonly known as: LIPITOR   40 mg, Oral, Daily      budesonide-formoterol 160-4.5 MCG/ACT inhaler  Commonly known as: SYMBICORT   2 puffs, Inhalation, 2 Times Daily - RT      carvedilol 12.5 MG tablet  Commonly known as: COREG   12.5 mg, Oral, 2 Times Daily With Meals      cloNIDine 0.2 MG tablet  Commonly known as: CATAPRES   0.2 mg, Oral, Every 8 Hours      clopidogrel 75 MG tablet  Commonly known as: PLAVIX   75 mg, Oral, Daily      fluticasone 50 MCG/ACT nasal spray  Commonly known as: FLONASE   2 sprays, Nasal, Daily      HYDROcodone-Acetaminophen  MG per tablet  Commonly known as: XODOL    1 tablet, Oral, Every 8 Hours PRN      isosorbide mononitrate 30 MG 24 hr tablet  Commonly known as: IMDUR   30 mg, Oral, Every 24 Hours " Scheduled      ketoconazole 2 % cream  Commonly known as: NIZORAL   Topical, Daily      montelukast 10 MG tablet  Commonly known as: SINGULAIR   10 mg, Oral, Nightly      nitroglycerin 0.4 MG SL tablet  Commonly known as: NITROSTAT   0.4 mg, Sublingual, Every 5 Minutes PRN, Take no more than 3 doses in 15 minutes.             Stop These Medications      lisinopril 40 MG tablet  Commonly known as: PRINIVIL,ZESTRIL     spironolactone 50 MG tablet  Commonly known as: ALDACTONE              Allergies   Allergen Reactions    Shellfish-Derived Products Unknown - Low Severity     gout  gout           Discharge Disposition:  Home-Health Care Svc    Discharge Diet:  Diet Order   Procedures    Diet: Cardiac, Diabetic; Healthy Heart (2-3 Na+); Consistent Carbohydrate; Fluid Consistency: Thin (IDDSI 0)       Discharge Activity:   As tolerated    CODE STATUS:    Code Status and Medical Interventions: CPR (Attempt to Resuscitate); Full Support   Ordered at: 05/29/25 0145     Code Status (Patient has no pulse and is not breathing):    CPR (Attempt to Resuscitate)     Medical Interventions (Patient has pulse or is breathing):    Full Support       Future Appointments   Date Time Provider Department Center   6/16/2025 12:30 PM Jayne Villalobos MD [facility]     Additional Instructions for the Follow-ups that You Need to Schedule       Ambulatory Referral to Home Health   As directed      Face to Face Visit Date: 6/2/2025   Follow-up provider for Plan of Care?: I treated the patient in an acute care facility and will not continue treatment after discharge.   Follow-up provider: WILBERT DAILEY [[id number]]   Reason/Clinical Findings: weakness   Describe mobility limitations that make leaving home difficult: weakness   Nursing/Therapeutic Services Requested: Physical Therapy   PT orders: Therapeutic exercise Gait Training Transfer training   Weight Bearing Status: As Tolerated   Frequency: 1 Week 1               Follow-up  Information       Wilbert Yu MD .    Specialty: Internal Medicine  Contact information:  [address], [address]  [place] [id number] [phone]                             Additional Instructions for the Follow-ups that You Need to Schedule       Ambulatory Referral to Home Health   As directed      Face to Face Visit Date: 6/2/2025   Follow-up provider for Plan of Care?: I treated the patient in an acute care facility and will not continue treatment after discharge.   Follow-up provider: WILBERT YU [[id number]]   Reason/Clinical Findings: weakness   Describe mobility limitations that make leaving home difficult: weakness   Nursing/Therapeutic Services Requested: Physical Therapy   PT orders: Therapeutic exercise Gait Training Transfer training   Weight Bearing Status: As Tolerated   Frequency: 1 Week 1            Time Spent on Discharge:  Greater than 30 minutes      Peter Dalton MD  [facility]  06/02/25  14:19 EDT

## 2025-06-02 NOTE — THERAPY EVALUATION
Patient Name: Negrito Navarro  : 1950    MRN: [id number]                              Today's Date: 2025       Admit Date: 2025    Visit Dx:     ICD-10-CM ICD-9-CM   1. Acute on chronic congestive heart failure, unspecified heart failure type  I50.9 428.0   2. Hypertension, unspecified type  I10 401.9     Patient Active Problem List   Diagnosis    Hyperlipidemia    Benign prostatic hyperplasia    Chronic constipation    Essential hypertension    SERENITY (obstructive sleep apnea)    Oxygen dependent    COPD (chronic obstructive pulmonary disease)    CAD (coronary artery disease)    Acute coronary syndrome with high troponin    Precordial chest pain    History of coronary angioplasty with insertion of stent    Hx of CABG    CAD S/P percutaneous coronary angioplasty    Long term (current) use of antithrombotics/antiplatelets    Acute hypoxic respiratory failure    Viral pneumonia    Reactive airway disease    Chronic systolic (congestive) heart failure    Chronic kidney failure    Leukocytosis    Type 2 diabetes mellitus    Chronic HFrEF (heart failure with reduced ejection fraction)    Syncope    CHF exacerbation     Past Medical History:   Diagnosis Date    Benign prostatic hyperplasia 11/10/2016    COPD (chronic obstructive pulmonary disease) 6/10/2021    Coronary artery disease involving coronary bypass graft of native heart without angina pectoris 6/10/2021    Essential hypertension 11/10/2016    Hyperlipidemia     SERENITY (obstructive sleep apnea) 11/10/2016     Past Surgical History:   Procedure Laterality Date    CARDIAC CATHETERIZATION N/A 2023    Procedure: Left Heart Cath;  Surgeon: Jayne Villalobos MD;  Location: [facility] CATH INVASIVE LOCATION;  Service: Cardiovascular;  Laterality: N/A;    CARDIAC CATHETERIZATION N/A 2023    Procedure: Coronary angiography;  Surgeon: Jayne Villalobos MD;  Location:  [facility] CATH INVASIVE LOCATION;  Service: Cardiovascular;  Laterality: N/A;     CARDIAC CATHETERIZATION N/A 4/21/2023    Procedure: Left ventriculography;  Surgeon: Jayne Villalobos MD;  Location:  [facility] CATH INVASIVE LOCATION;  Service: Cardiovascular;  Laterality: N/A;    CARDIAC CATHETERIZATION  4/21/2023    Procedure: Saphenous Vein Graft;  Surgeon: Jayne Villalobos MD;  Location:  [facility] CATH INVASIVE LOCATION;  Service: Cardiovascular;;    CARDIAC CATHETERIZATION N/A 4/21/2023    Procedure: Native mammary injection;  Surgeon: Jayne Villalobos MD;  Location:  [facility] CATH INVASIVE LOCATION;  Service: Cardiovascular;  Laterality: N/A;    CARDIAC CATHETERIZATION N/A 4/21/2023    Procedure: Percutaneous Coronary Intervention;  Surgeon: Jayne Villalobos MD;  Location:  [facility] CATH INVASIVE LOCATION;  Service: Cardiovascular;  Laterality: N/A;    CARDIAC CATHETERIZATION N/A 4/21/2023    Procedure: Stent BETO coronary;  Surgeon: Jayne Villalobos MD;  Location:  [facility] CATH INVASIVE LOCATION;  Service: Cardiovascular;  Laterality: N/A;    CARDIAC CATHETERIZATION  4/21/2023    Procedure: Percutaneous Manual Thrombectomy;  Surgeon: Jayne Villalobos MD;  Location:  [facility] CATH INVASIVE LOCATION;  Service: Cardiovascular;;      General Information       Row Name 06/02/25 1317          Physical Therapy Time and Intention    Document Type evaluation  -     Mode of Treatment individual therapy;physical therapy  -       Row Name 06/02/25 1317          General Information    Patient Profile Reviewed yes  -     Prior Level of Function independent:  reports he sometimes uses a cane at home  -     Existing Precautions/Restrictions fall  -       Row Name 06/02/25 1317          Living Environment    Current Living Arrangements home  -     People in Home child(mike), adult  -       Row Name 06/02/25 1317          Home Main Entrance    Number of Stairs, Main Entrance one  -               User Key  (r) = Recorded By, (t) = Taken By, (c) = Cosigned By      Initials Name  Provider Type    Guillermina Faith, PT Physical Therapist                   Mobility       Row Name 06/02/25 1318          Bed Mobility    Comment, (Bed Mobility) up in chair  -       Row Name 06/02/25 1318          Sit-Stand Transfer    Sit-Stand [place] (Transfers) contact guard  -       Row Name 06/02/25 1318          Gait/Stairs (Locomotion)    [place] Level (Gait) contact guard;minimum assist (75% patient effort)  -     Distance in Feet (Gait) 75  -KH     Deviations/Abnormal Patterns (Gait) gait speed decreased;stride length decreased  -     Bilateral Gait Deviations heel strike decreased;forward flexed posture  -     Comment, (Gait/Stairs) Min A first few feet, unsteady, but progressed to CGA. reachign for handrail at times. 2 standing rest breaks secondary to SOA  -               User Key  (r) = Recorded By, (t) = Taken By, (c) = Cosigned By      Initials Name Provider Type    Guillermina Faith PT Physical Therapist                   Obj/Interventions       Row Name 06/02/25 1319          Range of Motion Comprehensive    Comment, General Range of Motion WFL  -[facility] Name 06/02/25 1319          Strength Comprehensive (MMT)    Comment, General Manual Muscle Testing (MMT) Assessment L  -[facility] Name 06/02/25 1319          Balance    Balance Assessment sitting static balance;sitting dynamic balance;standing static balance;standing dynamic balance  -     Static Sitting Balance independent  -     Dynamic Sitting Balance independent  -     Static Standing Balance contact guard  -     Dynamic Standing Balance contact guard;minimal assist  -               User Key  (r) = Recorded By, (t) = Taken By, (c) = Cosigned By      Initials Name Provider Type    Guillermina Faith PT Physical Therapist                   Goals/Plan       Row Name 06/02/25 1324          Bed Mobility Goal 1 (PT)    Activity/Assistive Device (Bed Mobility Goal 1, PT) bed  mobility activities, all  -     [place] Level/Cues Needed (Bed Mobility Goal 1, PT) standby assist  -KH     Time Frame (Bed Mobility Goal 1, PT) 1 week  -       Row Name 06/02/25 1324          Transfer Goal 1 (PT)    Activity/Assistive Device (Transfer Goal 1, PT) sit-to-stand/stand-to-sit;bed-to-chair/chair-to-bed  -KH     [place] Level/Cues Needed (Transfer Goal 1, PT) standby assist  -KH     Time Frame (Transfer Goal 1, PT) 1 week  -       Row Name 06/02/25 1324          Gait Training Goal 1 (PT)    Activity/Assistive Device (Gait Training Goal 1, PT) gait (walking locomotion);assistive device use  -     [place] Level (Gait Training Goal 1, PT) standby assist  -KH     Distance (Gait Training Goal 1, PT) 100 ft  -     Time Frame (Gait Training Goal 1, PT) 1 week  -       Row Name 06/02/25 1324          Patient Education Goal (PT)    Activity (Patient Education Goal, PT) HEP  -     [place]/Cues/Accuracy (Memory Goal 2, PT) demonstrates adequately  -KH     Time Frame (Patient Education Goal, PT) 1 week  -       Row Name 06/02/25 1324          Therapy Assessment/Plan (PT)    Planned Therapy Interventions (PT) balance training;bed mobility training;gait training;home exercise program;patient/family education;transfer training;strengthening;stair training  -               User Key  (r) = Recorded By, (t) = Taken By, (c) = Cosigned By      Initials Name Provider Type    Guillermina Faith, PT Physical Therapist                   Clinical Impression       Row Name 06/02/25 1319          Pain    Pretreatment Pain Rating 0/10 - no pain  -     Posttreatment Pain Rating 0/10 - no pain  -       Row Name 06/02/25 1319          Plan of Care Review    Plan of Care Reviewed With patient  [facility]     Outcome Evaluation Patient admitted with CHF exacerbation.  Per chart patient lives at home with his daughter in a single-story house and one-step to enter.  He reports he occasionally  uses a cane but often walks without an assistive device.  Patient was up in the chair and agreeable to therapy.  He was on room air throughout the session.  Patient stood from the chair with CGA.  He ambulated with min assist initially and was slightly unsteady.  Patient reports he ambulates better in shoes, but was ambulating and nonskid socks during eval.  However, gait quality improved and patient ambulated with contact-guard assist.  He required 2 standing rest breaks secondary to SOA.  O2 saturation dropped to 89% briefly, but remained above 92% for most of the session.  Patient presents with generalized weakness, limited endurance, unsteady gait and functional mobility likely slightly below baseline.  Patient plans to DC back home with his daughter.  Patient is safe to do so but would recommend use of cane or rolling walker for increased stability and recommend  PT to ensure return to baseline.  PT will follow while admitted.  Also encourage patient to ambulate with nursing staff multiple times per day in addition to PT.  -       Row Name 06/02/25 1319          Therapy Assessment/Plan (PT)    Patient/Family Therapy Goals Statement (PT) Return home to prior level of function  -     Rehab Potential (PT) good  -     Therapy Frequency (PT) 5 times/wk  -       Row Name 06/02/25 1318          Positioning and Restraints    Pre-Treatment Position sitting in chair/recliner  -     Post Treatment Position chair  -     In Chair reclined;call light within reach;encouraged to call for assist;exit alarm on;notified [facility]               User Key  (r) = Recorded By, (t) = Taken By, (c) = Cosigned By      Initials Name Provider Type    Guillermina Faith, PT Physical Therapist                   Outcome Measures       Row Name 06/02/25 1325 06/02/25 0923       How much help from another person do you currently need...    Turning from your back to your side while in flat bed without using bedrails? 4  -  4  -EB    Moving from lying on back to sitting on the side of a flat bed without bedrails? 4  -KH 4  -EB    Moving to and from a bed to a chair (including a wheelchair)? 3  -KH 3  -EB    Standing up from a chair using your arms (e.g., wheelchair, bedside chair)? 3  -KH 3  -EB    Climbing 3-5 steps with a railing? 3  -KH 3  -EB    To walk in hospital room? 3  -KH 3  -EB    AM-PAC 6 Clicks Score (PT) 20  -KH 20  -EB    Highest Level of Mobility Goal Walk 10 Steps or More-6  -KH Walk 10 Steps or More-6  -EB      Row Name 06/02/25 1320          Functional Assessment    Outcome Measure Options AM-PAC 6 Clicks Basic Mobility (PT)  -               User Key  (r) = Recorded By, (t) = Taken By, (c) = Cosigned By      Initials Name Provider Type    Guillermina Faith, PT Physical Therapist    Tita Mares, RN Registered Nurse                                 Physical Therapy Education       Title: PT OT SLP Therapies (In Progress)       Topic: Physical Therapy (Not Started)       Point: Mobility training (Not Started)       Learner Progress:  Not documented in this visit.              Point: Home exercise program (Not Started)       Learner Progress:  Not documented in this visit.              Point: Body mechanics (Not Started)       Learner Progress:  Not documented in this visit.              Point: Precautions (Not Started)       Learner Progress:  Not documented in this visit.                                  PT Recommendation and Plan  Planned Therapy Interventions (PT): balance training, bed mobility training, gait training, home exercise program, patient/family education, transfer training, strengthening, stair training  Outcome Evaluation: Patient admitted with CHF exacerbation.  Per chart patient lives at home with his daughter in a single-story house and one-step to enter.  He reports he occasionally uses a cane but often walks without an assistive device.  Patient was up in the chair and agreeable  to therapy.  He was on room air throughout the session.  Patient stood from the chair with CGA.  He ambulated with min assist initially and was slightly unsteady.  Patient reports he ambulates better in shoes, but was ambulating and nonskid socks during eval.  However, gait quality improved and patient ambulated with contact-guard assist.  He required 2 standing rest breaks secondary to SOA.  O2 saturation dropped to 89% briefly, but remained above 92% for most of the session.  Patient presents with generalized weakness, limited endurance, unsteady gait and functional mobility likely slightly below baseline.  Patient plans to DC back home with his daughter.  Patient is safe to do so but would recommend use of cane or rolling walker for increased stability and recommend  PT to ensure return to baseline.  PT will follow while admitted.  Also encourage patient to ambulate with nursing staff multiple times per day in addition to PT.     Time Calculation:         PT Charges       Row Name 06/02/25 1326 06/02/25 1325          Time Calculation    Start Time -- 1105  -KH     Stop Time -- 1125  -KH     Time Calculation (min) -- 20 min  -     PT Received On -- 06/02/25  -     PT - Next Appointment -- 06/03/25  -     PT Goal Re-Cert Due Date -- 06/09/25  -        Time Calculation- PT    Total Timed Code Minutes- PT -- 10 minute(s)  -        Timed Charges    07746 - PT Therapeutic Activity Minutes 10  -KH --        Total Minutes    Timed Charges Total Minutes 10  -KH --      Total Minutes 10  -KH --               User Key  (r) = Recorded By, (t) = Taken By, (c) = Cosigned By      Initials Name Provider Type    Guillermina Faith PT Physical Therapist                  Therapy Charges for Today       Code Description Service Date Service Provider Modifiers Qty    [id number]  PT EVAL MOD COMPLEXITY 2 6/2/2025 Guillermina Major, PT GP 1    [id number]  PT THERAPEUTIC ACT EA 15 MIN 6/2/2025  Pedrito, Guillermina Stovall, PT GP 1            PT G-Codes  Outcome Measure Options: AM-PAC 6 Clicks Basic Mobility (PT)  AM-PAC 6 Clicks Score (PT): 20  PT Discharge Summary  Anticipated Discharge Disposition (PT): home with assist, home with home health    Guillermina Major, PT  6/2/2025

## 2025-06-02 NOTE — OUTREACH NOTE
Prep Survey      Flowsheet Row Responses   [facility] facility patient discharged from? [facility]   Is LACE score < 7 ? No   Eligibility Readm Mgmt   Discharge diagnosis CHF exacerbation   Does the patient have one of the following disease processes/diagnoses(primary or secondary)? CHF   Is there a DME ordered? Yes   What DME was ordered? O2 tank and rollator   Prep survey completed? Yes            Cande ARAIZA - Registered Nurse

## 2025-06-02 NOTE — CASE MANAGEMENT/SOCIAL WORK
Continued Stay Note  [facility]     Patient Name: Negrito Navarro  MRN: [id number]  Today's Date: 6/2/2025    Admit Date: 5/28/2025    Plan: Plans discharge to his daughter [facility] house ([address] [place] [address]).   Discharge Plan       Row Name 06/02/25 1535       Plan    Plan Plans discharge to his daughter [facility] ([address] [place] [address]).    Patient/Family in Agreement with Plan yes    Plan Comments Call placed to [employer] regarding portable O2 tank and rollator. Message sent to MD to order rollator. Await [employer] to deliver portable tank and rollator to bedside. Spoke with patient at bedside to obtain HH selection. Patient refusing HH referral. Family to transport.....[facility]                   Discharge Codes    No documentation.                 Expected Discharge Date and Time       Expected Discharge Date Expected Discharge Time    Jun 2, 2025               Nelli Wilson RN

## 2025-06-02 NOTE — PROGRESS NOTES
[facility]     Progress Note    Patient Name: Negrito Navarro  : 1950  MRN: [id number]  Primary Care Physician:  Gaby Yu MD  Date of admission: 2025    Subjective   Subjective     Chief Complaint: yosef     History of Present Illness  Patient with yosef likely secondary to atn after drop in bp and decreased renal perfusion    Review of Systems    Objective   Objective     Vitals:   Temp:  [97.3 °F (36.3 °C)-98.1 °F (36.7 °C)] 98.1 °F (36.7 °C)  Heart Rate:  [] 69  Resp:  [16-18] 18  BP: (128-189)/() 150/84    Physical Exam   General Appearance:  In no acute distress.    HEENT: Normal HEENT exam.     Extremities: .  There is no deformity, effusion or dependent edema.    Neurological: Patient is alert     Result Review    Result Review:  I have personally reviewed the results from the time of this admission to 2025 10:11 EDT and agree with these findings:  []  Laboratory list / accordion  []  Microbiology  []  Radiology  []  EKG/Telemetry   []  Cardiology/Vascular   []  Pathology  []  Old records  []  Other:  Most notable findings include:       Assessment & Plan   Assessment / Plan     Brief Patient Summary:  Negrito Navarro is a 74 y.o. male who has yosef secondary to atn    Active Hospital Problems:  Active Hospital Problems    Diagnosis     **CHF exacerbation     Chronic HFrEF (heart failure with reduced ejection fraction)     Type 2 diabetes mellitus     Chronic kidney failure     CAD (coronary artery disease)     COPD (chronic obstructive pulmonary disease)     Essential hypertension     SERENITY (obstructive sleep apnea)      Plan:   Patient seen and examined. Awake,alert. No complaints. Labs and chart reviewed. Cr improved to 1.5.  bp at goal.  Continue to hold lisinopril and aldactone.  Will follow.  Ok for d/c from renal standpoint    Courtney Lux MD

## 2025-06-03 ENCOUNTER — READMISSION MANAGEMENT (OUTPATIENT)
Dept: CALL CENTER | Facility: HOSPITAL | Age: 75
End: 2025-06-03
Payer: MEDICARE

## 2025-06-03 NOTE — PAYOR COMM NOTE
"Wilbur Navarro (74 y.o. Male)          DC SUMMARY FOR [id number]     UR F# [phone]         Date of Birth   1950    Social Security Number       Address   [address] [place] [id number]    Home Phone   [phone]    MRN   [id number]       Anabaptism   Muslim    Marital Status   Legally                             Admission Date   5/28/2025    Admission Type   Emergency    Admitting Provider       Attending Provider       Department, Room/Bed   [address], P588/1       Discharge Date   6/2/2025    Discharge Disposition   Home-Health Care Svc    Discharge Destination                                 Attending Provider: (none)   Allergies: Shellfish-derived Products    Isolation: None   Infection: None   Code Status: Prior    Ht: 172.7 cm (68\")   Wt: 104 kg (228 lb 2.8 oz)    Admission Cmt: None   Principal Problem: CHF exacerbation [I50.9]                   Active Insurance as of 5/28/2025       Primary Coverage       Payor Plan Insurance Group Employer/Plan Group    [employer] [place] MEDICARE REPLACEMENT WELLCARE MEDICARE ADVANTAGE SNP PPO        Payor Plan Address Payor Plan Phone Number Payor Plan Fax Number Effective Dates    [address] [address]   4/1/2025 - None Entered    [facility] [id number]         Subscriber Name Subscriber Birth Date Member ID       WILBUR NAVARRO 1950 [id number]               Secondary Coverage       Payor Plan Insurance Group Employer/Plan Group    [place]        Payor Plan Address Payor Plan Phone Number Payor Plan Fax Number Effective Dates    [address] [phone]  6/9/2021 - None Entered    [facility] [id number]         Subscriber Name Subscriber Birth Date Member ID       WILBUR NAVARRO 1950 [id number]                     Emergency Contacts        (Rel.) Home Phone Work Phone Mobile Phone    JOAQUIM SERRATO (Daughter) -- -- [phone]    RamonDrea (Daughter) -- -- " [phone]                 Discharge Summary        Peter Dalton MD at 25 1414              Patient Name: Negrito Navarro  : 1950  MRN: [id number]    Date of Admission: 2025  Date of Discharge:  2025  Primary Care Physician: Gaby Yu MD      Chief Complaint:   Shortness of Breath      Discharge Diagnoses     Active Hospital Problems    Diagnosis  POA    **CHF exacerbation [I50.9]  Yes    Chronic HFrEF (heart failure with reduced ejection fraction) [I50.22]  Yes    Type 2 diabetes mellitus [E11.9]  Yes    Chronic kidney failure [N18.9]  Yes    CAD (coronary artery disease) [I25.10]  Yes    COPD (chronic obstructive pulmonary disease) [J44.9]  Yes    Essential hypertension [I10]  Yes    SERENITY (obstructive sleep apnea) [G47.33]  Yes      Resolved Hospital Problems   No resolved problems to display.        Hospital Course     Mr. Navarro is a 74 y.o. male with a history of CKD, diabetes type 2, nocturnal hypoxemia, coronary artery disease, chronic heart failure with recovered ejection fracture presenting with shortness of breath found to have acute pulmonary edema from either heart failure exacerbation versus more likely a flash pulmonary edema from hypertensive emergency.  Patient was given IV Lasix once and then gotten BAO renal was consulted multifactorial BAO from uncontrolled blood pressure, IV Lasix, urinary retention.  Urology was consulted and a Herrera was placed with 1 L out.  Urology recommends keeping Herrera in place until outpatient follow-up.  Terazosin started for BPH.    His hydralazine was decreased to 25 mg and his Aldactone and lisinopril were discontinued.  Patient reports that he essentially was not taking any blood pressure medications at home with any type of regularity    Patient reports nocturnal oxygen that he wears at night and sometimes during the day when he is short of breath.  Walking oximetry reviewed with desaturations into the 80s with exertion.  Plan to send  home on oxygen and plan for home health at discharge.    At the time of discharge patient was told to take all medications as prescribed, keep all follow-up appointments, and call their doctor or return to the hospital with any worsening or concerning symptoms.    Day of Discharge     Subjective:  Patient sitting up comfortably in bed.  Had some shortness of breath this morning that has improved with oxygen.  No nausea or vomiting.  Eager to leave the hospital.  Creatinine improving.        Physical Exam:  Temp:  [97.3 °F (36.3 °C)-98.1 °F (36.7 °C)] 97.5 °F (36.4 °C)  Heart Rate:  [] 60  Resp:  [16-20] 20  BP: (128-189)/() 131/75  Body mass index is 34.69 kg/m².  Physical Exam  Vitals and nursing note reviewed.   Constitutional:       General: He is not in acute distress.  Cardiovascular:      Rate and Rhythm: Normal rate and regular rhythm.   Pulmonary:      Effort: Pulmonary effort is normal. No respiratory distress.   Abdominal:      General: Abdomen is flat. There is no distension.      Tenderness: There is no abdominal tenderness.   Musculoskeletal:         General: No swelling or deformity.   Skin:     General: Skin is warm and dry.   Neurological:      General: No focal deficit present.      Mental Status: He is alert. Mental status is at baseline.         Consultants     Consult Orders (all) (From admission, onward)       Start     Ordered    05/30/25 0756  Inpatient Nephrology Consult  Once        Specialty:  Nephrology  Provider:  Carlos Rodriguez MD    05/30/25 0755    05/29/25 0943  Inpatient Urology Consult  Once        Specialty:  Urology  Provider:  Ricardo Holbrook MD    05/29/25 0943    05/29/25 0702  Inpatient Cardiology Consult  IN AM        Specialty:  Cardiology  Provider:  Silvio Gotti MD    05/29/25 0145    05/29/25 0107  [facility] (on-call MD unless specified) Details  Once        Specialty:  Hospitalist  Provider:  (Not yet assigned)    05/29/25 0106                   Procedures     Imaging Results (All)       Procedure Component Value Units Date/Time    XR Chest 1 View [[id number]] Collected: 05/28/25 2303     Updated: 05/28/25 2307    Narrative:      SINGLE VIEW OF THE CHEST     HISTORY: Weakness     COMPARISON: September 10, 2024     FINDINGS:  There is cardiomegaly. There is some vascular congestion. Patient status  post median sternotomy with coronary artery bypass grafting. Elevation  of the left hemidiaphragm, with chronic scarring is again seen. Blunting  of the left costophrenic angle is unchanged.       Impression:      Cardiomegaly with suspected vascular congestion.     This report was finalized on 5/28/2025 11:04 PM by Dr. Kimberly Stevens M.D on Workstation: [id number]               Pertinent Labs     Results from last 7 days   Lab Units 05/30/25  0549 05/29/25  0622 05/28/25  2240   WBC 10*3/mm3 11.09* 12.29* 10.46   HEMOGLOBIN g/dL 14.3 16.1 15.4   PLATELETS 10*3/mm3 259 279 267     Results from last 7 days   Lab Units 06/02/25  0607 06/01/25  0546 05/31/25  0729 05/30/25  0549   SODIUM mmol/L 138 139 139 139   POTASSIUM mmol/L 4.7 4.2 3.9 4.3   CHLORIDE mmol/L 101 103 103 103   CO2 mmol/L 29.7* 27.8 26.0 25.9   BUN mg/dL 29.0* 32.0* 37.0* 35.0*   CREATININE mg/dL 1.52* 1.80* 2.21* 2.43*   GLUCOSE mg/dL 108* 110* 117* 116*   Estimated Creatinine Clearance: 48.3 mL/min (A) (by C-G formula based on SCr of 1.52 mg/dL (H)).  Results from last 7 days   Lab Units 06/02/25  0607 06/01/25  0546 05/31/25  0729 05/29/25  0622 05/28/25  2240   ALBUMIN g/dL 3.6 3.3* 3.2* 4.1 4.0   BILIRUBIN mg/dL  --   --   --  0.5 0.5   ALK PHOS U/L  --   --   --  106 102   AST (SGOT) U/L  --   --   --  21 20   ALT (SGPT) U/L  --   --   --  23 24     Results from last 7 days   Lab Units 06/02/25  0607 06/01/25  0546 05/31/25  0729 05/30/25  0549 05/29/25  0622 05/28/25  2240   CALCIUM mg/dL 9.2 8.8 8.4* 8.8 9.5 9.2   ALBUMIN g/dL 3.6 3.3* 3.2*  --  4.1 4.0   MAGNESIUM mg/dL  --   --    "--   --   --  2.1   PHOSPHORUS mg/dL 3.4 3.7 3.8  --   --   --        Results from last 7 days   Lab Units 06/02/25  0848 06/02/25  0607 05/28/25  2342 05/28/25  2240   HSTROP T ng/L 28* 29* 27* 22*   PROBNP pg/mL  --   --   --  910.0     Results from last 7 days   Lab Units 05/31/25  0729 05/30/25  1049   SODIUM UR mmol/L  --  40   CREATININE UR mg/dL  --  230.2   CHLORIDE UR mmol/L  --  <20   PROTEIN TOTAL URINE mg/dL  --  293.5   URIC ACID mg/dL 10.8*  --    PROT/CREAT RATIO UR mg/G Crea  --  1,275.0*         Invalid input(s): \"LDLCALC\"        Test Results Pending at Discharge       Discharge Details        Discharge Medications        New Medications        Instructions Start Date   terazosin 1 MG capsule  Commonly known as: HYTRIN   1 mg, Oral, Nightly             Changes to Medications        Instructions Start Date   hydrALAZINE 25 MG tablet  Commonly known as: APRESOLINE  What changed:   medication strength  how much to take   25 mg, Oral, Every 8 Hours Scheduled             Continue These Medications        Instructions Start Date   albuterol sulfate  (90 Base) MCG/ACT inhaler  Commonly known as: PROVENTIL HFA;VENTOLIN HFA;PROAIR HFA   2 puffs, Inhalation, Every 4 Hours PRN      aspirin 81 MG EC tablet   81 mg, Oral, Daily      atorvastatin 40 MG tablet  Commonly known as: LIPITOR   40 mg, Oral, Daily      budesonide-formoterol 160-4.5 MCG/ACT inhaler  Commonly known as: SYMBICORT   2 puffs, Inhalation, 2 Times Daily - RT      carvedilol 12.5 MG tablet  Commonly known as: COREG   12.5 mg, Oral, 2 Times Daily With Meals      cloNIDine 0.2 MG tablet  Commonly known as: CATAPRES   0.2 mg, Oral, Every 8 Hours      clopidogrel 75 MG tablet  Commonly known as: PLAVIX   75 mg, Oral, Daily      fluticasone 50 MCG/ACT nasal spray  Commonly known as: FLONASE   2 sprays, Nasal, Daily      HYDROcodone-Acetaminophen  MG per tablet  Commonly known as: XODOL    1 tablet, Oral, Every 8 Hours PRN    "   isosorbide mononitrate 30 MG 24 hr tablet  Commonly known as: IMDUR   30 mg, Oral, Every 24 Hours Scheduled      ketoconazole 2 % cream  Commonly known as: NIZORAL   Topical, Daily      montelukast 10 MG tablet  Commonly known as: SINGULAIR   10 mg, Oral, Nightly      nitroglycerin 0.4 MG SL tablet  Commonly known as: NITROSTAT   0.4 mg, Sublingual, Every 5 Minutes PRN, Take no more than 3 doses in 15 minutes.             Stop These Medications      lisinopril 40 MG tablet  Commonly known as: PRINIVIL,ZESTRIL     spironolactone 50 MG tablet  Commonly known as: ALDACTONE              Allergies   Allergen Reactions    Shellfish-Derived Products Unknown - Low Severity     gout  gout           Discharge Disposition:  Home-Health Care c    Discharge Diet:  Diet Order   Procedures    Diet: Cardiac, Diabetic; Healthy Heart (2-3 Na+); Consistent Carbohydrate; Fluid Consistency: Thin (IDDSI 0)       Discharge Activity:   As tolerated    CODE STATUS:    Code Status and Medical Interventions: CPR (Attempt to Resuscitate); Full Support   Ordered at: 05/29/25 0145     Code Status (Patient has no pulse and is not breathing):    CPR (Attempt to Resuscitate)     Medical Interventions (Patient has pulse or is breathing):    Full Support       Future Appointments   Date Time Provider Department Center   6/16/2025 12:30 PM Jayne Villalobos MD [facility]     Additional Instructions for the Follow-ups that You Need to Schedule       Ambulatory Referral to Home Health   As directed      Face to Face Visit Date: 6/2/2025   Follow-up provider for Plan of Care?: I treated the patient in an acute care facility and will not continue treatment after discharge.   Follow-up provider: WILBERT DAILEY [[id number]]   Reason/Clinical Findings: weakness   Describe mobility limitations that make leaving home difficult: weakness   Nursing/Therapeutic Services Requested: Physical Therapy   PT orders: Therapeutic exercise Gait Training Transfer  training   Weight Bearing Status: As Tolerated   Frequency: 1 Week 1               Follow-up Information       Wilbert Yu MD .    Specialty: Internal Medicine  Contact information:  Purvi Caba, [place]. [address] [place], [place] [id number] [phone]                             Additional Instructions for the Follow-ups that You Need to Schedule       Ambulatory Referral to Home Health   As directed      Face to Face Visit Date: 6/2/2025   Follow-up provider for Plan of Care?: I treated the patient in an acute care facility and will not continue treatment after discharge.   Follow-up provider: WILBERT YU [[id number]]   Reason/Clinical Findings: weakness   Describe mobility limitations that make leaving home difficult: weakness   Nursing/Therapeutic Services Requested: Physical Therapy   PT orders: Therapeutic exercise Gait Training Transfer training   Weight Bearing Status: As Tolerated   Frequency: 1 Week 1            Time Spent on Discharge:  Greater than 30 minutes      Peter Dalton MD  [facility]  06/02/25  14:19 EDT                Electronically signed by Peter Dalton MD at 06/02/25 7866

## 2025-06-03 NOTE — OUTREACH NOTE
CHF Week 1 Survey      Flowsheet Row Responses   [facility] patient discharged from? [place]   Does the patient have one of the following disease processes/diagnoses(primary or secondary)? CHF   CHF Week 1 attempt successful? Yes   Call start time 1535   Call end time 1545   Discharge diagnosis CHF exacerbation   Person spoke with today (if not patient) and relationship pt   Meds reviewed with patient/caregiver? Yes   Is the patient having any side effects they believe may be caused by any medication additions or changes? No   Does the patient have all medications ordered at discharge? Yes   Is the patient taking all medications as directed (includes completed medication regime)? Yes   Does the patient have a primary care provider?  Yes   Does the patient have an appointment with their PCP within 7 days of discharge? Yes   Has the patient kept scheduled appointments due by today? Yes   Psychosocial issues? No   Did the patient receive a copy of their discharge instructions? Yes   Nursing interventions Reviewed instructions with patient   What is the patient's perception of their health status since discharge? Improving   Nursing interventions Nurse provided patient education   Is the patient able to teach back signs and symptoms of worsening condition? (i.e. weight gain, shortness of air, etc.) Yes   If the patient is a current smoker, are they able to teach back resources for cessation? Not a smoker   Is the patient/caregiver able to teach back the hierarchy of who to call/visit for symptoms/problems? PCP, Specialist, Home health nurse, Urgent Care, ED, 911 Yes   Is the patient able to teach back Heart Failure Zones? Yes   CHF Nursing Interventions Education provided on various zones   CHF Zone this Call Green Zone   Green Zone Patient reports doing well, No new or worsening shortness of breath, Physical activity level is normal for you, No new swelling -  feet, ankles and legs look normal for you, Weight  check stable, No chest pain   Green Zone Interventions Daily weight check, Follow up visits planned, Meds as directed    CHF Week 1 call completed? Yes   Is the patient interested in additional calls from an ambulatory ? No   Would this patient benefit from a Referral to [facility] Social Work? No   Wrap up additional comments pt doing ok. given hf clinic number   Call end time [phone]            QUINTON HENRIQUEZ - Registered Nurse

## 2025-06-05 ENCOUNTER — NURSE TRIAGE (OUTPATIENT)
Dept: CALL CENTER | Facility: HOSPITAL | Age: 75
End: 2025-06-05
Payer: MEDICARE

## 2025-06-05 NOTE — TELEPHONE ENCOUNTER
"Patient c/o bleeding and leaking around urinary catheter. Catheter continues to drain urine. Reviewed protocol with patient. Advised to call Urologist or PCP to be seen today. Advised to go to Urgent Care or ER if they cannot see him today. Patient verbalizes agreement with plan.    Reason for Disposition   Bleeding around catheter (e.g., from penis or female urethra)    Additional Information   Negative: Shock suspected (e.g., cold/pale/clammy skin, too weak to stand, low BP, rapid pulse)   Negative: Sounds like a life-threatening emergency to the triager   Negative: Urinary catheter and spinal cord injury, questions about   Negative: Urinary catheter in a hospice patient   Negative: [1] Catheter was accidentally pulled-out AND [2] bright red continuous bleeding (or trickling)   Negative: SEVERE abdominal pain   Negative: Fever > 100.4 F (38.0 C)   Negative: [1] Drinking very little AND [2] dehydration suspected (e.g., no urine > 12 hours, very dry mouth, very lightheaded)   Negative: Patient sounds very sick or weak to the triager   Negative: Catheter was accidentally pulled-out   Negative: [1] Catheter is broken AND [2] is not working (does not function normally)    Answer Assessment - Initial Assessment Questions  1. SYMPTOMS: \"What symptoms are you concerned about?\"      Leaking, bleeding  2. ONSET:  \"When did the symptoms start?\"      This morning  3. FEVER: \"Is there a fever?\" If Yes, ask: \"What is the temperature, how was it measured, and when did it start?\"      No   4. ABDOMEN PAIN: \"Is there any abdomen pain?\" (e.g., Scale 1-10; or mild, moderate, severe)      No   5. URINE COLOR: \"What color is the urine?\"  \"Is there blood present in the urine?\" (e.g., clear, yellow, cloudy, tea-colored, blood streaks, bright red)      Red-tinted  6. URINE AMOUNT: \"When did you last empty the urine from the collection bag?\" \"How much urine was in the bag at that time?\" How much urine is in the collection bag now?\"      " "Large amount of urine  7. INSERTION: \"How long have you (they) had the catheter?\"      05/30  8. OTHER SYMPTOMS: \"Are there any other symptoms?\" (e.g., abdomen swelling, back pain, bladder spasms, constipation, foul smelling urine, leaking of urine)       Low back pain last night  9. MEDICINES: \"Are you taking any medicines to treat urinary problems?\" (e.g., antibiotics for a urinary tract infection, medicines to treat bladder spasms)       No   10. PREGNANCY: \"Is there any chance you are pregnant?\" \"When was your last menstrual period?\"        N/A    Protocols used: Urinary Catheter (e.g., Herrera) Symptoms and Questions-ADULT-AH    "

## 2025-06-12 ENCOUNTER — READMISSION MANAGEMENT (OUTPATIENT)
Dept: CALL CENTER | Facility: HOSPITAL | Age: 75
End: 2025-06-12
Payer: MEDICARE

## 2025-06-12 NOTE — OUTREACH NOTE
CHF Week 2 Survey      Flowsheet Row Responses   [facility] patient discharged from? [place]   Does the patient have one of the following disease processes/diagnoses(primary or secondary)? CHF   Week 2 attempt successful? Yes   Call start time 1435   Call end time 1440   Discharge diagnosis CHF exacerbation   Meds reviewed with patient/caregiver? Yes   Is the patient having any side effects they believe may be caused by any medication additions or changes? No   Is the patient taking all medications as directed (includes completed medication regime)? Yes   Comments regarding appointments urology appt on 6/17   Does the patient have a primary care provider?  Yes   Does the patient have an appointment with their PCP within 7 days of discharge? No   Nursing Interventions Advised patient to make appointment   Has the patient kept scheduled appointments due by today? N/A   Has home health visited the patient within 72 hours of discharge? N/A   What DME was ordered? O2 tank and rollator   Has all DME been delivered? Yes   DME comments continuous oxygen, wears 2-3LO2   Pulse Ox monitoring None   Psychosocial issues? No   Comments pt reports f/c has good output.   What is the patient's perception of their health status since discharge? Improving  [still has some vianey ankle swelling, however is better]   Nursing interventions Nurse provided patient education   Is the patient/caregiver able to teach back the hierarchy of who to call/visit for symptoms/problems? PCP, Specialist, Home health nurse, Urgent Care, ED, 911 Yes   Additional teach back comments Enc pt to weigh daily   CHF Zone this Call Green Zone   Green Zone Patient reports doing well   Green Zone Interventions Daily weight check, Meds as directed, Low sodium diet   CHF Week 2 call completed? Yes   Is the patient interested in additional calls from an ambulatory ? No   Would this patient benefit from a Referral to Amb Social Work? No   Call end  time 1440            Kelli GUAMAN - Registered Nurse

## 2025-06-23 ENCOUNTER — READMISSION MANAGEMENT (OUTPATIENT)
Dept: CALL CENTER | Facility: HOSPITAL | Age: 75
End: 2025-06-23
Payer: MEDICARE

## 2025-06-23 NOTE — OUTREACH NOTE
CHF Week 3 Survey      Flowsheet Row Responses   [facility] facility patient discharged from? [place]   Does the patient have one of the following disease processes/diagnoses(primary or secondary)? CHF   Week 3 attempt successful? No   Unsuccessful attempts Attempt 1            Julieth GOLDBERG - Registered Nurse

## 2025-07-21 ENCOUNTER — OFFICE VISIT (OUTPATIENT)
Dept: CARDIOLOGY | Facility: CLINIC | Age: 75
End: 2025-07-21
Payer: MEDICARE

## 2025-07-21 VITALS
SYSTOLIC BLOOD PRESSURE: 216 MMHG | DIASTOLIC BLOOD PRESSURE: 99 MMHG | BODY MASS INDEX: 30.51 KG/M2 | HEIGHT: 69 IN | HEART RATE: 73 BPM | WEIGHT: 206 LBS

## 2025-07-21 DIAGNOSIS — Z01.810 PREOP CARDIOVASCULAR EXAM: ICD-10-CM

## 2025-07-21 DIAGNOSIS — I10 ESSENTIAL HYPERTENSION: ICD-10-CM

## 2025-07-21 DIAGNOSIS — R07.2 PRECORDIAL PAIN: Primary | ICD-10-CM

## 2025-07-21 DIAGNOSIS — E78.5 HYPERLIPIDEMIA, UNSPECIFIED HYPERLIPIDEMIA TYPE: ICD-10-CM

## 2025-07-21 PROCEDURE — 3077F SYST BP >= 140 MM HG: CPT | Performed by: INTERNAL MEDICINE

## 2025-07-21 PROCEDURE — 3080F DIAST BP >= 90 MM HG: CPT | Performed by: INTERNAL MEDICINE

## 2025-07-21 PROCEDURE — 99214 OFFICE O/P EST MOD 30 MIN: CPT | Performed by: INTERNAL MEDICINE

## 2025-07-21 NOTE — PROGRESS NOTES
[facility] ALSO NEEDS CARDIAC / BLOOD THINNER CLR HOLEP SURG 1ST UROLOGY FX    Subjective:        Negrito Navarro is a 75 y.o. male who here for follow up    CC  Follow-up hypertension hyperlipidemia clearance for the urological surgery  HPI  75-year-old male with hypertension hyperlipidemia needs a surgical clearance he denies any chest pains or tightness in the chest     Problems Addressed this Visit          Cardiac and Vasculature    Hyperlipidemia    Essential hypertension     Other Visit Diagnoses         Precordial pain    -  Primary      Preop cardiovascular exam              Diagnoses         Codes Comments      Precordial pain    -  Primary ICD-10-CM: R07.2  ICD-9-CM: 786.51       Essential hypertension     ICD-10-CM: I10  ICD-9-CM: 401.9       Hyperlipidemia, unspecified hyperlipidemia type     ICD-10-CM: E78.5  ICD-9-CM: 272.4       Preop cardiovascular exam     ICD-10-CM: Z01.810  ICD-9-CM: V72.81           .    The following portions of the patient's history were reviewed and updated as appropriate: allergies, current medications, past family history, past medical history, past social history, past surgical history and problem list.    Past Medical History:   Diagnosis Date    Benign prostatic hyperplasia 11/10/2016    COPD (chronic obstructive pulmonary disease) 6/10/2021    Coronary artery disease involving coronary bypass graft of native heart without angina pectoris 6/10/2021    Essential hypertension 11/10/2016    Hyperlipidemia     SERENITY (obstructive sleep apnea) 11/10/2016     reports that he has never smoked. He has never been exposed to tobacco smoke. He has never used smokeless tobacco. He reports that he does not drink alcohol and does not use drugs.   Family History   Problem Relation Age of Onset    Heart attack Mother     Heart disease Mother     Heart failure Mother     Hyperlipidemia Mother     Hypertension Mother        Review of Systems  Constitutional: No wt loss, fever,  "fatigue  Gastrointestinal: No nausea, abdominal pain  Behavioral/Psych: No insomnia or anxiety   Cardiovascular no chest pains or tightness in the chest  Objective:       Physical Exam  BP (!) 216/99   Pulse 73   Ht 175.3 cm (69\")   Wt 93.4 kg (206 lb)   BMI 30.42 kg/m²   General appearance: No acute changes   Neck: Trachea midline; NECK, supple, no thyromegaly or lymphadenopathy   Lungs: Normal size and shape, normal breath sounds, equal distribution of air, no rales and rhonchi   CV: S1-S2 regular, no murmurs, no rub, no gallop   Abdomen: Soft, nontender; no masses , no abnormal abdominal sounds   Extremities: No deformity , normal color , no peripheral edema   Skin: Normal temperature, turgor and texture; no rash, ulcers          Procedures      Echocardiogram:    Results for orders placed during the hospital encounter of 05/28/25    Adult Transthoracic Echo Complete W/ Cont if Necessary Per Protocol    Interpretation Summary    Left ventricular ejection fraction appears to be 61 - 65%.    Left ventricular diastolic function was normal.    Mild aortic valve stenosis is present.    Systolic anterior motion of the anterior mitral leaflet is present.          Current Outpatient Medications:     albuterol sulfate  (90 Base) MCG/ACT inhaler, Inhale 2 puffs Every 4 (Four) Hours As Needed for Wheezing., Disp: , Rfl:     aspirin 81 MG EC tablet, Take 1 tablet by mouth Daily., Disp: , Rfl:     atorvastatin (LIPITOR) 40 MG tablet, Take 1 tablet by mouth Daily., Disp: , Rfl:     budesonide-formoterol (SYMBICORT) 160-4.5 MCG/ACT inhaler, Inhale 2 puffs 2 (Two) Times a Day., Disp: 10.2 g, Rfl: 1    carvedilol (COREG) 12.5 MG tablet, Take 1 tablet by mouth 2 (Two) Times a Day With Meals., Disp: , Rfl:     cloNIDine (CATAPRES) 0.2 MG tablet, Take 1 tablet by mouth Every 8 (Eight) Hours., Disp: 90 tablet, Rfl: 3    clopidogrel (PLAVIX) 75 MG tablet, Take 1 tablet by mouth Daily., Disp: 90 tablet, Rfl: 3    fluticasone " (FLONASE) 50 MCG/ACT nasal spray, Administer 2 sprays into the nostril(s) as directed by provider Daily., Disp: , Rfl:     HYDROcodone-Acetaminophen (XODOL )  MG per tablet, Take 1 tablet by mouth Every 8 (Eight) Hours As Needed for Moderate Pain., Disp: , Rfl:     isosorbide mononitrate (IMDUR) 30 MG 24 hr tablet, Take 1 tablet by mouth Daily., Disp: 30 tablet, Rfl: 3    ketoconazole (NIZORAL) 2 % cream, Apply  topically to the appropriate area as directed Daily., Disp: , Rfl:     montelukast (SINGULAIR) 10 MG tablet, Take 1 tablet by mouth Every Night., Disp: 30 tablet, Rfl: 0    nitroglycerin (NITROSTAT) 0.4 MG SL tablet, Place 1 tablet under the tongue Every 5 (Five) Minutes As Needed for Chest Pain for up to 1 dose. Take no more than 3 doses in 15 minutes., Disp: 25 tablet, Rfl: 12    hydrALAZINE (APRESOLINE) 25 MG tablet, Take 1 tablet by mouth Every 8 (Eight) Hours for 30 days., Disp: 90 tablet, Rfl: 0    terazosin (HYTRIN) 1 MG capsule, Take 1 capsule by mouth Every Night for 30 days., Disp: 30 capsule, Rfl: 0   Assessment:                Plan:          ICD-10-CM ICD-9-CM   1. Precordial pain  R07.2 786.51   2. Essential hypertension  I10 401.9   3. Hyperlipidemia, unspecified hyperlipidemia type  E78.5 272.4   4. Preop cardiovascular exam  Z01.810 V72.81     1. Precordial pain  Atypical    2. Essential hypertension  Patient understands importance of blood pressure check at home which patient does regularly and the blood pressures are well under control to the level of less than 140/90      3. Hyperlipidemia, unspecified hyperlipidemia type  Continue current treatment    4. Preop cardiovascular exam  Cleared without any modifiable risk factors      See in 6 months    Negrito Navarro seen and examined with no clinical signs of angina or chf, pt is cleared for surgery with non modifiable risk factors.  Negrito Navarro has been advised to take cardiac meds with sip of water on the day of  surgery.    Please use beta blocker for tachycardia perioperatively    Anticoagulation to be managed appropriately    Watch for chest pain, shortness of breath, palpitations, arrhythmias, and significant change in the blood pressure perioperatively,     Please check EKG preop and postop if any questions, notify us if any change in patient's cardiovascular conditions    May stop asa, plavix for 5 days    COUNSELING:    Negrito Purcell was given to patient for the following topics: diagnostic results, risk factor reductions, impressions, risks and benefits of treatment options and importance of treatment compliance .       SMOKING COUNSELING:        Dictated using Dragon dictation

## (undated) DEVICE — GW EMR FIX EXCHG J STD .035 3MM 260CM

## (undated) DEVICE — Device

## (undated) DEVICE — ANGIO-SEAL STS PLUS VASCULAR CLOSURE DEVICE: Brand: ANGIO-SEAL

## (undated) DEVICE — CATH DIAG IMPULSE FR4 5F 100CM

## (undated) DEVICE — 6F .070 JR 4 100CM: Brand: CORDIS

## (undated) DEVICE — CATH DIAG IMPULSE FL4 5F 100CM

## (undated) DEVICE — KT MANIFLD CARDIAC

## (undated) DEVICE — PK CATH CARD 40

## (undated) DEVICE — RUNTHROUGH NS EXTRA FLOPPY PTCA GUIDEWIRE: Brand: RUNTHROUGH

## (undated) DEVICE — INTRO SHEATH ART/FEM ENGAGE .035 6F12CM

## (undated) DEVICE — DEV INDEFLATOR P/N 580289

## (undated) DEVICE — RADIFOCUS GLIDEWIRE: Brand: GLIDEWIRE

## (undated) DEVICE — CATH VENT MIV RADL PIG ST TIP 5F 110CM

## (undated) DEVICE — RADIFOCUS GLIDEWIRE ADVANTAGE GUIDEWIRE: Brand: GLIDEWIRE ADVANTAGE

## (undated) DEVICE — CATH DIAG IMPULSE IMT 5F 100CM

## (undated) DEVICE — TREK CORONARY DILATATION CATHETER 2.50 MM X 12 MM / RAPID-EXCHANGE: Brand: TREK

## (undated) DEVICE — CATH ASPIR EXPORTADVANCE 6F .014IN 140CM